# Patient Record
Sex: FEMALE | Race: WHITE | NOT HISPANIC OR LATINO | URBAN - METROPOLITAN AREA
[De-identification: names, ages, dates, MRNs, and addresses within clinical notes are randomized per-mention and may not be internally consistent; named-entity substitution may affect disease eponyms.]

---

## 2017-03-19 ENCOUNTER — EMERGENCY (EMERGENCY)
Facility: HOSPITAL | Age: 62
LOS: 1 days | Discharge: HOME | End: 2017-03-19
Attending: EMERGENCY MEDICINE | Admitting: EMERGENCY MEDICINE
Payer: COMMERCIAL

## 2017-03-19 VITALS
TEMPERATURE: 98 F | OXYGEN SATURATION: 98 % | SYSTOLIC BLOOD PRESSURE: 173 MMHG | DIASTOLIC BLOOD PRESSURE: 100 MMHG | RESPIRATION RATE: 24 BRPM | WEIGHT: 147.93 LBS | HEIGHT: 65 IN | HEART RATE: 89 BPM

## 2017-03-19 VITALS
DIASTOLIC BLOOD PRESSURE: 76 MMHG | TEMPERATURE: 98 F | HEART RATE: 75 BPM | RESPIRATION RATE: 16 BRPM | SYSTOLIC BLOOD PRESSURE: 155 MMHG | OXYGEN SATURATION: 100 %

## 2017-03-19 DIAGNOSIS — E11.9 TYPE 2 DIABETES MELLITUS WITHOUT COMPLICATIONS: ICD-10-CM

## 2017-03-19 DIAGNOSIS — K74.60 UNSPECIFIED CIRRHOSIS OF LIVER: ICD-10-CM

## 2017-03-19 DIAGNOSIS — Z90.89 ACQUIRED ABSENCE OF OTHER ORGANS: ICD-10-CM

## 2017-03-19 DIAGNOSIS — Z98.890 OTHER SPECIFIED POSTPROCEDURAL STATES: Chronic | ICD-10-CM

## 2017-03-19 DIAGNOSIS — R10.11 RIGHT UPPER QUADRANT PAIN: ICD-10-CM

## 2017-03-19 DIAGNOSIS — E89.0 POSTPROCEDURAL HYPOTHYROIDISM: Chronic | ICD-10-CM

## 2017-03-19 DIAGNOSIS — Z90.49 ACQUIRED ABSENCE OF OTHER SPECIFIED PARTS OF DIGESTIVE TRACT: Chronic | ICD-10-CM

## 2017-03-19 DIAGNOSIS — F17.200 NICOTINE DEPENDENCE, UNSPECIFIED, UNCOMPLICATED: ICD-10-CM

## 2017-03-19 LAB
ACETONE SERPL-MCNC: NEGATIVE — SIGNIFICANT CHANGE UP
ALBUMIN SERPL ELPH-MCNC: 4 G/DL — SIGNIFICANT CHANGE UP (ref 3.3–5)
ALP SERPL-CCNC: 103 U/L — SIGNIFICANT CHANGE UP (ref 40–120)
ALT FLD-CCNC: 30 U/L RC — SIGNIFICANT CHANGE UP (ref 10–45)
ANION GAP SERPL CALC-SCNC: 14 MMOL/L — SIGNIFICANT CHANGE UP (ref 5–17)
APPEARANCE UR: CLEAR — SIGNIFICANT CHANGE UP
APTT BLD: 34.8 SEC — SIGNIFICANT CHANGE UP (ref 27.5–37.4)
AST SERPL-CCNC: 19 U/L — SIGNIFICANT CHANGE UP (ref 10–40)
BASOPHILS # BLD AUTO: 0.1 K/UL — SIGNIFICANT CHANGE UP (ref 0–0.2)
BASOPHILS NFR BLD AUTO: 1 % — SIGNIFICANT CHANGE UP (ref 0–2)
BILIRUB SERPL-MCNC: 0.4 MG/DL — SIGNIFICANT CHANGE UP (ref 0.2–1.2)
BILIRUB UR-MCNC: NEGATIVE — SIGNIFICANT CHANGE UP
BUN SERPL-MCNC: 17 MG/DL — SIGNIFICANT CHANGE UP (ref 7–23)
CALCIUM SERPL-MCNC: 9.2 MG/DL — SIGNIFICANT CHANGE UP (ref 8.4–10.5)
CHLORIDE SERPL-SCNC: 100 MMOL/L — SIGNIFICANT CHANGE UP (ref 96–108)
CO2 SERPL-SCNC: 26 MMOL/L — SIGNIFICANT CHANGE UP (ref 22–31)
COLOR SPEC: SIGNIFICANT CHANGE UP
COMMENT - URINE: SIGNIFICANT CHANGE UP
CREAT SERPL-MCNC: 0.93 MG/DL — SIGNIFICANT CHANGE UP (ref 0.5–1.3)
DIFF PNL FLD: NEGATIVE — SIGNIFICANT CHANGE UP
EOSINOPHIL # BLD AUTO: 0.1 K/UL — SIGNIFICANT CHANGE UP (ref 0–0.5)
EOSINOPHIL NFR BLD AUTO: 1.5 % — SIGNIFICANT CHANGE UP (ref 0–6)
GAS PNL BLDV: SIGNIFICANT CHANGE UP
GLUCOSE SERPL-MCNC: 428 MG/DL — HIGH (ref 70–99)
GLUCOSE UR QL: >1000
HCT VFR BLD CALC: 44.5 % — SIGNIFICANT CHANGE UP (ref 34.5–45)
HGB BLD-MCNC: 15.4 G/DL — SIGNIFICANT CHANGE UP (ref 11.5–15.5)
INR BLD: 1.07 RATIO — SIGNIFICANT CHANGE UP (ref 0.88–1.16)
KETONES UR-MCNC: NEGATIVE — SIGNIFICANT CHANGE UP
LEUKOCYTE ESTERASE UR-ACNC: NEGATIVE — SIGNIFICANT CHANGE UP
LIDOCAIN IGE QN: 55 U/L — SIGNIFICANT CHANGE UP (ref 7–60)
LYMPHOCYTES # BLD AUTO: 2.2 K/UL — SIGNIFICANT CHANGE UP (ref 1–3.3)
LYMPHOCYTES # BLD AUTO: 32.1 % — SIGNIFICANT CHANGE UP (ref 13–44)
MAGNESIUM SERPL-MCNC: 1.8 MG/DL — SIGNIFICANT CHANGE UP (ref 1.6–2.6)
MCHC RBC-ENTMCNC: 33.9 PG — SIGNIFICANT CHANGE UP (ref 27–34)
MCHC RBC-ENTMCNC: 34.7 GM/DL — SIGNIFICANT CHANGE UP (ref 32–36)
MCV RBC AUTO: 97.8 FL — SIGNIFICANT CHANGE UP (ref 80–100)
MONOCYTES # BLD AUTO: 0.4 K/UL — SIGNIFICANT CHANGE UP (ref 0–0.9)
MONOCYTES NFR BLD AUTO: 5.8 % — SIGNIFICANT CHANGE UP (ref 2–14)
NEUTROPHILS # BLD AUTO: 4.1 K/UL — SIGNIFICANT CHANGE UP (ref 1.8–7.4)
NEUTROPHILS NFR BLD AUTO: 59.6 % — SIGNIFICANT CHANGE UP (ref 43–77)
NITRITE UR-MCNC: NEGATIVE — SIGNIFICANT CHANGE UP
PH UR: 6 — SIGNIFICANT CHANGE UP (ref 4.8–8)
PHOSPHATE SERPL-MCNC: 3.4 MG/DL — SIGNIFICANT CHANGE UP (ref 2.5–4.5)
PLATELET # BLD AUTO: 91 K/UL — LOW (ref 150–400)
POTASSIUM SERPL-MCNC: 4 MMOL/L — SIGNIFICANT CHANGE UP (ref 3.5–5.3)
POTASSIUM SERPL-SCNC: 4 MMOL/L — SIGNIFICANT CHANGE UP (ref 3.5–5.3)
PROT SERPL-MCNC: 7.1 G/DL — SIGNIFICANT CHANGE UP (ref 6–8.3)
PROT UR-MCNC: 30 MG/DL
PROTHROM AB SERPL-ACNC: 11.7 SEC — SIGNIFICANT CHANGE UP (ref 10–13.1)
RBC # BLD: 4.55 M/UL — SIGNIFICANT CHANGE UP (ref 3.8–5.2)
RBC # FLD: 12.1 % — SIGNIFICANT CHANGE UP (ref 10.3–14.5)
RBC CASTS # UR COMP ASSIST: SIGNIFICANT CHANGE UP /HPF (ref 0–2)
SODIUM SERPL-SCNC: 140 MMOL/L — SIGNIFICANT CHANGE UP (ref 135–145)
SP GR SPEC: 1.02 — SIGNIFICANT CHANGE UP (ref 1.01–1.02)
UROBILINOGEN FLD QL: NEGATIVE — SIGNIFICANT CHANGE UP
WBC # BLD: 6.9 K/UL — SIGNIFICANT CHANGE UP (ref 3.8–10.5)
WBC # FLD AUTO: 6.9 K/UL — SIGNIFICANT CHANGE UP (ref 3.8–10.5)
WBC UR QL: SIGNIFICANT CHANGE UP /HPF (ref 0–5)

## 2017-03-19 PROCEDURE — 82947 ASSAY GLUCOSE BLOOD QUANT: CPT

## 2017-03-19 PROCEDURE — 99284 EMERGENCY DEPT VISIT MOD MDM: CPT | Mod: 25

## 2017-03-19 PROCEDURE — 74176 CT ABD & PELVIS W/O CONTRAST: CPT

## 2017-03-19 PROCEDURE — 80053 COMPREHEN METABOLIC PANEL: CPT

## 2017-03-19 PROCEDURE — 84295 ASSAY OF SERUM SODIUM: CPT

## 2017-03-19 PROCEDURE — 96376 TX/PRO/DX INJ SAME DRUG ADON: CPT

## 2017-03-19 PROCEDURE — 99285 EMERGENCY DEPT VISIT HI MDM: CPT | Mod: 25

## 2017-03-19 PROCEDURE — 81001 URINALYSIS AUTO W/SCOPE: CPT

## 2017-03-19 PROCEDURE — 84100 ASSAY OF PHOSPHORUS: CPT

## 2017-03-19 PROCEDURE — 82803 BLOOD GASES ANY COMBINATION: CPT

## 2017-03-19 PROCEDURE — 82330 ASSAY OF CALCIUM: CPT

## 2017-03-19 PROCEDURE — 85027 COMPLETE CBC AUTOMATED: CPT

## 2017-03-19 PROCEDURE — 74176 CT ABD & PELVIS W/O CONTRAST: CPT | Mod: 26

## 2017-03-19 PROCEDURE — 83735 ASSAY OF MAGNESIUM: CPT

## 2017-03-19 PROCEDURE — 83605 ASSAY OF LACTIC ACID: CPT

## 2017-03-19 PROCEDURE — 84132 ASSAY OF SERUM POTASSIUM: CPT

## 2017-03-19 PROCEDURE — 85610 PROTHROMBIN TIME: CPT

## 2017-03-19 PROCEDURE — 85014 HEMATOCRIT: CPT

## 2017-03-19 PROCEDURE — 82009 KETONE BODYS QUAL: CPT

## 2017-03-19 PROCEDURE — 82435 ASSAY OF BLOOD CHLORIDE: CPT

## 2017-03-19 PROCEDURE — 85730 THROMBOPLASTIN TIME PARTIAL: CPT

## 2017-03-19 PROCEDURE — 83690 ASSAY OF LIPASE: CPT

## 2017-03-19 PROCEDURE — 96375 TX/PRO/DX INJ NEW DRUG ADDON: CPT

## 2017-03-19 PROCEDURE — 96374 THER/PROPH/DIAG INJ IV PUSH: CPT

## 2017-03-19 RX ORDER — SODIUM CHLORIDE 9 MG/ML
1000 INJECTION, SOLUTION INTRAVENOUS ONCE
Qty: 0 | Refills: 0 | Status: COMPLETED | OUTPATIENT
Start: 2017-03-19 | End: 2017-03-19

## 2017-03-19 RX ORDER — MORPHINE SULFATE 50 MG/1
4 CAPSULE, EXTENDED RELEASE ORAL ONCE
Qty: 0 | Refills: 0 | Status: DISCONTINUED | OUTPATIENT
Start: 2017-03-19 | End: 2017-03-19

## 2017-03-19 RX ORDER — DOCUSATE SODIUM 100 MG
1 CAPSULE ORAL
Qty: 14 | Refills: 0
Start: 2017-03-19 | End: 2017-03-26

## 2017-03-19 RX ORDER — OXYCODONE HYDROCHLORIDE 5 MG/1
1 TABLET ORAL
Qty: 12 | Refills: 0
Start: 2017-03-19 | End: 2017-03-22

## 2017-03-19 RX ORDER — ACETAMINOPHEN 500 MG
1000 TABLET ORAL ONCE
Qty: 0 | Refills: 0 | Status: COMPLETED | OUTPATIENT
Start: 2017-03-19 | End: 2017-03-19

## 2017-03-19 RX ORDER — SENNA PLUS 8.6 MG/1
1 TABLET ORAL
Qty: 7 | Refills: 0
Start: 2017-03-19 | End: 2017-03-26

## 2017-03-19 RX ORDER — ONDANSETRON 8 MG/1
4 TABLET, FILM COATED ORAL ONCE
Qty: 0 | Refills: 0 | Status: COMPLETED | OUTPATIENT
Start: 2017-03-19 | End: 2017-03-19

## 2017-03-19 RX ORDER — HYDROMORPHONE HYDROCHLORIDE 2 MG/ML
0.5 INJECTION INTRAMUSCULAR; INTRAVENOUS; SUBCUTANEOUS ONCE
Qty: 0 | Refills: 0 | Status: DISCONTINUED | OUTPATIENT
Start: 2017-03-19 | End: 2017-03-19

## 2017-03-19 RX ORDER — SODIUM CHLORIDE 9 MG/ML
1000 INJECTION INTRAMUSCULAR; INTRAVENOUS; SUBCUTANEOUS ONCE
Qty: 0 | Refills: 0 | Status: COMPLETED | OUTPATIENT
Start: 2017-03-19 | End: 2017-03-19

## 2017-03-19 RX ADMIN — MORPHINE SULFATE 4 MILLIGRAM(S): 50 CAPSULE, EXTENDED RELEASE ORAL at 06:41

## 2017-03-19 RX ADMIN — Medication 400 MILLIGRAM(S): at 06:10

## 2017-03-19 RX ADMIN — MORPHINE SULFATE 4 MILLIGRAM(S): 50 CAPSULE, EXTENDED RELEASE ORAL at 07:35

## 2017-03-19 RX ADMIN — Medication 1000 MILLIGRAM(S): at 06:42

## 2017-03-19 RX ADMIN — SODIUM CHLORIDE 1000 MILLILITER(S): 9 INJECTION INTRAMUSCULAR; INTRAVENOUS; SUBCUTANEOUS at 06:10

## 2017-03-19 RX ADMIN — SODIUM CHLORIDE 4000 MILLILITER(S): 9 INJECTION, SOLUTION INTRAVENOUS at 08:03

## 2017-03-19 RX ADMIN — HYDROMORPHONE HYDROCHLORIDE 0.5 MILLIGRAM(S): 2 INJECTION INTRAMUSCULAR; INTRAVENOUS; SUBCUTANEOUS at 10:54

## 2017-03-19 RX ADMIN — MORPHINE SULFATE 4 MILLIGRAM(S): 50 CAPSULE, EXTENDED RELEASE ORAL at 07:11

## 2017-03-19 RX ADMIN — MORPHINE SULFATE 4 MILLIGRAM(S): 50 CAPSULE, EXTENDED RELEASE ORAL at 08:03

## 2017-03-19 RX ADMIN — ONDANSETRON 4 MILLIGRAM(S): 8 TABLET, FILM COATED ORAL at 06:10

## 2017-03-19 NOTE — ED PROVIDER NOTE - CARE PLAN
Instructions for follow-up, activity and diet:	You were seen in the Emergency Department for abdominal pain. Your examination and lab tests were reassuring. Please follow up with your regular physician this week for reevaluation. Please follow up with your gastroenterologist as soon as possible for further evaluation. Take the pain medications as needed. Please return to the Emergency Department if you have any new concerning symptoms such as severe pain, weakness, or any other concerning symptoms. Principal Discharge DX:	Cirrhosis  Instructions for follow-up, activity and diet:	You were seen in the Emergency Department for abdominal pain. Your examination and lab tests were reassuring. Please follow up with your regular physician this week for reevaluation. Please follow up with your gastroenterologist as soon as possible for further evaluation. Take the pain medications as needed. Please return to the Emergency Department if you have any new concerning symptoms such as severe pain, weakness, or any other concerning symptoms.

## 2017-03-19 NOTE — ED ADULT NURSE NOTE - PMH
Bernard syndrome    Diabetes    Gastroptosis    Liver disease    Pancreatitis Bernard syndrome    Cirrhosis    Diabetes    Gastroptosis    Liver disease    Pancreatitis

## 2017-03-19 NOTE — ED PROVIDER NOTE - NS ED ROS FT
ROS: No fever/chills, no eye pain, no throat pain, no chest pain, no shortness of breath, pos  abdominal pain,  no dysuria, no muscle pain, no rashes, no focal neurologic complaints, no known mental health issues

## 2017-03-19 NOTE — ED PROVIDER NOTE - PHYSICAL EXAMINATION
Aida: A & O x 3, NAD, HEENT WNL and no facial asymmetry; lungs CTAB, heart with reg rhythm without murmur; abdomen soft with right upper quadrant tenderness on lateral upper quadrandt. extremities with no edema; skin with no rashes, neuro exam non focal with no motor or sensory deficits

## 2017-03-19 NOTE — ED PROVIDER NOTE - OBJECTIVE STATEMENT
61 year old, diabetic with Hep C and cirrhosis presenting with sharp right upper quadrant pain . 61 year old, diabetic with Hep C and cirrhosis presenting with sharp right upper quadrant pain occasionally radiating to the back.     GI: antionette bradford  Pmd: [facility] 61 year old, diabetic with Hep C, intermittent pancreatitis, and cirrhosis presenting with sharp right upper quadrant pain occasionally radiating to the back. nausea with no vomit. pain is 3x per year for the past few years.     GI: antionette bradford  Pmd: [facility] Dr. Ranjit Blcok 61 year old, diabetic with Hep C (since transfusion during birth), intermittent pancreatitis, and cirrhosis presenting with sharp right upper quadrant pain occasionally radiating to the back. nausea with no vomit. pain is 3x per year for the past few years. states chronic abdominal pain which requires narcotics. On Istop patient found to have 3 vicodin per day rx for at least the past 6 months.     GI: antionette bradford  Pmd: [facility] Dr. Ranjit Block

## 2017-03-19 NOTE — ED ADULT NURSE REASSESSMENT NOTE - NS ED NURSE REASSESS COMMENT FT1
vss; bp high secondary to pain; adv md marrufo; pt drinking for ct scan; c/o 7/10 pain; adv md stock

## 2017-03-19 NOTE — ED PROVIDER NOTE - ATTENDING CONTRIBUTION TO CARE
61 year old, diabetic with Hep C, intermittent pancreatitis, and cirrhosis c/o ruq tend sharp with soft abd. pt came to see gi.  ct a/p, labs, and analgesia

## 2017-03-19 NOTE — ED ADULT NURSE NOTE - PSH
H/O total thyroidectomy    History of cholecystectomy    History of liver biopsy H/O total thyroidectomy    History of cholecystectomy  1990s  History of liver biopsy

## 2017-03-19 NOTE — ED ADULT NURSE NOTE - OBJECTIVE STATEMENT
61 year old female with Hx of hep C, liver cirrhosis, gall bladder removal presented to ED complaining of RUQ and RLQ pain 7/10  and nausea that has been off and on for a week. Pt was discharged from [facility] 2 days ago with 'abdominal pain' and some minor ascites. Pt states that the pain has been getting worse in the last 2 hours and she came here because her MD is here. Pt is A&O x 4, VSS, afebrile in ED, ambulates independently. Abdomen is distended and tender to palpation. + bowel sounds. IV placed, labs drawn, bed in low position.

## 2017-03-19 NOTE — ED PROVIDER NOTE - PLAN OF CARE
You were seen in the Emergency Department for abdominal pain. Your examination and lab tests were reassuring. Please follow up with your regular physician this week for reevaluation. Please follow up with your gastroenterologist as soon as possible for further evaluation. Take the pain medications as needed. Please return to the Emergency Department if you have any new concerning symptoms such as severe pain, weakness, or any other concerning symptoms.

## 2017-03-19 NOTE — ED PROVIDER NOTE - PROGRESS NOTE DETAILS
Spoke with GI regarding abdominal pain, CT showing cirrhosis, splenomegaly. Report that this pain is chronic and that she does not need further GI work up in ED. Pt. with recent EGD, colonoscopy, MRI that were normal. would like patient to follow up with DR. López this week. Patient agrees with discharge and outpatient follow up with strict return precautions.

## 2017-03-29 ENCOUNTER — APPOINTMENT (OUTPATIENT)
Age: 62
End: 2017-03-29

## 2017-03-29 VITALS
BODY MASS INDEX: 25.49 KG/M2 | HEIGHT: 65 IN | HEART RATE: 85 BPM | WEIGHT: 153 LBS | TEMPERATURE: 98.3 F | SYSTOLIC BLOOD PRESSURE: 116 MMHG | DIASTOLIC BLOOD PRESSURE: 76 MMHG | RESPIRATION RATE: 14 BRPM

## 2017-06-28 ENCOUNTER — APPOINTMENT (OUTPATIENT)
Age: 62
End: 2017-06-28

## 2017-08-07 ENCOUNTER — APPOINTMENT (OUTPATIENT)
Age: 62
End: 2017-08-07
Payer: COMMERCIAL

## 2017-08-07 PROCEDURE — 91200 LIVER ELASTOGRAPHY: CPT

## 2018-02-07 ENCOUNTER — LABORATORY RESULT (OUTPATIENT)
Age: 63
End: 2018-02-07

## 2018-02-07 ENCOUNTER — APPOINTMENT (OUTPATIENT)
Age: 63
End: 2018-02-07
Payer: COMMERCIAL

## 2018-02-07 VITALS
BODY MASS INDEX: 23.99 KG/M2 | DIASTOLIC BLOOD PRESSURE: 78 MMHG | WEIGHT: 144 LBS | TEMPERATURE: 98.1 F | SYSTOLIC BLOOD PRESSURE: 129 MMHG | HEART RATE: 76 BPM | RESPIRATION RATE: 14 BRPM | HEIGHT: 65 IN

## 2018-02-07 DIAGNOSIS — K74.60 CHRONIC VIRAL HEPATITIS C: ICD-10-CM

## 2018-02-07 DIAGNOSIS — B18.2 CHRONIC VIRAL HEPATITIS C: ICD-10-CM

## 2018-02-07 DIAGNOSIS — F17.200 NICOTINE DEPENDENCE, UNSPECIFIED, UNCOMPLICATED: ICD-10-CM

## 2018-02-07 PROCEDURE — 99214 OFFICE O/P EST MOD 30 MIN: CPT

## 2018-02-08 LAB
AFP-TM SERPL-MCNC: 4.3 NG/ML
ALBUMIN SERPL ELPH-MCNC: 3.5 G/DL
ALP BLD-CCNC: 97 U/L
ALT SERPL-CCNC: 53 U/L
ANION GAP SERPL CALC-SCNC: 15 MMOL/L
AST SERPL-CCNC: 38 U/L
BASOPHILS # BLD AUTO: 0.02 K/UL
BASOPHILS NFR BLD AUTO: 0.4 %
BILIRUB SERPL-MCNC: 0.4 MG/DL
BUN SERPL-MCNC: 20 MG/DL
CALCIUM SERPL-MCNC: 9 MG/DL
CHLORIDE SERPL-SCNC: 99 MMOL/L
CO2 SERPL-SCNC: 23 MMOL/L
CREAT SERPL-MCNC: 0.87 MG/DL
EOSINOPHIL # BLD AUTO: 0.09 K/UL
EOSINOPHIL NFR BLD AUTO: 1.7 %
HCT VFR BLD CALC: 41 %
HGB BLD-MCNC: 14.2 G/DL
IMM GRANULOCYTES NFR BLD AUTO: 0.2 %
INR PPP: 1.03 RATIO
LYMPHOCYTES # BLD AUTO: 1.85 K/UL
LYMPHOCYTES NFR BLD AUTO: 34.7 %
MAN DIFF?: NORMAL
MCHC RBC-ENTMCNC: 33.6 PG
MCHC RBC-ENTMCNC: 34.6 GM/DL
MCV RBC AUTO: 96.9 FL
MONOCYTES # BLD AUTO: 0.28 K/UL
MONOCYTES NFR BLD AUTO: 5.3 %
NEUTROPHILS # BLD AUTO: 3.08 K/UL
NEUTROPHILS NFR BLD AUTO: 57.7 %
PLATELET # BLD AUTO: 97 K/UL
POTASSIUM SERPL-SCNC: 4.6 MMOL/L
PROT SERPL-MCNC: 6.7 G/DL
PT BLD: 11.7 SEC
RBC # BLD: 4.23 M/UL
RBC # FLD: 13.3 %
SODIUM SERPL-SCNC: 137 MMOL/L
WBC # FLD AUTO: 5.33 K/UL

## 2018-02-09 LAB
HCV RNA SERPL NAA DL=5-ACNC: NOT DETECTED IU/ML
HCV RNA SERPL NAA+PROBE-LOG IU: NOT DETECTED LOGIU/ML

## 2018-02-11 ENCOUNTER — FORM ENCOUNTER (OUTPATIENT)
Age: 63
End: 2018-02-11

## 2018-02-12 ENCOUNTER — OUTPATIENT (OUTPATIENT)
Dept: OUTPATIENT SERVICES | Facility: HOSPITAL | Age: 63
LOS: 1 days | End: 2018-02-12

## 2018-02-12 ENCOUNTER — APPOINTMENT (OUTPATIENT)
Dept: ULTRASOUND IMAGING | Facility: CLINIC | Age: 63
End: 2018-02-12
Payer: COMMERCIAL

## 2018-02-12 DIAGNOSIS — B18.2 CHRONIC VIRAL HEPATITIS C: ICD-10-CM

## 2018-02-12 DIAGNOSIS — Z90.49 ACQUIRED ABSENCE OF OTHER SPECIFIED PARTS OF DIGESTIVE TRACT: Chronic | ICD-10-CM

## 2018-02-12 DIAGNOSIS — E89.0 POSTPROCEDURAL HYPOTHYROIDISM: Chronic | ICD-10-CM

## 2018-02-12 DIAGNOSIS — Z98.890 OTHER SPECIFIED POSTPROCEDURAL STATES: Chronic | ICD-10-CM

## 2018-02-12 PROCEDURE — 76700 US EXAM ABDOM COMPLETE: CPT | Mod: 26

## 2018-03-12 ENCOUNTER — APPOINTMENT (OUTPATIENT)
Dept: GASTROENTEROLOGY | Facility: CLINIC | Age: 63
End: 2018-03-12
Payer: COMMERCIAL

## 2018-03-12 VITALS
OXYGEN SATURATION: 97 % | DIASTOLIC BLOOD PRESSURE: 64 MMHG | HEART RATE: 58 BPM | BODY MASS INDEX: 23.99 KG/M2 | HEIGHT: 65 IN | TEMPERATURE: 97.5 F | RESPIRATION RATE: 12 BRPM | WEIGHT: 144 LBS | SYSTOLIC BLOOD PRESSURE: 118 MMHG

## 2018-03-12 DIAGNOSIS — R63.4 ABNORMAL WEIGHT LOSS: ICD-10-CM

## 2018-03-12 DIAGNOSIS — R10.11 RIGHT UPPER QUADRANT PAIN: ICD-10-CM

## 2018-03-12 PROCEDURE — 99213 OFFICE O/P EST LOW 20 MIN: CPT

## 2018-03-22 ENCOUNTER — APPOINTMENT (OUTPATIENT)
Dept: CT IMAGING | Facility: CLINIC | Age: 63
End: 2018-03-22

## 2018-03-30 DIAGNOSIS — Z91.041 RADIOGRAPHIC DYE ALLERGY STATUS: ICD-10-CM

## 2018-04-01 ENCOUNTER — FORM ENCOUNTER (OUTPATIENT)
Age: 63
End: 2018-04-01

## 2018-04-02 ENCOUNTER — APPOINTMENT (OUTPATIENT)
Dept: CT IMAGING | Facility: CLINIC | Age: 63
End: 2018-04-02
Payer: COMMERCIAL

## 2018-04-02 ENCOUNTER — OUTPATIENT (OUTPATIENT)
Dept: OUTPATIENT SERVICES | Facility: HOSPITAL | Age: 63
LOS: 1 days | End: 2018-04-02

## 2018-04-02 DIAGNOSIS — Z98.890 OTHER SPECIFIED POSTPROCEDURAL STATES: Chronic | ICD-10-CM

## 2018-04-02 DIAGNOSIS — Z00.8 ENCOUNTER FOR OTHER GENERAL EXAMINATION: ICD-10-CM

## 2018-04-02 DIAGNOSIS — E89.0 POSTPROCEDURAL HYPOTHYROIDISM: Chronic | ICD-10-CM

## 2018-04-02 DIAGNOSIS — Z90.49 ACQUIRED ABSENCE OF OTHER SPECIFIED PARTS OF DIGESTIVE TRACT: Chronic | ICD-10-CM

## 2018-04-02 PROCEDURE — 74177 CT ABD & PELVIS W/CONTRAST: CPT | Mod: 26

## 2018-06-13 ENCOUNTER — APPOINTMENT (OUTPATIENT)
Dept: GASTROENTEROLOGY | Facility: CLINIC | Age: 63
End: 2018-06-13

## 2018-08-15 ENCOUNTER — APPOINTMENT (OUTPATIENT)
Dept: HEPATOLOGY | Facility: CLINIC | Age: 63
End: 2018-08-15

## 2018-08-27 PROBLEM — K85.90 ACUTE PANCREATITIS WITHOUT NECROSIS OR INFECTION, UNSPECIFIED: Chronic | Status: ACTIVE | Noted: 2017-03-19

## 2018-08-27 PROBLEM — E11.9 TYPE 2 DIABETES MELLITUS WITHOUT COMPLICATIONS: Chronic | Status: ACTIVE | Noted: 2017-03-19

## 2018-08-27 PROBLEM — K74.60 UNSPECIFIED CIRRHOSIS OF LIVER: Chronic | Status: ACTIVE | Noted: 2017-03-19

## 2018-08-27 PROBLEM — K22.70 BARRETT'S ESOPHAGUS WITHOUT DYSPLASIA: Chronic | Status: ACTIVE | Noted: 2017-03-19

## 2018-10-11 ENCOUNTER — APPOINTMENT (OUTPATIENT)
Dept: ENDOCRINOLOGY | Facility: CLINIC | Age: 63
End: 2018-10-11
Payer: COMMERCIAL

## 2018-10-11 VITALS
BODY MASS INDEX: 24.66 KG/M2 | HEART RATE: 79 BPM | WEIGHT: 148 LBS | SYSTOLIC BLOOD PRESSURE: 108 MMHG | DIASTOLIC BLOOD PRESSURE: 64 MMHG | HEIGHT: 65 IN

## 2018-10-11 LAB — GLUCOSE BLDC GLUCOMTR-MCNC: 304

## 2018-10-11 PROCEDURE — 82962 GLUCOSE BLOOD TEST: CPT

## 2018-10-11 PROCEDURE — 99215 OFFICE O/P EST HI 40 MIN: CPT | Mod: 25

## 2018-10-11 RX ORDER — BLOOD-GLUCOSE METER
EACH MISCELLANEOUS
Refills: 0 | Status: ACTIVE | COMMUNITY

## 2018-10-11 RX ORDER — RANITIDINE HYDROCHLORIDE 150 MG/1
150 CAPSULE ORAL TWICE DAILY
Qty: 60 | Refills: 3 | Status: DISCONTINUED | COMMUNITY
Start: 2018-03-12 | End: 2018-10-11

## 2018-10-11 RX ORDER — LANCETS 18 GAUGE
EACH MISCELLANEOUS
Qty: 600 | Refills: 1 | Status: ACTIVE | COMMUNITY

## 2018-11-21 ENCOUNTER — APPOINTMENT (OUTPATIENT)
Dept: ENDOCRINOLOGY | Facility: CLINIC | Age: 63
End: 2018-11-21

## 2018-12-19 ENCOUNTER — APPOINTMENT (OUTPATIENT)
Dept: HEPATOLOGY | Facility: CLINIC | Age: 63
End: 2018-12-19

## 2019-01-07 ENCOUNTER — MEDICATION RENEWAL (OUTPATIENT)
Age: 64
End: 2019-01-07

## 2019-01-07 ENCOUNTER — APPOINTMENT (OUTPATIENT)
Dept: HEPATOLOGY | Facility: CLINIC | Age: 64
End: 2019-01-07
Payer: COMMERCIAL

## 2019-01-07 VITALS
SYSTOLIC BLOOD PRESSURE: 165 MMHG | WEIGHT: 148 LBS | DIASTOLIC BLOOD PRESSURE: 84 MMHG | HEIGHT: 65 IN | HEART RATE: 76 BPM | TEMPERATURE: 98.2 F | BODY MASS INDEX: 24.66 KG/M2 | RESPIRATION RATE: 12 BRPM

## 2019-01-07 LAB
AFP-TM SERPL-MCNC: 5.2 NG/ML
ALBUMIN SERPL ELPH-MCNC: 3.4 G/DL
ALP BLD-CCNC: 77 U/L
ALT SERPL-CCNC: 15 U/L
ANION GAP SERPL CALC-SCNC: 9 MMOL/L
AST SERPL-CCNC: 15 U/L
BASOPHILS # BLD AUTO: 0.04 K/UL
BASOPHILS NFR BLD AUTO: 0.6 %
BILIRUB SERPL-MCNC: 0.4 MG/DL
BUN SERPL-MCNC: 22 MG/DL
CALCIUM SERPL-MCNC: 8.7 MG/DL
CHLORIDE SERPL-SCNC: 104 MMOL/L
CO2 SERPL-SCNC: 26 MMOL/L
CREAT SERPL-MCNC: 0.7 MG/DL
EOSINOPHIL # BLD AUTO: 0.3 K/UL
EOSINOPHIL NFR BLD AUTO: 4.7 %
HCT VFR BLD CALC: 39.6 %
HGB BLD-MCNC: 13.7 G/DL
IMM GRANULOCYTES NFR BLD AUTO: 0.2 %
INR PPP: 1.05 RATIO
LYMPHOCYTES # BLD AUTO: 1.91 K/UL
LYMPHOCYTES NFR BLD AUTO: 30.2 %
MAN DIFF?: NORMAL
MCHC RBC-ENTMCNC: 31.6 PG
MCHC RBC-ENTMCNC: 34.6 GM/DL
MCV RBC AUTO: 91.2 FL
MONOCYTES # BLD AUTO: 0.32 K/UL
MONOCYTES NFR BLD AUTO: 5.1 %
NEUTROPHILS # BLD AUTO: 3.75 K/UL
NEUTROPHILS NFR BLD AUTO: 59.2 %
PLATELET # BLD AUTO: 110 K/UL
POTASSIUM SERPL-SCNC: 4 MMOL/L
PROT SERPL-MCNC: 6.4 G/DL
PT BLD: 12 SEC
RBC # BLD: 4.34 M/UL
RBC # FLD: 13.6 %
SODIUM SERPL-SCNC: 139 MMOL/L
WBC # FLD AUTO: 6.33 K/UL

## 2019-01-07 PROCEDURE — 99214 OFFICE O/P EST MOD 30 MIN: CPT

## 2019-01-07 RX ORDER — BLOOD SUGAR DIAGNOSTIC
STRIP MISCELLANEOUS
Qty: 600 | Refills: 1 | Status: DISCONTINUED | COMMUNITY
End: 2019-01-07

## 2019-01-07 NOTE — CONSULT LETTER
[Dear  ___] : Dear  [unfilled], [Courtesy Letter:] : I had the pleasure of seeing your patient, [unfilled], in my office today. [Please see my note below.] : Please see my note below. [Consult Closing:] : Thank you very much for allowing me to participate in the care of this patient.  If you have any questions, please do not hesitate to contact me. [Sincerely,] : Sincerely, [Ernesto López MD, FACP, TYREL, ROSY, RHONDA] : Ernesto López MD, FACP, TYREL, ROSY, RHONDA [Chief, Division of Hepatology] : Chief, Division of Hepatology [[facility]] : [facility] [Professor of Medicine] : Professor of Medicine [[facility] [employer] [facility]] : [facility]

## 2019-01-07 NOTE — ASSESSMENT
[FreeTextEntry1] : 63-year-old woman with cirrhosis secondary to hepatitis C who has had a sustained virologic response to hepatitis C therapy.\par \par I explained the meaning of sustained virological response. I explained the that hepatitis C antibody will be present for life. I explained that the patient will not be able to donate blood because of the positive antibody. I have explained that the antibody is not protective and that hepatitis C infection can be acquired again if  exposed. I reviewed the risk factors for acquiring hepatitis C. \par \par I discussed the meaning of cirrhosis with the patient. I reviewed the natural history of the disease. I explained the risks for the development of esophageal varices with and without bleeding, hepatic encephalopathy, ascites, hepatocellular carcinoma, and liver failure. I have explained that disease can progress to the point of requiring an evaluation for liver transplantation. I have explained the need for imaging every 6 months to screen for liver cancer.\par \par She has no evidence of encephalopathy on physical examination. I have asked her to get a copy of the CT scan report done last month sent to me. If this is without contrast, I am asked her to obtain an abdominal sonogram. I will speak with her on the phone regarding these results. I have recommended blood test to be performed today.\par \par I have recommended followup with GI for persistent abdominal discomfort.\par \par I've asked her to call me with any questions. There are no significant changes related to her liver, I would like to see her back in 6 months with repeat laboratory tests and abdominal sonogram

## 2019-01-07 NOTE — PHYSICAL EXAM
[Liver Size (___ Cm)] : Liver size [unfilled] cm [Cognitive Mini-Mental Status Normal?] : Cognitive Mini Mental Status Exam is normal [General Appearance - Alert] : alert [General Appearance - In No Acute Distress] : in no acute distress [Sclera] : the sclera and conjunctiva were normal [PERRL With Normal Accommodation] : pupils were equal in size, round, and reactive to light [Neck Appearance] : the appearance of the neck was normal [Neck Cervical Mass (___cm)] : no neck mass was observed [Jugular Venous Distention Increased] : there was no jugular-venous distention [Thyroid Diffuse Enlargement] : the thyroid was not enlarged [Thyroid Nodule] : there were no palpable thyroid nodules [Auscultation Breath Sounds / Voice Sounds] : lungs were clear to auscultation bilaterally [Heart Rate And Rhythm] : heart rate was normal and rhythm regular [Heart Sounds] : normal S1 and S2 [Heart Sounds Gallop] : no gallops [Murmurs] : no murmurs [Heart Sounds Pericardial Friction Rub] : no pericardial rub [Edema] : there was no peripheral edema [Bowel Sounds] : normal bowel sounds [Abdomen Soft] : soft [No CVA Tenderness] : no ~M costovertebral angle tenderness [No Spinal Tenderness] : no spinal tenderness [Abnormal Walk] : normal gait [Nail Clubbing] : no clubbing  or cyanosis of the fingernails [Musculoskeletal - Swelling] : no joint swelling seen [Motor Tone] : muscle strength and tone were normal [Skin Color & Pigmentation] : normal skin color and pigmentation [Skin Turgor] : normal skin turgor [] : no rash [Oriented To Time, Place, And Person] : oriented to person, place, and time [Impaired Insight] : insight and judgment were intact [Affect] : the affect was normal [Scleral Icterus] : No Scleral Icterus [Hepatojugular Reflux] : patient did not have a sustained hepatojugular reflux [Spider Angioma] : No spider angioma(s) were observed [Abdominal Bruit] : no abdominal bruit [Splenomegaly] : no splenomegaly [Scrotal Edema] : Scrotum is not edematous [Asterixis] : no asterixis observed [Jaundice] : No jaundice [Palmar Erythema] : no Palmar Erythema [Depression] : no depression [FreeTextEntry1] : Mild tenderness in the right upper quadrant to deep palpation, no rebound, no guarding, liver edge is palpable below the costal margin and is irregular. Her spleen is not palpable

## 2019-01-08 ENCOUNTER — MEDICATION RENEWAL (OUTPATIENT)
Age: 64
End: 2019-01-08

## 2019-01-25 ENCOUNTER — RECORD ABSTRACTING (OUTPATIENT)
Age: 64
End: 2019-01-25

## 2019-01-25 DIAGNOSIS — Z83.3 FAMILY HISTORY OF DIABETES MELLITUS: ICD-10-CM

## 2019-01-25 DIAGNOSIS — Z85.850 PERSONAL HISTORY OF MALIGNANT NEOPLASM OF THYROID: ICD-10-CM

## 2019-01-25 DIAGNOSIS — Z86.59 PERSONAL HISTORY OF OTHER MENTAL AND BEHAVIORAL DISORDERS: ICD-10-CM

## 2019-01-25 DIAGNOSIS — Z80.9 FAMILY HISTORY OF MALIGNANT NEOPLASM, UNSPECIFIED: ICD-10-CM

## 2019-02-04 ENCOUNTER — APPOINTMENT (OUTPATIENT)
Dept: GASTROENTEROLOGY | Facility: CLINIC | Age: 64
End: 2019-02-04

## 2019-02-11 ENCOUNTER — APPOINTMENT (OUTPATIENT)
Dept: ENDOCRINOLOGY | Facility: CLINIC | Age: 64
End: 2019-02-11

## 2019-05-09 ENCOUNTER — APPOINTMENT (OUTPATIENT)
Dept: ENDOCRINOLOGY | Facility: CLINIC | Age: 64
End: 2019-05-09
Payer: COMMERCIAL

## 2019-05-09 ENCOUNTER — EMERGENCY (EMERGENCY)
Facility: HOSPITAL | Age: 64
LOS: 1 days | Discharge: HOME | End: 2019-05-09
Attending: STUDENT IN AN ORGANIZED HEALTH CARE EDUCATION/TRAINING PROGRAM
Payer: COMMERCIAL

## 2019-05-09 VITALS
TEMPERATURE: 99 F | HEIGHT: 65 IN | OXYGEN SATURATION: 99 % | WEIGHT: 151.02 LBS | HEART RATE: 64 BPM | DIASTOLIC BLOOD PRESSURE: 76 MMHG | SYSTOLIC BLOOD PRESSURE: 145 MMHG | RESPIRATION RATE: 16 BRPM

## 2019-05-09 VITALS
HEIGHT: 65 IN | DIASTOLIC BLOOD PRESSURE: 88 MMHG | BODY MASS INDEX: 25.16 KG/M2 | SYSTOLIC BLOOD PRESSURE: 142 MMHG | WEIGHT: 151 LBS | HEART RATE: 65 BPM | OXYGEN SATURATION: 97 %

## 2019-05-09 DIAGNOSIS — Z98.890 OTHER SPECIFIED POSTPROCEDURAL STATES: Chronic | ICD-10-CM

## 2019-05-09 DIAGNOSIS — Z90.49 ACQUIRED ABSENCE OF OTHER SPECIFIED PARTS OF DIGESTIVE TRACT: Chronic | ICD-10-CM

## 2019-05-09 DIAGNOSIS — E89.0 POSTPROCEDURAL HYPOTHYROIDISM: Chronic | ICD-10-CM

## 2019-05-09 LAB
ACETONE SERPL-MCNC: NEGATIVE — SIGNIFICANT CHANGE UP
ALBUMIN SERPL ELPH-MCNC: 2.6 G/DL — LOW (ref 3.3–5.2)
ALP SERPL-CCNC: 142 U/L — HIGH (ref 40–120)
ALT FLD-CCNC: 38 U/L — HIGH
ANION GAP SERPL CALC-SCNC: 9 MMOL/L — SIGNIFICANT CHANGE UP (ref 5–17)
AST SERPL-CCNC: 33 U/L — HIGH
BASOPHILS # BLD AUTO: 0 K/UL — SIGNIFICANT CHANGE UP (ref 0–0.2)
BASOPHILS NFR BLD AUTO: 0.5 % — SIGNIFICANT CHANGE UP (ref 0–2)
BILIRUB SERPL-MCNC: <0.2 MG/DL — LOW (ref 0.4–2)
BUN SERPL-MCNC: 26 MG/DL — HIGH (ref 8–20)
CALCIUM SERPL-MCNC: 9 MG/DL — SIGNIFICANT CHANGE UP (ref 8.6–10.2)
CHLORIDE SERPL-SCNC: 99 MMOL/L — SIGNIFICANT CHANGE UP (ref 98–107)
CO2 SERPL-SCNC: 27 MMOL/L — SIGNIFICANT CHANGE UP (ref 22–29)
CREAT SERPL-MCNC: 0.77 MG/DL — SIGNIFICANT CHANGE UP (ref 0.5–1.3)
EOSINOPHIL # BLD AUTO: 0.2 K/UL — SIGNIFICANT CHANGE UP (ref 0–0.5)
EOSINOPHIL NFR BLD AUTO: 3 % — SIGNIFICANT CHANGE UP (ref 0–6)
GLUCOSE BLDC GLUCOMTR-MCNC: 371
GLUCOSE BLDC GLUCOMTR-MCNC: 438
GLUCOSE BLDC GLUCOMTR-MCNC: 445
GLUCOSE SERPL-MCNC: 335 MG/DL — HIGH (ref 70–115)
HCT VFR BLD CALC: 36.4 % — LOW (ref 37–47)
HGB BLD-MCNC: 12.8 G/DL — SIGNIFICANT CHANGE UP (ref 12–16)
LYMPHOCYTES # BLD AUTO: 2.5 K/UL — SIGNIFICANT CHANGE UP (ref 1–4.8)
LYMPHOCYTES # BLD AUTO: 40 % — SIGNIFICANT CHANGE UP (ref 20–55)
MCHC RBC-ENTMCNC: 32.7 PG — HIGH (ref 27–31)
MCHC RBC-ENTMCNC: 35.2 G/DL — SIGNIFICANT CHANGE UP (ref 32–36)
MCV RBC AUTO: 93.1 FL — SIGNIFICANT CHANGE UP (ref 81–99)
MONOCYTES # BLD AUTO: 0.4 K/UL — SIGNIFICANT CHANGE UP (ref 0–0.8)
MONOCYTES NFR BLD AUTO: 6.4 % — SIGNIFICANT CHANGE UP (ref 3–10)
NEUTROPHILS # BLD AUTO: 3.1 K/UL — SIGNIFICANT CHANGE UP (ref 1.8–8)
NEUTROPHILS NFR BLD AUTO: 49.9 % — SIGNIFICANT CHANGE UP (ref 37–73)
PLATELET # BLD AUTO: 111 K/UL — LOW (ref 150–400)
POTASSIUM SERPL-MCNC: 4.5 MMOL/L — SIGNIFICANT CHANGE UP (ref 3.5–5.3)
POTASSIUM SERPL-SCNC: 4.5 MMOL/L — SIGNIFICANT CHANGE UP (ref 3.5–5.3)
PROT SERPL-MCNC: 5.5 G/DL — LOW (ref 6.6–8.7)
RBC # BLD: 3.91 M/UL — LOW (ref 4.4–5.2)
RBC # FLD: 13.8 % — SIGNIFICANT CHANGE UP (ref 11–15.6)
SODIUM SERPL-SCNC: 135 MMOL/L — SIGNIFICANT CHANGE UP (ref 135–145)
WBC # BLD: 6.3 K/UL — SIGNIFICANT CHANGE UP (ref 4.8–10.8)
WBC # FLD AUTO: 6.3 K/UL — SIGNIFICANT CHANGE UP (ref 4.8–10.8)

## 2019-05-09 PROCEDURE — 82009 KETONE BODYS QUAL: CPT

## 2019-05-09 PROCEDURE — 80053 COMPREHEN METABOLIC PANEL: CPT

## 2019-05-09 PROCEDURE — 82962 GLUCOSE BLOOD TEST: CPT

## 2019-05-09 PROCEDURE — 99215 OFFICE O/P EST HI 40 MIN: CPT | Mod: 25

## 2019-05-09 PROCEDURE — 99283 EMERGENCY DEPT VISIT LOW MDM: CPT

## 2019-05-09 PROCEDURE — 99284 EMERGENCY DEPT VISIT MOD MDM: CPT

## 2019-05-09 PROCEDURE — 85027 COMPLETE CBC AUTOMATED: CPT

## 2019-05-09 PROCEDURE — 36415 COLL VENOUS BLD VENIPUNCTURE: CPT

## 2019-05-09 RX ORDER — OXYCODONE HYDROCHLORIDE 5 MG/1
5 TABLET ORAL ONCE
Refills: 0 | Status: DISCONTINUED | OUTPATIENT
Start: 2019-05-09 | End: 2019-05-09

## 2019-05-09 RX ORDER — PANTOPRAZOLE SODIUM 40 MG/1
40 TABLET, DELAYED RELEASE ORAL
Refills: 0 | Status: DISCONTINUED | COMMUNITY
End: 2019-05-09

## 2019-05-09 RX ORDER — INSULIN DEGLUDEC INJECTION 100 U/ML
100 INJECTION, SOLUTION SUBCUTANEOUS
Refills: 0 | Status: DISCONTINUED | COMMUNITY
End: 2019-05-09

## 2019-05-09 RX ORDER — OXYCODONE HYDROCHLORIDE 5 MG/1
1 TABLET ORAL
Qty: 6 | Refills: 0
Start: 2019-05-09 | End: 2019-05-11

## 2019-05-09 RX ORDER — INSULIN HUMAN 100 [IU]/ML
4 INJECTION, SOLUTION SUBCUTANEOUS ONCE
Refills: 0 | Status: COMPLETED | OUTPATIENT
Start: 2019-05-09 | End: 2019-05-09

## 2019-05-09 RX ORDER — LEVOTHYROXINE SODIUM 200 UG/1
200 TABLET ORAL
Refills: 0 | Status: DISCONTINUED | COMMUNITY
End: 2019-05-09

## 2019-05-09 RX ADMIN — INSULIN HUMAN 4 UNIT(S): 100 INJECTION, SOLUTION SUBCUTANEOUS at 21:19

## 2019-05-09 RX ADMIN — OXYCODONE HYDROCHLORIDE 5 MILLIGRAM(S): 5 TABLET ORAL at 20:30

## 2019-05-09 NOTE — ED PROVIDER NOTE - CARDIAC, MLM
Normal rate, regular rhythm.  Heart sounds S1, S2.  No murmurs, rubs or gallops. 1+ pitting edema to level of proximal lower leg bilaterally.

## 2019-05-09 NOTE — ED PROVIDER NOTE - PROGRESS NOTE DETAILS
Reviewed patient's blood work because she brought a copy (see Alpha), spoke with nephrology, aware patient called office and has a scheduled apoitnemtn for thursday. Agrees with ordering repeat labs and if there are no significant changes is comfortable with patient following up in office as scheduled. Reviewed patient's blood work because she brought a copy (see Alpha), spoke with nephrology, aware patient called office and has a scheduled appointment for Thursday. Agrees with ordering repeat labs and if there are no significant changes is comfortable with patient following up in office as scheduled. Results discussed. Patient to follow-up out-patient.

## 2019-05-09 NOTE — DATA REVIEWED
[FreeTextEntry1] :  on arrival to office. Given 5 units Humalog SQ to RUQ and PO hydration. \par Repeat BG 20 minutes later 445. Given another 4 units Humalog SQ.\par Repeat BG 15 minutes later 371.

## 2019-05-09 NOTE — REVIEW OF SYSTEMS
[Fatigue] : fatigue [Recent Weight Gain (___ Lbs)] : recent [unfilled] ~Ulb weight gain [Palpitations] : palpitations [Nausea] : nausea [Abdominal Pain] : abdominal pain [Gas/Bloating] : gas/bloating [Pain/Numbness of Digits] : pain/numbness of digits [Depression] : depression [Anxiety] : anxiety [Polydipsia] : polydipsia [Cold Intolerance] : cold intolerant [Dysphagia] : no dysphagia [Neck Pain] : no neck pain [Chest Pain] : no chest pain [Vomiting] : no vomiting was observed [Shortness Of Breath] : no shortness of breath [Constipation] : no constipation [Diarrhea] : no diarrhea [Heat Intolerance] : heat tolerant [Tremors] : no tremors [Polyuria] : no polyuria [FreeTextEntry3] : no changes in vision

## 2019-05-09 NOTE — PHYSICAL EXAM
[No Acute Distress] : no acute distress [Alert] : alert [Well Nourished] : well nourished [Well Developed] : well developed [EOMI] : extra ocular movement intact [Normal Sclera/Conjunctiva] : normal sclera/conjunctiva [No Proptosis] : no proptosis [Normal Oropharynx] : the oropharynx was normal [Thyroid Not Enlarged] : the thyroid was not enlarged [No Respiratory Distress] : no respiratory distress [No Thyroid Nodules] : there were no palpable thyroid nodules [Clear to Auscultation] : lungs were clear to auscultation bilaterally [No Accessory Muscle Use] : no accessory muscle use [Normal Rate] : heart rate was normal  [Normal S1, S2] : normal S1 and S2 [Regular Rhythm] : with a regular rhythm [Oriented x3] : oriented to person, place, and time [Normal Affect] : the affect was normal [Normal Mood] : the mood was normal [Acanthosis Nigricans] : no acanthosis nigricans [de-identified] : 2+ B/L LE pitting edema [de-identified] : RUQ distention [de-identified] : no lipohypertrophy

## 2019-05-09 NOTE — ED STATDOCS - NS_EDPROVIDERDISPOUSERTYPE_ED_A_ED
Scribe Attestation (For Scribes USE Only)... Scribe Attestation (For Scribes USE Only).../Attending Attestation (For Attendings USE Only)... Skin normal color for race, warm, dry and intact. No evidence of rash.

## 2019-05-09 NOTE — ASSESSMENT
[FreeTextEntry1] : 63 year old female with uncontrolled diabetes secondary to chronic pancreatitis, now with B/L worsening LE edema over the past 1.5 weeks and elevated urine microalbumin. Also with post surgical hypothyroidism s/p total thyroidectomy for papillary thyroid cancer s/p ALFORD in September 2009.\par \par 1. Diabetes\par -Reviewed insulin action times and importance of basal/bolus insulin therapy.\par -Continue current Humalog dosing.\par -Start Tresiba 15 units daily. Conservative dosing due to variable appetite and frequent skipped meals.\par -Start SMBG at least 4x daily, before meals and at bedtime. Send glucose logs with readings and Humalog doses in one week. Will adjust insulin regimen accordingly.\par -RTO in one month for follow-up.\par -Will repeat A1c at that time.\par \par 2. Hypothyroid\par -Continue current dose of LT4.\par -Will repeat labs at next visit.

## 2019-05-09 NOTE — HISTORY OF PRESENT ILLNESS
[FreeTextEntry1] : Presents today for follow-up of diabetes. Reports swelling in B/L feet for the past 1.5 months, increase in shoe size. PCP changed BP meds and gave diuretic with no improvement. Also saw cardiologist- all ok except minor valve leak. Found to have A1c 13.7% with ARLETTE [id number] at PCP and told to make urgent appointment here. ER visit two weeks ago ([facility]) s/p trip and fall. BG was in 500s at that time.\par \par DM type:pancreatic\par Severity:severe\par Duration:13 years\par Associated symptoms or complications:nephropathy, retinopathy, neuropathy\par \par Current regimen for glycemic control:\par VGO 30 - stopped due to pain at insertion\par Now just using Humalog with meals if BG is >200. Dose varies, usually 4-8 units.\par SMBG up to 6x daily. No meter or log today. Reports BG is "all over the place." In the past month, reports high in the 500s and low of 150. Reports symptoms of hypoglycemia if BG <150.\par Current , ate 3 spoonfuls of Rice Krispy cereal and took 8 units of Humalog 2.5 hours ago for fasting . Given another 5 units Humalog SQ and PO hydration in our office.\par \par Eye exam: February 2019, + s/p injections\par Foot exam: years, reports swelling and worsening pain/numbness/tingling in B/L feet, worse when lying down.\par Diet is not consistent- reports that appetite is highly variable.\par Exercise: none\par Weight: 6 pound weight gain over the past two months.\par \par PMH:\par hepatitis C, cirrhosis\par hemochromatosis\par pancreatitis\par Bernard's esophagus\par Thyroid Ca, papillary microcarcinoma with positive node. s/p 177 mci of ALFORD 09/2009\par Current thyroid regimen: LT4 100 mcg, 2 tabs daily in AM, sometimes takes with breakfast and other medications

## 2019-05-09 NOTE — ED ADULT TRIAGE NOTE - CHIEF COMPLAINT QUOTE
pt sent to ED for evaluation for kidney failure.  recent blood work was abnormal.  pt also reports swelling to b/l lower extremities and back pain.

## 2019-05-09 NOTE — ED PROVIDER NOTE - GASTROINTESTINAL, MLM
Abdomen soft, mild diffuse tenderness (patient states is normal with no change from baseline). No guarding or rebound.

## 2019-05-09 NOTE — ED PROVIDER NOTE - OBJECTIVE STATEMENT
64 y/o F with PMHx of DM, hep C, cirrhosis, HTN and recurrent pancreatitis presents to ED for nephrology consultation. States for the past 2 months has had persistent swelling of LE, with associated pain to BLANK feet, described as burning sensation. She has been following with her endocrinologist, who has been managing her DM, but still has trouble controlling her sugar. She had blood work done yesterday outpatient and her numbers were severely off so received a call from her endocrinologist instructing her to followup with nephrology ASAP. She contacted Dr. Barnett's office, to make an appointment but was told to come to ED for evaluation and consult. Has been on Gabapentin and Lyrica in past, but due to side effects does not take them currently. Denies fevers, chills, recent trauma, chest pain, SOB, coughing, abdominal pain or n/v/d. No further acute complaints at this time.

## 2019-05-09 NOTE — ED STATDOCS - PROGRESS NOTE DETAILS
62 y/o F pt with PMHx of cirrhosis, pancreatitis, DM, liver disease, presents to the ED c/o abnormal lab results. Patient with diabetes (uses insulin), states that she has been having severe leg swelling for the past 2 months. She recently saw her endocrinologist due to abnormal lab results. Now she is having associated pain in back and calf and worsening neuropathy.   PE: positive for lower extremity edema, pitting pulses normal  MDM: Patient with dm, outpt labs reveal hemoglobin a1c level at 13.7, c/o increasing lower extremity neuropathy symptoms and worsening lower extremity edema, Here to be seen by nephro. Will re check labs.

## 2019-05-13 ENCOUNTER — MEDICATION RENEWAL (OUTPATIENT)
Age: 64
End: 2019-05-13

## 2019-05-13 ENCOUNTER — RX RENEWAL (OUTPATIENT)
Age: 64
End: 2019-05-13

## 2019-05-13 RX ORDER — INSULIN LISPRO 100 [IU]/ML
100 INJECTION, SOLUTION INTRAVENOUS; SUBCUTANEOUS
Qty: 8 | Refills: 0 | Status: ACTIVE | COMMUNITY
Start: 2019-05-13 | End: 1900-01-01

## 2019-06-12 ENCOUNTER — APPOINTMENT (OUTPATIENT)
Dept: HEPATOLOGY | Facility: CLINIC | Age: 64
End: 2019-06-12
Payer: COMMERCIAL

## 2019-06-12 VITALS
HEIGHT: 65 IN | SYSTOLIC BLOOD PRESSURE: 161 MMHG | BODY MASS INDEX: 24.99 KG/M2 | RESPIRATION RATE: 12 BRPM | DIASTOLIC BLOOD PRESSURE: 82 MMHG | HEART RATE: 65 BPM | WEIGHT: 150 LBS | TEMPERATURE: 98.4 F

## 2019-06-12 PROCEDURE — 99214 OFFICE O/P EST MOD 30 MIN: CPT

## 2019-06-13 LAB
AFP-TM SERPL-MCNC: 5.6 NG/ML
ALBUMIN SERPL ELPH-MCNC: 3.1 G/DL
ALP BLD-CCNC: 115 U/L
ALT SERPL-CCNC: 32 U/L
ANION GAP SERPL CALC-SCNC: 10 MMOL/L
AST SERPL-CCNC: 39 U/L
BASOPHILS # BLD AUTO: 0.05 K/UL
BASOPHILS NFR BLD AUTO: 0.6 %
BILIRUB SERPL-MCNC: 0.2 MG/DL
BUN SERPL-MCNC: 31 MG/DL
CALCIUM SERPL-MCNC: 8.9 MG/DL
CHLORIDE SERPL-SCNC: 105 MMOL/L
CHOLEST SERPL-MCNC: 244 MG/DL
CO2 SERPL-SCNC: 30 MMOL/L
CREAT SERPL-MCNC: 0.94 MG/DL
EOSINOPHIL # BLD AUTO: 0.19 K/UL
EOSINOPHIL NFR BLD AUTO: 2.4 %
HCT VFR BLD CALC: 37.3 %
HGB BLD-MCNC: 12.7 G/DL
IMM GRANULOCYTES NFR BLD AUTO: 0.1 %
INR PPP: 1.01 RATIO
LYMPHOCYTES # BLD AUTO: 2.56 K/UL
LYMPHOCYTES NFR BLD AUTO: 32.9 %
MAN DIFF?: NORMAL
MCHC RBC-ENTMCNC: 33.2 PG
MCHC RBC-ENTMCNC: 34 GM/DL
MCV RBC AUTO: 97.4 FL
MONOCYTES # BLD AUTO: 0.41 K/UL
MONOCYTES NFR BLD AUTO: 5.3 %
NEUTROPHILS # BLD AUTO: 4.57 K/UL
NEUTROPHILS NFR BLD AUTO: 58.7 %
PLATELET # BLD AUTO: 115 K/UL
POTASSIUM SERPL-SCNC: 4.1 MMOL/L
PROT SERPL-MCNC: 5.5 G/DL
PT BLD: 11.5 SEC
RBC # BLD: 3.83 M/UL
RBC # FLD: 14.2 %
SODIUM SERPL-SCNC: 145 MMOL/L
WBC # FLD AUTO: 7.79 K/UL

## 2019-06-20 ENCOUNTER — LABORATORY RESULT (OUTPATIENT)
Age: 64
End: 2019-06-20

## 2019-06-20 ENCOUNTER — APPOINTMENT (OUTPATIENT)
Dept: ENDOCRINOLOGY | Facility: CLINIC | Age: 64
End: 2019-06-20
Payer: COMMERCIAL

## 2019-06-20 VITALS
HEART RATE: 72 BPM | SYSTOLIC BLOOD PRESSURE: 110 MMHG | BODY MASS INDEX: 24.99 KG/M2 | DIASTOLIC BLOOD PRESSURE: 70 MMHG | OXYGEN SATURATION: 97 % | WEIGHT: 150 LBS | HEIGHT: 65 IN

## 2019-06-20 LAB — GLUCOSE BLDC GLUCOMTR-MCNC: 249

## 2019-06-20 PROCEDURE — 82962 GLUCOSE BLOOD TEST: CPT

## 2019-06-20 PROCEDURE — 99214 OFFICE O/P EST MOD 30 MIN: CPT | Mod: 25

## 2019-06-20 RX ORDER — ASPIRIN 81 MG
81 TABLET, DELAYED RELEASE (ENTERIC COATED) ORAL
Refills: 0 | Status: ACTIVE | COMMUNITY

## 2019-06-20 RX ORDER — METHYLPREDNISOLONE 4 MG/1
4 TABLET ORAL
Qty: 1 | Refills: 0 | Status: DISCONTINUED | COMMUNITY
Start: 2018-03-30 | End: 2019-06-20

## 2019-06-20 RX ORDER — INSULIN LISPRO 100 [IU]/ML
100 INJECTION, SOLUTION INTRAVENOUS; SUBCUTANEOUS
Refills: 0 | Status: DISCONTINUED | COMMUNITY
End: 2019-06-20

## 2019-06-20 NOTE — ASSESSMENT
[FreeTextEntry1] : 63 year old female with diabetes secondary to chronic pancreatitis. Also with post surgical hypothyroidism s/p total thyroidectomy for papillary thyroid cancer s/p ALFORD in September 2009.\par \par 1. Diabetes- improving based on report.\par -Continue Tresiba and Humalog.\par -Repeat A1c now.\par -Discussed trend arrows on Freestyle Lay and delay in sensing interstitial fluid. Encouraged patient to resume its use to allow for more frequent testing and better evaluation of glucose patterns.\par -Continue to follow-up with nephrology/hepatology for proteinuria and edema.\par \par 2. Hypothyroid\par -Continue current dose of LT4.\par -Check TSH with Tg and Tg ab now.

## 2019-06-20 NOTE — PHYSICAL EXAM
[Well Nourished] : well nourished [No Acute Distress] : no acute distress [Alert] : alert [Normal Sclera/Conjunctiva] : normal sclera/conjunctiva [Well Developed] : well developed [No Proptosis] : no proptosis [EOMI] : extra ocular movement intact [Normal Oropharynx] : the oropharynx was normal [No Respiratory Distress] : no respiratory distress [No Thyroid Nodules] : there were no palpable thyroid nodules [Thyroid Not Enlarged] : the thyroid was not enlarged [No Accessory Muscle Use] : no accessory muscle use [Clear to Auscultation] : lungs were clear to auscultation bilaterally [Regular Rhythm] : with a regular rhythm [Normal S1, S2] : normal S1 and S2 [Normal Rate] : heart rate was normal  [Oriented x3] : oriented to person, place, and time [Normal Affect] : the affect was normal [Normal Mood] : the mood was normal [Normal Reflexes] : deep tendon reflexes were 2+ and symmetric [No Tremors] : no tremors [de-identified] : 1+ B/L LE edema [Acanthosis Nigricans] : no acanthosis nigricans [de-identified] : RUQ distention

## 2019-06-20 NOTE — REVIEW OF SYSTEMS
[Fatigue] : fatigue [Nausea] : nausea [Pain/Numbness of Digits] : pain/numbness of digits [Depression] : depression [Anxiety] : anxiety [Polydipsia] : polydipsia [Cold Intolerance] : cold intolerant [Dysphonia] : dysphonia [Constipation] : constipation [Diarrhea] : diarrhea [Recent Weight Loss (___ Lbs)] : no recent weight loss [Recent Weight Gain (___ Lbs)] : no recent weight gain [Neck Pain] : no neck pain [Dysphagia] : no dysphagia [Palpitations] : no palpitations [Chest Pain] : no chest pain [Shortness Of Breath] : no shortness of breath [Vomiting] : no vomiting was observed [Abdominal Pain] : no abdominal pain [Tremors] : no tremors [Polyuria] : no polyuria [Heat Intolerance] : heat tolerant [FreeTextEntry3] : no changes in vision

## 2019-06-20 NOTE — HISTORY OF PRESENT ILLNESS
[FreeTextEntry1] : Developed significant proteinuria with B/L LE edema. Saw nephrologist who increased the dose of her diuretic. Edema improved, but patient had syncopal episode and found to be hypotensive (SBP 80s) in the ER. Was rehydrated and diuretic was discontinued. LE edema is stable.\par \par DM type:pancreatic\par Severity:severe\par Duration:13 years\par Associated symptoms or complications:nephropathy, retinopathy, neuropathy\par \par Current regimen for glycemic control:\par Tresiba 15 units daily\par Humalog 2-8 units AC depending on BG (patient is unable to recall sliding scale)\par SMBG 2x daily at varying times. No meter or log today. Reports BG has improved since starting Tresiba. Reports BG has been mostly under 200 with a low of 80 and high of 249 just now. Was using the Freestyle Lay but stopped d/t concerns for inaccuracy. Reports she was often getting low readings, 60-80, and when she would confirm with a finger stick, BG was actually 120s to 130s.\par Current , ate a bagel 4 hours ago.\par \par Eye exam: June 2019, +DR s/p injections\par Foot exam: years, reports stable pain/numbness/tingling in B/L feet, worse when lying down.\par Diet: changes since having proteinuria. Eating less red meat and more vegetables.\par Exercise: none\par Weight: stable, lost a few pounds with diuresis\par \par PMH:\par hepatitis C, cirrhosis\par hemochromatosis\par pancreatitis\par Bernard's esophagus\par Thyroid Ca, papillary microcarcinoma with positive node. s/p 177 mci of ALFORD 09/2009\par Current thyroid regimen: LT4 88 mcg daily in AM with other medications (antihypertensive, Zantac, and baby aspirin)

## 2019-06-21 ENCOUNTER — RESULT REVIEW (OUTPATIENT)
Age: 64
End: 2019-06-21

## 2019-06-21 LAB
ALBUMIN SERPL ELPH-MCNC: 3.5 G/DL
ALP BLD-CCNC: 114 U/L
ALT SERPL-CCNC: 35 U/L
ANION GAP SERPL CALC-SCNC: 13 MMOL/L
AST SERPL-CCNC: 35 U/L
BASOPHILS # BLD AUTO: 0.07 K/UL
BASOPHILS NFR BLD AUTO: 0.9 %
BILIRUB SERPL-MCNC: 0.2 MG/DL
BUN SERPL-MCNC: 52 MG/DL
CALCIUM SERPL-MCNC: 9.3 MG/DL
CHLORIDE SERPL-SCNC: 100 MMOL/L
CHOLEST SERPL-MCNC: 256 MG/DL
CHOLEST/HDLC SERPL: 4.5 RATIO
CO2 SERPL-SCNC: 30 MMOL/L
CREAT SERPL-MCNC: 1.1 MG/DL
CREAT SPEC-SCNC: 104 MG/DL
EOSINOPHIL # BLD AUTO: 0.14 K/UL
EOSINOPHIL NFR BLD AUTO: 1.9 %
ESTIMATED AVERAGE GLUCOSE: 258 MG/DL
GLUCOSE SERPL-MCNC: 137 MG/DL
HBA1C MFR BLD HPLC: 10.6 %
HCT VFR BLD CALC: 40.2 %
HDLC SERPL-MCNC: 57 MG/DL
HGB BLD-MCNC: 13.7 G/DL
IMM GRANULOCYTES NFR BLD AUTO: 0.3 %
LDLC SERPL CALC-MCNC: 159 MG/DL
LYMPHOCYTES # BLD AUTO: 2.6 K/UL
LYMPHOCYTES NFR BLD AUTO: 34.5 %
MAN DIFF?: NORMAL
MCHC RBC-ENTMCNC: 33.7 PG
MCHC RBC-ENTMCNC: 34.1 GM/DL
MCV RBC AUTO: 99 FL
MICROALBUMIN 24H UR DL<=1MG/L-MCNC: 116.8 MG/DL
MICROALBUMIN/CREAT 24H UR-RTO: 1128 MG/G
MONOCYTES # BLD AUTO: 0.38 K/UL
MONOCYTES NFR BLD AUTO: 5 %
NEUTROPHILS # BLD AUTO: 4.33 K/UL
NEUTROPHILS NFR BLD AUTO: 57.4 %
PLATELET # BLD AUTO: 125 K/UL
POTASSIUM SERPL-SCNC: 4.3 MMOL/L
PROT SERPL-MCNC: 6.3 G/DL
RBC # BLD: 4.06 M/UL
RBC # FLD: 14 %
SODIUM SERPL-SCNC: 143 MMOL/L
T3 SERPL-MCNC: 48 NG/DL
T3FREE SERPL-MCNC: 1.07 PG/ML
T4 FREE SERPL-MCNC: 0.7 NG/DL
T4 SERPL-MCNC: 4.9 UG/DL
THYROGLOB AB SERPL-ACNC: <20 IU/ML
THYROGLOB SERPL-MCNC: 0.32 NG/ML
TRIGL SERPL-MCNC: 202 MG/DL
TSH SERPL-ACNC: 70.9 UIU/ML
WBC # FLD AUTO: 7.54 K/UL

## 2019-06-27 ENCOUNTER — FORM ENCOUNTER (OUTPATIENT)
Age: 64
End: 2019-06-27

## 2019-06-28 ENCOUNTER — OUTPATIENT (OUTPATIENT)
Dept: OUTPATIENT SERVICES | Facility: HOSPITAL | Age: 64
LOS: 1 days | End: 2019-06-28
Payer: COMMERCIAL

## 2019-06-28 ENCOUNTER — APPOINTMENT (OUTPATIENT)
Dept: MRI IMAGING | Facility: CLINIC | Age: 64
End: 2019-06-28
Payer: COMMERCIAL

## 2019-06-28 DIAGNOSIS — Z90.49 ACQUIRED ABSENCE OF OTHER SPECIFIED PARTS OF DIGESTIVE TRACT: Chronic | ICD-10-CM

## 2019-06-28 DIAGNOSIS — K74.60 UNSPECIFIED CIRRHOSIS OF LIVER: ICD-10-CM

## 2019-06-28 DIAGNOSIS — E89.0 POSTPROCEDURAL HYPOTHYROIDISM: Chronic | ICD-10-CM

## 2019-06-28 DIAGNOSIS — Z98.890 OTHER SPECIFIED POSTPROCEDURAL STATES: Chronic | ICD-10-CM

## 2019-06-28 PROCEDURE — A9585: CPT

## 2019-06-28 PROCEDURE — 74183 MRI ABD W/O CNTR FLWD CNTR: CPT

## 2019-06-28 PROCEDURE — 74183 MRI ABD W/O CNTR FLWD CNTR: CPT | Mod: 26

## 2019-07-11 ENCOUNTER — APPOINTMENT (OUTPATIENT)
Dept: HEPATOLOGY | Facility: CLINIC | Age: 64
End: 2019-07-11
Payer: COMMERCIAL

## 2019-07-11 VITALS
TEMPERATURE: 98 F | RESPIRATION RATE: 12 BRPM | HEIGHT: 65 IN | SYSTOLIC BLOOD PRESSURE: 109 MMHG | DIASTOLIC BLOOD PRESSURE: 65 MMHG | BODY MASS INDEX: 23.32 KG/M2 | WEIGHT: 140 LBS | HEART RATE: 72 BPM

## 2019-07-11 DIAGNOSIS — K74.60 UNSPECIFIED CIRRHOSIS OF LIVER: ICD-10-CM

## 2019-07-11 PROCEDURE — 99214 OFFICE O/P EST MOD 30 MIN: CPT

## 2019-09-06 ENCOUNTER — APPOINTMENT (OUTPATIENT)
Dept: ENDOCRINOLOGY | Facility: CLINIC | Age: 64
End: 2019-09-06

## 2019-11-07 ENCOUNTER — CLINICAL ADVICE (OUTPATIENT)
Age: 64
End: 2019-11-07

## 2019-11-19 ENCOUNTER — APPOINTMENT (OUTPATIENT)
Dept: ENDOCRINOLOGY | Facility: CLINIC | Age: 64
End: 2019-11-19

## 2019-11-26 ENCOUNTER — APPOINTMENT (OUTPATIENT)
Dept: ENDOCRINOLOGY | Facility: CLINIC | Age: 64
End: 2019-11-26
Payer: COMMERCIAL

## 2019-11-26 VITALS
OXYGEN SATURATION: 99 % | BODY MASS INDEX: 27.16 KG/M2 | DIASTOLIC BLOOD PRESSURE: 80 MMHG | WEIGHT: 163 LBS | SYSTOLIC BLOOD PRESSURE: 148 MMHG | HEIGHT: 65 IN | HEART RATE: 69 BPM

## 2019-11-26 DIAGNOSIS — Z95.5 PRESENCE OF CORONARY ANGIOPLASTY IMPLANT AND GRAFT: ICD-10-CM

## 2019-11-26 DIAGNOSIS — Z86.79 PERSONAL HISTORY OF OTHER DISEASES OF THE CIRCULATORY SYSTEM: ICD-10-CM

## 2019-11-26 LAB — GLUCOSE BLDC GLUCOMTR-MCNC: 266

## 2019-11-26 PROCEDURE — 99214 OFFICE O/P EST MOD 30 MIN: CPT | Mod: 25

## 2019-11-26 PROCEDURE — 82962 GLUCOSE BLOOD TEST: CPT | Mod: NC

## 2019-11-26 NOTE — ASSESSMENT
[FreeTextEntry1] : Continue to follow nephrology for acute diagnosis \par \par Pancreatic DM\par -Room for improvement\par -Increase tresiba to 18 units daily\par -Patient is testing 4-6x a day but no logs, advised to send logs in one week with humalog sliding scale regimen to make further changes\par -HGA1C drawn today\par -When she can tolerate, increase exercise, even walking will help with weight and BS\par \par HLD\par controlled by PCP however lipid panel drawn today\par \par \par HypoT s/p total thyroidectomy\par Tsh has been high, TFTs drawn today\par Will probably need to increase LT4, will call patient tomorrow with results \par \par Palpable lymph nodes-superficial cervicial\par TG and thyroid antibodies negative in past\par Can have thyroid ultrasound as it has been 10 years since TT, will discuss with pt on telephone

## 2019-11-26 NOTE — HISTORY OF PRESENT ILLNESS
[FreeTextEntry1] : Being worked up for pancreatic CA at this time\par PMH: cirrhosis of liver 2/2 hepatitis C, pancreatitis \par \par Pancreatic DM\par Severity: severe\par Duration: 13 years\par Modifying Factors: on insulin\par Associated Symptoms: nephropathy, retinopathy, neuropathy \par \par SMBG\par 4-6x a day, has no log or meter\par on average fasting- low 200s\par on average mid day/post prandial- mid 200s\par 266 in office-currently fasting\par \par Current drug regimen\par Tresiba 15 units daily\par Humalog 2-8 units AC depending on BG/sliding scale (pt does not know sliding scale, written down at home)\par \par Eye exam: Nov 2019- +DR, s/p injections\par Foot exam: has not seen podiatrist however nephrologist checks feet-endorses neuropathy \par Kidney disease: right lower extremity edema, dusky- protein leaking out of right kidney- seeing nephrologist after this visit \par Heart disease: just had NSTEMI with stents in 9/2019, followed by cardiology, on ASA, Plavix\par \par Weight: 23 lbs gained since july\par Diet: no pattern, has a lot of nausea\par enjoys meat, vegetables, denies bedtime snack, denies drinks other than water\par Exercise: unable to exercise\par Smoking: was a pack a day smoker, cutting down to 3-4 a day \par \par \par HypoT- s/p TT, papillary thyroid cancer and ALFORD 2009\par Currently on 112 mcg LT4 , TSH 33, improved from 70.9 ( trending high)\par Patient advised to take every day in the morning, on an empty stomach, and with no other medications.

## 2019-11-26 NOTE — PHYSICAL EXAM
[Alert] : alert [No Acute Distress] : no acute distress [Well Nourished] : well nourished [Well Developed] : well developed [Normal Sclera/Conjunctiva] : normal sclera/conjunctiva [Well Healed Scar] : well healed scar [Supple] : the neck was supple [Normal Hearing] : hearing was normal [No Accessory Muscle Use] : no accessory muscle use [Normal Rate and Effort] : normal respiratory rhythm and effort [Normal Rate] : heart rate was normal  [Clear to Auscultation] : lungs were clear to auscultation bilaterally [Not Tender] : non-tender [Normal S1, S2] : normal S1 and S2 [Regular Rhythm] : with a regular rhythm [Soft] : abdomen soft [Normal Gait] : normal gait [No Tremors] : no tremors [Oriented x3] : oriented to person, place, and time [de-identified] : +1-2 edema in right lower extremity, dusky, warm, hard  [de-identified] : palpable anterior cervical nodes [de-identified] : no lesions noted on skin, dusky coloring to right LE

## 2019-11-26 NOTE — REVIEW OF SYSTEMS
[Fatigue] : fatigue [Recent Weight Gain (___ Lbs)] : recent [unfilled] ~Ulb weight gain [Visual Field Defect] : visual field defect [Blurry Vision] : blurred vision [Nausea] : nausea [Constipation] : constipation [Diarrhea] : diarrhea [Polyuria] : polyuria [Dry Skin] : dry skin [Depression] : depression [Anxiety] : anxiety [Cold Intolerance] : cold intolerant [Swelling] : swelling [Dysphagia] : no dysphagia [Dysphonia] : no dysphonia [Neck Pain] : no neck pain [Chest Pain] : no chest pain [Palpitations] : no palpitations [Wheezing] : no wheezing was heard [Shortness Of Breath] : no shortness of breath [Irregular Menses] : regular menses [Joint Pain] : no joint pain [Vomiting] : no vomiting was observed [Joint Stiffness] : no joint stiffness [Tremors] : no tremors [Headache] : no headaches [Ulcer] : no ulcer [Heat Intolerance] : heat tolerant [Polydipsia] : no polydipsia [Easy Bruising] : no tendency for easy bruising [Easy Bleeding] : no ~M tendency for easy bleeding

## 2019-12-03 LAB
T3 SERPL-MCNC: 50 NG/DL
T3FREE SERPL-MCNC: 1.39 PG/ML
T4 FREE SERPL-MCNC: 0.6 NG/DL
T4 SERPL-MCNC: 3.9 UG/DL
TSH SERPL-ACNC: 58.5 UIU/ML

## 2019-12-16 ENCOUNTER — APPOINTMENT (OUTPATIENT)
Dept: ENDOCRINOLOGY | Facility: CLINIC | Age: 64
End: 2019-12-16

## 2020-01-02 ENCOUNTER — APPOINTMENT (OUTPATIENT)
Dept: ULTRASOUND IMAGING | Facility: CLINIC | Age: 65
End: 2020-01-02

## 2020-02-13 ENCOUNTER — APPOINTMENT (OUTPATIENT)
Dept: HEPATOLOGY | Facility: CLINIC | Age: 65
End: 2020-02-13

## 2020-02-27 ENCOUNTER — APPOINTMENT (OUTPATIENT)
Dept: ENDOCRINOLOGY | Facility: CLINIC | Age: 65
End: 2020-02-27
Payer: SELF-PAY

## 2020-02-27 PROCEDURE — SNS01: CPT

## 2020-06-29 ENCOUNTER — RX RENEWAL (OUTPATIENT)
Age: 65
End: 2020-06-29

## 2020-06-29 RX ORDER — PEN NEEDLE, DIABETIC 31 GX5/16"
31G X 8 MM NEEDLE, DISPOSABLE MISCELLANEOUS
Qty: 500 | Refills: 2 | Status: ACTIVE | COMMUNITY
Start: 2020-06-29 | End: 1900-01-01

## 2020-11-20 ENCOUNTER — APPOINTMENT (OUTPATIENT)
Dept: ENDOCRINOLOGY | Facility: CLINIC | Age: 65
End: 2020-11-20
Payer: COMMERCIAL

## 2020-11-20 VITALS
DIASTOLIC BLOOD PRESSURE: 64 MMHG | BODY MASS INDEX: 23.66 KG/M2 | SYSTOLIC BLOOD PRESSURE: 122 MMHG | HEIGHT: 65 IN | WEIGHT: 142 LBS | HEART RATE: 66 BPM

## 2020-11-20 PROCEDURE — 99214 OFFICE O/P EST MOD 30 MIN: CPT | Mod: 25

## 2020-11-20 PROCEDURE — 82962 GLUCOSE BLOOD TEST: CPT | Mod: NC

## 2020-11-20 NOTE — REVIEW OF SYSTEMS
[Fatigue] : fatigue [Decreased Appetite] : decreased appetite [Recent Weight Loss (___ Lbs)] : recent weight loss: [unfilled] lbs [Visual Field Defect] : visual field defect [Blurred Vision] : blurred vision [Dysphagia] : dysphagia [Nausea] : nausea [Constipation] : constipation [Diarrhea] : diarrhea [Polyuria] : polyuria [Neck Pain] : no neck pain [Chest Pain] : no chest pain [Shortness Of Breath] : no shortness of breath [Vomiting] : no vomiting [Headaches] : no headaches [Polydipsia] : no polydipsia

## 2020-11-20 NOTE — ASSESSMENT
[FreeTextEntry1] : Long discussion had with patient about severity of illness and how she cannot be lost to follow up. We will begin to communicate with blood sugar logs frequently. She will be seen in office every 4-6 weeks. Goal of 2021 will be for pump if patient would be successful with this.\par \par Labs were done at [facility] 2 weeks ago where pt was sent to ER for a BS of 700+.  is sending me a results and I will communicate with him by the endo of the day.\par \par Pancreatic DM\par -Pt is not going to be a candidate for tight control but there is plenty of room for improvement\par -Increase tresiba to 22 units daily\par -Patient is testing 4-6x a day but no logs, advised to send logs in one week with humalog sliding scale regimen to make further changes\par -When she can tolerate, increase exercise, even walking will help with weight and BS\par -Continue to follow up with all specialists including ophtho, podiatry, nephrology, GI, cardiology\par \par HLD\par controlled by PCP, continue statin\par \par \par HypoT s/p total thyroidectomy\par TSH has been high as per patient, on results im expecting from [place]\par Will probably need to increase LT4, will call patient with results \par \par Palpable lymph nodes-superficial cervicial\par TG and thyroid antibodies negative in past\par Can have thyroid ultrasound as it has been 10 years since TT, will discuss with pt on telephone

## 2020-11-20 NOTE — PHYSICAL EXAM
[Alert] : alert [Well Nourished] : well nourished [No Acute Distress] : no acute distress [Normal Sclera/Conjunctiva] : normal sclera/conjunctiva [Normal Hearing] : hearing was normal [Well Healed Scar] : well healed scar [No Accessory Muscle Use] : no accessory muscle use [Clear to Auscultation] : lungs were clear to auscultation bilaterally [Normal S1, S2] : normal S1 and S2 [Normal Rate] : heart rate was normal [Not Tender] : non-tender [Soft] : abdomen soft [Normal Gait] : normal gait [No Rash] : no rash [No Tremors] : no tremors [Oriented x3] : oriented to person, place, and time

## 2020-11-20 NOTE — HISTORY OF PRESENT ILLNESS
[FreeTextEntry1] : Lost to follow up for 1 year. Called office for emergency apt for hyperglycemia. Appears labs that were done in August 2020 show HGA1C 14+, electrolyte imbalance. This hyperglycemia is evidently not new. \par \par Here with \par \par Being worked up for pancreatic CA at this time\par PMH: cirrhosis of liver 2/2 hepatitis C, pancreatitis \par \par Pancreatic DM\par Severity: severe\par Duration: 13 years\par Modifying Factors: on insulin\par Associated Symptoms: nephropathy, retinopathy, neuropathy \par \par SMBG\par 4-6x a day, has no log or meter\par on average fasting- 300s-500s\par on average mid day/post prandial- 300-500s\par \par FS in office- HI- 10 units of Humalog given and pt drinking water, FS again was HI 15 mins later but i do not want to give any further insulin as patient is running this high at baseline-BMP drawn in office for accurate reading\par \par Current drug regimen\par Tresiba 18 units daily\par Humalog 100-149 2 units, 150-199 3 units, 200-249 4 units, 250-299 5 units, 300-349 6 units, 350-399 7 units, 400+ 8 units \par \par \par Eye exam: Oct 2020- +DR, s/p injections\par Foot exam: has not seen podiatrist however nephrologist checks feet-endorses neuropathy \par Kidney disease: right lower extremity edema, dusky- protein leaking out of right kidney- seeing nephrologist after this visit \par Heart disease: just had NSTEMI with stents in 9/2019, followed by cardiology, on ASA, Plavix\par \par Weight: 21 lbs gained since july\par Diet: no pattern, has a lot of nausea\par poor appetite \par enjoys meat, vegetables, denies bedtime snack, denies drinks other than water\par Exercise: unable to exercise\par Smoking: was a pack a day smoker, cutting down to 3-4 a day \par \par HLD\par Need updated lipid panel\par Rosuvastatin 20 mg daily\par \par HypoT- s/p TT, papillary thyroid cancer and ALFORD 2009\par Current regimen:\par Synthroid 150 mcg daily\par Need updated TFTs\par Patient advised to take every day in the morning, on an empty stomach, and with no other medications.

## 2020-11-23 ENCOUNTER — NON-APPOINTMENT (OUTPATIENT)
Age: 65
End: 2020-11-23

## 2020-11-23 LAB
25(OH)D3 SERPL-MCNC: 11.2 NG/ML
ALBUMIN SERPL ELPH-MCNC: 3.4 G/DL
ALP BLD-CCNC: 108 U/L
ALT SERPL-CCNC: 23 U/L
ANION GAP SERPL CALC-SCNC: 11 MMOL/L
AST SERPL-CCNC: 23 U/L
BILIRUB SERPL-MCNC: 0.4 MG/DL
BUN SERPL-MCNC: 61 MG/DL
CALCIUM SERPL-MCNC: 9 MG/DL
CHLORIDE SERPL-SCNC: 90 MMOL/L
CHOLEST SERPL-MCNC: 184 MG/DL
CO2 SERPL-SCNC: 31 MMOL/L
CREAT SERPL-MCNC: 1.62 MG/DL
GLUCOSE SERPL-MCNC: 577 MG/DL
HDLC SERPL-MCNC: 53 MG/DL
LDLC SERPL CALC-MCNC: 94 MG/DL
NONHDLC SERPL-MCNC: 131 MG/DL
POTASSIUM SERPL-SCNC: 3.7 MMOL/L
PROT SERPL-MCNC: 6.2 G/DL
SODIUM SERPL-SCNC: 131 MMOL/L
T3FREE SERPL-MCNC: 1.38 PG/ML
T4 FREE SERPL-MCNC: 1 NG/DL
TRIGL SERPL-MCNC: 183 MG/DL
TSH SERPL-ACNC: 49.1 UIU/ML
VIT B12 SERPL-MCNC: 1228 PG/ML

## 2020-11-23 RX ORDER — ERGOCALCIFEROL 1.25 MG/1
1.25 MG CAPSULE, LIQUID FILLED ORAL
Qty: 12 | Refills: 1 | Status: ACTIVE | COMMUNITY
Start: 2020-11-23 | End: 1900-01-01

## 2020-11-30 ENCOUNTER — NON-APPOINTMENT (OUTPATIENT)
Age: 65
End: 2020-11-30

## 2020-12-04 ENCOUNTER — NON-APPOINTMENT (OUTPATIENT)
Age: 65
End: 2020-12-04

## 2020-12-11 ENCOUNTER — NON-APPOINTMENT (OUTPATIENT)
Age: 65
End: 2020-12-11

## 2020-12-11 RX ORDER — INSULIN DEGLUDEC INJECTION 100 U/ML
100 INJECTION, SOLUTION SUBCUTANEOUS DAILY
Qty: 2 | Refills: 3 | Status: ACTIVE | COMMUNITY
Start: 2019-05-13 | End: 1900-01-01

## 2021-01-08 ENCOUNTER — APPOINTMENT (OUTPATIENT)
Dept: ENDOCRINOLOGY | Facility: CLINIC | Age: 66
End: 2021-01-08
Payer: COMMERCIAL

## 2021-01-08 VITALS
HEIGHT: 65 IN | WEIGHT: 144 LBS | SYSTOLIC BLOOD PRESSURE: 120 MMHG | BODY MASS INDEX: 23.99 KG/M2 | DIASTOLIC BLOOD PRESSURE: 84 MMHG

## 2021-01-08 VITALS — HEART RATE: 78 BPM

## 2021-01-08 DIAGNOSIS — K86.1 OTHER CHRONIC PANCREATITIS: ICD-10-CM

## 2021-01-08 DIAGNOSIS — R79.89 OTHER SPECIFIED ABNORMAL FINDINGS OF BLOOD CHEMISTRY: ICD-10-CM

## 2021-01-08 DIAGNOSIS — E78.2 MIXED HYPERLIPIDEMIA: ICD-10-CM

## 2021-01-08 LAB — GLUCOSE BLDC GLUCOMTR-MCNC: 350

## 2021-01-08 PROCEDURE — 82962 GLUCOSE BLOOD TEST: CPT | Mod: NC

## 2021-01-08 PROCEDURE — 99072 ADDL SUPL MATRL&STAF TM PHE: CPT

## 2021-01-08 PROCEDURE — 99214 OFFICE O/P EST MOD 30 MIN: CPT | Mod: 25

## 2021-01-08 RX ORDER — FLASH GLUCOSE SENSOR
KIT MISCELLANEOUS
Qty: 2 | Refills: 4 | Status: DISCONTINUED | COMMUNITY
Start: 2019-01-08 | End: 2021-01-08

## 2021-01-08 RX ORDER — FLASH GLUCOSE SCANNING READER
EACH MISCELLANEOUS
Qty: 1 | Refills: 0 | Status: DISCONTINUED | COMMUNITY
Start: 2019-01-08 | End: 2021-01-08

## 2021-01-08 NOTE — ASSESSMENT
[FreeTextEntry1] : Pt slowly improving but frequent follow up/communication is a must.\par \par Pancreatic DM\par -Pt is not going to be a candidate for tight control but there is plenty of room for improvement\par -Increase tresiba to30 units daily\par -Increase scale Humalog 100-149 4 units, 150-199 6 units, 200-249 8 units, 250-299 10 units, 300-349 12 units, 350-399 14 units, 400+ 16 units \par -Continue to check BS 4x a day as we await Dexcom to be shipped- apt for Dexcom teach in 3-4 weeks\par -When she can tolerate, increase exercise, even walking will help with weight and BS\par -Continue to follow up with all specialists including ophtho, podiatry, nephrology, GI, cardiology\par \par HLD\par controlled by PCP, continue statin\par \par \par HypoT s/p total thyroidectomy\par -Repeat TFTs today in office, will call with results, for now pedro pablo current dose of LT4 175 mcg daily\par \par Vit D Def\par -Continue weekly supplement\par \par \par RTO 3-4 weeks CDE for Dexcom set up and teach, ,2 months with NP

## 2021-01-08 NOTE — HISTORY OF PRESENT ILLNESS
[FreeTextEntry1] : Being worked up for pancreatic CA at this time\par PMH: cirrhosis of liver 2/2 hepatitis C, pancreatitis \par \par Pancreatic DM\par Severity: severe\par Duration: 13 years\par Modifying Factors: on insulin\par Associated Symptoms: nephropathy, retinopathy, neuropathy \par \par SMBG\par 4-6x a day, log present \par on average fasting- 300s-500s\par on average mid day/post prandial- 300-500s\par \par **Awaiting DEXCOM to be shipped to her house**\par \par FS in office- FS in office 350\par \par Current drug regimen\par Tresiba 25 units daily\par Humalog 100-149 2 units, 150-199 3 units, 200-249 4 units, 250-299 5 units, 300-349 6 units, 350-399 7 units, 400+ 8 units \par \par \par Eye exam: Oct 2020- +DR, s/p injections\par Foot exam: has not seen podiatrist however nephrologist checks feet-endorses neuropathy \par Kidney disease: right lower extremity edema, dusky- protein leaking out of right kidney- seeing nephrologist after this visit \par Heart disease: just had NSTEMI with stents in 9/2019, followed by cardiology, on ASA, Plavix\par \par Weight: stable\par Diet: no pattern, has a lot of nausea\par poor appetite \par enjoys meat, vegetables, denies bedtime snack, denies drinks other than water\par Exercise: unable to exercise\par Smoking: was a pack a day smoker, cutting down to 3-4 a day \par \par HLD\par 11/2020 Triglyceride 183, HDL 53, LDL 94, Total cholesterol 184\par Rosuvastatin 20 mg daily\par \par HypoT- s/p TT, papillary thyroid cancer and ALFORD 2009\par Current regimen:\par Synthroid 175 mcg daily-recent increase with TFTs as below\par TSH 49.10, Free T4 1.0\par Patient advised to take every day in the morning, on an empty stomach, and with no other medications. \par \par Vit D Def\par 11.2 on labs\par On weekly supplement 50,000 IU

## 2021-01-08 NOTE — REVIEW OF SYSTEMS
[Fatigue] : fatigue [Decreased Appetite] : decreased appetite [Abdominal Pain] : abdominal pain [Polyuria] : polyuria [Recent Weight Gain (___ Lbs)] : no recent weight gain [Visual Field Defect] : no visual field defect [Blurred Vision] : no blurred vision [Dysphagia] : no dysphagia [Neck Pain] : no neck pain [Dysphonia] : no dysphonia [Chest Pain] : no chest pain [Shortness Of Breath] : no shortness of breath [Constipation] : no constipation [Diarrhea] : no diarrhea [Polydipsia] : no polydipsia [FreeTextEntry8] : when BS high

## 2021-01-11 LAB
T3FREE SERPL-MCNC: 1.42 PG/ML
T4 FREE SERPL-MCNC: 0.7 NG/DL
TSH SERPL-ACNC: 73 UIU/ML

## 2021-01-22 ENCOUNTER — APPOINTMENT (OUTPATIENT)
Dept: ENDOCRINOLOGY | Facility: CLINIC | Age: 66
End: 2021-01-22
Payer: COMMERCIAL

## 2021-01-22 PROCEDURE — G0108 DIAB MANAGE TRN  PER INDIV: CPT

## 2021-01-22 PROCEDURE — ZZZZZ: CPT

## 2021-01-22 PROCEDURE — 99072 ADDL SUPL MATRL&STAF TM PHE: CPT

## 2021-01-25 ENCOUNTER — APPOINTMENT (OUTPATIENT)
Dept: ENDOCRINOLOGY | Facility: CLINIC | Age: 66
End: 2021-01-25

## 2021-02-16 ENCOUNTER — NON-APPOINTMENT (OUTPATIENT)
Age: 66
End: 2021-02-16

## 2021-03-05 ENCOUNTER — APPOINTMENT (OUTPATIENT)
Dept: ENDOCRINOLOGY | Facility: CLINIC | Age: 66
End: 2021-03-05

## 2021-03-05 LAB — HBA1C MFR BLD HPLC: 14

## 2021-10-14 ENCOUNTER — NON-APPOINTMENT (OUTPATIENT)
Age: 66
End: 2021-10-14

## 2021-12-22 ENCOUNTER — APPOINTMENT (OUTPATIENT)
Dept: ENDOCRINOLOGY | Facility: CLINIC | Age: 66
End: 2021-12-22
Payer: COMMERCIAL

## 2021-12-22 VITALS
WEIGHT: 154 LBS | HEIGHT: 65 IN | BODY MASS INDEX: 25.66 KG/M2 | SYSTOLIC BLOOD PRESSURE: 150 MMHG | DIASTOLIC BLOOD PRESSURE: 80 MMHG | HEART RATE: 66 BPM | OXYGEN SATURATION: 99 %

## 2021-12-22 LAB — GLUCOSE BLDC GLUCOMTR-MCNC: 136

## 2021-12-22 PROCEDURE — 82962 GLUCOSE BLOOD TEST: CPT | Mod: NC

## 2021-12-22 PROCEDURE — 99214 OFFICE O/P EST MOD 30 MIN: CPT | Mod: 25

## 2021-12-22 RX ORDER — RAMIPRIL 10 MG/1
10 CAPSULE ORAL
Refills: 0 | Status: ACTIVE | COMMUNITY

## 2021-12-22 RX ORDER — ROSUVASTATIN CALCIUM 20 MG/1
20 TABLET, FILM COATED ORAL DAILY
Refills: 0 | Status: ACTIVE | COMMUNITY

## 2021-12-22 RX ORDER — PANCRELIPASE 60000; 12000; 38000 [USP'U]/1; [USP'U]/1; [USP'U]/1
12000-38000 CAPSULE, DELAYED RELEASE PELLETS ORAL
Refills: 0 | Status: ACTIVE | COMMUNITY

## 2021-12-22 RX ORDER — CAMPHOR 0.45 %
25 GEL (GRAM) TOPICAL
Qty: 2 | Refills: 0 | Status: DISCONTINUED | COMMUNITY
Start: 2018-03-30 | End: 2021-12-22

## 2021-12-22 NOTE — HISTORY OF PRESENT ILLNESS
[FreeTextEntry1] : DM type:pancreatic\par Severity:severe\par Duration:13 years\par \par Associated symptoms or complications:nephropathy, retinopathy (legally blind), neuropathy\par \par \par Current regimen:\par vgo 30 - stopped\par tresiba 20\par humalog\par \par \par \par Current control:uncontrolled\par \par \par \par PMH:\par hepatitis C, cirrhosis\par hemochromatosis\par pancreatitis\par Bernard's esophagus\par Thyroid Ca, papillary microcarcinoma with positive node. s/p 177 [facility]ci of ALFORD 9/09/\par renal insufficiency\par refractory hypertension\par \par \par

## 2021-12-22 NOTE — PHYSICAL EXAM
[Thyroid Not Enlarged] : the thyroid was not enlarged [Normal Rate] : heart rate was normal [de-identified] : chronically ill appearing [de-identified] : bilateral edema

## 2021-12-22 NOTE — ASSESSMENT
[FreeTextEntry1] : Pancreatic diabetes, chronically uncontrolled with complications. ALso renal, liver disease problematic, as well as intermittent hypertension.\par Needs to resume dexcom use\par \par Glucose Sensor Necessity:  This patient with diabetes (dx: E10.65) has been performing 4 glucose checks per day for the last 60 days utilizing a home blood glucose monitor   The patient is treated with insulin via four injections daily.  This patient requires frequent adjustments to her insulin treatment on the basis of therapeutic continuous glucose monitoring results by either adjusting fixed doses or sliding scale insulin.  In addition, the patient has been to our office for an evaluation of her diabetes control within the past 6 months.  \par \par \par

## 2022-01-19 ENCOUNTER — APPOINTMENT (OUTPATIENT)
Dept: ENDOCRINOLOGY | Facility: CLINIC | Age: 67
End: 2022-01-19
Payer: COMMERCIAL

## 2022-01-19 DIAGNOSIS — C73 MALIGNANT NEOPLASM OF THYROID GLAND: ICD-10-CM

## 2022-01-19 DIAGNOSIS — E89.0 POSTPROCEDURAL HYPOTHYROIDISM: ICD-10-CM

## 2022-01-19 DIAGNOSIS — E11.69 TYPE 2 DIABETES MELLITUS WITH OTHER SPECIFIED COMPLICATION: ICD-10-CM

## 2022-01-19 DIAGNOSIS — K86.9 TYPE 2 DIABETES MELLITUS WITH OTHER SPECIFIED COMPLICATION: ICD-10-CM

## 2022-01-19 LAB — GLUCOSE BLDC GLUCOMTR-MCNC: 305

## 2022-01-19 PROCEDURE — 82962 GLUCOSE BLOOD TEST: CPT | Mod: NC

## 2022-01-19 PROCEDURE — 99214 OFFICE O/P EST MOD 30 MIN: CPT | Mod: 25

## 2022-01-19 RX ORDER — LEVOTHYROXINE SODIUM 0.2 MG/1
200 TABLET ORAL
Qty: 90 | Refills: 3 | Status: ACTIVE | COMMUNITY
Start: 2019-06-21 | End: 1900-01-01

## 2022-01-19 NOTE — ASSESSMENT
[FreeTextEntry1] : Severe hypothyroidism. Suspect poor adherence with T4. EMphasized need for daily dosing under the proper conditions. Increased dose to .2, and repeat labs in 10-14 days.\par Will also update A1C, suspect chronically uncontrolled diabetes\par \par \par Glucose Sensor Necessity:  This patient with diabetes (dx: E10.65) has been performing 4 glucose checks per day for the last 60 days utilizing a home blood glucose monitor   The patient is treated with insulin via four injections daily.  This patient requires frequent adjustments to her insulin treatment on the basis of therapeutic continuous glucose monitoring results by either adjusting fixed doses or sliding scale insulin.  In addition, the patient has been to our office for an evaluation of her diabetes control within the past 6 months.  \par \par \par

## 2022-01-19 NOTE — PHYSICAL EXAM
[Well Healed Scar] : well healed scar [Clear to Auscultation] : lungs were clear to auscultation bilaterally [Normal Rate] : heart rate was normal [Oriented x3] : oriented to person, place, and time [de-identified] : appears chronically ill and fatigued [de-identified] : bilateral periorbital edema [de-identified] : voice slow and mildly raspy [de-identified] : bilateral edema [de-identified] : warm and dry

## 2022-01-19 NOTE — HISTORY OF PRESENT ILLNESS
[FreeTextEntry1] : DM type:pancreatic\par Severity:severe\par Duration:13 years\par \par Associated symptoms or complications:nephropathy, retinopathy (legally blind), neuropathy\par \par \par Current regimen:\par vgo 30 - stopped\par tresiba 20\par humalog 10 AC\par \par \par \par Current control:uncontrolled\par \par \par \par PMH:\par hepatitis C, cirrhosis\par hemochromatosis\par pancreatitis\par Bernard's esophagus\par Thyroid Ca, papillary microcarcinoma with positive node. s/p 177 mci of ALFORD 9/09/\par renal insufficiency\par refractory hypertension\par \par recent hospitalization at [facility] due to accelerated hypertension. Labs noted severe hypothyroidism. Pt. claims adherence with synthroid. c/o fatigue, swelling, weakness and cold intolerance\par \par

## 2022-04-27 RX ORDER — BLOOD SUGAR DIAGNOSTIC
STRIP MISCELLANEOUS
Qty: 6 | Refills: 0 | Status: ACTIVE | COMMUNITY
Start: 2019-01-07 | End: 1900-01-01

## 2022-04-27 RX ORDER — PEN NEEDLE, DIABETIC 29 G X1/2"
31G X 8 MM NEEDLE, DISPOSABLE MISCELLANEOUS
Qty: 500 | Refills: 1 | Status: ACTIVE | COMMUNITY
Start: 1900-01-01 | End: 1900-01-01

## 2022-06-23 ENCOUNTER — APPOINTMENT (OUTPATIENT)
Dept: ENDOCRINOLOGY | Facility: CLINIC | Age: 67
End: 2022-06-23

## 2024-02-07 ENCOUNTER — INPATIENT (INPATIENT)
Facility: HOSPITAL | Age: 69
LOS: 2 days | Discharge: HOME | DRG: 391 | End: 2024-02-10
Attending: STUDENT IN AN ORGANIZED HEALTH CARE EDUCATION/TRAINING PROGRAM | Admitting: STUDENT IN AN ORGANIZED HEALTH CARE EDUCATION/TRAINING PROGRAM
Payer: COMMERCIAL

## 2024-02-07 VITALS
WEIGHT: 135.8 LBS | TEMPERATURE: 98 F | SYSTOLIC BLOOD PRESSURE: 154 MMHG | DIASTOLIC BLOOD PRESSURE: 69 MMHG | HEIGHT: 62 IN | RESPIRATION RATE: 18 BRPM | OXYGEN SATURATION: 99 % | HEART RATE: 70 BPM

## 2024-02-07 DIAGNOSIS — Z98.890 OTHER SPECIFIED POSTPROCEDURAL STATES: Chronic | ICD-10-CM

## 2024-02-07 DIAGNOSIS — Z90.49 ACQUIRED ABSENCE OF OTHER SPECIFIED PARTS OF DIGESTIVE TRACT: Chronic | ICD-10-CM

## 2024-02-07 DIAGNOSIS — E89.0 POSTPROCEDURAL HYPOTHYROIDISM: Chronic | ICD-10-CM

## 2024-02-07 DIAGNOSIS — N18.6 END STAGE RENAL DISEASE: ICD-10-CM

## 2024-02-07 LAB
ALBUMIN SERPL ELPH-MCNC: 3.5 G/DL — SIGNIFICANT CHANGE UP (ref 3.3–5.2)
ALP SERPL-CCNC: 86 U/L — SIGNIFICANT CHANGE UP (ref 40–120)
ALT FLD-CCNC: 16 U/L — SIGNIFICANT CHANGE UP
ANION GAP SERPL CALC-SCNC: 17 MMOL/L — SIGNIFICANT CHANGE UP (ref 5–17)
ANISOCYTOSIS BLD QL: SLIGHT — SIGNIFICANT CHANGE UP
APTT BLD: 34.3 SEC — SIGNIFICANT CHANGE UP (ref 24.5–35.6)
AST SERPL-CCNC: 18 U/L — SIGNIFICANT CHANGE UP
BASE EXCESS BLDV CALC-SCNC: 3.6 MMOL/L — HIGH (ref -2–3)
BASOPHILS # BLD AUTO: 0 K/UL — SIGNIFICANT CHANGE UP (ref 0–0.2)
BASOPHILS NFR BLD AUTO: 0 % — SIGNIFICANT CHANGE UP (ref 0–2)
BILIRUB SERPL-MCNC: 0.3 MG/DL — LOW (ref 0.4–2)
BLD GP AB SCN SERPL QL: SIGNIFICANT CHANGE UP
BUN SERPL-MCNC: 48.6 MG/DL — HIGH (ref 8–20)
CA-I SERPL-SCNC: 1.02 MMOL/L — LOW (ref 1.15–1.33)
CALCIUM SERPL-MCNC: 8.4 MG/DL — SIGNIFICANT CHANGE UP (ref 8.4–10.5)
CHLORIDE BLDV-SCNC: 101 MMOL/L — SIGNIFICANT CHANGE UP (ref 96–108)
CHLORIDE SERPL-SCNC: 96 MMOL/L — SIGNIFICANT CHANGE UP (ref 96–108)
CO2 SERPL-SCNC: 24 MMOL/L — SIGNIFICANT CHANGE UP (ref 22–29)
CREAT SERPL-MCNC: 5.33 MG/DL — HIGH (ref 0.5–1.3)
EGFR: 8 ML/MIN/1.73M2 — LOW
EOSINOPHIL # BLD AUTO: 0.14 K/UL — SIGNIFICANT CHANGE UP (ref 0–0.5)
EOSINOPHIL NFR BLD AUTO: 5.3 % — SIGNIFICANT CHANGE UP (ref 0–6)
GAS PNL BLDV: 136 MMOL/L — SIGNIFICANT CHANGE UP (ref 136–145)
GAS PNL BLDV: SIGNIFICANT CHANGE UP
GLUCOSE BLDV-MCNC: 205 MG/DL — HIGH (ref 70–99)
GLUCOSE SERPL-MCNC: 195 MG/DL — HIGH (ref 70–99)
HCO3 BLDV-SCNC: 28 MMOL/L — SIGNIFICANT CHANGE UP (ref 22–29)
HCT VFR BLD CALC: 29.4 % — LOW (ref 34.5–45)
HCT VFR BLDA CALC: 30 % — SIGNIFICANT CHANGE UP
HGB BLD CALC-MCNC: 10 G/DL — LOW (ref 11.7–16.1)
HGB BLD-MCNC: 9.9 G/DL — LOW (ref 11.5–15.5)
INR BLD: 1.05 RATIO — SIGNIFICANT CHANGE UP (ref 0.85–1.18)
LACTATE BLDV-MCNC: 1.1 MMOL/L — SIGNIFICANT CHANGE UP (ref 0.5–2)
LIDOCAIN IGE QN: 45 U/L — SIGNIFICANT CHANGE UP (ref 22–51)
LYMPHOCYTES # BLD AUTO: 0.53 K/UL — LOW (ref 1–3.3)
LYMPHOCYTES # BLD AUTO: 19.5 % — SIGNIFICANT CHANGE UP (ref 13–44)
MANUAL SMEAR VERIFICATION: SIGNIFICANT CHANGE UP
MCHC RBC-ENTMCNC: 31.8 PG — SIGNIFICANT CHANGE UP (ref 27–34)
MCHC RBC-ENTMCNC: 33.7 GM/DL — SIGNIFICANT CHANGE UP (ref 32–36)
MCV RBC AUTO: 94.5 FL — SIGNIFICANT CHANGE UP (ref 80–100)
MONOCYTES # BLD AUTO: 0.33 K/UL — SIGNIFICANT CHANGE UP (ref 0–0.9)
MONOCYTES NFR BLD AUTO: 12.4 % — SIGNIFICANT CHANGE UP (ref 2–14)
MYELOCYTES NFR BLD: 0.9 % — HIGH (ref 0–0)
NEUTROPHILS # BLD AUTO: 1.6 K/UL — LOW (ref 1.8–7.4)
NEUTROPHILS NFR BLD AUTO: 59.3 % — SIGNIFICANT CHANGE UP (ref 43–77)
OVALOCYTES BLD QL SMEAR: SLIGHT — SIGNIFICANT CHANGE UP
PCO2 BLDV: 42 MMHG — SIGNIFICANT CHANGE UP (ref 39–42)
PH BLDV: 7.43 — SIGNIFICANT CHANGE UP (ref 7.32–7.43)
PLAT MORPH BLD: NORMAL — SIGNIFICANT CHANGE UP
PLATELET # BLD AUTO: 72 K/UL — LOW (ref 150–400)
PO2 BLDV: 135 MMHG — HIGH (ref 25–45)
POLYCHROMASIA BLD QL SMEAR: SLIGHT — SIGNIFICANT CHANGE UP
POTASSIUM BLDV-SCNC: 4.9 MMOL/L — SIGNIFICANT CHANGE UP (ref 3.5–5.1)
POTASSIUM SERPL-MCNC: 4.5 MMOL/L — SIGNIFICANT CHANGE UP (ref 3.5–5.3)
POTASSIUM SERPL-SCNC: 4.5 MMOL/L — SIGNIFICANT CHANGE UP (ref 3.5–5.3)
PROT SERPL-MCNC: 6.5 G/DL — LOW (ref 6.6–8.7)
PROTHROM AB SERPL-ACNC: 11.6 SEC — SIGNIFICANT CHANGE UP (ref 9.5–13)
RBC # BLD: 3.11 M/UL — LOW (ref 3.8–5.2)
RBC # FLD: 13.4 % — SIGNIFICANT CHANGE UP (ref 10.3–14.5)
RBC BLD AUTO: ABNORMAL
SAO2 % BLDV: 100 % — SIGNIFICANT CHANGE UP
SODIUM SERPL-SCNC: 137 MMOL/L — SIGNIFICANT CHANGE UP (ref 135–145)
VARIANT LYMPHS # BLD: 2.6 % — SIGNIFICANT CHANGE UP (ref 0–6)
WBC # BLD: 2.7 K/UL — LOW (ref 3.8–10.5)
WBC # FLD AUTO: 2.7 K/UL — LOW (ref 3.8–10.5)

## 2024-02-07 PROCEDURE — 71045 X-RAY EXAM CHEST 1 VIEW: CPT | Mod: 26

## 2024-02-07 PROCEDURE — 99285 EMERGENCY DEPT VISIT HI MDM: CPT

## 2024-02-07 PROCEDURE — 93010 ELECTROCARDIOGRAM REPORT: CPT

## 2024-02-07 PROCEDURE — G1004: CPT

## 2024-02-07 PROCEDURE — 74177 CT ABD & PELVIS W/CONTRAST: CPT | Mod: 26,ME

## 2024-02-07 PROCEDURE — 99223 1ST HOSP IP/OBS HIGH 75: CPT

## 2024-02-07 RX ORDER — ONDANSETRON 8 MG/1
4 TABLET, FILM COATED ORAL ONCE
Refills: 0 | Status: COMPLETED | OUTPATIENT
Start: 2024-02-07 | End: 2024-02-07

## 2024-02-07 RX ORDER — LANOLIN ALCOHOL/MO/W.PET/CERES
3 CREAM (GRAM) TOPICAL AT BEDTIME
Refills: 0 | Status: DISCONTINUED | OUTPATIENT
Start: 2024-02-07 | End: 2024-02-10

## 2024-02-07 RX ORDER — ONDANSETRON 8 MG/1
4 TABLET, FILM COATED ORAL EVERY 8 HOURS
Refills: 0 | Status: DISCONTINUED | OUTPATIENT
Start: 2024-02-07 | End: 2024-02-10

## 2024-02-07 RX ORDER — ACETAMINOPHEN 500 MG
650 TABLET ORAL EVERY 6 HOURS
Refills: 0 | Status: DISCONTINUED | OUTPATIENT
Start: 2024-02-07 | End: 2024-02-10

## 2024-02-07 RX ORDER — HYDROMORPHONE HYDROCHLORIDE 2 MG/ML
1 INJECTION INTRAMUSCULAR; INTRAVENOUS; SUBCUTANEOUS ONCE
Refills: 0 | Status: DISCONTINUED | OUTPATIENT
Start: 2024-02-07 | End: 2024-02-07

## 2024-02-07 RX ORDER — SODIUM CHLORIDE 9 MG/ML
3 INJECTION INTRAMUSCULAR; INTRAVENOUS; SUBCUTANEOUS EVERY 8 HOURS
Refills: 0 | Status: DISCONTINUED | OUTPATIENT
Start: 2024-02-07 | End: 2024-02-10

## 2024-02-07 RX ADMIN — HYDROMORPHONE HYDROCHLORIDE 1 MILLIGRAM(S): 2 INJECTION INTRAMUSCULAR; INTRAVENOUS; SUBCUTANEOUS at 16:49

## 2024-02-07 RX ADMIN — HYDROMORPHONE HYDROCHLORIDE 1 MILLIGRAM(S): 2 INJECTION INTRAMUSCULAR; INTRAVENOUS; SUBCUTANEOUS at 15:47

## 2024-02-07 RX ADMIN — ONDANSETRON 4 MILLIGRAM(S): 8 TABLET, FILM COATED ORAL at 15:47

## 2024-02-07 NOTE — ED PROVIDER NOTE - ATTENDING CONTRIBUTION TO CARE
67 year old female with past medical history of CAD (s/p stents), PAD, Hep-C (cured per pt, was on Harvoni in past), Cirrhosis, HTN, ACD, CKD, end stage renal disease (On dialysis M, W, F L. AVF access) and Bernard's esophagus, s/p cholecystectomy presenting for evaluation of right lower quadrant pain, on exam w. mild-mod ttp, no other focal findings on exam, did miss her dialysis earlier today. Will check labs, ctap w. iv cont, after study patient will need admit for dialysis though does not appear to be in acute vol overload--mostly due to need for her routine dialysis. If positive scan will d/w surg

## 2024-02-07 NOTE — ED PROVIDER NOTE - NSICDXPASTMEDICALHX_GEN_ALL_CORE_FT
PAST MEDICAL HISTORY:  Bernard syndrome     Cirrhosis     Diabetes     ESRD on dialysis MWF at [facility]    HTN (hypertension)     Pancreatitis

## 2024-02-07 NOTE — ED PROVIDER NOTE - PROGRESS NOTE DETAILS
CT abdomen with IV contrast is necessary to rule out acute appendicitis for this pt. Benefits outweigh the risk, Nephrology contacted for HD post CT abdomen with IV contrast. CT abdomen pelvis negative for acute findings.  Will admit patient for dialysis given that she missed dialysis today and received IV contrast in the ED.  Nephrology consult placed for the morning

## 2024-02-07 NOTE — ED PROVIDER NOTE - NSICDXPASTSURGICALHX_GEN_ALL_CORE_FT
PAST SURGICAL HISTORY:  H/O total thyroidectomy     History of cholecystectomy 1990s    History of liver biopsy

## 2024-02-07 NOTE — ED PROVIDER NOTE - PHYSICAL EXAMINATION
Const: Awake, alert and oriented.   HEENT: NC/AT. Moist mucous membranes.  Eyes: No scleral icterus.  Neck:. Soft and supple.  Cardiac: Regular rate and regular rhythm. +S1/S2. Peripheral pulses 2+ and symmetric. No LE edema.  Resp: Speaking in full sentences. No evidence of respiratory distress. No wheezes, rales or rhonchi.  Abd: Soft, tender over right lower quadrant, non-distended. Normal bowel sounds in all 4 quadrants. +Guarding or rebound.  Back: Spine midline and non-tender. +CVAT.  Skin: No rashes, abrasions or lacerations.  Neuro: Awake, alert & oriented x 3. Moves all extremities symmetrically.

## 2024-02-07 NOTE — ED PROVIDER NOTE - CLINICAL SUMMARY MEDICAL DECISION MAKING FREE TEXT BOX
67 year old female with past medical history of CAD (s/p stents), PAD, Hep-C (cured per pt, was on Harvoni in past), Cirrhosis, HTN, ACD, CKD, end stage renal disease (On dialysis M, W, F L. AVF access). Complains of R.LQ pain, vitally stable. Tenderness in R.LQ, CT abdomen with IV contrast planned to r/o acute appendicits, ischemic bowel. Nephrology contacted for HD post CT abdomen with IV contrast. Blood work without WBC elevation, normal electrolytes. EKG without ST-T changes. GI PCR ordered. To be admitted following CT abdomen.

## 2024-02-07 NOTE — ED PROVIDER NOTE - OBJECTIVE STATEMENT
67 year old female with past medical history of CAD (s/p stents), PAD, Hep-C (cured per pt, was on Harvoni in past), Cirrhosis, HTN, ACD, CKD, end stage renal disease (On dialysis M, W, F) and Barrets esophagus, s/p cholecystectomy. 67 year old female with past medical history of CAD (s/p stents), PAD, Hep-C (cured per pt, was on Harvoni in past), Cirrhosis, HTN, ACD, CKD, end stage renal disease (On dialysis M, W, F) and Bernard's esophagus, s/p cholecystectomy. RLQ pain radiating to the back, CVA tenderness. Nausea, vomiting. Diarrhea+  No fever, chills, burning with micturition. Missed dialysis session today. Was previously seen at [facility] on Monday 2/5, discharged as constipation was did not have a bowel movement then. 67 year old female with past medical history of CAD (s/p stents), PAD, Hep-C (cured per pt, was on Harvoni in past), Cirrhosis, HTN, ACD, CKD, end stage renal disease (On dialysis M, W, F L. AVF access) and Bernard's esophagus, s/p cholecystectomy. RLQ pain radiating to the back, CVA tenderness. Nausea, vomiting. Diarrhea+ No fever, chills, burning with micturition. Missed dialysis session today. Was previously seen at [facility] on Monday 2/5, discharged as constipation was did not have a bowel movement then.

## 2024-02-07 NOTE — ED ADULT NURSE REASSESSMENT NOTE - NS ED NURSE REASSESS COMMENT FT1
patient informed plan of care and states understanding, warm blankets provided  and safety precautions in place.

## 2024-02-07 NOTE — ED PROVIDER NOTE - DISPOSITION TYPE
06/24/23 1410   Subjective Comments   Subjective Comments Hold PT, pt not medically stable, hypotensive. PT will continue to follow and treat as able        ADMIT

## 2024-02-08 LAB
A1C WITH ESTIMATED AVERAGE GLUCOSE RESULT: 7.3 % — HIGH (ref 4–5.6)
ANION GAP SERPL CALC-SCNC: 14 MMOL/L — SIGNIFICANT CHANGE UP (ref 5–17)
APPEARANCE UR: ABNORMAL
BACTERIA # UR AUTO: ABNORMAL /HPF
BILIRUB UR-MCNC: NEGATIVE — SIGNIFICANT CHANGE UP
BUN SERPL-MCNC: 50.5 MG/DL — HIGH (ref 8–20)
CALCIUM SERPL-MCNC: 8.2 MG/DL — LOW (ref 8.4–10.5)
CHLORIDE SERPL-SCNC: 99 MMOL/L — SIGNIFICANT CHANGE UP (ref 96–108)
CO2 SERPL-SCNC: 26 MMOL/L — SIGNIFICANT CHANGE UP (ref 22–29)
COLOR SPEC: YELLOW — SIGNIFICANT CHANGE UP
CREAT SERPL-MCNC: 5.52 MG/DL — HIGH (ref 0.5–1.3)
DIFF PNL FLD: ABNORMAL
EGFR: 8 ML/MIN/1.73M2 — LOW
ESTIMATED AVERAGE GLUCOSE: 163 MG/DL — HIGH (ref 68–114)
GLUCOSE BLDC GLUCOMTR-MCNC: 143 MG/DL — HIGH (ref 70–99)
GLUCOSE BLDC GLUCOMTR-MCNC: 204 MG/DL — HIGH (ref 70–99)
GLUCOSE BLDC GLUCOMTR-MCNC: 238 MG/DL — HIGH (ref 70–99)
GLUCOSE BLDC GLUCOMTR-MCNC: 303 MG/DL — HIGH (ref 70–99)
GLUCOSE SERPL-MCNC: 222 MG/DL — HIGH (ref 70–99)
GLUCOSE UR QL: 250 MG/DL
HCT VFR BLD CALC: 28.9 % — LOW (ref 34.5–45)
HGB BLD-MCNC: 9.2 G/DL — LOW (ref 11.5–15.5)
KETONES UR-MCNC: NEGATIVE MG/DL — SIGNIFICANT CHANGE UP
LEUKOCYTE ESTERASE UR-ACNC: ABNORMAL
MAGNESIUM SERPL-MCNC: 2.2 MG/DL — SIGNIFICANT CHANGE UP (ref 1.6–2.6)
MCHC RBC-ENTMCNC: 31.1 PG — SIGNIFICANT CHANGE UP (ref 27–34)
MCHC RBC-ENTMCNC: 31.8 GM/DL — LOW (ref 32–36)
MCV RBC AUTO: 97.6 FL — SIGNIFICANT CHANGE UP (ref 80–100)
NITRITE UR-MCNC: NEGATIVE — SIGNIFICANT CHANGE UP
PH UR: 8.5 (ref 5–8)
PHOSPHATE SERPL-MCNC: 6.4 MG/DL — HIGH (ref 2.4–4.7)
PLATELET # BLD AUTO: 73 K/UL — LOW (ref 150–400)
POTASSIUM SERPL-MCNC: 5 MMOL/L — SIGNIFICANT CHANGE UP (ref 3.5–5.3)
POTASSIUM SERPL-SCNC: 5 MMOL/L — SIGNIFICANT CHANGE UP (ref 3.5–5.3)
PROT UR-MCNC: 300 MG/DL
RBC # BLD: 2.96 M/UL — LOW (ref 3.8–5.2)
RBC # FLD: 13.2 % — SIGNIFICANT CHANGE UP (ref 10.3–14.5)
RBC CASTS # UR COMP ASSIST: 5 /HPF — HIGH (ref 0–4)
SODIUM SERPL-SCNC: 138 MMOL/L — SIGNIFICANT CHANGE UP (ref 135–145)
SP GR SPEC: 1.02 — SIGNIFICANT CHANGE UP (ref 1–1.03)
SQUAMOUS # UR AUTO: 31 /HPF — HIGH (ref 0–5)
UROBILINOGEN FLD QL: 0.2 MG/DL — SIGNIFICANT CHANGE UP (ref 0.2–1)
WBC # BLD: 2.64 K/UL — LOW (ref 3.8–10.5)
WBC # FLD AUTO: 2.64 K/UL — LOW (ref 3.8–10.5)
WBC UR QL: 75 /HPF — HIGH (ref 0–5)

## 2024-02-08 RX ORDER — SERTRALINE 25 MG/1
200 TABLET, FILM COATED ORAL DAILY
Refills: 0 | Status: DISCONTINUED | OUTPATIENT
Start: 2024-02-08 | End: 2024-02-10

## 2024-02-08 RX ORDER — LIPASE/PROTEASE/AMYLASE 16-48-48K
2 CAPSULE,DELAYED RELEASE (ENTERIC COATED) ORAL
Refills: 0 | Status: DISCONTINUED | OUTPATIENT
Start: 2024-02-08 | End: 2024-02-10

## 2024-02-08 RX ORDER — GLUCAGON INJECTION, SOLUTION 0.5 MG/.1ML
1 INJECTION, SOLUTION SUBCUTANEOUS ONCE
Refills: 0 | Status: DISCONTINUED | OUTPATIENT
Start: 2024-02-08 | End: 2024-02-10

## 2024-02-08 RX ORDER — DEXTROSE 50 % IN WATER 50 %
12.5 SYRINGE (ML) INTRAVENOUS ONCE
Refills: 0 | Status: DISCONTINUED | OUTPATIENT
Start: 2024-02-08 | End: 2024-02-10

## 2024-02-08 RX ORDER — CALCIUM ACETATE 667 MG
667 TABLET ORAL
Refills: 0 | Status: DISCONTINUED | OUTPATIENT
Start: 2024-02-08 | End: 2024-02-10

## 2024-02-08 RX ORDER — HYDROMORPHONE HYDROCHLORIDE 2 MG/ML
0.5 INJECTION INTRAMUSCULAR; INTRAVENOUS; SUBCUTANEOUS EVERY 4 HOURS
Refills: 0 | Status: DISCONTINUED | OUTPATIENT
Start: 2024-02-08 | End: 2024-02-10

## 2024-02-08 RX ORDER — LISINOPRIL 2.5 MG/1
1 TABLET ORAL
Refills: 0 | DISCHARGE

## 2024-02-08 RX ORDER — SERTRALINE 25 MG/1
1 TABLET, FILM COATED ORAL
Refills: 0 | DISCHARGE

## 2024-02-08 RX ORDER — CARVEDILOL PHOSPHATE 80 MG/1
37.5 CAPSULE, EXTENDED RELEASE ORAL EVERY 12 HOURS
Refills: 0 | Status: DISCONTINUED | OUTPATIENT
Start: 2024-02-08 | End: 2024-02-10

## 2024-02-08 RX ORDER — LEVOTHYROXINE SODIUM 125 MCG
2 TABLET ORAL
Refills: 0 | DISCHARGE

## 2024-02-08 RX ORDER — LEVOTHYROXINE SODIUM 125 MCG
250 TABLET ORAL DAILY
Refills: 0 | Status: DISCONTINUED | OUTPATIENT
Start: 2024-02-08 | End: 2024-02-10

## 2024-02-08 RX ORDER — DEXTROSE 50 % IN WATER 50 %
25 SYRINGE (ML) INTRAVENOUS ONCE
Refills: 0 | Status: DISCONTINUED | OUTPATIENT
Start: 2024-02-08 | End: 2024-02-10

## 2024-02-08 RX ORDER — HYDRALAZINE HCL 50 MG
5 TABLET ORAL ONCE
Refills: 0 | Status: COMPLETED | OUTPATIENT
Start: 2024-02-08 | End: 2024-02-08

## 2024-02-08 RX ORDER — LISINOPRIL 2.5 MG/1
10 TABLET ORAL DAILY
Refills: 0 | Status: DISCONTINUED | OUTPATIENT
Start: 2024-02-08 | End: 2024-02-10

## 2024-02-08 RX ORDER — ASPIRIN/CALCIUM CARB/MAGNESIUM 324 MG
81 TABLET ORAL DAILY
Refills: 0 | Status: DISCONTINUED | OUTPATIENT
Start: 2024-02-08 | End: 2024-02-10

## 2024-02-08 RX ORDER — LIPASE/PROTEASE/AMYLASE 16-48-48K
2 CAPSULE,DELAYED RELEASE (ENTERIC COATED) ORAL
Refills: 0 | DISCHARGE

## 2024-02-08 RX ORDER — INSULIN LISPRO 100/ML
VIAL (ML) SUBCUTANEOUS
Refills: 0 | Status: DISCONTINUED | OUTPATIENT
Start: 2024-02-08 | End: 2024-02-09

## 2024-02-08 RX ORDER — NIFEDIPINE 30 MG
60 TABLET, EXTENDED RELEASE 24 HR ORAL DAILY
Refills: 0 | Status: DISCONTINUED | OUTPATIENT
Start: 2024-02-08 | End: 2024-02-10

## 2024-02-08 RX ORDER — NIFEDIPINE 30 MG
1 TABLET, EXTENDED RELEASE 24 HR ORAL
Refills: 0 | DISCHARGE

## 2024-02-08 RX ORDER — CALCIUM ACETATE 667 MG
3 TABLET ORAL
Refills: 0 | DISCHARGE

## 2024-02-08 RX ORDER — CARVEDILOL PHOSPHATE 80 MG/1
3 CAPSULE, EXTENDED RELEASE ORAL
Refills: 0 | DISCHARGE

## 2024-02-08 RX ORDER — SODIUM CHLORIDE 9 MG/ML
1000 INJECTION, SOLUTION INTRAVENOUS
Refills: 0 | Status: DISCONTINUED | OUTPATIENT
Start: 2024-02-08 | End: 2024-02-10

## 2024-02-08 RX ORDER — INFLUENZA VIRUS VACCINE 15; 15; 15; 15 UG/.5ML; UG/.5ML; UG/.5ML; UG/.5ML
0.7 SUSPENSION INTRAMUSCULAR ONCE
Refills: 0 | Status: DISCONTINUED | OUTPATIENT
Start: 2024-02-08 | End: 2024-02-10

## 2024-02-08 RX ORDER — HYDROMORPHONE HYDROCHLORIDE 2 MG/ML
1 INJECTION INTRAMUSCULAR; INTRAVENOUS; SUBCUTANEOUS EVERY 4 HOURS
Refills: 0 | Status: DISCONTINUED | OUTPATIENT
Start: 2024-02-08 | End: 2024-02-10

## 2024-02-08 RX ORDER — ASPIRIN/CALCIUM CARB/MAGNESIUM 324 MG
0 TABLET ORAL
Refills: 0 | DISCHARGE

## 2024-02-08 RX ORDER — DEXTROSE 50 % IN WATER 50 %
15 SYRINGE (ML) INTRAVENOUS ONCE
Refills: 0 | Status: DISCONTINUED | OUTPATIENT
Start: 2024-02-08 | End: 2024-02-10

## 2024-02-08 RX ORDER — HEPARIN SODIUM 5000 [USP'U]/ML
5000 INJECTION INTRAVENOUS; SUBCUTANEOUS EVERY 12 HOURS
Refills: 0 | Status: DISCONTINUED | OUTPATIENT
Start: 2024-02-08 | End: 2024-02-10

## 2024-02-08 RX ADMIN — Medication 0.2 MILLIGRAM(S): at 15:26

## 2024-02-08 RX ADMIN — Medication 4: at 08:01

## 2024-02-08 RX ADMIN — Medication 8: at 22:32

## 2024-02-08 RX ADMIN — Medication 667 MILLIGRAM(S): at 08:01

## 2024-02-08 RX ADMIN — Medication 667 MILLIGRAM(S): at 16:46

## 2024-02-08 RX ADMIN — LISINOPRIL 10 MILLIGRAM(S): 2.5 TABLET ORAL at 05:21

## 2024-02-08 RX ADMIN — Medication 81 MILLIGRAM(S): at 11:24

## 2024-02-08 RX ADMIN — HYDROMORPHONE HYDROCHLORIDE 1 MILLIGRAM(S): 2 INJECTION INTRAMUSCULAR; INTRAVENOUS; SUBCUTANEOUS at 01:44

## 2024-02-08 RX ADMIN — HYDROMORPHONE HYDROCHLORIDE 1 MILLIGRAM(S): 2 INJECTION INTRAMUSCULAR; INTRAVENOUS; SUBCUTANEOUS at 18:37

## 2024-02-08 RX ADMIN — CARVEDILOL PHOSPHATE 37.5 MILLIGRAM(S): 80 CAPSULE, EXTENDED RELEASE ORAL at 05:28

## 2024-02-08 RX ADMIN — HYDROMORPHONE HYDROCHLORIDE 1 MILLIGRAM(S): 2 INJECTION INTRAMUSCULAR; INTRAVENOUS; SUBCUTANEOUS at 00:52

## 2024-02-08 RX ADMIN — SERTRALINE 200 MILLIGRAM(S): 25 TABLET, FILM COATED ORAL at 11:24

## 2024-02-08 RX ADMIN — Medication 0.2 MILLIGRAM(S): at 22:31

## 2024-02-08 RX ADMIN — Medication 0.2 MILLIGRAM(S): at 05:22

## 2024-02-08 RX ADMIN — Medication 5 MILLIGRAM(S): at 07:19

## 2024-02-08 RX ADMIN — Medication 60 MILLIGRAM(S): at 05:23

## 2024-02-08 RX ADMIN — HEPARIN SODIUM 5000 UNIT(S): 5000 INJECTION INTRAVENOUS; SUBCUTANEOUS at 05:31

## 2024-02-08 RX ADMIN — Medication 2 CAPSULE(S): at 08:01

## 2024-02-08 RX ADMIN — Medication 40 MILLIGRAM(S): at 05:22

## 2024-02-08 RX ADMIN — SODIUM CHLORIDE 3 MILLILITER(S): 9 INJECTION INTRAMUSCULAR; INTRAVENOUS; SUBCUTANEOUS at 22:31

## 2024-02-08 RX ADMIN — Medication 667 MILLIGRAM(S): at 11:25

## 2024-02-08 RX ADMIN — CARVEDILOL PHOSPHATE 37.5 MILLIGRAM(S): 80 CAPSULE, EXTENDED RELEASE ORAL at 16:46

## 2024-02-08 RX ADMIN — SODIUM CHLORIDE 3 MILLILITER(S): 9 INJECTION INTRAMUSCULAR; INTRAVENOUS; SUBCUTANEOUS at 15:28

## 2024-02-08 RX ADMIN — HYDROMORPHONE HYDROCHLORIDE 1 MILLIGRAM(S): 2 INJECTION INTRAMUSCULAR; INTRAVENOUS; SUBCUTANEOUS at 11:59

## 2024-02-08 RX ADMIN — Medication 2 CAPSULE(S): at 16:46

## 2024-02-08 RX ADMIN — Medication 250 MICROGRAM(S): at 05:28

## 2024-02-08 RX ADMIN — HYDROMORPHONE HYDROCHLORIDE 1 MILLIGRAM(S): 2 INJECTION INTRAMUSCULAR; INTRAVENOUS; SUBCUTANEOUS at 22:29

## 2024-02-08 RX ADMIN — HEPARIN SODIUM 5000 UNIT(S): 5000 INJECTION INTRAVENOUS; SUBCUTANEOUS at 16:45

## 2024-02-08 RX ADMIN — SODIUM CHLORIDE 3 MILLILITER(S): 9 INJECTION INTRAMUSCULAR; INTRAVENOUS; SUBCUTANEOUS at 06:03

## 2024-02-08 RX ADMIN — HYDROMORPHONE HYDROCHLORIDE 1 MILLIGRAM(S): 2 INJECTION INTRAMUSCULAR; INTRAVENOUS; SUBCUTANEOUS at 18:07

## 2024-02-08 RX ADMIN — Medication 4: at 16:45

## 2024-02-08 RX ADMIN — HYDROMORPHONE HYDROCHLORIDE 1 MILLIGRAM(S): 2 INJECTION INTRAMUSCULAR; INTRAVENOUS; SUBCUTANEOUS at 12:29

## 2024-02-08 RX ADMIN — Medication 2 CAPSULE(S): at 11:25

## 2024-02-08 RX ADMIN — HYDROMORPHONE HYDROCHLORIDE 1 MILLIGRAM(S): 2 INJECTION INTRAMUSCULAR; INTRAVENOUS; SUBCUTANEOUS at 05:04

## 2024-02-08 NOTE — H&P ADULT - NSICDXPASTMEDICALHX_GEN_ALL_CORE_FT
PAST MEDICAL HISTORY:  Bernard syndrome     Cirrhosis     Diabetes     ESRD on dialysis MWF at [facility]    HTN (hypertension)     Pancreatitis

## 2024-02-08 NOTE — CONSULT NOTE ADULT - SUBJECTIVE AND OBJECTIVE BOX
Consult Note Nephrology - CONSULTATION NOTE  67 year old female with past medical history of CAD (s/p stents), PAD, Hep-C (cured per pt, was on Harvoni in past), Cirrhosis, HTN, ACD, CKD, end stage renal disease (On dialysis M, W, F L. AVF access) and Bernard's esophagus, s/p cholecystectomy. RLQ pain radiating to the back, CVA tenderness. Nausea, vomiting. Diarrhea+ No fever, chills, burning with micturition. Missed dialysis session today. Was previously seen at [facility] on Monday 2/5, discharged as constipation ,  did not have a bowel movement then.    Nephrology consult called in for her ESR/ on going hemodialysis needs.  She gets her HD in [facility], [facility] schedule. Nephrologist  is Preethi Sierra.   Her last HD was on Monday 2/5 and missed yesterday's one.   PT is alert and Ox3 with no distress.  No SOB and no CP. Breathing easily at room air.   She still has mild pain in her right abdomin but no n/v.  Vitals stable during exam.           PAST MEDICAL & SURGICAL HISTORY:  Diabetes      Bernard syndrome      Pancreatitis      Cirrhosis      ESRD on dialysis  MWF at [facility]      HTN (hypertension)      History of liver biopsy      H/O total thyroidectomy      History of cholecystectomy  1990s        Augmentin (Hives)    Home Medications Reviewed  Hospital Medications:   MEDICATIONS  (STANDING):  aspirin enteric coated 81 milliGRAM(s) Oral daily  calcium acetate 667 milliGRAM(s) Oral three times a day with meals  carvedilol 37.5 milliGRAM(s) Oral every 12 hours  cloNIDine 0.2 milliGRAM(s) Oral three times a day  dextrose 5%. 1000 milliLiter(s) (50 mL/Hr) IV Continuous <Continuous>  dextrose 5%. 1000 milliLiter(s) (100 mL/Hr) IV Continuous <Continuous>  dextrose 50% Injectable 25 Gram(s) IV Push once  dextrose 50% Injectable 12.5 Gram(s) IV Push once  dextrose 50% Injectable 25 Gram(s) IV Push once  glucagon  Injectable 1 milliGRAM(s) IntraMuscular once  heparin   Injectable 5000 Unit(s) SubCutaneous every 12 hours  influenza  Vaccine (HIGH DOSE) 0.7 milliLiter(s) IntraMuscular once  insulin lispro (ADMELOG) corrective regimen sliding scale   SubCutaneous Before meals and at bedtime  levothyroxine 250 MICROGram(s) Oral daily  lisinopril 10 milliGRAM(s) Oral daily  NIFEdipine XL 60 milliGRAM(s) Oral daily  pancrelipase  (CREON 36,000 Lipase Units) 2 Capsule(s) Oral three times a day with meals  sertraline 200 milliGRAM(s) Oral daily  sodium chloride 0.9% lock flush 3 milliLiter(s) IV Push every 8 hours  torsemide 40 milliGRAM(s) Oral daily    SOCIAL HISTORY:  Denies ETOh,Smoking,   FAMILY HISTORY:    REVIEW OF SYSTEMS:  CONSTITUTIONAL: No weakness, fevers or chills  EYES/ENT: No visual changes;  No vertigo or throat pain   NECK: No pain or stiffness  RESPIRATORY: No cough, wheezing, hemoptysis; No shortness of breath  CARDIOVASCULAR: No chest pain or palpitations.  GASTROINTESTINAL: Pain in right abdomin but No nausea, vomiting, or hematemesis; No diarrhea or constipation. No melena or hematochezia.  GENITOURINARY: No dysuria, frequency, foamy urine, urinary urgency, incontinence or hematuria  NEUROLOGICAL: No numbness or weakness  SKIN: No itching, burning, rashes, or lesions   VASCULAR: No bilateral lower extremity edema.   All other review of systems is negative unless indicated above.    VITALS:  T(F): 97.9 (02-08-24 @ 10:58), Max: 98.1 (02-08-24 @ 00:36)  HR: 68 (02-08-24 @ 10:58)  BP: 124/65 (02-08-24 @ 10:58)  RR: 18 (02-08-24 @ 10:58)  SpO2: 98% (02-08-24 @ 10:58)  Wt(kg): --    Height (cm): 157.5 (02-07 @ 13:28)  Weight (kg): 61.6 (02-07 @ 13:28)  BMI (kg/m2): 24.8 (02-07 @ 13:28)  BSA (m2): 1.62 (02-07 @ 13:28)  PHYSICAL EXAM:  Constitutional: NAD  HEENT: anicteric sclera, oropharynx clear, MMM  Neck: No JVD  Respiratory: CTAB, no wheezes, rales or rhonchi  Cardiovascular: S1, S2, RRR  Gastrointestinal: BS+, soft, NT/ND  Extremities: No cyanosis or clubbing. No peripheral edema  Neurological: A/O x 3, no focal deficits  Psychiatric: Normal mood, normal affect  : No CVA tenderness. No clemons.   Skin: No rashes  Vascular Access:    LABS:  02-08    138  |  99  |  50.5<H>  ----------------------------<  222<H>  5.0   |  26.0  |  5.52<H>    Ca    8.2<L>      08 Feb 2024 04:21  Phos  6.4     02-08  Mg     2.2     02-08    TPro  6.5<L>  /  Alb  3.5  /  TBili  0.3<L>  /  DBili      /  AST  18  /  ALT  16  /  AlkPhos  86  02-07    Creatinine Trend: 5.52 <--, 5.33 <--                        9.2    2.64  )-----------( 73       ( 08 Feb 2024 04:21 )             28.9     Urine Studies:  Urinalysis Basic - ( 08 Feb 2024 04:21 )    Color:  / Appearance:  / SG:  / pH:   Gluc: 222 mg/dL / Ketone:   / Bili:  / Urobili:    Blood:  / Protein:  / Nitrite:    Leuk Esterase:  / RBC:  / WBC    Sq Epi:  / Non Sq Epi:  / Bacteria:                        Consult Note Nephrology - CONSULTATION NOTE  67 year old female with past medical history of CAD (s/p stents), PAD, Hep-C (cured per pt, was on Harvoni in past), Cirrhosis, HTN, ACD, CKD, end stage renal disease (On dialysis M, W, F L. AVF access) and Bernard's esophagus, s/p cholecystectomy. RLQ pain radiating to the back, CVA tenderness. Nausea, vomiting. Diarrhea+ No fever, chills, burning with micturition. Missed dialysis session today. Was previously seen at [facility] on Monday 2/5, discharged as constipation ,  did not have a bowel movement then.    Nephrology consult called in for her ESR/ on going hemodialysis needs.  She gets her HD in [facility] chronic HD center, [facility] schedule. Nephrologist  is Preethi Sierra.   Her last HD was on Monday 2/5 and missed yesterday's one.   PT is alert and Ox3 with no distress.  No SOB and no CP. Breathing easily at room air.   She still has mild pain in her right abdomin but no n/v.  Vitals stable during exam.     Later on Pt seen during HD.   Tolerated HD well with no complaints.          PAST MEDICAL & SURGICAL HISTORY:  Diabetes      Bernard syndrome      Pancreatitis      Cirrhosis      ESRD on dialysis  MWF at [facility]      HTN (hypertension)      History of liver biopsy      H/O total thyroidectomy      History of cholecystectomy  1990s        Augmentin (Hives)    Home Medications Reviewed  Hospital Medications:   MEDICATIONS  (STANDING):  aspirin enteric coated 81 milliGRAM(s) Oral daily  calcium acetate 667 milliGRAM(s) Oral three times a day with meals  carvedilol 37.5 milliGRAM(s) Oral every 12 hours  cloNIDine 0.2 milliGRAM(s) Oral three times a day  dextrose 5%. 1000 milliLiter(s) (50 mL/Hr) IV Continuous <Continuous>  dextrose 5%. 1000 milliLiter(s) (100 mL/Hr) IV Continuous <Continuous>  dextrose 50% Injectable 25 Gram(s) IV Push once  dextrose 50% Injectable 12.5 Gram(s) IV Push once  dextrose 50% Injectable 25 Gram(s) IV Push once  glucagon  Injectable 1 milliGRAM(s) IntraMuscular once  heparin   Injectable 5000 Unit(s) SubCutaneous every 12 hours  influenza  Vaccine (HIGH DOSE) 0.7 milliLiter(s) IntraMuscular once  insulin lispro (ADMELOG) corrective regimen sliding scale   SubCutaneous Before meals and at bedtime  levothyroxine 250 MICROGram(s) Oral daily  lisinopril 10 milliGRAM(s) Oral daily  NIFEdipine XL 60 milliGRAM(s) Oral daily  pancrelipase  (CREON 36,000 Lipase Units) 2 Capsule(s) Oral three times a day with meals  sertraline 200 milliGRAM(s) Oral daily  sodium chloride 0.9% lock flush 3 milliLiter(s) IV Push every 8 hours  torsemide 40 milliGRAM(s) Oral daily    SOCIAL HISTORY:  Denies ETOh,Smoking,   FAMILY HISTORY:    REVIEW OF SYSTEMS:  CONSTITUTIONAL: No weakness, fevers or chills  EYES/ENT: No visual changes;  No vertigo or throat pain   NECK: No pain or stiffness  RESPIRATORY: No cough, wheezing, hemoptysis; No shortness of breath  CARDIOVASCULAR: No chest pain or palpitations.  GASTROINTESTINAL: Pain in right abdomin but No nausea, vomiting, or hematemesis; No diarrhea or constipation. No melena or hematochezia.  GENITOURINARY: No dysuria, frequency, foamy urine, urinary urgency, incontinence or hematuria  NEUROLOGICAL: No numbness or weakness  SKIN: No itching, burning, rashes, or lesions   VASCULAR: No bilateral lower extremity edema.   All other review of systems is negative unless indicated above.    VITALS:  T(F): 97.9 (02-08-24 @ 10:58), Max: 98.1 (02-08-24 @ 00:36)  HR: 68 (02-08-24 @ 10:58)  BP: 124/65 (02-08-24 @ 10:58)  RR: 18 (02-08-24 @ 10:58)  SpO2: 98% (02-08-24 @ 10:58)  Wt(kg): --    Height (cm): 157.5 (02-07 @ 13:28)  Weight (kg): 61.6 (02-07 @ 13:28)  BMI (kg/m2): 24.8 (02-07 @ 13:28)  BSA (m2): 1.62 (02-07 @ 13:28)  PHYSICAL EXAM:  Constitutional: NAD  HEENT: anicteric sclera, oropharynx clear, MMM  Neck: No JVD  Respiratory: CTAB, no wheezes, rales or rhonchi  Cardiovascular: S1, S2, RRR  Gastrointestinal: BS+, soft, NT/ND  Extremities: No cyanosis or clubbing. No peripheral edema  Neurological: A/O x 3, no focal deficits  Psychiatric: Normal mood, normal affect  : No CVA tenderness. No clemons.   Skin: No rashes  Vascular Access:    LABS:  02-08    138  |  99  |  50.5<H>  ----------------------------<  222<H>  5.0   |  26.0  |  5.52<H>    Ca    8.2<L>      08 Feb 2024 04:21  Phos  6.4     02-08  Mg     2.2     02-08    TPro  6.5<L>  /  Alb  3.5  /  TBili  0.3<L>  /  DBili      /  AST  18  /  ALT  16  /  AlkPhos  86  02-07    Creatinine Trend: 5.52 <--, 5.33 <--                        9.2    2.64  )-----------( 73       ( 08 Feb 2024 04:21 )             28.9     Urine Studies:  Urinalysis Basic - ( 08 Feb 2024 04:21 )    Color:  / Appearance:  / SG:  / pH:   Gluc: 222 mg/dL / Ketone:   / Bili:  / Urobili:    Blood:  / Protein:  / Nitrite:    Leuk Esterase:  / RBC:  / WBC    Sq Epi:  / Non Sq Epi:  / Bacteria:

## 2024-02-08 NOTE — PATIENT PROFILE ADULT - FALL HARM RISK - HARM RISK INTERVENTIONS

## 2024-02-08 NOTE — H&P ADULT - NSICDXPASTSURGICALHX_GEN_ALL_CORE_FT
HPI:  63M with PMHx HTN, CAD s/p CABG, CRISTIAN on CPAP, psoriasis (skyrizi), left facial liposarcoma s/p resection, undergoing adjuvant radiotherapy starting in May presenting with worsening left facial pain and swelling. Patient has been following with rad onc for RT starting in May and has had progressively worsening L sided facial swelling more so over the last ten days. Pain is burning, 9/10 and located to left mandible/submandibular area. Pain is worse with opening mouth and limits his ability to eat. Worse with palpation as well. Has muffled his voice but able to speak in full sentences. He states he has lost 50 lbs in the last few months. Denies trouble tolerating secretions or liquids. Denies fevers, chills, abdominal pain, SOB, CP, hemoptysis, vomiting, diarrhea. Pt had CT done today with rad onc and was told to come into the hospital.    In ED leukocytosis, tachycardic, given IV cefepime and vanc for likely OM.    (2021 03:11)      PAST MEDICAL & SURGICAL HISTORY:  Psoriasis    HTN (hypertension)    Cervical radiculopathy    CAD (coronary artery disease)    Sleep apnea  uses CPAP    Cheek mass    Other specified soft tissue disorders    Cataract  left    Obesity    History of hip replacement, total, right      S/P CABG x 4  2019    Cheek mass  liposuction x 2 to left cheek    H/O sinus surgery        Allergies    No Known Allergies    Intolerances        ANTIMICROBIALS:  cefepime   IVPB 2000 every 12 hours  vancomycin  IVPB 1000 every 12 hours      OTHER MEDS:  acetaminophen   Tablet .. 650 milliGRAM(s) Oral every 6 hours PRN  aspirin enteric coated 81 milliGRAM(s) Oral daily  atorvastatin 80 milliGRAM(s) Oral at bedtime  dexAMETHasone  IVPB 10 milliGRAM(s) IV Intermittent every 8 hours  enoxaparin Injectable 40 milliGRAM(s) SubCutaneous daily  losartan 100 milliGRAM(s) Oral daily  sodium chloride 0.9%. 1000 milliLiter(s) IV Continuous <Continuous>      SOCIAL HISTORY:  from [place], , lives with family  no smoking, alcohol or drug abuse  no recent travel    FAMILY HISTORY:  Family history of coronary artery disease  father when 59    Family history of MI (myocardial infarction) (Father)  father        ROS:    All other systems negative     Constitutional: no fever, no chills, + weight loss  Eye: no eye pain, no redness, no vision changes  ENT:  L face and neck swelling, difficulty opening mouth and swallowing  Cardiovascular:  no chest pain, no palpitation  Respiratory:  no SOB, no cough  GI:  no abd pain, no vomiting, no diarrhea  urinary: no dysuria, no hematuria, no flank pain  : no penile discharge or bleeding  musculoskeletal:  no joint pain, no joint swelling  skin:  no rash  neurology:  no headache, no seizure, no change in mental status  psych: no anxiety, no depression     Physical Exam:    General:    NAD, non toxic  Head: atraumatic, normocephalic  Eyes: normal sclera and conjunctiva  ENT:   cannot open the mouth fully but belives it has improved after receiving medications, L face and neck edema, slight erythremia and tenderness  Cardio:    regular S1,S2, no murmur  Respiratory:   clear b/l, no wheezing  abd:   soft, BS +, not tender  :     no CVAT, no suprapubic tenderness, no miles  Musculoskeletal : no joint swelling, no edema  Skin:    no rash  vascular: no phlebitis  Neurologic:     no focal deficits  psych: normal affect      Drug Dosing Weight  Height (cm): 170.2 (2021 19:20)  Weight (kg): 95.7 (2021 12:01)  BMI (kg/m2): 33 (2021 19:20)  BSA (m2): 2.07 (2021 19:20)    Vital Signs Last 24 Hrs  T(F): 98.2 (21 @ 09:15), Max: 99.5 (21 @ 01:24)    Vital Signs Last 24 Hrs  HR: 69 (21 @ 09:15) (69 - 102)  BP: 113/67 (21 @ 09:15) (103/64 - 125/60)  RR: 17 (21 @ 09:15)  SpO2: 100% (21 @ 09:15) (97% - 100%)  Wt(kg): --                          13.0   16.68 )-----------( 255      ( 2021 06:14 )             39.4           135  |  100  |  20  ----------------------------<  138<H>  4.8   |  19<L>  |  0.81    Ca    9.3      2021 06:14  Phos  3.2       Mg     2.2         TPro  8.5<H>  /  Alb  3.1<L>  /  TBili  0.7  /  DBili  x   /  AST  70<H>  /  ALT  85<H>  /  AlkPhos  140<H>        Urinalysis Basic - ( 2021 05:06 )    Color: Yellow / Appearance: Clear / S.036 / pH: x  Gluc: x / Ketone: Small  / Bili: Negative / Urobili: <2 mg/dL   Blood: x / Protein: 30 mg/dL / Nitrite: Negative   Leuk Esterase: Negative / RBC: 1 /HPF / WBC 1 /HPF   Sq Epi: x / Non Sq Epi: 1 /HPF / Bacteria: Negative        MICROBIOLOGY:  v              RADIOLOGY:    Images independently visualized and reviewed personally,  findings as below  < from: CT Angio Neck w/ IV Cont (21 @ 09:35) >    IMPRESSION:    Head CT:  1. No acute intracranial hemorrhage, mass effect, or shift of the midline structures.    CTA NECK:  1. Mild long segment luminal narrowingof the left internal carotid artery without occlusion secondary to mass effect generated upon the left carotid sheath by the previously noted left-sided /parapharyngeal space phlegmon.  2. Redemonstration of lytic destructive changes involving the left posterior mandibular body for which osteomyelitis, osteonecrosis, or osteoradionecrosis are considerations.    CTA HEAD:  1. No large vessel occlusion or major stenosis.    < end of copied text >  < from: CT Maxillofacial w/ IV Cont (21 @ 15:35) >  IMPRESSION:    Bony irregularity, dehiscence left mandibular posterior body involving left inferior alveolar foramina with permeative change, absent left mandibular molar teeth, osteitis condensans, osteomyelitis, osteoradionecrosis not excluded.    Inflammatory phlegmon right , submandibular, parapharyngeal, carotid, parotid, spaces 3.2 x 4 x 4.2 cm AP, TR, CC low-attenuation fluid collection involving left medial and lateral pterygoid with narrowed  nasopharyngeal and oropharyngeal airways (4:32). Phlegmon with inflammatory change involves left palatine tonsils, glossotonsillar sulcus, left lateral lateral pharyngeal wall, and saphenous tissues left supra and infrahyoid neck developing Will's angina not excluded.    Soft tissue fullness and inflammatory change extends with loss of fat planes extends to the left soft palate (200:88), uvula, and left greater than right prevertebral soft tissues with loss of intervening fat planes and left carotid space, with narrowing of distal left internal carotid artery (4:29), CTA or MRA may better assess.    Soft tissue fullness in prior region of left buccal space liposarcoma, involving left buccinator muscle and left retromolar trigone,  left parotid duct Stensen's insertion, left  space extending to left foramen ovale, (200:120), with loss of fat planes in the left pterygopalatine fossa, although this may reflect infectious/inflammatory change, recurrent tumor with perineural involvement is also not excluded.    < end of copied text >   PAST SURGICAL HISTORY:  H/O total thyroidectomy     History of cholecystectomy 1990s    History of liver biopsy

## 2024-02-08 NOTE — H&P ADULT - GAIT/BALANCE
Spoke with Mr Noyola, phone number for [facility] pharmacy given to patient   Not tested at this time

## 2024-02-08 NOTE — CONSULT NOTE ADULT - ASSESSMENT
Assessment & Plan=    ESRD/On gong HD requirements.    ESRD.   Continue HD.  Will dialyze today. Consent for HD signed.  Access is LUE AVF.  Out patient HD on MWF      Hyperkalemia.  K in goal.  Continue to monitor.       Anemia of CKD.  Hb below goal  Continue out patient HD plan.    HT.  Controlled with current Plan.  continue current regimen.    MBD of CKD.  Pi is high.   Continue Ca acetate along with low Pi diets.     Abdominal Pain.  Manage as per PCP team.

## 2024-02-08 NOTE — H&P ADULT - ASSESSMENT
69 y/o female with abdominal pain, Hx of ESRD on HD, HTN, IBS, Pancreatic insufficiency, DM-2, HCV s/p Harvoni, cirrhosis, CAD s/p PCI    Abdominal Pain:  -Unclear etiology at this time  -No acute findings on CT  -Liver/bowel congestion from missing HD/Cirrhosis   -LFTs normal, Lipase normal, no pancreatitis on CT  -No leucocytosis, left shift/fever  -IBS?   -Treat symptomatically for now  -serial abdominal exams  -GI f/u as o/p  -OOB, ambulate with walker/fall risk  -DVT-P VC boots/ Sub Q heparin     ESRD on HD:  -No urgent need for HD at this time  -No reported issues with left UE AVF  -Cont. o/p regimen  -Nephrology consulted for Routine HD while admitted as patient had IV contrast  -Renal diet     HTN:  -resume o/p regimen    Pancreatic insufficiency:  -Cont. Creon with meals     DM-2:  -Diet controlled at home  -Coverage insulin with AC/HS accu checks     Cirrhosis:  -No evidence of decompensated cirrhosis   -Cont. o/p regimen  -F/U with Hepatology as o/p     CAD s/p PCI:  -No anginal symptoms  -Cont. Aspirin, BB  -Not on statin since 11/23?  -F/U with Cardiology as o/p     Discussed with ED staff, Patient

## 2024-02-08 NOTE — PATIENT PROFILE ADULT - FUNCTIONAL ASSESSMENT - BASIC MOBILITY 6.
[address] [facility]                Phone: [phone]   Fax: [phone]    Physical Therapy Daily Treatment Note  Date:  2019    Patient Name:  Tra Cummins    :  1972  MRN: [id number]    Evaluating therapist:  DOUGLAS Morillo               (4/10/19)  Restrictions/Precautions:  WBAT, in boot when up; remove last heel lift in two weeks  Diagnosis:  s/p L achilles tendon repair              ()    Insurance/Certification information:  [id number] [facility],[address]  Referring Physician:  Mary Kate Graham  care signed (Y/N):    Visit# / total visits:     Pain level: 0-10/10        RTP 19     Time In:       1026  Time Out:     1131      Subjective:          Exercises:  Exercise/Equipment Resistance/Repetitions Other comments   StepOne   10 min L2         Rocker board wt shifts 20x ea 3 way                           squats 2 x20         Step ups fwd  L LE 3 x10x8\"         Calf raises wedge 2x15     Calf stretch wedge 2 x20s         leg press [id number]         flex band:  A/P 2 min                     M/L 2 min                     marching  2 min          BOSU 4x30s                      Objective: Activities as listed per flow sheet above. exercises reduced due to increased soreness/pain reported today. Assessment:  Patient performs all exercises well with good effort and pacing. Home Exercise Program:  provided 4/10/19    Manual Treatments:      Modalities:      Timed Code Treatment Minutes:       Total Treatment Minutes:      Treatment/Activity Tolerance:  [x] Patient tolerated treatment well [] Patient limited by fatique  [] Patient limited by pain  [] Patient limited by other medical complications  [] Other:     Prognosis: [x] Good [] Fair  [] Poor    Patient Requires Follow-up: [x] Yes  [] No    Plan:   [x] Continue per plan of care [] Alter current plan (see comments)  [] Plan of care initiated [] Hold pending MD visit [] Discharge    Plan for Next Session: See Weekly Progress Note: []  Yes  []  No  Next due:        Electronically signed by:   Karishma Rajan ATC 2 = A lot of assistance

## 2024-02-08 NOTE — H&P ADULT - HEART RATE (BEATS/MIN)
Physical Therapy  Visit Type: initial evaluation and treatment  Precautions:  Medical precautions:  fall risk;. 1/9 Pt adm to 857 via ED following fall sustaining left distal femur fx  1/10  ORIF left supracondylar femur fx.  WBAT  KI on when ambulating ok to perform ROM as tolerated    Therapist wearing gloves and surgical mask during session.    Patient was wearing mask throughout duration of therapy session.   Lines:     Basic: capped IV and wound vac      Lines in chart and on patient reviewed, precautions maintained throughout session.  Lower Extremity:    Left:  weight bearing: as tolerated.  knee immobilizer when out of bed    Safety Measures: bed alarm and bed rails    SUBJECTIVE  Patient agreed to participate in therapy this date.  Patient / Family Goal: maximize function     OBJECTIVE     Oriented to person, place, time and situation     Arousal alertness: appropriate responses to stimuli    Affect/Behavior: cooperative and appropriate  Functional Communication/Cognition    Overall status:  Within functional limits    Form of communication:  Verbal   Attention span:  Appears intact    Commands: follows all commands and directions consistently.    Transition between tasks: transition with cues.    Safety judgement: good awareness of safety precautions.    Awareness of deficits: fully aware of deficits.  Range of Motion (measured in degrees unless otherwise noted, active unless indicated)  Comments / Details: WFL except LLE in Knee immobilizer for ambulation. Left up in chair and ROM not addressed this session.  Strength (out of 5 unless otherwise indicated)   WFL: LLE, RLE  Balance (trials measured in sec unless otherwise indicted)    Sitting: Static: stand by assist back unsupported, single lower extremity support and double upper extremity support    Standing - Firm Surface - Eyes Open: Static: contact guard/touching/steadying assist double upper extremity support    Bed Mobility:        Supine 78

## 2024-02-08 NOTE — H&P ADULT - HISTORY OF PRESENT ILLNESS
67 y/o female with hx of ESRD on HD via Left UE AVF M/W/F, HTN, CAD s/p PCI, PAD, HCV cirrhosis s/p Harvoni, IBS, Pancreatic insufficiency due to prior episodes of pancreatitis, Hypothyroidism, Depression, diet controlled DM-2. Patient presents from o/p GI clinic c/o abdominal pain. She states she was at [facility] last week c/o abdominal pain and was told she was constipated and prescribed Colace. She states she has had this pain before and was concerned it may be Pancreatitis. She went to see her GI doctor who advised she go to ED. She missed her usual HD session today as well. In the ED vitals stable, noted to have right sided reproducible abdomina pain w/o guarding or rebound. Lipase 45, CT abd/pelvis w/ IV contrast with colopathy of bowels, no other acute findings. Denies fever, chills, N/V, travel, sick contacts or changes in medications.

## 2024-02-09 LAB
ALBUMIN SERPL ELPH-MCNC: 3.4 G/DL — SIGNIFICANT CHANGE UP (ref 3.3–5.2)
ALP SERPL-CCNC: 87 U/L — SIGNIFICANT CHANGE UP (ref 40–120)
ALT FLD-CCNC: 13 U/L — SIGNIFICANT CHANGE UP
ANION GAP SERPL CALC-SCNC: 16 MMOL/L — SIGNIFICANT CHANGE UP (ref 5–17)
AST SERPL-CCNC: 18 U/L — SIGNIFICANT CHANGE UP
BILIRUB SERPL-MCNC: 0.3 MG/DL — LOW (ref 0.4–2)
BUN SERPL-MCNC: 34.7 MG/DL — HIGH (ref 8–20)
CALCIUM SERPL-MCNC: 8.6 MG/DL — SIGNIFICANT CHANGE UP (ref 8.4–10.5)
CHLORIDE SERPL-SCNC: 96 MMOL/L — SIGNIFICANT CHANGE UP (ref 96–108)
CO2 SERPL-SCNC: 25 MMOL/L — SIGNIFICANT CHANGE UP (ref 22–29)
CREAT SERPL-MCNC: 4.24 MG/DL — HIGH (ref 0.5–1.3)
CULTURE RESULTS: SIGNIFICANT CHANGE UP
EGFR: 11 ML/MIN/1.73M2 — LOW
GLUCOSE BLDC GLUCOMTR-MCNC: 159 MG/DL — HIGH (ref 70–99)
GLUCOSE BLDC GLUCOMTR-MCNC: 163 MG/DL — HIGH (ref 70–99)
GLUCOSE BLDC GLUCOMTR-MCNC: 185 MG/DL — HIGH (ref 70–99)
GLUCOSE BLDC GLUCOMTR-MCNC: 255 MG/DL — HIGH (ref 70–99)
GLUCOSE BLDC GLUCOMTR-MCNC: 264 MG/DL — HIGH (ref 70–99)
GLUCOSE BLDC GLUCOMTR-MCNC: 270 MG/DL — HIGH (ref 70–99)
GLUCOSE BLDC GLUCOMTR-MCNC: 48 MG/DL — CRITICAL LOW (ref 70–99)
GLUCOSE BLDC GLUCOMTR-MCNC: 49 MG/DL — CRITICAL LOW (ref 70–99)
GLUCOSE BLDC GLUCOMTR-MCNC: 49 MG/DL — CRITICAL LOW (ref 70–99)
GLUCOSE SERPL-MCNC: 175 MG/DL — HIGH (ref 70–99)
HCT VFR BLD CALC: 30.6 % — LOW (ref 34.5–45)
HCV AB S/CO SERPL IA: 13.33 S/CO — HIGH (ref 0–0.99)
HCV AB SERPL-IMP: REACTIVE
HGB BLD-MCNC: 9.8 G/DL — LOW (ref 11.5–15.5)
MAGNESIUM SERPL-MCNC: 2.1 MG/DL — SIGNIFICANT CHANGE UP (ref 1.6–2.6)
MCHC RBC-ENTMCNC: 31 PG — SIGNIFICANT CHANGE UP (ref 27–34)
MCHC RBC-ENTMCNC: 32 GM/DL — SIGNIFICANT CHANGE UP (ref 32–36)
MCV RBC AUTO: 96.8 FL — SIGNIFICANT CHANGE UP (ref 80–100)
PHOSPHATE SERPL-MCNC: 4.6 MG/DL — SIGNIFICANT CHANGE UP (ref 2.4–4.7)
PLATELET # BLD AUTO: 86 K/UL — LOW (ref 150–400)
POTASSIUM SERPL-MCNC: 4.6 MMOL/L — SIGNIFICANT CHANGE UP (ref 3.5–5.3)
POTASSIUM SERPL-SCNC: 4.6 MMOL/L — SIGNIFICANT CHANGE UP (ref 3.5–5.3)
PROT SERPL-MCNC: 6.4 G/DL — LOW (ref 6.6–8.7)
RBC # BLD: 3.16 M/UL — LOW (ref 3.8–5.2)
RBC # FLD: 13.4 % — SIGNIFICANT CHANGE UP (ref 10.3–14.5)
SODIUM SERPL-SCNC: 137 MMOL/L — SIGNIFICANT CHANGE UP (ref 135–145)
SPECIMEN SOURCE: SIGNIFICANT CHANGE UP
WBC # BLD: 3.66 K/UL — LOW (ref 3.8–10.5)
WBC # FLD AUTO: 3.66 K/UL — LOW (ref 3.8–10.5)

## 2024-02-09 PROCEDURE — 99232 SBSQ HOSP IP/OBS MODERATE 35: CPT

## 2024-02-09 PROCEDURE — 70450 CT HEAD/BRAIN W/O DYE: CPT | Mod: 26

## 2024-02-09 PROCEDURE — 99223 1ST HOSP IP/OBS HIGH 75: CPT

## 2024-02-09 PROCEDURE — 70551 MRI BRAIN STEM W/O DYE: CPT | Mod: 26

## 2024-02-09 PROCEDURE — 99222 1ST HOSP IP/OBS MODERATE 55: CPT

## 2024-02-09 RX ORDER — DEXTROSE 50 % IN WATER 50 %
25 SYRINGE (ML) INTRAVENOUS ONCE
Refills: 0 | Status: COMPLETED | OUTPATIENT
Start: 2024-02-09 | End: 2024-02-09

## 2024-02-09 RX ORDER — INSULIN LISPRO 100/ML
VIAL (ML) SUBCUTANEOUS
Refills: 0 | Status: DISCONTINUED | OUTPATIENT
Start: 2024-02-09 | End: 2024-02-10

## 2024-02-09 RX ADMIN — LISINOPRIL 10 MILLIGRAM(S): 2.5 TABLET ORAL at 06:22

## 2024-02-09 RX ADMIN — HYDROMORPHONE HYDROCHLORIDE 1 MILLIGRAM(S): 2 INJECTION INTRAMUSCULAR; INTRAVENOUS; SUBCUTANEOUS at 06:20

## 2024-02-09 RX ADMIN — Medication 2 CAPSULE(S): at 09:13

## 2024-02-09 RX ADMIN — Medication 25 GRAM(S): at 01:53

## 2024-02-09 RX ADMIN — SODIUM CHLORIDE 3 MILLILITER(S): 9 INJECTION INTRAMUSCULAR; INTRAVENOUS; SUBCUTANEOUS at 05:08

## 2024-02-09 RX ADMIN — HYDROMORPHONE HYDROCHLORIDE 1 MILLIGRAM(S): 2 INJECTION INTRAMUSCULAR; INTRAVENOUS; SUBCUTANEOUS at 11:58

## 2024-02-09 RX ADMIN — Medication 0.2 MILLIGRAM(S): at 14:17

## 2024-02-09 RX ADMIN — Medication 667 MILLIGRAM(S): at 09:13

## 2024-02-09 RX ADMIN — Medication 25 GRAM(S): at 12:32

## 2024-02-09 RX ADMIN — HYDROMORPHONE HYDROCHLORIDE 1 MILLIGRAM(S): 2 INJECTION INTRAMUSCULAR; INTRAVENOUS; SUBCUTANEOUS at 16:11

## 2024-02-09 RX ADMIN — Medication 667 MILLIGRAM(S): at 11:58

## 2024-02-09 RX ADMIN — Medication 3: at 18:53

## 2024-02-09 RX ADMIN — HEPARIN SODIUM 5000 UNIT(S): 5000 INJECTION INTRAVENOUS; SUBCUTANEOUS at 06:24

## 2024-02-09 RX ADMIN — HYDROMORPHONE HYDROCHLORIDE 1 MILLIGRAM(S): 2 INJECTION INTRAMUSCULAR; INTRAVENOUS; SUBCUTANEOUS at 21:19

## 2024-02-09 RX ADMIN — Medication 250 MICROGRAM(S): at 06:22

## 2024-02-09 RX ADMIN — HEPARIN SODIUM 5000 UNIT(S): 5000 INJECTION INTRAVENOUS; SUBCUTANEOUS at 18:48

## 2024-02-09 RX ADMIN — HYDROMORPHONE HYDROCHLORIDE 1 MILLIGRAM(S): 2 INJECTION INTRAMUSCULAR; INTRAVENOUS; SUBCUTANEOUS at 20:19

## 2024-02-09 RX ADMIN — Medication 667 MILLIGRAM(S): at 18:48

## 2024-02-09 RX ADMIN — Medication 60 MILLIGRAM(S): at 06:21

## 2024-02-09 RX ADMIN — SERTRALINE 200 MILLIGRAM(S): 25 TABLET, FILM COATED ORAL at 11:58

## 2024-02-09 RX ADMIN — HYDROMORPHONE HYDROCHLORIDE 1 MILLIGRAM(S): 2 INJECTION INTRAMUSCULAR; INTRAVENOUS; SUBCUTANEOUS at 16:07

## 2024-02-09 RX ADMIN — Medication 81 MILLIGRAM(S): at 11:57

## 2024-02-09 RX ADMIN — Medication 6: at 09:16

## 2024-02-09 RX ADMIN — SODIUM CHLORIDE 3 MILLILITER(S): 9 INJECTION INTRAMUSCULAR; INTRAVENOUS; SUBCUTANEOUS at 21:25

## 2024-02-09 RX ADMIN — SODIUM CHLORIDE 3 MILLILITER(S): 9 INJECTION INTRAMUSCULAR; INTRAVENOUS; SUBCUTANEOUS at 13:28

## 2024-02-09 RX ADMIN — Medication 0.2 MILLIGRAM(S): at 06:22

## 2024-02-09 RX ADMIN — Medication 1: at 22:17

## 2024-02-09 RX ADMIN — CARVEDILOL PHOSPHATE 37.5 MILLIGRAM(S): 80 CAPSULE, EXTENDED RELEASE ORAL at 06:21

## 2024-02-09 RX ADMIN — Medication 2 CAPSULE(S): at 11:57

## 2024-02-09 RX ADMIN — Medication 2 CAPSULE(S): at 18:48

## 2024-02-09 RX ADMIN — Medication 40 MILLIGRAM(S): at 06:21

## 2024-02-09 NOTE — CONSULT NOTE ADULT - SUBJECTIVE AND OBJECTIVE BOX
[facility]                                        [facility] [place]                                  Jayda Ellison, & Willie                                      [address]                                           [place], [place], [address]                                                [phone]        CC: Balance disorder and dizziness.    HISTORY:  The patient is a 68y Female with multiple medical issues including End stage renal disease on hemodialysis.  She reported dizziness and balance difficulty worse than her usual issues.   She has diabetic peripheral neuropathy and balance problems secondary to Covid-19.   She uses a walker at baseline.     PAST MEDICAL & SURGICAL HISTORY:  Diabetes  Bernard syndrome  Pancreatitis  Cirrhosis  ESRD on dialysis  MWF at [facility]  HTN (hypertension)  History of liver biopsy  H/O total thyroidectomy  History of cholecystectomy  1990s    MEDICATIONS  (STANDING):  aspirin enteric coated 81 milliGRAM(s) Oral daily  calcium acetate 667 milliGRAM(s) Oral three times a day with meals  carvedilol 37.5 milliGRAM(s) Oral every 12 hours  cloNIDine 0.2 milliGRAM(s) Oral three times a day  dextrose 5%. 1000 milliLiter(s) (50 mL/Hr) IV Continuous <Continuous>  dextrose 5%. 1000 milliLiter(s) (100 mL/Hr) IV Continuous <Continuous>  glucagon  Injectable 1 milliGRAM(s) IntraMuscular once  heparin   Injectable 5000 Unit(s) SubCutaneous every 12 hours  influenza  Vaccine (HIGH DOSE) 0.7 milliLiter(s) IntraMuscular once  insulin lispro (ADMELOG) corrective regimen sliding scale   SubCutaneous Before meals and at bedtime  levothyroxine 250 MICROGram(s) Oral daily  lisinopril 10 milliGRAM(s) Oral daily  NIFEdipine XL 60 milliGRAM(s) Oral daily  pancrelipase  (CREON 36,000 Lipase Units) 2 Capsule(s) Oral three times a day with meals  sertraline 200 milliGRAM(s) Oral daily  sodium chloride 0.9% lock flush 3 milliLiter(s) IV Push every 8 hours  torsemide 40 milliGRAM(s) Oral daily    MEDICATIONS  (PRN):  acetaminophen     Tablet .. 650 milliGRAM(s) Oral every 6 hours PRN Temp greater or equal to 38C (100.4F), Mild Pain (1 - 3)  aluminum hydroxide/magnesium hydroxide/simethicone Suspension 30 milliLiter(s) Oral every 4 hours PRN Dyspepsia  dextrose Oral Gel 15 Gram(s) Oral once PRN Blood Glucose LESS THAN 70 milliGRAM(s)/deciliter  HYDROmorphone  Injectable 1 milliGRAM(s) IV Push every 4 hours PRN Severe Pain (7 - 10)  HYDROmorphone  Injectable 0.5 milliGRAM(s) IV Push every 4 hours PRN Moderate Pain (4 - 6)  melatonin 3 milliGRAM(s) Oral at bedtime PRN Insomnia  ondansetron Injectable 4 milliGRAM(s) IV Push every 8 hours PRN Nausea and/or Vomiting    Allergies  Augmentin (Hives)    SOCIAL HISTORY:  Non smoker.   No alcohol.    FAMILY HISTORY:  No known family history of stroke.     ROS:  Constitutional: The patient denies fevers or weight changes.  Neuro: As per HPI.  Eyes: Denies blurry vision.  Ears/nose/throat: Denies Tinnitus.   Cardiac: Denies chest pain. Denies palpitations.  Respiratory: Denies shortness of breath.  GI: Denies abdominal pain, nausea, or vomiting.  : Denies change in urinary pattern.  Integumentary: Denies rash.  Psych: Denies recent mood changes.  Heme: denies easy bleeding/bruising.    Exam:  Vital Signs Last 24 Hrs  T(C): 36.5 (09 Feb 2024 10:45), Max: 36.9 (08 Feb 2024 15:00)  T(F): 97.7 (09 Feb 2024 10:45), Max: 98.5 (08 Feb 2024 15:00)  HR: 62 (09 Feb 2024 12:10) (59 - 69)  BP: 112/63 (09 Feb 2024 12:10) (112/63 - 190/70)  BP(mean): 79 (09 Feb 2024 12:10) (79 - 79)  RR: 18 (09 Feb 2024 10:45) (18 - 18)  SpO2: 95% (09 Feb 2024 10:45) (95% - 99%)    Parameters below as of 09 Feb 2024 10:45  Patient On (Oxygen Delivery Method): room air    General: NAD.   Carotid bruits absent.     Mental status: The patient is awake, alert, and fully oriented. There is no aphasia. Attention span is normal. Patient is aware of current events.     Cranial nerves: There is no papilledema. Pupils react symmetrically to light. There is no visual field deficit to confrontation. Extraocular motion is full with no nystagmus.  Facial sensation is intact. Facial musculature is symmetric. Palate elevates symmetrically. Tongue is midline.    Motor: There is normal bulk and tone.  Strength is 5/5 in the right arm and leg.   Strength is 5/5 in the left arm and leg.    Sensation: Intact to light touch and pin. Decreased vibration in the toes and ankles. There is no extinction to double simultaneous stimulation.    Reflexes: Trace throughout and plantar responses are flexor.    Cerebellar: There is no dysmetria on finger to nose testing. She sways on sitting up.    [facility] SS:   Date: 2/9/24  Time:   1a) Level of consciousness (0-3): 0  1b) Questions (0-2): 0  1c) Commands (0-2): 0  2  ) Gaze (0-2): 0  3  ) Visual field (0-3): 0  4  ) Facial palsy (0-3): 0  Motor  5a) Left arm (0-4): 0  5b) Right arm (0-4): 0  6a) Left leg (0-4): 0  6b) Right leg (0-4): 0  7  ) Ataxia (0-2): 0  Sensory  8  ) Sensory (0-2): 1  Speech  9  ) Language (0-3): 0  10) Dysarthria (0-2): 0  Extinction  11) Extinction/inattention (0-2): 0    Total score: 1    Prestroke Modified [place]: 2    (0: No symptoms and no disability.  1: No significant disability despite symptoms; able to carry out all usual duties and activities.  2: Slight disability; unable to carry out all previous activity but able to look after own affairs without assistance.  3: Moderate disability; requiring some help but able to walk without assistance.   4: Moderately severe disability; unable to walk without assistance and unable to attend to own bodily needs without assistance.  5: Severe disability; bedridden, incontinent and requiring constant nursing care and attention.   6: Dead. )     LABS:                         9.8    3.66  )-----------( 86       ( 09 Feb 2024 02:33 )             30.6       02-09    137  |  96  |  34.7<H>  ----------------------------<  175<H>  4.6   |  25.0  |  4.24<H>    Ca    8.6      09 Feb 2024 02:33  Phos  4.6     02-09  Mg     2.1     02-09    TPro  6.4<L>  /  Alb  3.4  /  TBili  0.3<L>  /  DBili  x   /  AST  18  /  ALT  13  /  AlkPhos  87  02-09    PT/INR - ( 07 Feb 2024 16:10 )   PT: 11.6 sec;   INR: 1.05 ratio    PTT - ( 07 Feb 2024 16:10 )  PTT:34.3 sec    RADIOLOGY   CT head images reviewed (and concur with report): There is no acute pathology.

## 2024-02-09 NOTE — CONSULT NOTE ADULT - NS ATTEND AMEND GEN_ALL_CORE FT
67 y/o female with a PMH of CAD s/p PCI, ESRD on HD, HCP cirrhosis s/p Harvoni, Pacreatic insufficiency 2/2 hx of pancreatitis, Pancreatic DM, Thyroid Cancer s/p ALFORD now Hypothyroidism s/p Total Thyroidectomy admitted with dizziness, balance issues and low abd pains. Labile glucoses with hyperglycemia followed by hypoglycemia after correction insulin. Hypoglycemia today. WIll reduce coverage scale for now. ALso w post surgical hypothyroidism for thyroid cancer in 2009, check TSH, Tg level, cont current synthroid dosing.  Strongly encourage her to call office and schedule a follow up apt.

## 2024-02-09 NOTE — CONSULT NOTE ADULT - ASSESSMENT
The patient is a 68y Female with multiple issues now with worse dizziness and balance difficulty.    Dizziness and balance difficulty.  Will need brain MRI to assess for cerebellar stroke.   If negative then would attribute to missing hemodialysis and can discharge once medically cleared.     End stage renal disease   Continue hemodialysis per medicine/renal service.     Case discussed with Ham.

## 2024-02-09 NOTE — CONSULT NOTE ADULT - ASSESSMENT
69 y/o female with a PMH of CAD s/p PCI, ESRD on HD, HCP cirrhosis s/p Harvoni, Pacreatic insufficiency 2/2 hx of pancreatitis, Pancreatic DM, Thyroid Cancer s/p ALFORD now Hypothyroidism s/p Total Thyroidectomy.    Consulted for diabetes management  Home diabetes meds: not currently on medication  Current a1c: 7.2% however may be unreliable 2/2 anemia of CKD  Outpatient Endocrinologist: Dr. Escobedo- lost to follow ups since 01/2022      1. Pancreatic DM- Labile BS  - Change MSSI to LSSI  - Check FS TID AC and QHS  - Consistent Carb diet    2. Thyroid Cancer s/p ALFORD now Hypothyroidism s/p Total Thyroidectomy  - Will check TFT's, TG and TGab tomorrow  - For now continue with LT4 250 mcg    3. HTN  - On ACE, BB, CCB    4. ESRD  - Hd management per Renal    5. Abd pain  - CT Abd: No appendicitis. Mild colonic wall thickening without inflammatory   changes can be due to portal colopathy, partial distention or early   colitis  - Management per primary team   69 y/o female with a PMH of CAD s/p PCI, ESRD on HD, HCP cirrhosis s/p Harvoni, Pacreatic insufficiency 2/2 hx of pancreatitis, Pancreatic DM, Thyroid Cancer s/p ALFORD now Hypothyroidism s/p Total Thyroidectomy.    Consulted for diabetes management  Home diabetes meds: not currently on medication  Current a1c: 7.2% however may be unreliable 2/2 anemia of CKD  Outpatient Endocrinologist: Dr. Escobedo- lost to follow ups since 01/2022      1. Pancreatic DM- Labile BS  - Change MSSI to LSSI  - Check FS TID AC and QHS  - Consistent Carb diet    2. Hx of Thyroid Cancer s/p ALFORD; now Hypothyroidism s/p Total Thyroidectomy  - Will check TFT's, TG and TGab tomorrow  - For now continue with LT4 250 mcg    3. HTN  - On ACE, BB, CCB    4. ESRD  - Hd management per Renal    5. Abd pain  - CT Abd: No appendicitis. Mild colonic wall thickening without inflammatory   changes can be due to portal colopathy, partial distention or early   colitis  - Management per primary team   69 y/o female with a PMH of CAD s/p PCI, ESRD on HD, HCP cirrhosis s/p Harvoni, Pacreatic insufficiency 2/2 hx of pancreatitis, Pancreatic DM, Thyroid Cancer s/p ALFORD now Hypothyroidism s/p Total Thyroidectomy admitted with dizziness, balance issues and low abd pains.    Consulted for diabetes management  Home diabetes meds: not currently on medication  Current a1c: 7.2% however may be unreliable 2/2 anemia of CKD  Outpatient Endocrinologist: Dr. Escobedo- lost to follow ups since 01/2022    1. Pancreatic DM- Labile BS since coming to hospital - has gotten lots of insulin coverage in setting of ESRD resulting in hypoglycemia  - Change AISS mod dose to low dose  - Check FS TID AC and QHS  - Consistent Carb diet  - hold off on standing basal insulin for now  - can discharge home on low dose admelog scale  - pt encouraged to call office and schedule a follow up apt    2. Hx of Thyroid Cancer s/p ALFORD; now Hypothyroidism s/p Total Thyroidectomy  - Will check TFT's, TG and TGab tomorrow  - For now continue with LT4 250 mcg    3. HTN  - On ACE, BB, CCB    4. ESRD  - Hd management per Renal    5. Abd pain, dizziness  - Lipase negative  - CT Abd: No appendicitis. Mild colonic wall thickening without inflammatory   changes can be due to portal colopathy, partial distention or early   colitis  - MRI head pending  - Management per primary team

## 2024-02-09 NOTE — CHART NOTE - NSCHARTNOTEFT_GEN_A_CORE
Pt with hypoglycemic episode, blood sugar 49  At time of event pt was diaphoretic, dizzy, clammy.  Given D50 injectable by RN with improvement in glucose, 264  Pt still c/o residual symptoms of dizziness despite improvement in glucose  Seen at bedside, resting comfortably in NAD  Denies chest pain, SOB, n/v/d/c, abdominal pain, HA, blurry vision  All vital signs stable  A&Ox4  Neuro exam non focal, PERRL, EOMI, tongue midline, no facial droop, equal strength b/l, follows commands  Head Ct Pt with hypoglycemic episode, blood sugar 49  At time of event pt was diaphoretic, dizzy, clammy.  Given D50 injectable by RN with improvement in glucose, 264  Pt still c/o residual symptoms of dizziness despite improvement in glucose  Seen at bedside, resting comfortably in NAD  Denies chest pain, SOB, n/v/d/c, abdominal pain, HA, blurry vision  All vital signs stable  A&Ox4  Neuro exam non focal, PERRL, EOMI, tongue midline, no facial droop, equal strength b/l, follows commands  Check Head CT, CBC, CMP

## 2024-02-09 NOTE — CONSULT NOTE ADULT - SUBJECTIVE AND OBJECTIVE BOX
HPI:  67 y/o female with hx of ESRD on HD via Left UE AVF M/W/F, HTN, CAD s/p PCI, PAD, HCV cirrhosis s/p Harvoni, IBS, Pancreatic insufficiency due to prior episodes of pancreatitis, Hypothyroidism, Depression, diet controlled DM-2. Patient presents from o/p GI clinic c/o abdominal pain. She states she was at [facility] last week c/o abdominal pain and was told she was constipated and prescribed Colace. She states she has had this pain before and was concerned it may be Pancreatitis. She went to see her GI doctor who advised she go to ED. She missed her usual HD session today as well. In the ED vitals stable, noted to have right sided reproducible abdomina pain w/o guarding or rebound. Lipase 45, CT abd/pelvis w/ IV contrast with colopathy of bowels, no other acute findings. Denies fever, chills, N/V, travel, sick contacts or changes in medications.  (2024 01:34)      Endocrine Hx : Patient is known to our practice, follows with Dr. Escobedo. However, lost to follow ups- last seen 2022. Patient reports she previously was on Basal/Bolus regimen but has stopped 2/2 kidney issues. Last seen in 2022 patient was on Tresiba 20 units daily and Humalog 10 units TID AC. Unclear when patient stopped taking insulin. Patient also with hx of papillary thyroid cancer, s/p ALFORD in  now hypothyroidism s/p total thyroidectomy. Reports she is taking LT4 125 mcg daily appropriately.     Diagnosed: ~ 10 years ago, pancreatic DM  Home regimen: diet controlled    Previous medications: Basal/Bolus- Tesiba/Humalog     SMB-3x per day  Hypoglycemia: 1-2x per week  Eye doctor: legally blind  Neuropathy: +numbness/tingling- overdue for podiatry  Kidney disease: ESRD on HD    Weight: lost 10 lbs over the past 1.5 weeks 2/2 N/V/Abd pain  Smoking: Former smoker- quit 4 months ago. 1 ppd for 50 years  Exercise: house work      PAST MEDICAL & SURGICAL HISTORY:  Diabetes  Bernard syndrome  Pancreatitis  Cirrhosis  ESRD on dialysis  MWF at [facility]  HTN (hypertension)  History of liver biopsy  H/O total thyroidectomy  History of cholecystectomy  1990s          FAMILY HISTORY:      SOCIAL HISTORY:    GENERAL: +generalized weakness, + weight loss  EYES/ENT: reports she is legally blind  NECK: No pain or stiffness   RESPIRATORY: no cough, no wheezing, no hemoptysis, no dyspnea, no shortness of breath  CARDIOVASCULAR: no chest pain or palpitations  GASTROINTESTINAL: + N/V + abd pain  GENITOURINARY: no dysuria, no frequency, no nocturia, no hematuria  MUSCULOSKELETAL: no trauma, no sprain/strain, no myalgias, no arthralgias  NEUROLOGICAL: no HA, +numbness/tingling to BL LE  SKIN: No itching, rashes      MEDICATIONS  (STANDING):  aspirin enteric coated 81 milliGRAM(s) Oral daily  calcium acetate 667 milliGRAM(s) Oral three times a day with meals  carvedilol 37.5 milliGRAM(s) Oral every 12 hours  cloNIDine 0.2 milliGRAM(s) Oral three times a day  dextrose 5%. 1000 milliLiter(s) (100 mL/Hr) IV Continuous <Continuous>  dextrose 5%. 1000 milliLiter(s) (50 mL/Hr) IV Continuous <Continuous>  dextrose 50% Injectable 25 Gram(s) IV Push once  dextrose 50% Injectable 12.5 Gram(s) IV Push once  dextrose 50% Injectable 25 Gram(s) IV Push once  glucagon  Injectable 1 milliGRAM(s) IntraMuscular once  heparin   Injectable 5000 Unit(s) SubCutaneous every 12 hours  influenza  Vaccine (HIGH DOSE) 0.7 milliLiter(s) IntraMuscular once  insulin lispro (ADMELOG) corrective regimen sliding scale   SubCutaneous Before meals and at bedtime  levothyroxine 250 MICROGram(s) Oral daily  lisinopril 10 milliGRAM(s) Oral daily  NIFEdipine XL 60 milliGRAM(s) Oral daily  pancrelipase  (CREON 36,000 Lipase Units) 2 Capsule(s) Oral three times a day with meals  sertraline 200 milliGRAM(s) Oral daily  sodium chloride 0.9% lock flush 3 milliLiter(s) IV Push every 8 hours  torsemide 40 milliGRAM(s) Oral daily    MEDICATIONS  (PRN):  acetaminophen     Tablet .. 650 milliGRAM(s) Oral every 6 hours PRN Temp greater or equal to 38C (100.4F), Mild Pain (1 - 3)  aluminum hydroxide/magnesium hydroxide/simethicone Suspension 30 milliLiter(s) Oral every 4 hours PRN Dyspepsia  dextrose Oral Gel 15 Gram(s) Oral once PRN Blood Glucose LESS THAN 70 milliGRAM(s)/deciliter  HYDROmorphone  Injectable 1 milliGRAM(s) IV Push every 4 hours PRN Severe Pain (7 - 10)  HYDROmorphone  Injectable 0.5 milliGRAM(s) IV Push every 4 hours PRN Moderate Pain (4 - 6)  melatonin 3 milliGRAM(s) Oral at bedtime PRN Insomnia  ondansetron Injectable 4 milliGRAM(s) IV Push every 8 hours PRN Nausea and/or Vomiting      Allergies    Augmentin (Hives)    Intolerances          PHYSICAL EXAM:    Vital Signs Last 24 Hrs  T(C): 36.5 (2024 10:45), Max: 36.9 (2024 15:00)  T(F): 97.7 (2024 10:45), Max: 98.5 (2024 15:00)  HR: 62 (2024 12:10) (59 - 69)  BP: 112/63 (2024 12:10) (112/63 - 190/70)  BP(mean): 79 (2024 12:10) (79 - 79)  RR: 18 (2024 10:45) (18 - 18)  SpO2: 95% (2024 10:45) (95% - 99%)    Parameters below as of 2024 10:45  Patient On (Oxygen Delivery Method): room air      GENERAL: NAD, comfortable  NECK: Supple; trachea midline  CHEST/LUNG: Clear to auscultation bilaterally; No wheezes  HEART: Regular rate and rhythm; No murmurs, rubs, or gallops  ABDOMEN: Soft, Nontender, Nondistended  NEURO: AAOx3  EXTREMITIES:  2+ Peripheral Pulses, no edema            LABS:                        9.8    3.66  )-----------( 86       ( 2024 02:33 )             30.6     02-09    137  |  96  |  34.7<H>  ----------------------------<  175<H>  4.6   |  25.0  |  4.24<H>    Ca    8.6      2024 02:33  Phos  4.6     02-09  Mg     2.1     02-09    TPro  6.4<L>  /  Alb  3.4  /  TBili  0.3<L>  /  DBili  x   /  AST  18  /  ALT  13  /  AlkPhos  87      Urinalysis Basic - ( 2024 02:33 )    Color: x / Appearance: x / SG: x / pH: x  Gluc: 175 mg/dL / Ketone: x  / Bili: x / Urobili: x   Blood: x / Protein: x / Nitrite: x   Leuk Esterase: x / RBC: x / WBC x   Sq Epi: x / Non Sq Epi: x / Bacteria: x      LIVER FUNCTIONS - ( 2024 02:33 )  Alb: 3.4 g/dL / Pro: 6.4 g/dL / ALK PHOS: 87 U/L / ALT: 13 U/L / AST: 18 U/L / GGT: x                 POCT Blood Glucose.: 48 mg/dL (24 @ 12:39)  POCT Blood Glucose.: 49 mg/dL (24 @ 12:36)  POCT Blood Glucose.: 255 mg/dL (24 @ 08:43)  POCT Blood Glucose.: 264 mg/dL (24 @ 02:03)  POCT Blood Glucose.: 49 mg/dL (24 @ 01:34)  POCT Blood Glucose.: 303 mg/dL (24 @ 22:27)  POCT Blood Glucose.: 238 mg/dL (24 @ 16:34)  POCT Blood Glucose.: 143 mg/dL (24 @ 11:09)  POCT Blood Glucose.: 204 mg/dL (24 @ 07:48)      RADIOLOGY & ADDITIONAL STUDIES:       HPI:  69 y/o female with hx of ESRD on HD via Left UE AVF M/W/F, HTN, CAD s/p PCI, PAD, HCV cirrhosis s/p Harvoni, IBS, Pancreatic insufficiency due to prior episodes of pancreatitis, Hypothyroidism, Depression, diet controlled DM-2. Patient presents from o/p GI clinic c/o abdominal pain. She states she was at [facility] last week c/o abdominal pain and was told she was constipated and prescribed Colace. She states she has had this pain before and was concerned it may be Pancreatitis. She went to see her GI doctor who advised she go to ED. She missed her usual HD session today as well. In the ED vitals stable, noted to have right sided reproducible abdomina pain w/o guarding or rebound. Lipase 45, CT abd/pelvis w/ IV contrast with colopathy of bowels, no other acute findings. Denies fever, chills, N/V, travel, sick contacts or changes in medications.  (2024 01:34)      Endocrine Hx : Patient is known to our practice, follows with Dr. Escobedo. However, lost to follow ups- last seen 2022. Patient reports she previously was on Basal/Bolus regimen but has stopped 2/2 kidney issues. Last seen in 2022 patient was on Tresiba 20 units daily and Humalog 10 units TID AC. Unclear when patient stopped taking insulin. Patient also with hx of papillary thyroid cancer, s/p ALFORD in  now hypothyroidism s/p total thyroidectomy.     Diagnosed: ~ 10 years ago, pancreatic DM  Severity: A1C 7.2% however may be unreliable 2/2 anemia of ckd  Home regimen: diet controlled    Previous medications: Basal/Bolus- Tesiba/Humalog     SMB-3x per day  Hypoglycemia: 1-2x per week  Eye doctor: legally blind  Neuropathy: +numbness/tingling- overdue for podiatry  Kidney disease: ESRD on HD    Weight: lost 10 lbs over the past 1.5 weeks 2/2 N/V/Abd pain  Smoking: Former smoker- quit 4 months ago. 1 ppd for 50 years  Exercise: house work      PAST MEDICAL & SURGICAL HISTORY:  Diabetes  Bernard syndrome  Pancreatitis  Cirrhosis  ESRD on dialysis  MWF at [facility]  HTN (hypertension)  History of liver biopsy  H/O total thyroidectomy  History of cholecystectomy        GENERAL: +generalized weakness, + weight loss  EYES/ENT: reports she is legally blind  NECK: No pain or stiffness   RESPIRATORY: no cough, no wheezing, no hemoptysis, no dyspnea, no shortness of breath  CARDIOVASCULAR: no chest pain or palpitations  GASTROINTESTINAL: + N/V + abd pain  GENITOURINARY: no dysuria, no frequency, no nocturia, no hematuria  MUSCULOSKELETAL: no trauma, no sprain/strain, no myalgias, no arthralgias  NEUROLOGICAL: no HA, +numbness/tingling to BL LE  SKIN: No itching, rashes      MEDICATIONS  (STANDING):  aspirin enteric coated 81 milliGRAM(s) Oral daily  calcium acetate 667 milliGRAM(s) Oral three times a day with meals  carvedilol 37.5 milliGRAM(s) Oral every 12 hours  cloNIDine 0.2 milliGRAM(s) Oral three times a day  dextrose 5%. 1000 milliLiter(s) (100 mL/Hr) IV Continuous <Continuous>  dextrose 5%. 1000 milliLiter(s) (50 mL/Hr) IV Continuous <Continuous>  dextrose 50% Injectable 25 Gram(s) IV Push once  dextrose 50% Injectable 12.5 Gram(s) IV Push once  dextrose 50% Injectable 25 Gram(s) IV Push once  glucagon  Injectable 1 milliGRAM(s) IntraMuscular once  heparin   Injectable 5000 Unit(s) SubCutaneous every 12 hours  influenza  Vaccine (HIGH DOSE) 0.7 milliLiter(s) IntraMuscular once  insulin lispro (ADMELOG) corrective regimen sliding scale   SubCutaneous Before meals and at bedtime  levothyroxine 250 MICROGram(s) Oral daily  lisinopril 10 milliGRAM(s) Oral daily  NIFEdipine XL 60 milliGRAM(s) Oral daily  pancrelipase  (CREON 36,000 Lipase Units) 2 Capsule(s) Oral three times a day with meals  sertraline 200 milliGRAM(s) Oral daily  sodium chloride 0.9% lock flush 3 milliLiter(s) IV Push every 8 hours  torsemide 40 milliGRAM(s) Oral daily    MEDICATIONS  (PRN):  acetaminophen     Tablet .. 650 milliGRAM(s) Oral every 6 hours PRN Temp greater or equal to 38C (100.4F), Mild Pain (1 - 3)  aluminum hydroxide/magnesium hydroxide/simethicone Suspension 30 milliLiter(s) Oral every 4 hours PRN Dyspepsia  dextrose Oral Gel 15 Gram(s) Oral once PRN Blood Glucose LESS THAN 70 milliGRAM(s)/deciliter  HYDROmorphone  Injectable 1 milliGRAM(s) IV Push every 4 hours PRN Severe Pain (7 - 10)  HYDROmorphone  Injectable 0.5 milliGRAM(s) IV Push every 4 hours PRN Moderate Pain (4 - 6)  melatonin 3 milliGRAM(s) Oral at bedtime PRN Insomnia  ondansetron Injectable 4 milliGRAM(s) IV Push every 8 hours PRN Nausea and/or Vomiting      Allergies    Augmentin (Hives)    Intolerances          PHYSICAL EXAM:    Vital Signs Last 24 Hrs  T(C): 36.5 (2024 10:45), Max: 36.9 (2024 15:00)  T(F): 97.7 (2024 10:45), Max: 98.5 (2024 15:00)  HR: 62 (2024 12:10) (59 - 69)  BP: 112/63 (2024 12:10) (112/63 - 190/70)  BP(mean): 79 (2024 12:10) (79 - 79)  RR: 18 (2024 10:45) (18 - 18)  SpO2: 95% (2024 10:45) (95% - 99%)    Parameters below as of 2024 10:45  Patient On (Oxygen Delivery Method): room air      GENERAL: NAD, comfortable  NECK: Supple; trachea midline  CHEST/LUNG: Clear to auscultation bilaterally; No wheezes  HEART: Regular rate and rhythm; No murmurs, rubs, or gallops  ABDOMEN: Soft, Nontender, Nondistended  NEURO: AAOx3  EXTREMITIES:  2+ Peripheral Pulses, no edema        LABS:                        9.8    3.66  )-----------( 86       ( 2024 02:33 )             30.6     02-09    137  |  96  |  34.7<H>  ----------------------------<  175<H>  4.6   |  25.0  |  4.24<H>    Ca    8.6      2024 02:33  Phos  4.6     02-09  Mg     2.1     02-09    TPro  6.4<L>  /  Alb  3.4  /  TBili  0.3<L>  /  DBili  x   /  AST  18  /  ALT  13  /  AlkPhos  87      Urinalysis Basic - ( 2024 02:33 )    Color: x / Appearance: x / SG: x / pH: x  Gluc: 175 mg/dL / Ketone: x  / Bili: x / Urobili: x   Blood: x / Protein: x / Nitrite: x   Leuk Esterase: x / RBC: x / WBC x   Sq Epi: x / Non Sq Epi: x / Bacteria: x      LIVER FUNCTIONS - ( 2024 02:33 )  Alb: 3.4 g/dL / Pro: 6.4 g/dL / ALK PHOS: 87 U/L / ALT: 13 U/L / AST: 18 U/L / GGT: x                 POCT Blood Glucose.: 48 mg/dL (24 @ 12:39)  POCT Blood Glucose.: 49 mg/dL (24 @ 12:36)  POCT Blood Glucose.: 255 mg/dL (24 @ 08:43)  POCT Blood Glucose.: 264 mg/dL (24 @ 02:03)  POCT Blood Glucose.: 49 mg/dL (24 @ 01:34)  POCT Blood Glucose.: 303 mg/dL (24 @ 22:27)  POCT Blood Glucose.: 238 mg/dL (24 @ 16:34)  POCT Blood Glucose.: 143 mg/dL (24 @ 11:09)  POCT Blood Glucose.: 204 mg/dL (24 @ 07:48)      RADIOLOGY & ADDITIONAL STUDIES:       HPI:  67 y/o female with hx of ESRD on HD via Left UE AVF M/W/F, HTN, CAD s/p PCI, PAD, HCV cirrhosis s/p Harvoni, IBS, Pancreatic insufficiency due to prior episodes of pancreatitis, Hypothyroidism, Depression, diet controlled DM-2. Patient presents from o/p GI clinic c/o abdominal pain. She states she was at [facility] last week c/o abdominal pain and was told she was constipated and prescribed Colace. She states she has had this pain before and was concerned it may be Pancreatitis. She went to see her GI doctor who advised she go to ED. She missed her usual HD session today as well. In the ED vitals stable, noted to have right sided reproducible abdomina pain w/o guarding or rebound. Lipase 45, CT abd/pelvis w/ IV contrast with colopathy of bowels, no other acute findings. Denies fever, chills, N/V, travel, sick contacts or changes in medications.  (2024 01:34)      Endocrine Hx : Patient is known to our practice, follows with Dr. Escobedo. However, lost to follow ups- last seen 2022. Patient reports she previously was on Basal/Bolus regimen but has stopped 2/2 kidney issues. Last seen in 2022 patient was on Tresiba 20 units daily and Humalog 10 units TID AC. Unclear when patient stopped taking insulin. Patient also with hx of papillary thyroid cancer, s/p ALFORD in  now hypothyroidism s/p total thyroidectomy. Reports taking Levothyroxine 250 mcg (ordered by PCP) daily first thing in the am on an empty stomach.    Diagnosed: ~ 10 years ago, pancreatic DM  Severity: A1C 7.2% however may be unreliable 2/2 anemia of ckd  Home regimen: diet controlled    Previous medications: Basal/Bolus- Tesiba/Humalog     SMB-3x per day  Hypoglycemia: 1-2x per week  Eye doctor: legally blind  Neuropathy: +numbness/tingling- overdue for podiatry  Kidney disease: ESRD on HD    Weight: lost 10 lbs over the past 1.5 weeks 2/2 N/V/Abd pain  Smoking: Former smoker- quit 4 months ago. 1 ppd for 50 years  Exercise: house work      PAST MEDICAL & SURGICAL HISTORY:  Diabetes  Bernard syndrome  Pancreatitis  Cirrhosis  ESRD on dialysis  MWF at [facility]  HTN (hypertension)  History of liver biopsy  H/O total thyroidectomy  History of cholecystectomy  1990s      GENERAL: +generalized weakness, + weight loss  EYES/ENT: reports she is legally blind  NECK: No pain or stiffness   RESPIRATORY: no cough, no wheezing, no hemoptysis, no dyspnea, no shortness of breath  CARDIOVASCULAR: no chest pain or palpitations  GASTROINTESTINAL: + N/V + abd pain  GENITOURINARY: no dysuria, no frequency, no nocturia, no hematuria  MUSCULOSKELETAL: no trauma, no sprain/strain, no myalgias, no arthralgias  NEUROLOGICAL: no HA, +numbness/tingling to BL LE  SKIN: No itching, rashes      MEDICATIONS  (STANDING):  aspirin enteric coated 81 milliGRAM(s) Oral daily  calcium acetate 667 milliGRAM(s) Oral three times a day with meals  carvedilol 37.5 milliGRAM(s) Oral every 12 hours  cloNIDine 0.2 milliGRAM(s) Oral three times a day  dextrose 5%. 1000 milliLiter(s) (100 mL/Hr) IV Continuous <Continuous>  dextrose 5%. 1000 milliLiter(s) (50 mL/Hr) IV Continuous <Continuous>  dextrose 50% Injectable 25 Gram(s) IV Push once  dextrose 50% Injectable 12.5 Gram(s) IV Push once  dextrose 50% Injectable 25 Gram(s) IV Push once  glucagon  Injectable 1 milliGRAM(s) IntraMuscular once  heparin   Injectable 5000 Unit(s) SubCutaneous every 12 hours  influenza  Vaccine (HIGH DOSE) 0.7 milliLiter(s) IntraMuscular once  insulin lispro (ADMELOG) corrective regimen sliding scale   SubCutaneous Before meals and at bedtime  levothyroxine 250 MICROGram(s) Oral daily  lisinopril 10 milliGRAM(s) Oral daily  NIFEdipine XL 60 milliGRAM(s) Oral daily  pancrelipase  (CREON 36,000 Lipase Units) 2 Capsule(s) Oral three times a day with meals  sertraline 200 milliGRAM(s) Oral daily  sodium chloride 0.9% lock flush 3 milliLiter(s) IV Push every 8 hours  torsemide 40 milliGRAM(s) Oral daily    MEDICATIONS  (PRN):  acetaminophen     Tablet .. 650 milliGRAM(s) Oral every 6 hours PRN Temp greater or equal to 38C (100.4F), Mild Pain (1 - 3)  aluminum hydroxide/magnesium hydroxide/simethicone Suspension 30 milliLiter(s) Oral every 4 hours PRN Dyspepsia  dextrose Oral Gel 15 Gram(s) Oral once PRN Blood Glucose LESS THAN 70 milliGRAM(s)/deciliter  HYDROmorphone  Injectable 1 milliGRAM(s) IV Push every 4 hours PRN Severe Pain (7 - 10)  HYDROmorphone  Injectable 0.5 milliGRAM(s) IV Push every 4 hours PRN Moderate Pain (4 - 6)  melatonin 3 milliGRAM(s) Oral at bedtime PRN Insomnia  ondansetron Injectable 4 milliGRAM(s) IV Push every 8 hours PRN Nausea and/or Vomiting      Allergies  Augmentin (Hives)      PHYSICAL EXAM:    Vital Signs Last 24 Hrs  T(C): 36.5 (2024 10:45), Max: 36.9 (2024 15:00)  T(F): 97.7 (2024 10:45), Max: 98.5 (2024 15:00)  HR: 62 (2024 12:10) (59 - 69)  BP: 112/63 (2024 12:10) (112/63 - 190/70)  BP(mean): 79 (2024 12:10) (79 - 79)  RR: 18 (2024 10:45) (18 - 18)  SpO2: 95% (2024 10:45) (95% - 99%)    Parameters below as of 2024 10:45  Patient On (Oxygen Delivery Method): room air      GENERAL: NAD, comfortable  NECK: Supple; trachea midline  CHEST/LUNG: Clear to auscultation bilaterally; No wheezes  HEART: Regular rate and rhythm; No murmurs, rubs, or gallops  ABDOMEN: Soft, Nontender, Nondistended  NEURO: AAOx3  EXTREMITIES:  2+ Peripheral Pulses, no edema        LABS:                        9.8    3.66  )-----------( 86       ( 2024 02:33 )             30.6     02-09    137  |  96  |  34.7<H>  ----------------------------<  175<H>  4.6   |  25.0  |  4.24<H>    Ca    8.6      2024 02:33  Phos  4.6       Mg     2.1         TPro  6.4<L>  /  Alb  3.4  /  TBili  0.3<L>  /  DBili  x   /  AST  18  /  ALT  13  /  AlkPhos  87      Urinalysis Basic - ( 2024 02:33 )    Color: x / Appearance: x / SG: x / pH: x  Gluc: 175 mg/dL / Ketone: x  / Bili: x / Urobili: x   Blood: x / Protein: x / Nitrite: x   Leuk Esterase: x / RBC: x / WBC x   Sq Epi: x / Non Sq Epi: x / Bacteria: x      LIVER FUNCTIONS - ( 2024 02:33 )  Alb: 3.4 g/dL / Pro: 6.4 g/dL / ALK PHOS: 87 U/L / ALT: 13 U/L / AST: 18 U/L / GGT: x             POCT Blood Glucose.: 48 mg/dL (24 @ 12:39)  POCT Blood Glucose.: 49 mg/dL (24 @ 12:36)  POCT Blood Glucose.: 255 mg/dL (24 @ 08:43)  POCT Blood Glucose.: 264 mg/dL (24 @ 02:03)  POCT Blood Glucose.: 49 mg/dL (24 @ 01:34)  POCT Blood Glucose.: 303 mg/dL (24 @ 22:27)  POCT Blood Glucose.: 238 mg/dL (24 @ 16:34)  POCT Blood Glucose.: 143 mg/dL (24 @ 11:09)  POCT Blood Glucose.: 204 mg/dL (24 @ 07:48)      RADIOLOGY & ADDITIONAL STUDIES:       HPI:  67 y/o female with hx of ESRD on HD via Left UE AVF M/W/F, HTN, CAD s/p PCI, PAD, HCV cirrhosis s/p Harvoni, IBS, Pancreatic insufficiency due to prior episodes of pancreatitis, Hypothyroidism, Depression, diet controlled DM-2. Patient presents from o/p GI clinic c/o abdominal pain. She states she was at [facility] last week c/o abdominal pain and was told she was constipated and prescribed Colace. She states she has had this pain before and was concerned it may be Pancreatitis. She went to see her GI doctor who advised she go to ED. She missed her usual HD session today as well. In the ED vitals stable, noted to have right sided reproducible abdomina pain w/o guarding or rebound. Lipase 45, CT abd/pelvis w/ IV contrast with colopathy of bowels, no other acute findings. Denies fever, chills, N/V, travel, sick contacts or changes in medications.  (2024 01:34)      Endocrine Hx : Patient is known to our practice, follows with Dr. Escobedo. However, lost to follow ups- last seen 2022. Patient reports she previously was on Basal/Bolus regimen but has stopped 2/2 kidney issues. Last seen in 2022 patient was on Tresiba 20 units daily and Humalog 10 units TID AC. Unclear when patient stopped taking insulin. Patient also with hx of papillary thyroid cancer, s/p ALFORD in  now hypothyroidism s/p total thyroidectomy. Reports taking Levothyroxine 250 mcg (ordered by PCP) daily first thing in the am on an empty stomach.    Diagnosed: ~ 10 years ago, pancreatic DM  Severity: A1C 7.2% however may be unreliable 2/2 anemia of ckd  Home regimen: diet controlled    Previous medications: Basal/Bolus- Tesiba/Humalog     SMB-3x per day  Hypoglycemia: 1-2x per week  Eye doctor: legally blind  Neuropathy: +numbness/tingling- overdue for podiatry  Kidney disease: ESRD on HD    Weight: lost 10 lbs over the past 1.5 weeks 2/2 N/V/Abd pain  Smoking: Former smoker- quit 4 months ago. 1 ppd for 50 years  Exercise: house work      PAST MEDICAL & SURGICAL HISTORY:  Diabetes  Bernard syndrome  Pancreatitis  Cirrhosis  ESRD on dialysis  MWF at [facility]  HTN (hypertension)  History of liver biopsy  H/O total thyroidectomy  History of cholecystectomy  1990s      GENERAL: +generalized weakness, + weight loss  EYES/ENT: reports she is legally blind  NECK: No pain or stiffness   RESPIRATORY: no cough, no wheezing, no hemoptysis, no dyspnea, no shortness of breath  CARDIOVASCULAR: no chest pain or palpitations  GASTROINTESTINAL: + N/V + abd pain  GENITOURINARY: no dysuria, no frequency, no nocturia, no hematuria  MUSCULOSKELETAL: no trauma, no sprain/strain, no myalgias, no arthralgias  NEUROLOGICAL: no HA, +numbness/tingling to BL LE  SKIN: No itching, rashes      MEDICATIONS  (STANDING):  aspirin enteric coated 81 milliGRAM(s) Oral daily  calcium acetate 667 milliGRAM(s) Oral three times a day with meals  carvedilol 37.5 milliGRAM(s) Oral every 12 hours  cloNIDine 0.2 milliGRAM(s) Oral three times a day  dextrose 5%. 1000 milliLiter(s) (100 mL/Hr) IV Continuous <Continuous>  dextrose 5%. 1000 milliLiter(s) (50 mL/Hr) IV Continuous <Continuous>  dextrose 50% Injectable 25 Gram(s) IV Push once  dextrose 50% Injectable 12.5 Gram(s) IV Push once  dextrose 50% Injectable 25 Gram(s) IV Push once  glucagon  Injectable 1 milliGRAM(s) IntraMuscular once  heparin   Injectable 5000 Unit(s) SubCutaneous every 12 hours  influenza  Vaccine (HIGH DOSE) 0.7 milliLiter(s) IntraMuscular once  insulin lispro (ADMELOG) corrective regimen sliding scale   SubCutaneous Before meals and at bedtime  levothyroxine 250 MICROGram(s) Oral daily  lisinopril 10 milliGRAM(s) Oral daily  NIFEdipine XL 60 milliGRAM(s) Oral daily  pancrelipase  (CREON 36,000 Lipase Units) 2 Capsule(s) Oral three times a day with meals  sertraline 200 milliGRAM(s) Oral daily  sodium chloride 0.9% lock flush 3 milliLiter(s) IV Push every 8 hours  torsemide 40 milliGRAM(s) Oral daily    MEDICATIONS  (PRN):  acetaminophen     Tablet .. 650 milliGRAM(s) Oral every 6 hours PRN Temp greater or equal to 38C (100.4F), Mild Pain (1 - 3)  aluminum hydroxide/magnesium hydroxide/simethicone Suspension 30 milliLiter(s) Oral every 4 hours PRN Dyspepsia  dextrose Oral Gel 15 Gram(s) Oral once PRN Blood Glucose LESS THAN 70 milliGRAM(s)/deciliter  HYDROmorphone  Injectable 1 milliGRAM(s) IV Push every 4 hours PRN Severe Pain (7 - 10)  HYDROmorphone  Injectable 0.5 milliGRAM(s) IV Push every 4 hours PRN Moderate Pain (4 - 6)  melatonin 3 milliGRAM(s) Oral at bedtime PRN Insomnia  ondansetron Injectable 4 milliGRAM(s) IV Push every 8 hours PRN Nausea and/or Vomiting      Allergies  Augmentin (Hives)      PHYSICAL EXAM:    Vital Signs Last 24 Hrs  T(C): 36.5 (2024 10:45), Max: 36.9 (2024 15:00)  T(F): 97.7 (2024 10:45), Max: 98.5 (2024 15:00)  HR: 62 (2024 12:10) (59 - 69)  BP: 112/63 (2024 12:10) (112/63 - 190/70)  BP(mean): 79 (2024 12:10) (79 - 79)  RR: 18 (2024 10:45) (18 - 18)  SpO2: 95% (2024 10:45) (95% - 99%)    Parameters below as of 2024 10:45  Patient On (Oxygen Delivery Method): room air      GENERAL: NAD, comfortable  NECK: Supple; trachea midline  CHEST/LUNG: Clear to auscultation bilaterally; No wheezes  HEART: Regular rate and rhythm; No murmurs, rubs, or gallops  ABDOMEN: Soft, Nontender, Nondistended  NEURO: AAOx3-forgetful  EXTREMITIES:  2+ Peripheral Pulses, no edema        LABS:                        9.8    3.66  )-----------( 86       ( 2024 02:33 )             30.6     02-    137  |  96  |  34.7<H>  ----------------------------<  175<H>  4.6   |  25.0  |  4.24<H>    Ca    8.6      2024 02:33  Phos  4.6       Mg     2.1         TPro  6.4<L>  /  Alb  3.4  /  TBili  0.3<L>  /  DBili  x   /  AST  18  /  ALT  13  /  AlkPhos  87      Urinalysis Basic - ( 2024 02:33 )    Color: x / Appearance: x / SG: x / pH: x  Gluc: 175 mg/dL / Ketone: x  / Bili: x / Urobili: x   Blood: x / Protein: x / Nitrite: x   Leuk Esterase: x / RBC: x / WBC x   Sq Epi: x / Non Sq Epi: x / Bacteria: x      LIVER FUNCTIONS - ( 2024 02:33 )  Alb: 3.4 g/dL / Pro: 6.4 g/dL / ALK PHOS: 87 U/L / ALT: 13 U/L / AST: 18 U/L / GGT: x             POCT Blood Glucose.: 48 mg/dL (24 @ 12:39)  POCT Blood Glucose.: 49 mg/dL (24 @ 12:36)  POCT Blood Glucose.: 255 mg/dL (24 @ 08:43)  POCT Blood Glucose.: 264 mg/dL (24 @ 02:03)  POCT Blood Glucose.: 49 mg/dL (24 @ 01:34)  POCT Blood Glucose.: 303 mg/dL (24 @ 22:27)  POCT Blood Glucose.: 238 mg/dL (24 @ 16:34)  POCT Blood Glucose.: 143 mg/dL (24 @ 11:09)  POCT Blood Glucose.: 204 mg/dL (24 @ 07:48)      RADIOLOGY & ADDITIONAL STUDIES:       HPI:  67 y/o female with hx of ESRD on HD via Left UE AVF M/W/F, HTN, CAD s/p PCI, PAD, HCV cirrhosis s/p Harvoni, IBS, Pancreatic insufficiency due to prior episodes of pancreatitis, Hypothyroidism, Depression, diet controlled DM-2. Patient presents from o/p GI clinic c/o abdominal pain. She states she was at [facility] last week c/o abdominal pain and was told she was constipated and prescribed Colace. She states she has had this pain before and was concerned it may be Pancreatitis. She went to see her GI doctor who advised she go to ED. She missed her usual HD session today as well. In the ED vitals stable, noted to have right sided reproducible abdomina pain w/o guarding or rebound. Lipase 45, CT abd/pelvis w/ IV contrast with colopathy of bowels, no other acute findings. Denies fever, chills, N/V, travel, sick contacts or changes in medications.  (2024 01:34)      Endocrine Hx : Patient is known to our practice, follows with Dr. Escobedo. However, lost to follow ups- last seen 2022. Patient reports she previously was on Basal/Bolus regimen but has stopped 2/2 kidney issues and has been off insulin for the past 1 year since starting HD. Last seen in 2022 patient was on Tresiba 20 units daily and Humalog 10 units TID AC. Unclear when patient stopped taking insulin. Patient also with hx of papillary thyroid cancer, s/p ALFORD in  now hypothyroidism s/p total thyroidectomy. Reports taking Levothyroxine 250 mcg (ordered by PCP, 2 tabs of 125 mcg) daily first thing in the am on an empty stomach.    Diagnosed: ~ 10 years ago, pancreatic DM due to chronic pancreatitis  Severity: A1C 7.2% however may be unreliable 2/2 anemia of ckd  Home regimen: diet controlled    Previous medications: Basal/Bolus- Tesiba/Humalog     SMB-3x per day, glucoses okay by history  Hypoglycemia: 1-2x per week  Eye doctor: legally blind  Neuropathy: +numbness/tingling- overdue for podiatry  Kidney disease: ESRD on HD    Weight: lost 10 lbs over the past 1.5 weeks 2/2 N/V/Abd pain  Smoking: Former smoker- quit 4 months ago. 1 ppd for 50 years  Exercise: house work      PAST MEDICAL & SURGICAL HISTORY:  Diabetes  Bernard syndrome  Pancreatitis  Cirrhosis  ESRD on dialysis  MWF at [facility]  HTN (hypertension)  History of liver biopsy  H/O total thyroidectomy  History of cholecystectomy  1990s      GENERAL: +generalized weakness, + weight loss  EYES/ENT: reports she is legally blind  NECK: No pain or stiffness   RESPIRATORY: no cough, no wheezing, no hemoptysis, no dyspnea, no shortness of breath  CARDIOVASCULAR: no chest pain or palpitations  GASTROINTESTINAL: + N/V + abd pain  GENITOURINARY: no dysuria, no frequency, no nocturia, no hematuria  MUSCULOSKELETAL: no trauma, no sprain/strain, no myalgias, no arthralgias  NEUROLOGICAL: no HA, +numbness/tingling to BL LE  SKIN: No itching, rashes      MEDICATIONS  (STANDING):  aspirin enteric coated 81 milliGRAM(s) Oral daily  calcium acetate 667 milliGRAM(s) Oral three times a day with meals  carvedilol 37.5 milliGRAM(s) Oral every 12 hours  cloNIDine 0.2 milliGRAM(s) Oral three times a day  dextrose 5%. 1000 milliLiter(s) (100 mL/Hr) IV Continuous <Continuous>  dextrose 5%. 1000 milliLiter(s) (50 mL/Hr) IV Continuous <Continuous>  dextrose 50% Injectable 25 Gram(s) IV Push once  dextrose 50% Injectable 12.5 Gram(s) IV Push once  dextrose 50% Injectable 25 Gram(s) IV Push once  glucagon  Injectable 1 milliGRAM(s) IntraMuscular once  heparin   Injectable 5000 Unit(s) SubCutaneous every 12 hours  influenza  Vaccine (HIGH DOSE) 0.7 milliLiter(s) IntraMuscular once  insulin lispro (ADMELOG) corrective regimen sliding scale   SubCutaneous Before meals and at bedtime  levothyroxine 250 MICROGram(s) Oral daily  lisinopril 10 milliGRAM(s) Oral daily  NIFEdipine XL 60 milliGRAM(s) Oral daily  pancrelipase  (CREON 36,000 Lipase Units) 2 Capsule(s) Oral three times a day with meals  sertraline 200 milliGRAM(s) Oral daily  sodium chloride 0.9% lock flush 3 milliLiter(s) IV Push every 8 hours  torsemide 40 milliGRAM(s) Oral daily    MEDICATIONS  (PRN):  acetaminophen     Tablet .. 650 milliGRAM(s) Oral every 6 hours PRN Temp greater or equal to 38C (100.4F), Mild Pain (1 - 3)  aluminum hydroxide/magnesium hydroxide/simethicone Suspension 30 milliLiter(s) Oral every 4 hours PRN Dyspepsia  dextrose Oral Gel 15 Gram(s) Oral once PRN Blood Glucose LESS THAN 70 milliGRAM(s)/deciliter  HYDROmorphone  Injectable 1 milliGRAM(s) IV Push every 4 hours PRN Severe Pain (7 - 10)  HYDROmorphone  Injectable 0.5 milliGRAM(s) IV Push every 4 hours PRN Moderate Pain (4 - 6)  melatonin 3 milliGRAM(s) Oral at bedtime PRN Insomnia  ondansetron Injectable 4 milliGRAM(s) IV Push every 8 hours PRN Nausea and/or Vomiting      Allergies  Augmentin (Hives)      PHYSICAL EXAM:    Vital Signs Last 24 Hrs  T(C): 36.5 (2024 10:45), Max: 36.9 (2024 15:00)  T(F): 97.7 (2024 10:45), Max: 98.5 (2024 15:00)  HR: 62 (2024 12:10) (59 - 69)  BP: 112/63 (2024 12:10) (112/63 - 190/70)  BP(mean): 79 (2024 12:10) (79 - 79)  RR: 18 (2024 10:45) (18 - 18)  SpO2: 95% (2024 10:45) (95% - 99%)    Parameters below as of 2024 10:45  Patient On (Oxygen Delivery Method): room air      GENERAL: NAD, comfortable  NECK: Supple; trachea midline  CHEST/LUNG: Clear to auscultation bilaterally; No wheezes  HEART: Regular rate and rhythm; No murmurs, rubs, or gallops  ABDOMEN: Soft, Nontender, Nondistended  NEURO: AAOx3-forgetful  EXTREMITIES:  2+ Peripheral Pulses, no edema        LABS:                        9.8    3.66  )-----------( 86       ( 2024 02:33 )             30.6     02-09    137  |  96  |  34.7<H>  ----------------------------<  175<H>  4.6   |  25.0  |  4.24<H>    Ca    8.6      2024 02:33  Phos  4.6       Mg     2.1         TPro  6.4<L>  /  Alb  3.4  /  TBili  0.3<L>  /  DBili  x   /  AST  18  /  ALT  13  /  AlkPhos  87      LIVER FUNCTIONS - ( 2024 02:33 )  Alb: 3.4 g/dL / Pro: 6.4 g/dL / ALK PHOS: 87 U/L / ALT: 13 U/L / AST: 18 U/L / GGT: x           POCT Blood Glucose.: 48 mg/dL (24 @ 12:39)  POCT Blood Glucose.: 49 mg/dL (24 @ 12:36)  POCT Blood Glucose.: 255 mg/dL (24 @ 08:43)  POCT Blood Glucose.: 264 mg/dL (24 @ 02:03)  POCT Blood Glucose.: 49 mg/dL (24 @ 01:34)  POCT Blood Glucose.: 303 mg/dL (24 @ 22:27)  POCT Blood Glucose.: 238 mg/dL (24 @ 16:34)  POCT Blood Glucose.: 143 mg/dL (24 @ 11:09)  POCT Blood Glucose.: 204 mg/dL (24 @ 07:48)      RADIOLOGY & ADDITIONAL STUDIES:

## 2024-02-10 ENCOUNTER — TRANSCRIPTION ENCOUNTER (OUTPATIENT)
Age: 69
End: 2024-02-10

## 2024-02-10 VITALS
RESPIRATION RATE: 18 BRPM | TEMPERATURE: 98 F | SYSTOLIC BLOOD PRESSURE: 134 MMHG | DIASTOLIC BLOOD PRESSURE: 62 MMHG | OXYGEN SATURATION: 98 % | HEART RATE: 69 BPM

## 2024-02-10 LAB
GLUCOSE BLDC GLUCOMTR-MCNC: 124 MG/DL — HIGH (ref 70–99)
GLUCOSE BLDC GLUCOMTR-MCNC: 152 MG/DL — HIGH (ref 70–99)
GLUCOSE BLDC GLUCOMTR-MCNC: 174 MG/DL — HIGH (ref 70–99)
GLUCOSE BLDC GLUCOMTR-MCNC: 214 MG/DL — HIGH (ref 70–99)
HCV RNA FLD QL NAA+PROBE: SIGNIFICANT CHANGE UP
HCV RNA SPEC QL PROBE+SIG AMP: SIGNIFICANT CHANGE UP
T4 FREE SERPL-MCNC: 1.5 NG/DL — SIGNIFICANT CHANGE UP (ref 0.9–1.8)
THYROGLOB AB FLD IA-ACNC: <20 IU/ML — SIGNIFICANT CHANGE UP
THYROGLOB AB SERPL-ACNC: <20 IU/ML — SIGNIFICANT CHANGE UP
THYROGLOB SERPL-MCNC: <0.2 NG/ML — LOW (ref 1.6–59.9)
TSH SERPL-MCNC: 0.63 UIU/ML — SIGNIFICANT CHANGE UP (ref 0.27–4.2)

## 2024-02-10 PROCEDURE — 90935 HEMODIALYSIS ONE EVALUATION: CPT

## 2024-02-10 PROCEDURE — 84443 ASSAY THYROID STIM HORMONE: CPT

## 2024-02-10 PROCEDURE — 83036 HEMOGLOBIN GLYCOSYLATED A1C: CPT

## 2024-02-10 PROCEDURE — 82962 GLUCOSE BLOOD TEST: CPT

## 2024-02-10 PROCEDURE — 85610 PROTHROMBIN TIME: CPT

## 2024-02-10 PROCEDURE — 85014 HEMATOCRIT: CPT

## 2024-02-10 PROCEDURE — 82330 ASSAY OF CALCIUM: CPT

## 2024-02-10 PROCEDURE — 85025 COMPLETE CBC W/AUTO DIFF WBC: CPT

## 2024-02-10 PROCEDURE — 99285 EMERGENCY DEPT VISIT HI MDM: CPT | Mod: 25

## 2024-02-10 PROCEDURE — 80053 COMPREHEN METABOLIC PANEL: CPT

## 2024-02-10 PROCEDURE — 74177 CT ABD & PELVIS W/CONTRAST: CPT | Mod: ME

## 2024-02-10 PROCEDURE — 70551 MRI BRAIN STEM W/O DYE: CPT

## 2024-02-10 PROCEDURE — 86900 BLOOD TYPING SEROLOGIC ABO: CPT

## 2024-02-10 PROCEDURE — 80048 BASIC METABOLIC PNL TOTAL CA: CPT

## 2024-02-10 PROCEDURE — 99233 SBSQ HOSP IP/OBS HIGH 50: CPT

## 2024-02-10 PROCEDURE — 82435 ASSAY OF BLOOD CHLORIDE: CPT

## 2024-02-10 PROCEDURE — 87521 HEPATITIS C PROBE&RVRS TRNSC: CPT

## 2024-02-10 PROCEDURE — G1004: CPT

## 2024-02-10 PROCEDURE — 81001 URINALYSIS AUTO W/SCOPE: CPT

## 2024-02-10 PROCEDURE — 83735 ASSAY OF MAGNESIUM: CPT

## 2024-02-10 PROCEDURE — 84432 ASSAY OF THYROGLOBULIN: CPT

## 2024-02-10 PROCEDURE — 93005 ELECTROCARDIOGRAM TRACING: CPT

## 2024-02-10 PROCEDURE — 36415 COLL VENOUS BLD VENIPUNCTURE: CPT

## 2024-02-10 PROCEDURE — 84132 ASSAY OF SERUM POTASSIUM: CPT

## 2024-02-10 PROCEDURE — 86800 THYROGLOBULIN ANTIBODY: CPT

## 2024-02-10 PROCEDURE — 84439 ASSAY OF FREE THYROXINE: CPT

## 2024-02-10 PROCEDURE — 85018 HEMOGLOBIN: CPT

## 2024-02-10 PROCEDURE — 99239 HOSP IP/OBS DSCHRG MGMT >30: CPT

## 2024-02-10 PROCEDURE — 83690 ASSAY OF LIPASE: CPT

## 2024-02-10 PROCEDURE — 84100 ASSAY OF PHOSPHORUS: CPT

## 2024-02-10 PROCEDURE — 86803 HEPATITIS C AB TEST: CPT

## 2024-02-10 PROCEDURE — 96375 TX/PRO/DX INJ NEW DRUG ADDON: CPT

## 2024-02-10 PROCEDURE — 86901 BLOOD TYPING SEROLOGIC RH(D): CPT

## 2024-02-10 PROCEDURE — 96374 THER/PROPH/DIAG INJ IV PUSH: CPT

## 2024-02-10 PROCEDURE — 85027 COMPLETE CBC AUTOMATED: CPT

## 2024-02-10 PROCEDURE — 99261: CPT

## 2024-02-10 PROCEDURE — 82947 ASSAY GLUCOSE BLOOD QUANT: CPT

## 2024-02-10 PROCEDURE — 70450 CT HEAD/BRAIN W/O DYE: CPT

## 2024-02-10 PROCEDURE — 87086 URINE CULTURE/COLONY COUNT: CPT

## 2024-02-10 PROCEDURE — 86850 RBC ANTIBODY SCREEN: CPT

## 2024-02-10 PROCEDURE — 83605 ASSAY OF LACTIC ACID: CPT

## 2024-02-10 PROCEDURE — 84295 ASSAY OF SERUM SODIUM: CPT

## 2024-02-10 PROCEDURE — 82803 BLOOD GASES ANY COMBINATION: CPT

## 2024-02-10 PROCEDURE — 71045 X-RAY EXAM CHEST 1 VIEW: CPT

## 2024-02-10 PROCEDURE — 85730 THROMBOPLASTIN TIME PARTIAL: CPT

## 2024-02-10 RX ORDER — ACETAMINOPHEN 500 MG
1000 TABLET ORAL ONCE
Refills: 0 | Status: COMPLETED | OUTPATIENT
Start: 2024-02-10 | End: 2024-02-10

## 2024-02-10 RX ORDER — INSULIN LISPRO 100/ML
1 VIAL (ML) SUBCUTANEOUS
Qty: 0 | Refills: 0 | DISCHARGE
Start: 2024-02-10

## 2024-02-10 RX ORDER — HYDRALAZINE HCL 50 MG
10 TABLET ORAL ONCE
Refills: 0 | Status: COMPLETED | OUTPATIENT
Start: 2024-02-10 | End: 2024-02-10

## 2024-02-10 RX ADMIN — CARVEDILOL PHOSPHATE 37.5 MILLIGRAM(S): 80 CAPSULE, EXTENDED RELEASE ORAL at 18:04

## 2024-02-10 RX ADMIN — HYDROMORPHONE HYDROCHLORIDE 1 MILLIGRAM(S): 2 INJECTION INTRAMUSCULAR; INTRAVENOUS; SUBCUTANEOUS at 09:37

## 2024-02-10 RX ADMIN — Medication 60 MILLIGRAM(S): at 05:39

## 2024-02-10 RX ADMIN — HEPARIN SODIUM 5000 UNIT(S): 5000 INJECTION INTRAVENOUS; SUBCUTANEOUS at 05:37

## 2024-02-10 RX ADMIN — Medication 0.2 MILLIGRAM(S): at 13:10

## 2024-02-10 RX ADMIN — SERTRALINE 200 MILLIGRAM(S): 25 TABLET, FILM COATED ORAL at 15:46

## 2024-02-10 RX ADMIN — HYDROMORPHONE HYDROCHLORIDE 0.5 MILLIGRAM(S): 2 INJECTION INTRAMUSCULAR; INTRAVENOUS; SUBCUTANEOUS at 19:06

## 2024-02-10 RX ADMIN — HYDROMORPHONE HYDROCHLORIDE 1 MILLIGRAM(S): 2 INJECTION INTRAMUSCULAR; INTRAVENOUS; SUBCUTANEOUS at 15:46

## 2024-02-10 RX ADMIN — Medication 400 MILLIGRAM(S): at 01:20

## 2024-02-10 RX ADMIN — Medication 2 CAPSULE(S): at 13:11

## 2024-02-10 RX ADMIN — Medication 1: at 13:05

## 2024-02-10 RX ADMIN — Medication 667 MILLIGRAM(S): at 09:14

## 2024-02-10 RX ADMIN — Medication 1000 MILLIGRAM(S): at 02:20

## 2024-02-10 RX ADMIN — HYDROMORPHONE HYDROCHLORIDE 1 MILLIGRAM(S): 2 INJECTION INTRAMUSCULAR; INTRAVENOUS; SUBCUTANEOUS at 16:00

## 2024-02-10 RX ADMIN — Medication 250 MICROGRAM(S): at 05:37

## 2024-02-10 RX ADMIN — Medication 2 CAPSULE(S): at 09:21

## 2024-02-10 RX ADMIN — Medication 81 MILLIGRAM(S): at 13:10

## 2024-02-10 RX ADMIN — Medication 10 MILLIGRAM(S): at 18:57

## 2024-02-10 RX ADMIN — Medication 667 MILLIGRAM(S): at 13:10

## 2024-02-10 RX ADMIN — LISINOPRIL 10 MILLIGRAM(S): 2.5 TABLET ORAL at 05:38

## 2024-02-10 RX ADMIN — Medication 2: at 09:21

## 2024-02-10 RX ADMIN — HYDROMORPHONE HYDROCHLORIDE 0.5 MILLIGRAM(S): 2 INJECTION INTRAMUSCULAR; INTRAVENOUS; SUBCUTANEOUS at 19:26

## 2024-02-10 RX ADMIN — SODIUM CHLORIDE 3 MILLILITER(S): 9 INJECTION INTRAMUSCULAR; INTRAVENOUS; SUBCUTANEOUS at 21:16

## 2024-02-10 NOTE — DISCHARGE NOTE PROVIDER - PROVIDER TOKENS
PROVIDER:[TOKEN:[[id number]],FOLLOWUP:[1 week]],FREE:[LAST:[PCP],PHONE:[(   )    -],FAX:[(   )    -],FOLLOWUP:[1 week]]

## 2024-02-10 NOTE — DISCHARGE NOTE PROVIDER - CARE PROVIDER_API CALL
Fred Escobedo  Endocrinology/Metab/Diabetes  [address] [place] [address]  Phone: [phone]  Fax: [phone]  Follow Up Time: 1 week    PCP,   Phone: (   )    -  Fax: (   )    -  Follow Up Time: 1 week

## 2024-02-10 NOTE — DISCHARGE NOTE PROVIDER - ATTENDING DISCHARGE PHYSICAL EXAMINATION:
Vital Signs Last 24 Hrs  T(F): 98.3 (10 Feb 2024 08:52), Max: 98.3 (10 Feb 2024 08:52)  HR: 68 (10 Feb 2024 08:52) (64 - 68)  BP: 179/68 (10 Feb 2024 08:52) (102/56 - 179/68)  RR: 18 (10 Feb 2024 08:52) (18 - 18)  SpO2: 97% (10 Feb 2024 08:52) (97% - 98%)    Physical Exam:  Constitutional: Appears stated aged, not- chronically ill-appearing, laying comfortably in bed in NAD  HEENT: NC/AT, PERRL, EOMI, trachea midline, no JVD  Respiratory: CTA bilaterally, symmetrical chest rise  Cardiovascular: RRR, no m/g/r  Gastrointestinal: Soft, NT/ND, BS+  Vascular: 2+ peripheral pulses  Neurological: A/O x 3, no focal neurological deficits  Psych: Fair mood/affect  Musculoskeletal: No edema, cyanosis, deformities. ROM normal  Skin: No obvious rash, lesions. No jaundice.

## 2024-02-10 NOTE — DISCHARGE NOTE PROVIDER - NSDCCPCAREPLAN_GEN_ALL_CORE_FT
PRINCIPAL DISCHARGE DIAGNOSIS  Diagnosis: ESRD on hemodialysis  Assessment and Plan of Treatment: Resume outpatient scheduled.      SECONDARY DISCHARGE DIAGNOSES  Diagnosis: Abdominal pain  Assessment and Plan of Treatment: CT A/P without acute changes. Follow up with GI outpatient.    Diagnosis: Hypoglycemia  Assessment and Plan of Treatment: Seen by Endocrine. Recommending low dose Ademlog sliding scale . Follow up with Dr. Escobedo

## 2024-02-10 NOTE — DISCHARGE NOTE NURSING/CASE MANAGEMENT/SOCIAL WORK - NSDCPEFALRISK_GEN_ALL_CORE
For information on Fall & Injury Prevention, visit: [email or web address] OR  [email or web address] OR  https://www.cdc.gov/steadi/patient.html

## 2024-02-10 NOTE — PROGRESS NOTE ADULT - SUBJECTIVE AND OBJECTIVE BOX
[facility]                                        Neurology at [place]                                  aJyda [place], & Willie                                      [address]                                           [place], [place], [address]                                                [phone]        CC: Balance disorder and dizziness.    HISTORY:  The patient is a 68y Female with multiple medical issues including End stage renal disease on hemodialysis.  She reported dizziness and balance difficulty worse than her usual issues.   She has diabetic peripheral neuropathy and balance problems secondary to Covid-19.   She uses a walker at baseline. (JW)    Interval history:    Review of systems (neurology): Denies headache or dizziness. Denies weakness/numbness.  Denies speech/language deficits. Denies diplopia/blurred vision.  Denies confusion    MEDICATIONS  (STANDING):  aspirin enteric coated 81 milliGRAM(s) Oral daily  calcium acetate 667 milliGRAM(s) Oral three times a day with meals  carvedilol 37.5 milliGRAM(s) Oral every 12 hours  cloNIDine 0.2 milliGRAM(s) Oral three times a day  dextrose 5%. 1000 milliLiter(s) (100 mL/Hr) IV Continuous <Continuous>  dextrose 5%. 1000 milliLiter(s) (50 mL/Hr) IV Continuous <Continuous>  dextrose 50% Injectable 12.5 Gram(s) IV Push once  dextrose 50% Injectable 25 Gram(s) IV Push once  dextrose 50% Injectable 25 Gram(s) IV Push once  glucagon  Injectable 1 milliGRAM(s) IntraMuscular once  heparin   Injectable 5000 Unit(s) SubCutaneous every 12 hours  influenza  Vaccine (HIGH DOSE) 0.7 milliLiter(s) IntraMuscular once  insulin lispro (ADMELOG) corrective regimen sliding scale   SubCutaneous Before meals and at bedtime  levothyroxine 250 MICROGram(s) Oral daily  lisinopril 10 milliGRAM(s) Oral daily  NIFEdipine XL 60 milliGRAM(s) Oral daily  pancrelipase  (CREON 36,000 Lipase Units) 2 Capsule(s) Oral three times a day with meals  sertraline 200 milliGRAM(s) Oral daily  sodium chloride 0.9% lock flush 3 milliLiter(s) IV Push every 8 hours  torsemide 40 milliGRAM(s) Oral daily    MEDICATIONS  (PRN):  acetaminophen     Tablet .. 650 milliGRAM(s) Oral every 6 hours PRN Temp greater or equal to 38C (100.4F), Mild Pain (1 - 3)  aluminum hydroxide/magnesium hydroxide/simethicone Suspension 30 milliLiter(s) Oral every 4 hours PRN Dyspepsia  dextrose Oral Gel 15 Gram(s) Oral once PRN Blood Glucose LESS THAN 70 milliGRAM(s)/deciliter  HYDROmorphone  Injectable 0.5 milliGRAM(s) IV Push every 4 hours PRN Moderate Pain (4 - 6)  HYDROmorphone  Injectable 1 milliGRAM(s) IV Push every 4 hours PRN Severe Pain (7 - 10)  melatonin 3 milliGRAM(s) Oral at bedtime PRN Insomnia  ondansetron Injectable 4 milliGRAM(s) IV Push every 8 hours PRN Nausea and/or Vomiting      Vital Signs Last 24 Hrs  T(C): 36.8 (10 Feb 2024 08:52), Max: 36.8 (10 Feb 2024 08:52)  T(F): 98.3 (10 Feb 2024 08:52), Max: 98.3 (10 Feb 2024 08:52)  HR: 68 (10 Feb 2024 08:52) (64 - 68)  BP: 179/68 (10 Feb 2024 08:52) (102/56 - 179/68)  BP(mean): --  RR: 18 (10 Feb 2024 08:52) (18 - 18)  SpO2: 97% (10 Feb 2024 08:52) (97% - 98%)    Parameters below as of 10 Feb 2024 08:52  Patient On (Oxygen Delivery Method): room air    Neuro exam:     Mental status: The patient is awake, alert, and fully oriented. There is no aphasia. Attention span is normal.     Cranial nerves:  Pupils react symmetrically to light. There is no visual field deficit to confrontation. Extraocular motion is full with no nystagmus.  Facial sensation is intact. Facial musculature is symmetric. Palate elevates symmetrically. Tongue is midline.    Motor: There is normal bulk and tone.  Strength is 5/5 in the right arm and leg.   Strength is 5/5 in the left arm and leg.    Sensation: Intact to light touch and pin.      Reflexes: Trace throughout and plantar responses are flexor.    Cerebellar: There is no dysmetria on finger to nose testing. She sways on sitting up.      LABS:                         9.8    3.66  )-----------( 86       ( 09 Feb 2024 02:33 )             30.6       02-09    137  |  96  |  34.7<H>  ----------------------------<  175<H>  4.6   |  25.0  |  4.24<H>    Ca    8.6      09 Feb 2024 02:33  Phos  4.6     02-09  Mg     2.1     02-09    TPro  6.4<L>  /  Alb  3.4  /  TBili  0.3<L>  /  DBili  x   /  AST  18  /  ALT  13  /  AlkPhos  87  02-09    PT/INR - ( 07 Feb 2024 16:10 )   PT: 11.6 sec;   INR: 1.05 ratio    PTT - ( 07 Feb 2024 16:10 )  PTT:34.3 sec    RADIOLOGY   MR Head No Cont (02.09.24 @ 17:23)  IMPRESSION: No acute intracranial hemorrhage or evidence of acute   ischemia.  Multiple small rounded nonspecific abnormal white matter foci of T2/FLAIR   prolongation statistically favoring microvascular type changes.              
Reason for visit: ESRD    Subjective: Patient was seen on hemodialysis earlier today    ROS: All systems were reviewed in detail pertinent positive and negative mentioned above, rest are negative.    Physical Exam:  Gen: no acute distress  MS: alert, conversing normally  Eyes: EOMI, no icterus  HENT: NCAT, MMM  CV: rhythm reg reg, rate normal, no m/g/r  Chest: CTAB, no w/r/r,  Abd: soft, NT, ND  Extremities: No edema    =======================================================  Vital Signs Last 24 Hrs  T(C): 36.8 (10 Feb 2024 20:58), Max: 37.1 (10 Feb 2024 19:16)  T(F): 98.2 (10 Feb 2024 20:58), Max: 98.8 (10 Feb 2024 19:16)  HR: 69 (10 Feb 2024 20:58) (66 - 73)  BP: 134/62 (10 Feb 2024 20:58) (102/56 - 185/77)  BP(mean): --  RR: 18 (10 Feb 2024 20:58) (18 - 18)  SpO2: 98% (10 Feb 2024 20:58) (97% - 100%)    Parameters below as of 10 Feb 2024 20:58  Patient On (Oxygen Delivery Method): room air      I&O's Summary    =======================================================  Current Antibiotics:    Other medications:  aspirin enteric coated 81 milliGRAM(s) Oral daily  calcium acetate 667 milliGRAM(s) Oral three times a day with meals  carvedilol 37.5 milliGRAM(s) Oral every 12 hours  cloNIDine 0.2 milliGRAM(s) Oral three times a day  dextrose 5%. 1000 milliLiter(s) IV Continuous <Continuous>  dextrose 5%. 1000 milliLiter(s) IV Continuous <Continuous>  dextrose 50% Injectable 12.5 Gram(s) IV Push once  dextrose 50% Injectable 25 Gram(s) IV Push once  dextrose 50% Injectable 25 Gram(s) IV Push once  glucagon  Injectable 1 milliGRAM(s) IntraMuscular once  heparin   Injectable 5000 Unit(s) SubCutaneous every 12 hours  influenza  Vaccine (HIGH DOSE) 0.7 milliLiter(s) IntraMuscular once  insulin lispro (ADMELOG) corrective regimen sliding scale   SubCutaneous Before meals and at bedtime  levothyroxine 250 MICROGram(s) Oral daily  lisinopril 10 milliGRAM(s) Oral daily  NIFEdipine XL 60 milliGRAM(s) Oral daily  pancrelipase  (CREON 36,000 Lipase Units) 2 Capsule(s) Oral three times a day with meals  sertraline 200 milliGRAM(s) Oral daily  sodium chloride 0.9% lock flush 3 milliLiter(s) IV Push every 8 hours  torsemide 40 milliGRAM(s) Oral daily    =======================================================  02-09    137  |  96  |  34.7<H>  ----------------------------<  175<H>  4.6   |  25.0  |  4.24<H>    Ca    8.6      09 Feb 2024 02:33  Phos  4.6     02-09  Mg     2.1     02-09    TPro  6.4<L>  /  Alb  3.4  /  TBili  0.3<L>  /  DBili  x   /  AST  18  /  ALT  13  /  AlkPhos  87  02-09    Creatinine: 4.24 mg/dL (02-09-24 @ 02:33)  Creatinine: 5.52 mg/dL (02-08-24 @ 04:21)  Creatinine: 5.33 mg/dL (02-07-24 @ 16:10)    =======================================================      
HAYLEY LARA Patient is a 68y old  Female who presents with a chief complaint of Missed HD  Abdominal pain (09 Feb 2024 13:53)     HPI:  69 y/o female with hx of ESRD on HD via Left UE AVF M/W/F, HTN, CAD s/p PCI, PAD, HCV cirrhosis s/p Harvoni, IBS, Pancreatic insufficiency due to prior episodes of pancreatitis, Hypothyroidism, Depression, diet controlled DM-2. Patient presents from o/p GI clinic c/o abdominal pain. She states she was at [facility] last week c/o abdominal pain and was told she was constipated and prescribed Colace. She states she has had this pain before and was concerned it may be Pancreatitis. She went to see her GI doctor who advised she go to ED. She missed her usual HD session today as well. In the ED vitals stable, noted to have right sided reproducible abdomina pain w/o guarding or rebound. Lipase 45, CT abd/pelvis w/ IV contrast with colopathy of bowels, no other acute findings. Denies fever, chills, N/V, travel, sick contacts or changes in medications.  (08 Feb 2024 01:34)    The patient was seen and evaluated lying in bed states has been light headed and dizzy when up - no vertigo - no spinning of the room- no focal weakness  The patient is in no acute distress.  Denied any fever chest pain, palpitations, shortness of breath, abdominal pain, fever, dysuria, cough, edema       Height (cm): 165.1 (02-10 @ 00:54)  Weight (kg): 61.6 (02-10 @ 00:54)  BMI (kg/m2): 22.6 (02-10 @ 00:54)  BSA (m2): 1.68 (02-10 @ 00:54)I&O's Summary    Allergies    Augmentin (Hives)    Intolerances      HEALTH ISSUES - PROBLEM Dx:        PAST MEDICAL & SURGICAL HISTORY:  Diabetes      Bernard syndrome      Pancreatitis      Cirrhosis      ESRD on dialysis  MWF at [facility]      HTN (hypertension)      History of liver biopsy      H/O total thyroidectomy      History of cholecystectomy  1990s              Vital Signs stable Last 24 Hrs    Wt(kg): --    PHYSICAL EXAM:    GENERAL: NAD, sitting up conversing- swaya a little to the right when she sits otherwise Ok left eyes open slightly less than right  HEAD:  Atraumatic, Normocephalic  EYES: EOMI,conjunctiva and sclera clear  ENMT:  Moist mucous membranes,  No lesions  NECK: Supple, No JVD, Normal thyroid  NERVOUS SYSTEM:  Alert & Oriented X3,  Moves upper and lower extremities; CNS-II-XII  CHEST/LUNG: Clear to auscultation bilaterally; No rales, rhonchi, wheezing,   HEART: Regular rate and rhythm; No murmurs,   ABDOMEN: Soft, Nontender, Nondistended; Bowel sounds present  EXTREMITIES:  Peripheral Pulses, No  cyanosis, or edema  psychiatry- mood and affect appropriate, Insight and judgement intact     acetaminophen     Tablet .. 650 milliGRAM(s) Oral every 6 hours PRN  aluminum hydroxide/magnesium hydroxide/simethicone Suspension 30 milliLiter(s) Oral every 4 hours PRN  aspirin enteric coated 81 milliGRAM(s) Oral daily  calcium acetate 667 milliGRAM(s) Oral three times a day with meals  carvedilol 37.5 milliGRAM(s) Oral every 12 hours  cloNIDine 0.2 milliGRAM(s) Oral three times a day  dextrose 5%. 1000 milliLiter(s) IV Continuous <Continuous>  dextrose 5%. 1000 milliLiter(s) IV Continuous <Continuous>  dextrose 50% Injectable 12.5 Gram(s) IV Push once  dextrose 50% Injectable 25 Gram(s) IV Push once  dextrose 50% Injectable 25 Gram(s) IV Push once  dextrose Oral Gel 15 Gram(s) Oral once PRN  glucagon  Injectable 1 milliGRAM(s) IntraMuscular once  heparin   Injectable 5000 Unit(s) SubCutaneous every 12 hours  HYDROmorphone  Injectable 0.5 milliGRAM(s) IV Push every 4 hours PRN  HYDROmorphone  Injectable 1 milliGRAM(s) IV Push every 4 hours PRN  influenza  Vaccine (HIGH DOSE) 0.7 milliLiter(s) IntraMuscular once  insulin lispro (ADMELOG) corrective regimen sliding scale   SubCutaneous Before meals and at bedtime  levothyroxine 250 MICROGram(s) Oral daily  lisinopril 10 milliGRAM(s) Oral daily  melatonin 3 milliGRAM(s) Oral at bedtime PRN  NIFEdipine XL 60 milliGRAM(s) Oral daily  ondansetron Injectable 4 milliGRAM(s) IV Push every 8 hours PRN  pancrelipase  (CREON 36,000 Lipase Units) 2 Capsule(s) Oral three times a day with meals  sertraline 200 milliGRAM(s) Oral daily  sodium chloride 0.9% lock flush 3 milliLiter(s) IV Push every 8 hours  torsemide 40 milliGRAM(s) Oral daily      LABS:                          9.8    3.66  )-----------( 86       ( 09 Feb 2024 02:33 )             30.6     02-09    137  |  96  |  34.7<H>  ----------------------------<  175<H>  4.6   |  25.0  |  4.24<H>    Ca    8.6      09 Feb 2024 02:33  Phos  4.6     02-09  Mg     2.1     02-09    TPro  6.4<L>  /  Alb  3.4  /  TBili  0.3<L>  /  DBili  x   /  AST  18  /  ALT  13  /  AlkPhos  87  02-09    LIVER FUNCTIONS - ( 09 Feb 2024 02:33 )  Alb: 3.4 g/dL / Pro: 6.4 g/dL / ALK PHOS: 87 U/L / ALT: 13 U/L / AST: 18 U/L / GGT: x                 Urinalysis Basic - ( 09 Feb 2024 02:33 )    Color: x / Appearance: x / SG: x / pH: x  Gluc: 175 mg/dL / Ketone: x  / Bili: x / Urobili: x   Blood: x / Protein: x / Nitrite: x   Leuk Esterase: x / RBC: x / WBC x   Sq Epi: x / Non Sq Epi: x / Bacteria: x      CAPILLARY BLOOD GLUCOSE      POCT Blood Glucose.: 214 mg/dL (10 Feb 2024 08:58)  POCT Blood Glucose.: 124 mg/dL (10 Feb 2024 00:16)  POCT Blood Glucose.: 159 mg/dL (09 Feb 2024 21:34)  POCT Blood Glucose.: 270 mg/dL (09 Feb 2024 18:52)  POCT Blood Glucose.: 185 mg/dL (09 Feb 2024 16:36)  POCT Blood Glucose.: 163 mg/dL (09 Feb 2024 14:14)  POCT Blood Glucose.: 48 mg/dL (09 Feb 2024 12:39)  POCT Blood Glucose.: 49 mg/dL (09 Feb 2024 12:36)      RADIOLOGY & ADDITIONAL TESTS:      Consultant notes reviewed    Case discussed with consultant/provider/ family /patient

## 2024-02-10 NOTE — PROGRESS NOTE ADULT - ASSESSMENT
67 y/o female with abdominal pain, Hx of ESRD on HD, HTN, IBS, Pancreatic insufficiency, DM-2, HCV s/p Harvoni, cirrhosis, CAD s/p PCI    hypoglycemia protocol- changed to a lower scale for DM    Dizziness swaying to left Balance issues - discussed with neurology MRI to rule out Cerebellar CVA     Abdominal Pain: now resolved   -No acute findings on CT  -Liver/bowel congestion from missing HD/Cirrhosis   -LFTs normal, Lipase normal, no pancreatitis on CT  -No leucocytosis, left shift/fever  -serial abdominal exams- have been stable   -GI f/u as o/p  -OOB, ambulate with walker/fall risk  -DVT-P VC boots/ Sub Q heparin     ESRD on HD:  -No urgent need for HD at this time  -No reported issues with left UE AVF  -Cont. o/p regimen  -Nephrology consulted for Routine HD while admitted as patient had IV contrast  -Renal diet     HTN:  -resume o/p regimen    Pancreatic insufficiency:  -Cont. Creon with meals     DM-2:  -Diet controlled at home  -Coverage insulin with AC/HS accu checks     Cirrhosis:  -No evidence of decompensated cirrhosis   -Cont. o/p regimen  -F/U with Hepatology as o/p     CAD s/p PCI:  -No anginal symptoms  -Cont. Aspirin, BB  -Not on statin since 11/23?  -F/U with Cardiology as o/p     Discussed with ED staff, Patient   
Assessment & Plan=    ESRD/On gong HD requirements.    ESRD.   Access is LUE AVF.  Out patient HD on MWF,Following TTS schedule here  Seen on HD.  Qd, Qb, UF rate reviewed.  Vitals stable.  Access working well.  Patient tolerating HD well.      Hyperkalemia.  K in goal.  Continue to monitor.       Anemia of CKD.  Hb below goal  Continue out patient HD plan.    HT.  Controlled with current Plan.  continue current regimen.    MBD of CKD.  Pi is high.   Continue Ca acetate along with low Pi diets.     Abdominal Pain.  Manage as per PCP team.
The patient is a 68y Female with multiple issues now with worse dizziness and balance difficulty.    Dizziness and balance difficulty.  MRI brain showed SVID, no acute stroke  Suspect sensory ataxia due to diabetic neuropathy and COVID19  Suggest PT for ambulation and as outpatient    End stage renal disease   Continue hemodialysis per medicine/renal service.     There is no further inpatient neurologic workup suggested at this time.  We will be available for reconsultation as needed.    Thank you.   Usama Montelongo MD, PhD  [phone]

## 2024-02-10 NOTE — DISCHARGE NOTE PROVIDER - NSDCMRMEDTOKEN_GEN_ALL_CORE_FT
aspirin 81 mg oral delayed release capsule: orally once a day  carvedilol 12.5 mg oral tablet: 3 tab(s) orally 3 times a day  cloNIDine 0.2 mg oral tablet: 1 tab(s) orally 3 times a day  insulin lispro 100 units/mL injectable solution: 1 unit(s) subcutaneous 3 times a day 1 Unit(s) if Glucose 151 - 200  2 Unit(s) if Glucose 201 - 250  3 Unit(s) if Glucose 251 - 300  4 Unit(s) if Glucose 301 - 350  5 Unit(s) if Glucose 351 - 400  6 Unit(s) if Glucose Greater Than 400  levothyroxine 125 mcg (0.125 mg) oral capsule: 2 cap(s) orally once a day  lisinopril 10 mg oral tablet: 1 tab(s) orally once a day  Nifedical XL 60 mg oral tablet, extended release: 1 tab(s) orally once a day  pancrelipase 36,000 units-114,000 units-180,000 units oral delayed release capsule: 2 cap(s) orally 3 times a day  PhosLo Gelcap 667 mg oral capsule: 3 cap(s) orally 3 times a day  sertraline 200 mg oral capsule: 1 cap(s) orally once a day  torsemide 40 mg oral tablet: 1 tab(s) orally once a day

## 2024-02-10 NOTE — DISCHARGE NOTE PROVIDER - HOSPITAL COURSE
67 y/o female with abdominal pain, Hx of ESRD on HD, HTN, IBS, Pancreatic insufficiency, DM-2, HCV s/p Harvoni, cirrhosis, CAD s/p PCI who presented to the ED for abdominal pain. CT AP without acute changes. Possibly due to IBS. Nephro was consulted for resumption of HD while inpatient. Course was complicated by hypoglycemia. Endo was consulted and recommending low dose sliding scale on discharge. Pt states she has all supplies at home and able to administer insulin. Neurology was consulted for difficulty ambulating. MRI was done which was negative for acute changes. Symptoms likely exacerbated by hypoglycemia. Patient refusing PT evaluation and wants to go home. Pt no longer requires inpt hospitalization and is stable for discharge home w/ outpt follow up.

## 2024-02-10 NOTE — DISCHARGE NOTE NURSING/CASE MANAGEMENT/SOCIAL WORK - PATIENT PORTAL LINK FT
You can access the FollowMyHealth Patient Portal offered by [facility] by registering at the following website: [email or web address]. By joining [facility] FollowMyHealth portal, you will also be able to view your health information using other applications (apps) compatible with our system.

## 2024-02-12 NOTE — CDI QUERY NOTE - NSCDIOTHERTXTBX_GEN_ALL_CORE_HH
IBS is not an acceptable NW abbreviation for coding, please specify what IBS refers to  -	Irritable bowel syndrome (IBS)  -	Other, please specify      Discharge Note Provider [Charted Location: [facility] [id number]] [Authored: 10-Feb-2024 12:32]  Hospital Course	  69 y/o female with abdominal pain, Hx of ESRD on HD, HTN, IBS, Pancreatic insufficiency, DM-2, HCV s/p Harvoni, cirrhosis, CAD s/p PCI who presented to the ED for abdominal pain. CT AP without acute changes. Possibly due to IBS.

## 2024-02-19 LAB
THYROGLOB SERPL-MCNC: 0.2 NG/ML — SIGNIFICANT CHANGE UP
THYROGLOB SERPL-MCNC: <1 IU/ML — SIGNIFICANT CHANGE UP
THYROGLOB SERPL-MCNC: SIGNIFICANT CHANGE UP NG/ML

## 2024-07-31 PROBLEM — N18.6 END STAGE RENAL DISEASE: Chronic | Status: ACTIVE | Noted: 2024-02-07

## 2024-07-31 PROBLEM — I10 ESSENTIAL (PRIMARY) HYPERTENSION: Chronic | Status: ACTIVE | Noted: 2024-02-07

## 2024-08-02 ENCOUNTER — TRANSCRIPTION ENCOUNTER (OUTPATIENT)
Age: 69
End: 2024-08-02

## 2024-08-03 ENCOUNTER — TRANSCRIPTION ENCOUNTER (OUTPATIENT)
Age: 69
End: 2024-08-03

## 2024-10-08 ENCOUNTER — APPOINTMENT (OUTPATIENT)
Dept: ENDOCRINOLOGY | Facility: CLINIC | Age: 69
End: 2024-10-08

## 2024-10-08 ENCOUNTER — APPOINTMENT (OUTPATIENT)
Dept: ENDOCRINOLOGY | Facility: CLINIC | Age: 69
End: 2024-10-08
Payer: COMMERCIAL

## 2024-10-08 VITALS
SYSTOLIC BLOOD PRESSURE: 136 MMHG | WEIGHT: 138 LBS | BODY MASS INDEX: 22.99 KG/M2 | HEIGHT: 65 IN | OXYGEN SATURATION: 99 % | HEART RATE: 78 BPM | DIASTOLIC BLOOD PRESSURE: 62 MMHG

## 2024-10-08 DIAGNOSIS — E89.0 POSTPROCEDURAL HYPOTHYROIDISM: ICD-10-CM

## 2024-10-08 DIAGNOSIS — E11.69 TYPE 2 DIABETES MELLITUS WITH OTHER SPECIFIED COMPLICATION: ICD-10-CM

## 2024-10-08 DIAGNOSIS — R79.89 OTHER SPECIFIED ABNORMAL FINDINGS OF BLOOD CHEMISTRY: ICD-10-CM

## 2024-10-08 DIAGNOSIS — C73 MALIGNANT NEOPLASM OF THYROID GLAND: ICD-10-CM

## 2024-10-08 DIAGNOSIS — K86.9 TYPE 2 DIABETES MELLITUS WITH OTHER SPECIFIED COMPLICATION: ICD-10-CM

## 2024-10-08 DIAGNOSIS — E78.2 MIXED HYPERLIPIDEMIA: ICD-10-CM

## 2024-10-08 LAB — GLUCOSE BLDC GLUCOMTR-MCNC: 167

## 2024-10-08 PROCEDURE — 99214 OFFICE O/P EST MOD 30 MIN: CPT

## 2024-10-08 PROCEDURE — 82962 GLUCOSE BLOOD TEST: CPT

## 2024-10-08 RX ORDER — NIFEDIPINE 60 MG
60 TABLET, EXTENDED RELEASE ORAL
Refills: 0 | Status: ACTIVE | COMMUNITY

## 2024-10-08 RX ORDER — SERTRALINE HYDROCHLORIDE 200 MG/1
200 CAPSULE ORAL
Refills: 0 | Status: ACTIVE | COMMUNITY

## 2024-10-08 RX ORDER — LEVOTHYROXINE SODIUM 0.05 MG/1
50 TABLET ORAL
Refills: 0 | Status: ACTIVE | COMMUNITY

## 2024-10-08 RX ORDER — LISINOPRIL 10 MG/1
10 TABLET ORAL
Refills: 0 | Status: ACTIVE | COMMUNITY

## 2024-10-08 RX ORDER — CARVEDILOL 3.12 MG/1
3.12 TABLET, FILM COATED ORAL
Refills: 0 | Status: ACTIVE | COMMUNITY

## 2024-10-08 RX ORDER — LEVOTHYROXINE SODIUM 200 UG/1
200 CAPSULE ORAL
Refills: 0 | Status: ACTIVE | COMMUNITY

## 2024-11-19 ENCOUNTER — NON-APPOINTMENT (OUTPATIENT)
Age: 69
End: 2024-11-19

## 2024-11-19 DIAGNOSIS — I10 ESSENTIAL (PRIMARY) HYPERTENSION: ICD-10-CM

## 2024-11-19 DIAGNOSIS — R60.9 EDEMA, UNSPECIFIED: ICD-10-CM

## 2024-11-19 RX ORDER — LIDOCAINE AND PRILOCAINE 25; 25 MG/G; MG/G
2.5-2.5 CREAM TOPICAL
Refills: 0 | Status: ACTIVE | COMMUNITY

## 2024-11-19 RX ORDER — METOLAZONE 5 MG/1
5 TABLET ORAL TWICE DAILY
Refills: 0 | Status: ACTIVE | COMMUNITY

## 2024-11-19 RX ORDER — ATORVASTATIN CALCIUM 10 MG/1
10 TABLET, FILM COATED ORAL DAILY
Refills: 0 | Status: ACTIVE | COMMUNITY

## 2024-11-19 RX ORDER — TORSEMIDE 20 MG/1
20 TABLET ORAL
Refills: 0 | Status: ACTIVE | COMMUNITY

## 2024-11-19 RX ORDER — RANITIDINE 150 MG/1
150 TABLET, FILM COATED ORAL
Refills: 0 | Status: ACTIVE | COMMUNITY

## 2024-11-19 RX ORDER — POTASSIUM CHLORIDE 1.5 G/1.58G
20 POWDER, FOR SOLUTION ORAL 3 TIMES DAILY
Refills: 0 | Status: ACTIVE | COMMUNITY

## 2025-01-13 LAB
HBA1C MFR BLD HPLC: 7.9
LDLC SERPL DIRECT ASSAY-MCNC: 128
TSH SERPL-ACNC: 0.19

## 2025-01-14 ENCOUNTER — APPOINTMENT (OUTPATIENT)
Dept: ENDOCRINOLOGY | Facility: CLINIC | Age: 70
End: 2025-01-14

## 2025-05-05 NOTE — ED ADULT TRIAGE NOTE - BANDS:
Treatment Goal Explanation (Does Not Render In The Note): Stable for the purposes of categorizing medical decision making is defined by the specific treatment goals for an individual patient. A patient that is not at their treatment goal is not stable, even if the condition has not changed and there is no short- term threat to life or function.
Allergy;

## 2025-05-20 ENCOUNTER — APPOINTMENT (OUTPATIENT)
Dept: ENDOCRINOLOGY | Facility: CLINIC | Age: 70
End: 2025-05-20

## 2025-05-21 ENCOUNTER — INPATIENT (INPATIENT)
Facility: HOSPITAL | Age: 70
LOS: 1 days | Discharge: HOME | DRG: 392 | End: 2025-05-23
Attending: INTERNAL MEDICINE | Admitting: HOSPITALIST
Payer: COMMERCIAL

## 2025-05-21 VITALS
OXYGEN SATURATION: 99 % | HEART RATE: 79 BPM | DIASTOLIC BLOOD PRESSURE: 71 MMHG | TEMPERATURE: 98 F | RESPIRATION RATE: 16 BRPM | WEIGHT: 143.08 LBS | SYSTOLIC BLOOD PRESSURE: 190 MMHG | HEIGHT: 65 IN

## 2025-05-21 DIAGNOSIS — R10.9 UNSPECIFIED ABDOMINAL PAIN: ICD-10-CM

## 2025-05-21 DIAGNOSIS — Z98.890 OTHER SPECIFIED POSTPROCEDURAL STATES: Chronic | ICD-10-CM

## 2025-05-21 DIAGNOSIS — Z90.49 ACQUIRED ABSENCE OF OTHER SPECIFIED PARTS OF DIGESTIVE TRACT: Chronic | ICD-10-CM

## 2025-05-21 DIAGNOSIS — E89.0 POSTPROCEDURAL HYPOTHYROIDISM: Chronic | ICD-10-CM

## 2025-05-21 LAB
ALBUMIN SERPL ELPH-MCNC: 4 G/DL — SIGNIFICANT CHANGE UP (ref 3.3–5.2)
ALP SERPL-CCNC: 96 U/L — SIGNIFICANT CHANGE UP (ref 40–120)
ALT FLD-CCNC: 11 U/L — SIGNIFICANT CHANGE UP
ANION GAP SERPL CALC-SCNC: 18 MMOL/L — HIGH (ref 5–17)
ANISOCYTOSIS BLD QL: SLIGHT — SIGNIFICANT CHANGE UP
APPEARANCE UR: ABNORMAL
AST SERPL-CCNC: 18 U/L — SIGNIFICANT CHANGE UP
BACTERIA # UR AUTO: ABNORMAL /HPF
BASOPHILS # BLD AUTO: 0.03 K/UL — SIGNIFICANT CHANGE UP (ref 0–0.2)
BASOPHILS # BLD MANUAL: 0.07 K/UL — SIGNIFICANT CHANGE UP (ref 0–0.2)
BASOPHILS NFR BLD AUTO: 0.8 % — SIGNIFICANT CHANGE UP (ref 0–2)
BASOPHILS NFR BLD MANUAL: 1.7 % — SIGNIFICANT CHANGE UP (ref 0–2)
BILIRUB DIRECT SERPL-MCNC: 0.1 MG/DL — SIGNIFICANT CHANGE UP (ref 0–0.3)
BILIRUB INDIRECT FLD-MCNC: 0.3 MG/DL — SIGNIFICANT CHANGE UP (ref 0.2–1)
BILIRUB SERPL-MCNC: 0.4 MG/DL — SIGNIFICANT CHANGE UP (ref 0.4–2)
BILIRUB UR-MCNC: NEGATIVE — SIGNIFICANT CHANGE UP
BLD GP AB SCN SERPL QL: SIGNIFICANT CHANGE UP
BUN SERPL-MCNC: 49.3 MG/DL — HIGH (ref 8–20)
CALCIUM SERPL-MCNC: 8.5 MG/DL — SIGNIFICANT CHANGE UP (ref 8.4–10.5)
CAST: 0 /LPF — SIGNIFICANT CHANGE UP (ref 0–4)
CHLORIDE SERPL-SCNC: 105 MMOL/L — SIGNIFICANT CHANGE UP (ref 96–108)
CO2 SERPL-SCNC: 18 MMOL/L — LOW (ref 22–29)
COLOR SPEC: YELLOW — SIGNIFICANT CHANGE UP
CREAT SERPL-MCNC: 6.16 MG/DL — HIGH (ref 0.5–1.3)
DIFF PNL FLD: ABNORMAL
EGFR: 7 ML/MIN/1.73M2 — LOW
EGFR: 7 ML/MIN/1.73M2 — LOW
EOSINOPHIL # BLD AUTO: 0.19 K/UL — SIGNIFICANT CHANGE UP (ref 0–0.5)
EOSINOPHIL # BLD MANUAL: 0.33 K/UL — SIGNIFICANT CHANGE UP (ref 0–0.5)
EOSINOPHIL NFR BLD AUTO: 4.9 % — SIGNIFICANT CHANGE UP (ref 0–6)
EOSINOPHIL NFR BLD MANUAL: 8.6 % — HIGH (ref 0–6)
GLUCOSE SERPL-MCNC: 128 MG/DL — HIGH (ref 70–99)
GLUCOSE UR QL: 250 MG/DL
HCT VFR BLD CALC: 26.3 % — LOW (ref 34.5–45)
HGB BLD-MCNC: 8.3 G/DL — LOW (ref 11.5–15.5)
HYPOCHROMIA BLD QL: SLIGHT — SIGNIFICANT CHANGE UP
IMM GRANULOCYTES # BLD AUTO: 0.01 K/UL — SIGNIFICANT CHANGE UP (ref 0–0.07)
IMM GRANULOCYTES NFR BLD AUTO: 0.3 % — SIGNIFICANT CHANGE UP (ref 0–0.9)
IMMATURE PLATELET FRACTION #: 1.8 K/UL — LOW (ref 4.7–11.1)
IMMATURE PLATELET FRACTION %: 2.5 % — SIGNIFICANT CHANGE UP (ref 1.6–4.9)
INR BLD: 1.1 RATIO — SIGNIFICANT CHANGE UP (ref 0.85–1.16)
KETONES UR QL: NEGATIVE MG/DL — SIGNIFICANT CHANGE UP
LEUKOCYTE ESTERASE UR-ACNC: NEGATIVE — SIGNIFICANT CHANGE UP
LIDOCAIN IGE QN: 48 U/L — SIGNIFICANT CHANGE UP (ref 22–51)
LYMPHOCYTES # BLD AUTO: 0.79 K/UL — LOW (ref 1–3.3)
LYMPHOCYTES # BLD MANUAL: 0.7 K/UL — LOW (ref 1–3.3)
LYMPHOCYTES NFR BLD AUTO: 20.5 % — SIGNIFICANT CHANGE UP (ref 13–44)
LYMPHOCYTES NFR BLD MANUAL: 18.1 % — SIGNIFICANT CHANGE UP (ref 13–44)
MAGNESIUM SERPL-MCNC: 2.3 MG/DL — SIGNIFICANT CHANGE UP (ref 1.6–2.6)
MCHC RBC-ENTMCNC: 30.1 PG — SIGNIFICANT CHANGE UP (ref 27–34)
MCHC RBC-ENTMCNC: 31.6 G/DL — LOW (ref 32–36)
MCV RBC AUTO: 95.3 FL — SIGNIFICANT CHANGE UP (ref 80–100)
MONOCYTES # BLD AUTO: 0.34 K/UL — SIGNIFICANT CHANGE UP (ref 0–0.9)
MONOCYTES # BLD MANUAL: 0.3 K/UL — SIGNIFICANT CHANGE UP (ref 0–0.9)
MONOCYTES NFR BLD AUTO: 8.8 % — SIGNIFICANT CHANGE UP (ref 2–14)
MONOCYTES NFR BLD MANUAL: 7.8 % — SIGNIFICANT CHANGE UP (ref 2–14)
NEUTROPHILS # BLD AUTO: 2.5 K/UL — SIGNIFICANT CHANGE UP (ref 1.8–7.4)
NEUTROPHILS # BLD MANUAL: 2.46 K/UL — SIGNIFICANT CHANGE UP (ref 1.8–7.4)
NEUTROPHILS NFR BLD AUTO: 64.7 % — SIGNIFICANT CHANGE UP (ref 43–77)
NEUTROPHILS NFR BLD MANUAL: 63.8 % — SIGNIFICANT CHANGE UP (ref 43–77)
NITRITE UR-MCNC: NEGATIVE — SIGNIFICANT CHANGE UP
NRBC # BLD AUTO: 0 K/UL — SIGNIFICANT CHANGE UP (ref 0–0)
NRBC # FLD: 0 K/UL — SIGNIFICANT CHANGE UP (ref 0–0)
NRBC BLD AUTO-RTO: 0 /100 WBCS — SIGNIFICANT CHANGE UP (ref 0–0)
PH UR: 8.5 (ref 5–8)
PHOSPHATE SERPL-MCNC: 5.8 MG/DL — HIGH (ref 2.4–4.7)
PLAT MORPH BLD: NORMAL — SIGNIFICANT CHANGE UP
PLATELET # BLD AUTO: 73 K/UL — LOW (ref 150–400)
PMV BLD: 12.1 FL — SIGNIFICANT CHANGE UP (ref 7–13)
POIKILOCYTOSIS BLD QL AUTO: SLIGHT — SIGNIFICANT CHANGE UP
POLYCHROMASIA BLD QL SMEAR: SLIGHT — SIGNIFICANT CHANGE UP
POTASSIUM SERPL-MCNC: 5.2 MMOL/L — SIGNIFICANT CHANGE UP (ref 3.5–5.3)
POTASSIUM SERPL-SCNC: 5.2 MMOL/L — SIGNIFICANT CHANGE UP (ref 3.5–5.3)
PROT SERPL-MCNC: 6.9 G/DL — SIGNIFICANT CHANGE UP (ref 6.6–8.7)
PROT UR-MCNC: 300 MG/DL
PROTHROM AB SERPL-ACNC: 12.8 SEC — SIGNIFICANT CHANGE UP (ref 9.9–13.4)
RBC # BLD: 2.76 M/UL — LOW (ref 3.8–5.2)
RBC # FLD: 15.4 % — HIGH (ref 10.3–14.5)
RBC BLD AUTO: ABNORMAL
RBC CASTS # UR COMP ASSIST: 4 /HPF — SIGNIFICANT CHANGE UP (ref 0–4)
SCHISTOCYTES BLD QL AUTO: SLIGHT — SIGNIFICANT CHANGE UP
SODIUM SERPL-SCNC: 141 MMOL/L — SIGNIFICANT CHANGE UP (ref 135–145)
SP GR SPEC: 1.01 — SIGNIFICANT CHANGE UP (ref 1–1.03)
SQUAMOUS # UR AUTO: 6 /HPF — HIGH (ref 0–5)
UROBILINOGEN FLD QL: 0.2 MG/DL — SIGNIFICANT CHANGE UP (ref 0.2–1)
WBC # BLD: 3.86 K/UL — SIGNIFICANT CHANGE UP (ref 3.8–10.5)
WBC # FLD AUTO: 3.86 K/UL — SIGNIFICANT CHANGE UP (ref 3.8–10.5)
WBC UR QL: 1 /HPF — SIGNIFICANT CHANGE UP (ref 0–5)

## 2025-05-21 PROCEDURE — 99223 1ST HOSP IP/OBS HIGH 75: CPT

## 2025-05-21 PROCEDURE — 74176 CT ABD & PELVIS W/O CONTRAST: CPT | Mod: 26

## 2025-05-21 PROCEDURE — 99285 EMERGENCY DEPT VISIT HI MDM: CPT

## 2025-05-21 RX ORDER — HYDROMORPHONE/SOD CHLOR,ISO/PF 2 MG/10 ML
1 SYRINGE (ML) INJECTION ONCE
Refills: 0 | Status: DISCONTINUED | OUTPATIENT
Start: 2025-05-21 | End: 2025-05-21

## 2025-05-21 RX ORDER — HEPARIN SODIUM 1000 [USP'U]/ML
5000 INJECTION INTRAVENOUS; SUBCUTANEOUS EVERY 12 HOURS
Refills: 0 | Status: DISCONTINUED | OUTPATIENT
Start: 2025-05-21 | End: 2025-05-23

## 2025-05-21 RX ORDER — DEXTROSE 50 % IN WATER 50 %
12.5 SYRINGE (ML) INTRAVENOUS ONCE
Refills: 0 | Status: DISCONTINUED | OUTPATIENT
Start: 2025-05-21 | End: 2025-05-23

## 2025-05-21 RX ORDER — ACETAMINOPHEN 500 MG/5ML
650 LIQUID (ML) ORAL EVERY 6 HOURS
Refills: 0 | Status: DISCONTINUED | OUTPATIENT
Start: 2025-05-21 | End: 2025-05-23

## 2025-05-21 RX ORDER — MAGNESIUM, ALUMINUM HYDROXIDE 200-200 MG
30 TABLET,CHEWABLE ORAL EVERY 4 HOURS
Refills: 0 | Status: DISCONTINUED | OUTPATIENT
Start: 2025-05-21 | End: 2025-05-23

## 2025-05-21 RX ORDER — ONDANSETRON HCL/PF 4 MG/2 ML
4 VIAL (ML) INJECTION EVERY 8 HOURS
Refills: 0 | Status: DISCONTINUED | OUTPATIENT
Start: 2025-05-21 | End: 2025-05-23

## 2025-05-21 RX ORDER — MELATONIN 5 MG
3 TABLET ORAL AT BEDTIME
Refills: 0 | Status: DISCONTINUED | OUTPATIENT
Start: 2025-05-21 | End: 2025-05-23

## 2025-05-21 RX ORDER — DEXTROSE 50 % IN WATER 50 %
25 SYRINGE (ML) INTRAVENOUS ONCE
Refills: 0 | Status: DISCONTINUED | OUTPATIENT
Start: 2025-05-21 | End: 2025-05-23

## 2025-05-21 RX ORDER — INSULIN LISPRO 100 U/ML
INJECTION, SOLUTION INTRAVENOUS; SUBCUTANEOUS
Refills: 0 | Status: DISCONTINUED | OUTPATIENT
Start: 2025-05-21 | End: 2025-05-23

## 2025-05-21 RX ORDER — HYDROMORPHONE/SOD CHLOR,ISO/PF 2 MG/10 ML
1 SYRINGE (ML) INJECTION EVERY 4 HOURS
Refills: 0 | Status: DISCONTINUED | OUTPATIENT
Start: 2025-05-21 | End: 2025-05-23

## 2025-05-21 RX ORDER — GLUCAGON 3 MG/1
1 POWDER NASAL ONCE
Refills: 0 | Status: DISCONTINUED | OUTPATIENT
Start: 2025-05-21 | End: 2025-05-23

## 2025-05-21 RX ORDER — SODIUM CHLORIDE 9 G/1000ML
1000 INJECTION, SOLUTION INTRAVENOUS
Refills: 0 | Status: DISCONTINUED | OUTPATIENT
Start: 2025-05-21 | End: 2025-05-23

## 2025-05-21 RX ORDER — DEXTROSE 50 % IN WATER 50 %
15 SYRINGE (ML) INTRAVENOUS ONCE
Refills: 0 | Status: DISCONTINUED | OUTPATIENT
Start: 2025-05-21 | End: 2025-05-23

## 2025-05-21 RX ORDER — HYDROMORPHONE/SOD CHLOR,ISO/PF 2 MG/10 ML
0.5 SYRINGE (ML) INJECTION EVERY 4 HOURS
Refills: 0 | Status: DISCONTINUED | OUTPATIENT
Start: 2025-05-21 | End: 2025-05-23

## 2025-05-21 RX ORDER — ONDANSETRON HCL/PF 4 MG/2 ML
4 VIAL (ML) INJECTION ONCE
Refills: 0 | Status: COMPLETED | OUTPATIENT
Start: 2025-05-21 | End: 2025-05-21

## 2025-05-21 RX ADMIN — Medication 4 MILLIGRAM(S): at 17:49

## 2025-05-21 RX ADMIN — Medication 1 MILLIGRAM(S): at 21:40

## 2025-05-21 RX ADMIN — Medication 1 MILLIGRAM(S): at 17:49

## 2025-05-21 NOTE — H&P ADULT - NSICDXPASTMEDICALHX_GEN_ALL_CORE_FT
PAST MEDICAL HISTORY:  Bernard syndrome     Cirrhosis     Diabetes     ESRD on dialysis MWF at [facility]    HTN (hypertension)     Pancreatitis

## 2025-05-21 NOTE — H&P ADULT - HISTORY OF PRESENT ILLNESS
70yo F with PMHx of ESRD on HD MWF,  HTN, chronic pancreatitis, diabetes, cirrhosis presented to ED c/o right sided abdominal pain.  Patient reports that she has intermittent right-sided abdominal pain for which she is on opioid and currently follows with GI for possible nerve block as per patient. Pt states that due to her abdominal pain she was unable to attend her scheduled dialysis session today. Denies fever, chills, nausea, vomiting, diarrhea, dysuria, hematuria.   The evaluation and management of this patient commenced prior to midnight.    68yo F with PMHx of ESRD on HD MWF,  HTN, chronic pancreatitis, diabetes, cirrhosis presented to ED c/o right sided abdominal pain.  Patient reports that she has intermittent right-sided abdominal pain for which she is on opioid and currently follows with GI for possible nerve block as per patient. Pt states that due to her abdominal pain she was unable to attend her scheduled dialysis session today. Denies fever, chills, nausea, vomiting, diarrhea, dysuria, hematuria.

## 2025-05-21 NOTE — H&P ADULT - NSHPPHYSICALEXAM_GEN_ALL_CORE
T(C): 36.4 (05-22-25 @ 03:33), Max: 36.8 (05-22-25 @ 01:51)  HR: 80 (05-22-25 @ 03:33) (74 - 80)  BP: 207/79 (05-22-25 @ 03:33) (190/71 - 210/81)  RR: 18 (05-22-25 @ 03:33) (16 - 18)  SpO2: 97% (05-22-25 @ 03:33) (89% - 99%)    GENERAL: patient appears well, no acute distress, appropriate, pleasant  EYES: sclera clear, no exudates  ENMT: oropharynx clear without erythema, no exudates, moist mucous membranes  NECK: supple, soft, no thyromegaly noted  LUNGS: good air entry bilaterally, clear to auscultation, symmetric breath sounds, no wheezing or rhonchi appreciated  HEART: soft S1/S2, regular rate and rhythm, no murmurs noted, no lower extremity edema  GASTROINTESTINAL: abdomen is soft, nontender, nondistended, normoactive bowel sounds, no palpable masses  INTEGUMENT: good skin turgor, warm skin, appears well perfused  MUSCULOSKELETAL: no clubbing or cyanosis, no obvious deformity  NEUROLOGIC: awake, alert, oriented x3, good muscle tone in 4 extremities, no obvious sensory deficits  PSYCHIATRIC: mood is good, affect is congruent, linear and logical thought process  HEME/LYMPH: no palpable supraclavicular nodules, no obvious ecchymosis or petechiae T(C): 36.4 (05-22-25 @ 03:33), Max: 36.8 (05-22-25 @ 01:51)  HR: 80 (05-22-25 @ 03:33) (74 - 80)  BP: 207/79 (05-22-25 @ 03:33) (190/71 - 210/81)  RR: 18 (05-22-25 @ 03:33) (16 - 18)  SpO2: 97% (05-22-25 @ 03:33) (89% - 99%)    GENERAL: patient appears well, no acute distress, appropriate, pleasant  EYES: sclera clear, no exudates  ENMT: oropharynx clear without erythema, no exudates, moist mucous membranes  NECK: supple, soft, no thyromegaly noted  LUNGS: good air entry bilaterally, clear to auscultation, symmetric breath sounds, no wheezing or rhonchi appreciated  HEART: soft S1/S2, regular rate and rhythm, no murmurs noted, no lower extremity edema  GASTROINTESTINAL: abdomen is soft, RUQ tenderness, nondistended, normoactive bowel sounds, no palpable masses  INTEGUMENT: good skin turgor, warm skin, appears well perfused  MUSCULOSKELETAL: no clubbing or cyanosis, no obvious deformity  NEUROLOGIC: awake, alert, oriented x3, good muscle tone in 4 extremities, no obvious sensory deficits  PSYCHIATRIC: mood is good, affect is congruent, linear and logical thought process  HEME/LYMPH: no palpable supraclavicular nodules, no obvious ecchymosis or petechiae

## 2025-05-21 NOTE — H&P ADULT - ASSESSMENT
70yo F with PMHx of ESRD on HD MWF,  HTN, chronic pancreatitis, diabetes, cirrhosis presented to ED c/o right sided abdominal pain.    ESRD missed HD  Hypertensive urgency    -Pt missed dialysis today due to severe abdominal pain   -No acute volume overload on exam, electrolytes wnl   -elevated BP given IV hydralazine 10mg stat  -resume home Lisinopril, coreg   -HD in the am   -cont [facility] Nephrology consulted     Chronic abdominal pain  Pancreatic insufficieny     -2/2 gastroparesis   -CT a/p without acute findings   -Cont with Dilaudid prn  -Pt reports that she has pain management but refused to fill her pain meds and recommended hospice for which pt declined and want to try nerve block first.   -cont Creon, Carafate     DM   -ISS, hypoglycemic protocol   -check A1c     Hypothyroidism   -cont synthroid     Depression/anxiety   -cont zoloft     DVT PPx  -Heparin subQ   Dispo: active, anticipated dc 1-2 days if medically stable        Time-based billing (NON-critical care).   78 minutes spent on total encounter. The necessity of the time spent during the encounter on this date of service was due to:   chart review, patient evaluation, independent history taking, documentation, coordination of care and discussion with patient, and nurse.

## 2025-05-21 NOTE — ED PROVIDER NOTE - NSICDXPASTMEDICALHX_GEN_ALL_CORE_FT
PAST MEDICAL HISTORY:  Bernard syndrome     Cirrhosis     Diabetes     ESRD on dialysis MWF at [facility]    HTN (hypertension)     Pancreatitis

## 2025-05-21 NOTE — ED ADULT NURSE NOTE - CHIEF COMPLAINT
----- Message from Rosy Kaplan PA-C sent at 5/24/2021  3:46 PM CDT -----  Folate is low. Would recommend supplement OTC - 1 mg daily. His US showed many gallstones. Needs to schedule appt with surgery. I also need him to do that additional lab test we discussed last week to further evaluate his elevated bilirubin. Order have been placed.   The patient is a 69y Female complaining of abdominal pain.

## 2025-05-21 NOTE — ED PROVIDER NOTE - CLINICAL SUMMARY MEDICAL DECISION MAKING FREE TEXT BOX
69-year-old female with history of end-stage renal disease on HD Monday Wednesday Friday, HTN, chronic pancreatitis, diabetes, cirrhosis, prior cholecystectomy presenting with right-sided abdominal pain.    Patient nontoxic-appearing, HDS, exam with right-sided tenderness on palpation.    CT abdomen pelvis without acute findings.    Potassium within normal limits however patient noted to have acidosis likely from missing hemodialysis today.  Patient reports that she follows with Dr. Marr from [place]. Will admit

## 2025-05-21 NOTE — ED ADULT NURSE NOTE - ED STAT RN HANDOFF DETAILS
Patient AAOx4, report given to receiving RN, IV # 20G to RAC patent, clean and dry, patient ambulates with cane/walker. Telebox in place,  airway patent respirations unlabored on 2L of o2 via n/c, plan of care ongoing.

## 2025-05-21 NOTE — ED ADULT NURSE NOTE - OBJECTIVE STATEMENT
A&Ox4, RR even and unlabored on RA. Skin is warm and dry but jaundiced. PT C/O right sided abd pain. Mass palpated to RUQ that patient states has been there for months but has not received a diagnosis for. PT reports for the last week +N/V/D and an 8lbs weight loss. PT has hx of cirrhosis secondary due Hep C and kidney failure. ON dialysis, last treatment was Saturday. Due today for treatment.

## 2025-05-21 NOTE — ED PROVIDER NOTE - OBJECTIVE STATEMENT
69-year-old female with history of end-stage renal disease on HD Monday Wednesday Friday, HTN, chronic pancreatitis, diabetes, cirrhosis, prior cholecystectomy presenting with right-sided abdominal pain.  Patient reports that she has intermittent right-sided abdominal pain and she currently follows with GI for it reports that GI was going to do some sort of nerve block for her pain or release was considering it in the future.  Patient endorses pain not associated with nausea, vomiting, diarrhea, fevers, chills, dysuria, hematuria.  Denies pain is worse with eating.

## 2025-05-21 NOTE — ED PROVIDER NOTE - PHYSICAL EXAMINATION
Gen: no acute distress  Head: normocephalic  EENT: EOMI  Lung: no increased work of breathing  CV: <2s cap refill  Abd: soft, moderately distended; +TTP RLQ; no CVA ttp  MSK: no visible deformities, full range of motion in all 4 extremities  Neuro: A&Ox4; No focal neurologic deficits

## 2025-05-21 NOTE — ED ADULT NURSE NOTE - NSFALLRISKINTERV_ED_ALL_ED
Assistance OOB with selected safe patient handling equipment if applicable/Assistance with ambulation/Communicate fall risk and risk factors to all staff, patient, and family/Monitor gait and stability/Provide visual cue: yellow wristband, yellow gown, etc/Reinforce activity limits and safety measures with patient and family/Call bell, personal items and telephone in reach/Instruct patient to call for assistance before getting out of bed/chair/stretcher/Non-slip footwear applied when patient is off stretcher/[place] to call system/Physically safe environment - no spills, clutter or unnecessary equipment/Purposeful Proactive Rounding/Room/bathroom lighting operational, light cord in reach

## 2025-05-22 LAB
A1C WITH ESTIMATED AVERAGE GLUCOSE RESULT: 7.3 % — HIGH (ref 4–5.6)
ANION GAP SERPL CALC-SCNC: 17 MMOL/L — SIGNIFICANT CHANGE UP (ref 5–17)
BUN SERPL-MCNC: 52.2 MG/DL — HIGH (ref 8–20)
CALCIUM SERPL-MCNC: 8.4 MG/DL — SIGNIFICANT CHANGE UP (ref 8.4–10.5)
CHLORIDE SERPL-SCNC: 106 MMOL/L — SIGNIFICANT CHANGE UP (ref 96–108)
CO2 SERPL-SCNC: 18 MMOL/L — LOW (ref 22–29)
CREAT SERPL-MCNC: 6.72 MG/DL — HIGH (ref 0.5–1.3)
EGFR: 6 ML/MIN/1.73M2 — LOW
EGFR: 6 ML/MIN/1.73M2 — LOW
ESTIMATED AVERAGE GLUCOSE: 163 MG/DL — HIGH (ref 68–114)
GLUCOSE BLDC GLUCOMTR-MCNC: 130 MG/DL — HIGH (ref 70–99)
GLUCOSE BLDC GLUCOMTR-MCNC: 135 MG/DL — HIGH (ref 70–99)
GLUCOSE BLDC GLUCOMTR-MCNC: 143 MG/DL — HIGH (ref 70–99)
GLUCOSE SERPL-MCNC: 126 MG/DL — HIGH (ref 70–99)
HCT VFR BLD CALC: 25.7 % — LOW (ref 34.5–45)
HGB BLD-MCNC: 8 G/DL — LOW (ref 11.5–15.5)
IMMATURE PLATELET FRACTION #: 2.3 K/UL — LOW (ref 4.7–11.1)
IMMATURE PLATELET FRACTION %: 2.8 % — SIGNIFICANT CHANGE UP (ref 1.6–4.9)
MCHC RBC-ENTMCNC: 29.9 PG — SIGNIFICANT CHANGE UP (ref 27–34)
MCHC RBC-ENTMCNC: 31.1 G/DL — LOW (ref 32–36)
MCV RBC AUTO: 95.9 FL — SIGNIFICANT CHANGE UP (ref 80–100)
NRBC # BLD AUTO: 0 K/UL — SIGNIFICANT CHANGE UP (ref 0–0)
NRBC # FLD: 0 K/UL — SIGNIFICANT CHANGE UP (ref 0–0)
NRBC BLD AUTO-RTO: 0 /100 WBCS — SIGNIFICANT CHANGE UP (ref 0–0)
PLATELET # BLD AUTO: 83 K/UL — LOW (ref 150–400)
PMV BLD: 11.4 FL — SIGNIFICANT CHANGE UP (ref 7–13)
POTASSIUM SERPL-MCNC: 5 MMOL/L — SIGNIFICANT CHANGE UP (ref 3.5–5.3)
POTASSIUM SERPL-SCNC: 5 MMOL/L — SIGNIFICANT CHANGE UP (ref 3.5–5.3)
RBC # BLD: 2.68 M/UL — LOW (ref 3.8–5.2)
RBC # FLD: 15.8 % — HIGH (ref 10.3–14.5)
SODIUM SERPL-SCNC: 140 MMOL/L — SIGNIFICANT CHANGE UP (ref 135–145)
WBC # BLD: 5.72 K/UL — SIGNIFICANT CHANGE UP (ref 3.8–10.5)
WBC # FLD AUTO: 5.72 K/UL — SIGNIFICANT CHANGE UP (ref 3.8–10.5)

## 2025-05-22 PROCEDURE — 99222 1ST HOSP IP/OBS MODERATE 55: CPT

## 2025-05-22 PROCEDURE — 99233 SBSQ HOSP IP/OBS HIGH 50: CPT | Mod: GC

## 2025-05-22 RX ORDER — LEVOTHYROXINE SODIUM 300 MCG
200 TABLET ORAL DAILY
Refills: 0 | Status: DISCONTINUED | OUTPATIENT
Start: 2025-05-22 | End: 2025-05-23

## 2025-05-22 RX ORDER — LIPASE/PROTEASE/AMYLASE 10K-37.5K
1 CAPSULE,DELAYED RELEASE (ENTERIC COATED) ORAL DAILY
Refills: 0 | Status: DISCONTINUED | OUTPATIENT
Start: 2025-05-22 | End: 2025-05-23

## 2025-05-22 RX ORDER — CARVEDILOL 3.12 MG/1
12.5 TABLET, FILM COATED ORAL EVERY 12 HOURS
Refills: 0 | Status: DISCONTINUED | OUTPATIENT
Start: 2025-05-22 | End: 2025-05-22

## 2025-05-22 RX ORDER — LISINOPRIL 5 MG/1
10 TABLET ORAL DAILY
Refills: 0 | Status: DISCONTINUED | OUTPATIENT
Start: 2025-05-22 | End: 2025-05-23

## 2025-05-22 RX ORDER — HYDROMORPHONE/SOD CHLOR,ISO/PF 2 MG/10 ML
1 SYRINGE (ML) INJECTION ONCE
Refills: 0 | Status: DISCONTINUED | OUTPATIENT
Start: 2025-05-22 | End: 2025-05-22

## 2025-05-22 RX ORDER — ASPIRIN 325 MG
81 TABLET ORAL DAILY
Refills: 0 | Status: DISCONTINUED | OUTPATIENT
Start: 2025-05-22 | End: 2025-05-23

## 2025-05-22 RX ORDER — SERTRALINE 100 MG/1
200 TABLET, FILM COATED ORAL DAILY
Refills: 0 | Status: DISCONTINUED | OUTPATIENT
Start: 2025-05-22 | End: 2025-05-23

## 2025-05-22 RX ORDER — SUCRALFATE 1 G
1 TABLET ORAL
Refills: 0 | Status: DISCONTINUED | OUTPATIENT
Start: 2025-05-22 | End: 2025-05-23

## 2025-05-22 RX ORDER — CARVEDILOL 3.12 MG/1
25 TABLET, FILM COATED ORAL EVERY 12 HOURS
Refills: 0 | Status: DISCONTINUED | OUTPATIENT
Start: 2025-05-22 | End: 2025-05-23

## 2025-05-22 RX ADMIN — SERTRALINE 200 MILLIGRAM(S): 100 TABLET, FILM COATED ORAL at 12:42

## 2025-05-22 RX ADMIN — Medication 1 MILLIGRAM(S): at 12:43

## 2025-05-22 RX ADMIN — Medication 667 MILLIGRAM(S): at 12:42

## 2025-05-22 RX ADMIN — Medication 1 MILLIGRAM(S): at 04:14

## 2025-05-22 RX ADMIN — Medication 1 MILLIGRAM(S): at 17:04

## 2025-05-22 RX ADMIN — Medication 10 MILLIGRAM(S): at 04:14

## 2025-05-22 RX ADMIN — Medication 0.5 MILLIGRAM(S): at 09:43

## 2025-05-22 RX ADMIN — CARVEDILOL 12.5 MILLIGRAM(S): 3.12 TABLET, FILM COATED ORAL at 08:41

## 2025-05-22 RX ADMIN — Medication 1 MILLIGRAM(S): at 22:08

## 2025-05-22 RX ADMIN — CARVEDILOL 25 MILLIGRAM(S): 3.12 TABLET, FILM COATED ORAL at 21:08

## 2025-05-22 RX ADMIN — Medication 1 MILLIGRAM(S): at 04:30

## 2025-05-22 RX ADMIN — Medication 81 MILLIGRAM(S): at 08:42

## 2025-05-22 RX ADMIN — Medication 1 MILLIGRAM(S): at 06:43

## 2025-05-22 RX ADMIN — Medication 0.5 MILLIGRAM(S): at 08:43

## 2025-05-22 RX ADMIN — Medication 0.5 MILLIGRAM(S): at 01:50

## 2025-05-22 RX ADMIN — Medication 667 MILLIGRAM(S): at 08:42

## 2025-05-22 RX ADMIN — Medication 1 MILLIGRAM(S): at 07:10

## 2025-05-22 RX ADMIN — Medication 4 MILLIGRAM(S): at 12:43

## 2025-05-22 RX ADMIN — LISINOPRIL 10 MILLIGRAM(S): 5 TABLET ORAL at 08:41

## 2025-05-22 RX ADMIN — Medication 1 MILLIGRAM(S): at 13:43

## 2025-05-22 RX ADMIN — Medication 1 MILLIGRAM(S): at 21:08

## 2025-05-22 RX ADMIN — HEPARIN SODIUM 5000 UNIT(S): 1000 INJECTION INTRAVENOUS; SUBCUTANEOUS at 06:36

## 2025-05-22 RX ADMIN — Medication 10 MILLIGRAM(S): at 00:13

## 2025-05-22 RX ADMIN — Medication 4 MILLIGRAM(S): at 21:52

## 2025-05-22 RX ADMIN — Medication 1 CAPSULE(S): at 08:42

## 2025-05-22 RX ADMIN — Medication 1 MILLIGRAM(S): at 17:31

## 2025-05-22 RX ADMIN — Medication 1 GRAM(S): at 06:36

## 2025-05-22 RX ADMIN — Medication 200 MICROGRAM(S): at 06:36

## 2025-05-22 NOTE — PROGRESS NOTE ADULT - TIME BILLING
evaluating patient , reviewing labs , imaging , chart review , discussing plan of care with the consultants and medical team and documentation.  excludes teaching and separately reported services.

## 2025-05-22 NOTE — CONSULT NOTE ADULT - ASSESSMENT
1.  End-stage renal disease on hemodialysis Monday Wednesday Friday: Missed dialysis yesterday.  Access is left upper extremity AV fistula.  Will do a 3-hour session today  2.  Hypertension: Likely contributed by pain.  Will try 1 L ultrafiltration today.  Not much edema  3.  Anemia hemoglobin is 8.  Check iron stores.  Can start Procrit 10,000 units with hemodialysis.  4.  Abdominal pain chronic per primary team      Thank you for the consultation    Marbin Castro MD, PORTIA

## 2025-05-22 NOTE — PATIENT PROFILE ADULT - FALL HARM RISK - HARM RISK INTERVENTIONS

## 2025-05-22 NOTE — CONSULT NOTE ADULT - SUBJECTIVE AND OBJECTIVE BOX
[facility] DIVISION OF KIDNEY DISEASES AND HYPERTENSION -- INITIAL CONSULT NOTE  --------------------------------------------------------------------------------  HPI:  69-year-old female with a past medical history of end-stage renal disease on hemodialysis, hypertension, chronic pancreatitis, cirrhosis, diabetes mellitus who presents to the hospital for abdominal pain.  She usually gets hemodialysis Monday Wednesday Friday at [place].  Her nephrologist is Dr. Marr.  She missed dialysis yesterday.  She still has persistent abdominal pain.  CT abdomen did not show any acute pathology.    PAST HISTORY  --------------------------------------------------------------------------------  PAST MEDICAL & SURGICAL HISTORY:  Diabetes      Bernard syndrome      Pancreatitis      Cirrhosis      ESRD on dialysis  MWF at [facility]      HTN (hypertension)      History of liver biopsy      H/O total thyroidectomy      History of cholecystectomy  1990s        FAMILY HISTORY:    PAST SOCIAL HISTORY:    ALLERGIES & MEDICATIONS  --------------------------------------------------------------------------------  Allergies    Augmentin (Hives)  ceftriaxone (Pruritus)    Intolerances      Standing Inpatient Medications  aspirin enteric coated 81 milliGRAM(s) Oral daily  calcium acetate 667 milliGRAM(s) Oral three times a day with meals  carvedilol 12.5 milliGRAM(s) Oral every 12 hours  dextrose 5%. 1000 milliLiter(s) IV Continuous <Continuous>  dextrose 5%. 1000 milliLiter(s) IV Continuous <Continuous>  dextrose 50% Injectable 25 Gram(s) IV Push once  dextrose 50% Injectable 12.5 Gram(s) IV Push once  dextrose 50% Injectable 25 Gram(s) IV Push once  glucagon  Injectable 1 milliGRAM(s) IntraMuscular once  heparin   Injectable 5000 Unit(s) SubCutaneous every 12 hours  insulin lispro (ADMELOG) corrective regimen sliding scale   SubCutaneous three times a day before meals  levothyroxine 200 MICROGram(s) Oral daily  lisinopril 10 milliGRAM(s) Oral daily  pancrelipase  (CREON 36,000 Lipase Units) 1 Capsule(s) Oral daily  sertraline 200 milliGRAM(s) Oral daily  sucralfate suspension 1 Gram(s) Oral two times a day    PRN Inpatient Medications  acetaminophen     Tablet .. 650 milliGRAM(s) Oral every 6 hours PRN  aluminum hydroxide/magnesium hydroxide/simethicone Suspension 30 milliLiter(s) Oral every 4 hours PRN  dextrose Oral Gel 15 Gram(s) Oral once PRN  hydrALAZINE Injectable 10 milliGRAM(s) IV Push every 6 hours PRN  HYDROmorphone  Injectable 1 milliGRAM(s) IV Push every 4 hours PRN  HYDROmorphone  Injectable 0.5 milliGRAM(s) IV Push every 4 hours PRN  melatonin 3 milliGRAM(s) Oral at bedtime PRN  ondansetron Injectable 4 milliGRAM(s) IV Push every 8 hours PRN      REVIEW OF SYSTEMS  --------------------------------------------------------------------------------  Gen: No weight changes, fatigue, fevers/chills, weakness  Skin: No rashes  Head/Eyes/Ears/Mouth: No headache; Normal hearing; Normal vision w/o blurriness; No sinus pain/discomfort, sore throat  Respiratory: No dyspnea, cough, wheezing, hemoptysis  CV: No chest pain, PND, orthopnea  GI: No abdominal pain, diarrhea, constipation, nausea, vomiting, melena, hematochezia  : No increased frequency, dysuria, hematuria, nocturia  MSK: No joint pain/swelling; no back pain; no edema  Neuro: No dizziness/lightheadedness, weakness, seizures, numbness, tingling  Heme: No easy bruising or bleeding      All other systems were reviewed and are negative, except as noted.    VITALS/PHYSICAL EXAM  --------------------------------------------------------------------------------  T(C): 36.7 (05-22-25 @ 07:15), Max: 36.8 (05-22-25 @ 01:51)  HR: 79 (05-22-25 @ 07:15) (74 - 86)  BP: 155/67 (05-22-25 @ 07:15) (155/67 - 210/81)  RR: 18 (05-22-25 @ 07:15) (16 - 18)  SpO2: 98% (05-22-25 @ 07:15) (89% - 99%)  Wt(kg): --  Height (cm): 165.1 (05-22-25 @ 06:43)  Weight (kg): 54.8 (05-22-25 @ 06:43)  BMI (kg/m2): 20.1 (05-22-25 @ 06:43)  BSA (m2): 1.6 (05-22-25 @ 06:43)      Physical Exam:  Gen: Alert and Oriented *3  HEENT: PERRL, supple neck, clear oropharynx  Pulm: CTA B/L  CV: RRR, S1S2  Abd: +BS, soft, + mild ternderness  Extremities: no edema  LUE AVF    LABS/STUDIES  --------------------------------------------------------------------------------              8.0    5.72  >-----------<  83       [05-22-25 @ 06:07]              25.7     140  |  106  |  52.2  ----------------------------<  126      [05-22-25 @ 06:07]  5.0   |  18.0  |  6.72        Ca     8.4     [05-22-25 @ 06:07]      Mg     2.3     [05-21-25 @ 16:20]      Phos  5.8     [05-21-25 @ 16:20]    TPro  6.9  /  Alb  4.0  /  TBili  0.4  /  DBili  0.1  /  AST  18  /  ALT  11  /  AlkPhos  96  [05-21-25 @ 16:20]    PT/INR: PT 12.8 , INR 1.10       [05-21-25 @ 17:40]      Creatinine Trend:  SCr 6.72 [05-22 @ 06:07]  SCr 6.16 [05-21 @ 16:20]    Urinalysis - [05-22-25 @ 06:07]      Color  / Appearance  / SG  / pH       Gluc 126 / Ketone   / Bili  / Urobili        Blood  / Protein  / Leuk Est  / Nitrite       RBC  / WBC  / Hyaline  / Gran  / Sq Epi  / Non Sq Epi  / Bacteria         HBsAg Nonreact      [08-01-24 @ 13:30]  HCV 13.33, Reactive      [02-09-24 @ 02:33]

## 2025-05-22 NOTE — PATIENT PROFILE ADULT - FUNCTIONAL ASSESSMENT - BASIC MOBILITY SECTION LABEL
Show Aperture Variable?: Yes Medical Necessity Information: It is in your best interest to select a reason for this procedure from the list below. All of these items fulfill various CMS LCD requirements except the new and changing color options. Render Post-Care Instructions In Note?: no Medical Necessity Clause: This procedure was medically necessary because the lesions that were treated were: Detail Level: Detailed Post-Care Instructions: I reviewed with the patient in detail post-care instructions. Patient is to wear sunprotection, and avoid picking at any of the treated lesions. Pt may apply Vaseline to crusted or scabbing areas. Spray Paint Text: The liquid nitrogen was applied to the skin utilizing a spray paint frosting technique. . Consent: The patient's consent was obtained including but not limited to risks of crusting, scabbing, blistering, scarring, darker or lighter pigmentary change, recurrence, incomplete removal and infection.

## 2025-05-23 ENCOUNTER — TRANSCRIPTION ENCOUNTER (OUTPATIENT)
Age: 70
End: 2025-05-23

## 2025-05-23 VITALS
OXYGEN SATURATION: 94 % | DIASTOLIC BLOOD PRESSURE: 57 MMHG | HEART RATE: 81 BPM | TEMPERATURE: 99 F | SYSTOLIC BLOOD PRESSURE: 114 MMHG | RESPIRATION RATE: 18 BRPM

## 2025-05-23 LAB
ANION GAP SERPL CALC-SCNC: 15 MMOL/L — SIGNIFICANT CHANGE UP (ref 5–17)
BUN SERPL-MCNC: 26.2 MG/DL — HIGH (ref 8–20)
CALCIUM SERPL-MCNC: 8.7 MG/DL — SIGNIFICANT CHANGE UP (ref 8.4–10.5)
CHLORIDE SERPL-SCNC: 101 MMOL/L — SIGNIFICANT CHANGE UP (ref 96–108)
CO2 SERPL-SCNC: 24 MMOL/L — SIGNIFICANT CHANGE UP (ref 22–29)
CREAT SERPL-MCNC: 4.37 MG/DL — HIGH (ref 0.5–1.3)
EGFR: 11 ML/MIN/1.73M2 — LOW
EGFR: 11 ML/MIN/1.73M2 — LOW
GLUCOSE BLDC GLUCOMTR-MCNC: 118 MG/DL — HIGH (ref 70–99)
GLUCOSE BLDC GLUCOMTR-MCNC: 145 MG/DL — HIGH (ref 70–99)
GLUCOSE BLDC GLUCOMTR-MCNC: 217 MG/DL — HIGH (ref 70–99)
GLUCOSE SERPL-MCNC: 112 MG/DL — HIGH (ref 70–99)
HCT VFR BLD CALC: 24.6 % — LOW (ref 34.5–45)
HGB BLD-MCNC: 7.6 G/DL — LOW (ref 11.5–15.5)
IMMATURE PLATELET FRACTION #: 2 K/UL — LOW (ref 4.7–11.1)
IMMATURE PLATELET FRACTION %: 3.2 % — SIGNIFICANT CHANGE UP (ref 1.6–4.9)
MCHC RBC-ENTMCNC: 29.6 PG — SIGNIFICANT CHANGE UP (ref 27–34)
MCHC RBC-ENTMCNC: 30.9 G/DL — LOW (ref 32–36)
MCV RBC AUTO: 95.7 FL — SIGNIFICANT CHANGE UP (ref 80–100)
NRBC # BLD AUTO: 0 K/UL — SIGNIFICANT CHANGE UP (ref 0–0)
NRBC # FLD: 0 K/UL — SIGNIFICANT CHANGE UP (ref 0–0)
NRBC BLD AUTO-RTO: 0 /100 WBCS — SIGNIFICANT CHANGE UP (ref 0–0)
PLATELET # BLD AUTO: 74 K/UL — LOW (ref 150–400)
PMV BLD: 11.4 FL — SIGNIFICANT CHANGE UP (ref 7–13)
POTASSIUM SERPL-MCNC: 4.4 MMOL/L — SIGNIFICANT CHANGE UP (ref 3.5–5.3)
POTASSIUM SERPL-SCNC: 4.4 MMOL/L — SIGNIFICANT CHANGE UP (ref 3.5–5.3)
RBC # BLD: 2.57 M/UL — LOW (ref 3.8–5.2)
RBC # FLD: 15.3 % — HIGH (ref 10.3–14.5)
SODIUM SERPL-SCNC: 140 MMOL/L — SIGNIFICANT CHANGE UP (ref 135–145)
WBC # BLD: 3.87 K/UL — SIGNIFICANT CHANGE UP (ref 3.8–10.5)
WBC # FLD AUTO: 3.87 K/UL — SIGNIFICANT CHANGE UP (ref 3.8–10.5)

## 2025-05-23 PROCEDURE — 90935 HEMODIALYSIS ONE EVALUATION: CPT

## 2025-05-23 PROCEDURE — 99239 HOSP IP/OBS DSCHRG MGMT >30: CPT | Mod: GC

## 2025-05-23 RX ORDER — SUCRALFATE 1 G
10 TABLET ORAL
Qty: 900 | Refills: 0
Start: 2025-05-23 | End: 2025-06-21

## 2025-05-23 RX ORDER — SIMETHICONE 80 MG
80 TABLET,CHEWABLE ORAL ONCE
Refills: 0 | Status: COMPLETED | OUTPATIENT
Start: 2025-05-23 | End: 2025-05-23

## 2025-05-23 RX ORDER — LIPASE/PROTEASE/AMYLASE 10K-37.5K
1 CAPSULE,DELAYED RELEASE (ENTERIC COATED) ORAL
Qty: 0 | Refills: 0
Start: 2025-05-23

## 2025-05-23 RX ORDER — LISINOPRIL 5 MG/1
1 TABLET ORAL
Qty: 30 | Refills: 0
Start: 2025-05-23 | End: 2025-06-21

## 2025-05-23 RX ORDER — POLYETHYLENE GLYCOL 3350 17 G/17G
17 POWDER, FOR SOLUTION ORAL ONCE
Refills: 0 | Status: COMPLETED | OUTPATIENT
Start: 2025-05-23 | End: 2025-05-23

## 2025-05-23 RX ORDER — CARVEDILOL 3.12 MG/1
1 TABLET, FILM COATED ORAL
Qty: 60 | Refills: 0
Start: 2025-05-23 | End: 2025-06-21

## 2025-05-23 RX ORDER — SUCRALFATE 1 G
10 TABLET ORAL
Refills: 0 | DISCHARGE

## 2025-05-23 RX ORDER — LIPASE/PROTEASE/AMYLASE 10K-37.5K
1 CAPSULE,DELAYED RELEASE (ENTERIC COATED) ORAL
Refills: 0 | DISCHARGE

## 2025-05-23 RX ORDER — MUPIROCIN CALCIUM 20 MG/G
1 CREAM TOPICAL
Refills: 0 | Status: DISCONTINUED | OUTPATIENT
Start: 2025-05-23 | End: 2025-05-23

## 2025-05-23 RX ORDER — LIPASE/PROTEASE/AMYLASE 10K-37.5K
1 CAPSULE,DELAYED RELEASE (ENTERIC COATED) ORAL
Qty: 90 | Refills: 0
Start: 2025-05-23 | End: 2025-06-21

## 2025-05-23 RX ORDER — SENNA 187 MG
1 TABLET ORAL ONCE
Refills: 0 | Status: COMPLETED | OUTPATIENT
Start: 2025-05-23 | End: 2025-05-23

## 2025-05-23 RX ADMIN — Medication 81 MILLIGRAM(S): at 12:05

## 2025-05-23 RX ADMIN — Medication 1 MILLIGRAM(S): at 07:00

## 2025-05-23 RX ADMIN — Medication 1 MILLIGRAM(S): at 16:08

## 2025-05-23 RX ADMIN — Medication 667 MILLIGRAM(S): at 12:05

## 2025-05-23 RX ADMIN — Medication 200 MICROGRAM(S): at 06:13

## 2025-05-23 RX ADMIN — CARVEDILOL 25 MILLIGRAM(S): 3.12 TABLET, FILM COATED ORAL at 06:13

## 2025-05-23 RX ADMIN — MUPIROCIN CALCIUM 1 APPLICATION(S): 20 CREAM TOPICAL at 17:13

## 2025-05-23 RX ADMIN — LISINOPRIL 10 MILLIGRAM(S): 5 TABLET ORAL at 06:13

## 2025-05-23 RX ADMIN — Medication 4 MILLIGRAM(S): at 10:55

## 2025-05-23 RX ADMIN — Medication 1 CAPSULE(S): at 12:07

## 2025-05-23 RX ADMIN — Medication 80 MILLIGRAM(S): at 16:26

## 2025-05-23 RX ADMIN — Medication 0.5 MILLIGRAM(S): at 19:00

## 2025-05-23 RX ADMIN — HEPARIN SODIUM 5000 UNIT(S): 1000 INJECTION INTRAVENOUS; SUBCUTANEOUS at 06:12

## 2025-05-23 RX ADMIN — Medication 667 MILLIGRAM(S): at 17:05

## 2025-05-23 RX ADMIN — POLYETHYLENE GLYCOL 3350 17 GRAM(S): 17 POWDER, FOR SOLUTION ORAL at 17:05

## 2025-05-23 RX ADMIN — Medication 1 APPLICATION(S): at 12:07

## 2025-05-23 RX ADMIN — HEPARIN SODIUM 5000 UNIT(S): 1000 INJECTION INTRAVENOUS; SUBCUTANEOUS at 17:05

## 2025-05-23 RX ADMIN — Medication 30 MILLILITER(S): at 00:40

## 2025-05-23 RX ADMIN — Medication 1 MILLIGRAM(S): at 13:06

## 2025-05-23 RX ADMIN — CARVEDILOL 25 MILLIGRAM(S): 3.12 TABLET, FILM COATED ORAL at 17:05

## 2025-05-23 RX ADMIN — Medication 1 MILLIGRAM(S): at 17:08

## 2025-05-23 RX ADMIN — SERTRALINE 200 MILLIGRAM(S): 100 TABLET, FILM COATED ORAL at 12:07

## 2025-05-23 RX ADMIN — INSULIN LISPRO 2: 100 INJECTION, SOLUTION INTRAVENOUS; SUBCUTANEOUS at 12:13

## 2025-05-23 RX ADMIN — Medication 1 MILLIGRAM(S): at 12:06

## 2025-05-23 RX ADMIN — Medication 1 GRAM(S): at 17:06

## 2025-05-23 RX ADMIN — Medication 1 TABLET(S): at 17:05

## 2025-05-23 RX ADMIN — Medication 667 MILLIGRAM(S): at 08:42

## 2025-05-23 RX ADMIN — Medication 1 GRAM(S): at 06:13

## 2025-05-23 NOTE — DISCHARGE NOTE PROVIDER - HOSPITAL COURSE
68yo F with PMHx of ESRD on HD MWF,  HTN, chronic pancreatitis, diabetes, cirrhosis presented to ED c/o chronic mid abd / right sided abdominal pain.  Admitted for ESRD missed HD/ Hypertensive urgency. Pt missed dialysis due to severe abdominal pain, No acute volume overload on exam, continued regular meds. Patient also has   Chronic abdominal pain/ Pancreatic insufficieny   / gastroparesis, -CT a/p without acute findings, Cont with Dilaudid prn, follows with gi , plan for possible  nerve block as op cont Creon, Carafate. Diabetic A1c 7.3    ESRD missed HD/ Hypertensive urgency    Chronic abdominal pain/ Pancreatic insufficieny   / gastroparesis   DM   Hypothyroidism   Depression/anxiety

## 2025-05-23 NOTE — PROGRESS NOTE ADULT - ASSESSMENT
68yo F with PMHx of ESRD on HD MWF,  HTN, chronic pancreatitis, diabetes, cirrhosis presented to ED c/o chronic mid abd / right sided abdominal pain.    ESRD missed HD/ Hypertensive urgency    -Pt missed dialysis today due to severe abdominal pain   -No acute volume overload on exam, electrolytes wnl   -c/w  Lisinopril  - increased dose of coreg   - iv hydralazine prn   -cont phoslo  -nephro following     Chronic abdominal pain/ Pancreatic insufficieny   / gastroparesis   -CT a/p without acute findings   -Cont with Dilaudid prn  - follows with gi , plan for possible  nerve block as op   -cont Creon, Carafate     DM   -ISS, hypoglycemic protocol   -A1C with Estimated Average Glucose Result: 7.3 % (05.22.25 @ 06:07)    Hypothyroidism   -cont synthroid     Depression/anxiety   -cont zoloft     DVT PPx  -Heparin subQ   Dispo: active, anticipated dc 1-2 days if medically stable        
1.  End-stage renal disease on hemodialysis Monday Wednesday Friday: Access is left upper extremity AV fistula.  Had HD on thursday. patient does not want HD today. will plan for HD tomorrow if still in house   2.  Hypertension: Likely contributed by pain.  plan for more UF tomorrow   3.  Anemia hemoglobin is 8.  Check iron stores.  Can start Procrit 10,000 units with hemodialysis.  4.  Abdominal pain chronic per primary team          Marbin Castro MD, PORTIA

## 2025-05-23 NOTE — DISCHARGE NOTE NURSING/CASE MANAGEMENT/SOCIAL WORK - PATIENT PORTAL LINK FT
You can access the FollowMyHealth Patient Portal offered by [facility] by registering at the following website: [email or web address]. By joining [facility]’s FollowMyHealth portal, you will also be able to view your health information using other applications (apps) compatible with our system.

## 2025-05-23 NOTE — PROGRESS NOTE ADULT - SUBJECTIVE AND OBJECTIVE BOX
[facility] DIVISION OF KIDNEY DISEASES AND HYPERTENSION -- PROGRESS NOTE    SUBJECTIVE:   had HD yesterday.     MEDICATIONS    Standing Inpatient Medications  aspirin enteric coated 81 milliGRAM(s) Oral daily  calcium acetate 667 milliGRAM(s) Oral three times a day with meals  carvedilol 25 milliGRAM(s) Oral every 12 hours  chlorhexidine 2% Cloths 1 Application(s) Topical <User Schedule>  dextrose 5%. 1000 milliLiter(s) IV Continuous <Continuous>  dextrose 5%. 1000 milliLiter(s) IV Continuous <Continuous>  dextrose 50% Injectable 25 Gram(s) IV Push once  dextrose 50% Injectable 12.5 Gram(s) IV Push once  dextrose 50% Injectable 25 Gram(s) IV Push once  glucagon  Injectable 1 milliGRAM(s) IntraMuscular once  heparin   Injectable 5000 Unit(s) SubCutaneous every 12 hours  insulin lispro (ADMELOG) corrective regimen sliding scale   SubCutaneous three times a day before meals  levothyroxine 200 MICROGram(s) Oral daily  lisinopril 10 milliGRAM(s) Oral daily  mupirocin 2% Ointment 1 Application(s) Both Nostrils two times a day  pancrelipase  (CREON 36,000 Lipase Units) 1 Capsule(s) Oral daily  sertraline 200 milliGRAM(s) Oral daily  sucralfate suspension 1 Gram(s) Oral two times a day    PRN Inpatient Medications  acetaminophen     Tablet .. 650 milliGRAM(s) Oral every 6 hours PRN  aluminum hydroxide/magnesium hydroxide/simethicone Suspension 30 milliLiter(s) Oral every 4 hours PRN  dextrose Oral Gel 15 Gram(s) Oral once PRN  hydrALAZINE Injectable 10 milliGRAM(s) IV Push every 6 hours PRN  HYDROmorphone  Injectable 1 milliGRAM(s) IV Push every 4 hours PRN  HYDROmorphone  Injectable 0.5 milliGRAM(s) IV Push every 4 hours PRN  melatonin 3 milliGRAM(s) Oral at bedtime PRN  ondansetron Injectable 4 milliGRAM(s) IV Push every 8 hours PRN      VITALS/PHYSICAL EXAM  --------------------------------------------------------------------------------  T(C): 36.9 (05-23-25 @ 07:15), Max: 37.6 (05-22-25 @ 15:23)  HR: 73 (05-23-25 @ 08:00) (73 - 88)  BP: 159/62 (05-23-25 @ 07:15) (143/54 - 209/78)  RR: 18 (05-23-25 @ 07:15) (18 - 18)  SpO2: 92% (05-23-25 @ 08:00) (92% - 100%)  Wt(kg): --  Height (cm): 165.1 (05-22-25 @ 06:43)  Weight (kg): 54.8 (05-22-25 @ 06:43)  BMI (kg/m2): 20.1 (05-22-25 @ 06:43)  BSA (m2): 1.6 (05-22-25 @ 06:43)      05-22-25 @ 07:01  -  05-23-25 @ 07:00  --------------------------------------------------------  IN: 800 mL / OUT: 1800 mL / NET: -1000 mL      Physical Exam:  Alert and oriented x3   HEENT: normal  Pulm: CTA B/L  CV: normal S1S2; no murmur  Abd: soft, non-tender  Extremities- no edema  AVF+    LABS/STUDIES  --------------------------------------------------------------------------------  140  |  101  |  26.2  ----------------------------<  112      [05-23-25 @ 06:37]  4.4   |  24.0  |  4.37        Ca     8.7     [05-23-25 @ 06:37]      Mg     2.3     [05-21-25 @ 16:20]      Phos  5.8     [05-21-25 @ 16:20]    TPro  6.9  /  Alb  4.0  /  TBili  0.4  /  DBili  0.1  /  AST  18  /  ALT  11  /  AlkPhos  96  [05-21-25 @ 16:20]    PT/INR: PT 12.8 , INR 1.10       [05-21-25 @ 17:40]      Creatinine Trend:  SCr 4.37 [05-23 @ 06:37]  SCr 6.72 [05-22 @ 06:07]  SCr 6.16 [05-21 @ 16:20]  
[facility] DIVISION OF KIDNEY DISEASES AND HYPERTENSION -- DIALYSIS NOTE    Patient seen and examined during dialysis  Patient tolerating the procedure well.     VITALS  --------------------------------------------------------------------------------  T(C): 36.6 (05-23-25 @ 12:57), Max: 37.6 (05-22-25 @ 15:23)  HR: 75 (05-23-25 @ 12:57) (73 - 88)  BP: 154/67 (05-23-25 @ 12:57) (143/54 - 209/78)  RR: 18 (05-23-25 @ 12:57) (18 - 18)  SpO2: 97% (05-23-25 @ 12:57) (92% - 100%)  Wt(kg): --     Will continue the current medical management. Next hemodialysis as scheduled.     
Patient is a 69y old  Female who presents with a chief complaint of missed dialysis (22 May 2025 12:45)      Patient seen and examined at bedside. No overnight events reported.   c/o abd pian , chronic , no change   planned for possible nerve block as op. in process of scheduling it     ALLERGIES:  Augmentin (Hives)  ceftriaxone (Pruritus)    MEDICATIONS  (STANDING):  aspirin enteric coated 81 milliGRAM(s) Oral daily  calcium acetate 667 milliGRAM(s) Oral three times a day with meals  carvedilol 25 milliGRAM(s) Oral every 12 hours  dextrose 5%. 1000 milliLiter(s) (100 mL/Hr) IV Continuous <Continuous>  dextrose 5%. 1000 milliLiter(s) (50 mL/Hr) IV Continuous <Continuous>  dextrose 50% Injectable 25 Gram(s) IV Push once  dextrose 50% Injectable 12.5 Gram(s) IV Push once  dextrose 50% Injectable 25 Gram(s) IV Push once  glucagon  Injectable 1 milliGRAM(s) IntraMuscular once  heparin   Injectable 5000 Unit(s) SubCutaneous every 12 hours  insulin lispro (ADMELOG) corrective regimen sliding scale   SubCutaneous three times a day before meals  levothyroxine 200 MICROGram(s) Oral daily  lisinopril 10 milliGRAM(s) Oral daily  pancrelipase  (CREON 36,000 Lipase Units) 1 Capsule(s) Oral daily  sertraline 200 milliGRAM(s) Oral daily  sucralfate suspension 1 Gram(s) Oral two times a day    MEDICATIONS  (PRN):  acetaminophen     Tablet .. 650 milliGRAM(s) Oral every 6 hours PRN Temp greater or equal to 38C (100.4F), Mild Pain (1 - 3)  aluminum hydroxide/magnesium hydroxide/simethicone Suspension 30 milliLiter(s) Oral every 4 hours PRN Dyspepsia  dextrose Oral Gel 15 Gram(s) Oral once PRN Blood Glucose LESS THAN 70 milliGRAM(s)/deciliter  hydrALAZINE Injectable 10 milliGRAM(s) IV Push every 6 hours PRN > 150  HYDROmorphone  Injectable 1 milliGRAM(s) IV Push every 4 hours PRN Severe Pain (7 - 10)  HYDROmorphone  Injectable 0.5 milliGRAM(s) IV Push every 4 hours PRN Moderate Pain (4 - 6)  melatonin 3 milliGRAM(s) Oral at bedtime PRN Insomnia  ondansetron Injectable 4 milliGRAM(s) IV Push every 8 hours PRN Nausea and/or Vomiting    Vital Signs Last 24 Hrs  T(F): 99.7 (22 May 2025 15:23), Max: 99.7 (22 May 2025 15:23)  HR: 85 (22 May 2025 15:23) (74 - 86)  BP: 209/78 (22 May 2025 15:23) (155/67 - 210/81)  RR: 18 (22 May 2025 15:23) (18 - 18)  SpO2: 100% (22 May 2025 15:23) (89% - 100%)  I&O's Summary    PHYSICAL EXAM:  General: NAD, A/O x 3  ENT: MMM, no thrush  Neck: Supple, No JVD  Lungs: Clear to auscultation bilaterally, good air entry, non-labored breathing  Cardio: RRR, S1/S2, No murmur  Abdomen: Soft, Nontender, Nondistended; Bowel sounds present  Extremities: No calf tenderness, No pitting edema    LABS:                        8.0    5.72  )-----------( 83       ( 22 May 2025 06:07 )             25.7     05-22    140  |  106  |  52.2  ----------------------------<  126  5.0   |  18.0  |  6.72    Ca    8.4      22 May 2025 06:07  Phos  5.8     05-21  Mg     2.3     05-21    TPro  6.9  /  Alb  4.0  /  TBili  0.4  /  DBili  0.1  /  AST  18  /  ALT  11  /  AlkPhos  96  05-21      Lipase: 48 U/L (05-21-25 @ 16:20)      PT/INR - ( 21 May 2025 17:40 )   PT: 12.8 sec;   INR: 1.10 ratio          POCT Blood Glucose.: 143 mg/dL (22 May 2025 12:35)  POCT Blood Glucose.: 135 mg/dL (22 May 2025 08:36)      Urinalysis Basic - ( 22 May 2025 06:07 )    Color: x / Appearance: x / SG: x / pH: x  Gluc: 126 mg/dL / Ketone: x  / Bili: x / Urobili: x   Blood: x / Protein: x / Nitrite: x   Leuk Esterase: x / RBC: x / WBC x   Sq Epi: x / Non Sq Epi: x / Bacteria: x          RADIOLOGY & ADDITIONAL TESTS:    Care Discussed with Consultants/Other Providers:

## 2025-05-23 NOTE — DISCHARGE NOTE PROVIDER - PROVIDER TOKENS
PROVIDER:[TOKEN:[[id number]],FOLLOWUP:[1 week],ESTABLISHEDPATIENT:[T]] PROVIDER:[TOKEN:[[id number]],FOLLOWUP:[1 week],ESTABLISHEDPATIENT:[T]],FREE:[LAST:[pain management],PHONE:[(   )    -],FAX:[(   )    -],FOLLOWUP:[1 week]],FREE:[LAST:[gastroenterology],PHONE:[(   )    -],FAX:[(   )    -],FOLLOWUP:[1 week]]

## 2025-05-23 NOTE — DISCHARGE NOTE PROVIDER - CARE PROVIDERS DIRECT ADDRESSES
[email or web address],DirectAddress_Unknown,DirectAddress_Unknown

## 2025-05-23 NOTE — DISCHARGE NOTE PROVIDER - CARE PROVIDER_API CALL
ISABELLA JEREZ  [address] [place] [address]  Phone: ()-  Fax: ()-  Established Patient  Follow Up Time: 1 week   [place] [address]  Phone: ()-  Fax: ()-  Established Patient  Follow Up Time: 1 week    pain management,   Phone: (   )    -  Fax: (   )    -  Follow Up Time: 1 week    gastroenterology,   Phone: (   )    -  Fax: (   )    -  Follow Up Time: 1 week

## 2025-05-23 NOTE — DISCHARGE NOTE NURSING/CASE MANAGEMENT/SOCIAL WORK - FINANCIAL ASSISTANCE
[facility] provides services at a reduced cost to those who are determined to be eligible through [facility]’s financial assistance program. Information regarding [facility]’s financial assistance program can be found by going to [email or web address] or by calling [phone].

## 2025-05-23 NOTE — DISCHARGE NOTE PROVIDER - NSDCMRMEDTOKEN_GEN_ALL_CORE_FT
aspirin 81 mg oral delayed release capsule: orally once a day  carvedilol 25 mg oral tablet: 1 tab(s) orally every 12 hours  Creon 36,000 units oral delayed release capsule: 1 cap(s) orally once a day  HumaLOG KwikPen 100 units/mL injectable solution: injectable 3 times a day  levothyroxine 200 mcg (0.2 mg) oral tablet: 1 tab(s) orally once a day  lisinopril 10 mg oral tablet: 1 tab(s) orally once a day  sertraline 100 mg oral tablet: 2 tab(s) orally once a day  sucralfate 1 g/10 mL oral suspension: 10 milliliter(s) orally 2 times a day   aspirin 81 mg oral delayed release capsule: orally once a day  calcium acetate 667 mg oral capsule: 3 cap(s) orally 2 times a day  carvedilol 25 mg oral tablet: 1 tab(s) orally every 12 hours  Creon 3000 units oral delayed release capsule: 1 cap(s) orally every 8 hours  HumaLOG KwikPen 100 units/mL injectable solution: injectable 3 times a day  hydrALAZINE 25 mg oral tablet: 1 tab(s) orally every 8 hours  levothyroxine 200 mcg (0.2 mg) oral tablet: 1 tab(s) orally once a day  lisinopril 10 mg oral tablet: 1 tab(s) orally once a day  sertraline 100 mg oral tablet: 2 tab(s) orally once a day  sucralfate 1 g/10 mL oral suspension: 10 milliliter(s) orally 2 times a day   aspirin 81 mg oral delayed release capsule: orally once a day  calcium acetate 667 mg oral capsule: 3 cap(s) orally 2 times a day  carvedilol 25 mg oral tablet: 1 tab(s) orally every 12 hours  Creon 3000 units oral delayed release capsule: 1 cap(s) orally every 8 hours  HumaLOG KwikPen 100 units/mL injectable solution: injectable 3 times a day  hydrALAZINE 25 mg oral tablet: 1 tab(s) orally every 8 hours  levothyroxine 200 mcg (0.2 mg) oral tablet: 1 tab(s) orally once a day  lisinopril 10 mg oral tablet: 1 tab(s) orally once a day  sertraline 100 mg oral tablet: 2 tab(s) orally once a day  sucralfate 1 g/10 mL oral suspension: 10 milliliter(s) orally 3 times a day

## 2025-05-23 NOTE — DISCHARGE NOTE PROVIDER - NSDCFUADDAPPT_GEN_ALL_CORE_FT
APPTS ARE READY TO BE MADE: [X] YES    Best Family or Patient Contact (if needed):    Additional Information about above appointments (if needed):    1: pain management 		  2: gastroenterology   3: nephrology     Other comments or requests:

## 2025-05-23 NOTE — DISCHARGE NOTE PROVIDER - NSDCCPCAREPLAN_GEN_ALL_CORE_FT
PRINCIPAL DISCHARGE DIAGNOSIS  Diagnosis: Abdominal pain  Assessment and Plan of Treatment: You were admitted to the Emergency Department due to significant right-sided abdominal pain that prevented you from attending your regular dialysis session. After evaluation, it was determined that there were no acute findings explaining the increase in pain.   Phosphate Management: Continue taking Phoslo as directed to manage your phosphate levels.  Chronic Abdominal Pain:  Pain Management: Continue using Dilaudid as needed for pain relief.  Gastrointestinal Care: Continue taking Creon and Carafate as prescribed to manage pancreatic insufficiency and protect your stomach lining.  Follow Up with GI: You have a plan with gastroenterology for a possible nerve block as an outpatient procedure to manage your pain better. Ensure to follow up as scheduled.  General Care Instructions:  Activity: Encourage gradual and regular physical activity as tolerated to improve overall health and mobility. Discuss any new exercise regimes with your healthcare provider.  Hydration: Ensure adequate fluid intake as directed by your nephrologist to balance your fluid levels, especially on non-dialysis days.  Follow-Up Appointments:  Gastroenterology: Ensure to follow up regarding the potential nerve block procedure for pain management.  When to Seek Immediate Care:  If you experience increased abdominal pain, new or worsening symptoms.  Signs of infection such as fever, chills, or unexplained changes in your health.  Missed dialysis sessions or issues with your vascular access.      SECONDARY DISCHARGE DIAGNOSES  Diagnosis: ESRD on dialysis  Assessment and Plan of Treatment: End Stage Renal Disease (ESRD) and Dialysis:  Dialysis Sessions: Ensure you attend your scheduled dialysis sessions (Monday, Wednesday, Friday) unless otherwise directed by your healthcare provider.  Phosphate Management: Continue taking Phoslo as directed to manage your phosphate levels.  Continue regular follow-ups to manage ESRD and related complications.   Follow a diet appropriate for your medical conditions (diabetic-friendly, low-sodium for hypertension, kidney-friendly for ESRD).    Diagnosis: Hypertension  Assessment and Plan of Treatment: Blood Pressure Management: Continue taking Lisinopril and Coreg as prescribed.  Schedule a follow-up appointment with Primary Care Physician within a week after discharge to review your overall health and medication regimen.      Diagnosis: Diabetes  Assessment and Plan of Treatment: Monitor your blood sugar levels regularly as instructed.  Your most recent A1C was 7.3%. Continue managing your diabetes as per the current plan and follow any adjustments your endocrinologist recommends.    Diagnosis: Hypothyroid  Assessment and Plan of Treatment: Hypothyroidism:  Medication: Continue taking Synthroid as prescribed. Do not skip doses, and take your medication at the same time each day.      Diagnosis: Anxiety and depression  Assessment and Plan of Treatment:   Medication: Continue taking Zoloft as prescribed. Report any changes in mood or anxiety to your healthcare provider.     PRINCIPAL DISCHARGE DIAGNOSIS  Diagnosis: Abdominal pain  Assessment and Plan of Treatment: You were admitted to the Emergency Department due to significant right-sided abdominal pain that prevented you from attending your regular dialysis session. After evaluation, it was determined that there were no acute findings explaining the increase in pain.   Phosphate Management: Continue taking Phoslo as directed to manage your phosphate levels.  Chronic Abdominal Pain:  Pain Management: Continue using Dilaudid as needed for pain relief.  Gastrointestinal Care: Continue taking Creon and Carafate as prescribed to manage pancreatic insufficiency and protect your stomach lining.  Follow Up with GI: You have a plan with gastroenterology for a possible nerve block as an outpatient procedure to manage your pain better. Ensure to follow up as scheduled.  follow up with pain management.   General Care Instructions:  Activity: Encourage gradual and regular physical activity as tolerated to improve overall health and mobility. Discuss any new exercise regimes with your healthcare provider.  Hydration: Ensure adequate fluid intake as directed by your nephrologist to balance your fluid levels, especially on non-dialysis days.  Follow-Up Appointments:  Gastroenterology: Ensure to follow up regarding the potential nerve block procedure for pain management.  When to Seek Immediate Care:  If you experience increased abdominal pain, new or worsening symptoms.  Signs of infection such as fever, chills, or unexplained changes in your health.  Missed dialysis sessions or issues with your vascular access.      SECONDARY DISCHARGE DIAGNOSES  Diagnosis: ESRD on dialysis  Assessment and Plan of Treatment: End Stage Renal Disease (ESRD) and Dialysis:  Dialysis Sessions: Ensure you attend your scheduled dialysis sessions (Monday, Wednesday, Friday) unless otherwise directed by your healthcare provider.  Phosphate Management: Continue taking Phoslo as directed to manage your phosphate levels.  Continue regular follow-ups to manage ESRD and related complications.   Follow a diet appropriate for your medical conditions (diabetic-friendly, low-sodium for hypertension, kidney-friendly for ESRD).    Diagnosis: Hypertension  Assessment and Plan of Treatment: Blood Pressure Management: Continue taking Lisinopril and Coreg as prescribed.  Schedule a follow-up appointment with Primary Care Physician within a week after discharge to review your overall health and medication regimen.      Diagnosis: Diabetes  Assessment and Plan of Treatment: Monitor your blood sugar levels regularly as instructed.  Your most recent A1C was 7.3%. Continue managing your diabetes as per the current plan and follow any adjustments your endocrinologist recommends.    Diagnosis: Hypothyroid  Assessment and Plan of Treatment: Hypothyroidism:  Medication: Continue taking Synthroid as prescribed. Do not skip doses, and take your medication at the same time each day.      Diagnosis: Anxiety and depression  Assessment and Plan of Treatment:   Medication: Continue taking Zoloft as prescribed. Report any changes in mood or anxiety to your healthcare provider.     PRINCIPAL DISCHARGE DIAGNOSIS  Diagnosis: Abdominal pain  Assessment and Plan of Treatment: You were admitted to the Emergency Department due to significant right-sided abdominal pain that prevented you from attending your regular dialysis session. After evaluation, it was determined that there were no acute findings explaining the increase in pain.   Phosphate Management: Continue taking Phoslo as directed to manage your phosphate levels.  Chronic Abdominal Pain:  See Pain Management on discharge to further address continuing need for pain relief.  Gastrointestinal Care: Continue taking Creon and Carafate as prescribed to manage pancreatic insufficiency and protect your stomach lining.  Follow Up with GI: You have a plan with gastroenterology for a possible nerve block as an outpatient procedure to manage your pain better. Ensure to follow up as scheduled.  follow up with pain management.   General Care Instructions:  Activity: Encourage gradual and regular physical activity as tolerated to improve overall health and mobility. Discuss any new exercise regimes with your healthcare provider.  Hydration: Ensure adequate fluid intake as directed by your nephrologist to balance your fluid levels, especially on non-dialysis days.  Follow-Up Appointments:  Gastroenterology: Ensure to follow up regarding the potential nerve block procedure for pain management.  When to Seek Immediate Care:  If you experience increased abdominal pain, new or worsening symptoms.  Signs of infection such as fever, chills, or unexplained changes in your health.  Missed dialysis sessions or issues with your vascular access.      SECONDARY DISCHARGE DIAGNOSES  Diagnosis: ESRD on dialysis  Assessment and Plan of Treatment: End Stage Renal Disease (ESRD) and Dialysis:  Dialysis Sessions: Ensure you attend your scheduled dialysis sessions (Monday, Wednesday, Friday) unless otherwise directed by your healthcare provider.  Phosphate Management: Continue taking Phoslo as directed to manage your phosphate levels.  Continue regular follow-ups to manage ESRD and related complications.   Follow a diet appropriate for your medical conditions (diabetic-friendly, low-sodium for hypertension, kidney-friendly for ESRD).    Diagnosis: Hypertension  Assessment and Plan of Treatment: Blood Pressure Management: Continue taking Lisinopril and Coreg as prescribed.  Schedule a follow-up appointment with Primary Care Physician within a week after discharge to review your overall health and medication regimen.      Diagnosis: Diabetes  Assessment and Plan of Treatment: Monitor your blood sugar levels regularly as instructed.  Your most recent A1C was 7.3%. Continue managing your diabetes as per the current plan and follow any adjustments your endocrinologist recommends.    Diagnosis: Hypothyroid  Assessment and Plan of Treatment: Hypothyroidism:  Medication: Continue taking Synthroid as prescribed. Do not skip doses, and take your medication at the same time each day.      Diagnosis: Anxiety and depression  Assessment and Plan of Treatment:   Medication: Continue taking Zoloft as prescribed. Report any changes in mood or anxiety to your healthcare provider.

## 2025-05-23 NOTE — DISCHARGE NOTE PROVIDER - ATTENDING DISCHARGE PHYSICAL EXAMINATION:
GENERAL: NAD, lying in bed comfortably  HEAD:  Atraumatic, normocephalic  EYES: EOMI, PERRLA, conjunctiva and sclera clear  ENT: Moist mucous membranes  NECK: Supple, no JVD  HEART: Regular rate and rhythm, no murmurs, rubs, or gallops  LUNGS: Unlabored respirations.  Clear to auscultation bilaterally, no crackles, wheezing, or rhonchi  ABDOMEN: Soft, nontender, nondistended, +BS  EXTREMITIES: 2+ peripheral pulses bilaterally. No clubbing, cyanosis, or edema  NERVOUS SYSTEM:  A&Ox3, no focal deficits   SKIN: No rashes or lesions Vital Signs Last 24 Hrs  T(C): 36.9 (05-23-25 @ 15:53), Max: 37.3 (05-23-25 @ 05:56)  T(F): 98.4 (05-23-25 @ 15:53), Max: 99.1 (05-23-25 @ 05:56)  HR: 70 (05-23-25 @ 15:53) (69 - 88)  BP: 146/68 (05-23-25 @ 15:53) (132/64 - 165/65)  BP(mean): --  RR: 18 (05-23-25 @ 15:53) (18 - 18)  SpO2: 97% (05-23-25 @ 15:53) (92% - 100%)        GENERAL: NAD, lying in bed comfortably  HEAD:  Atraumatic, normocephalic  EYES: EOMI, PERRLA, conjunctiva and sclera clear  ENT: Moist mucous membranes  NECK: Supple, no JVD  HEART: Regular rate and rhythm, no murmurs, rubs, or gallops  LUNGS: Unlabored respirations.  Clear to auscultation bilaterally, no crackles, wheezing, or rhonchi  ABDOMEN: Soft, nontender, nondistended, +BS  EXTREMITIES: 2+ peripheral pulses bilaterally. No clubbing, cyanosis, or edema  NERVOUS SYSTEM:  A&Ox3, no focal deficits   SKIN: No rashes or lesions

## 2025-05-27 ENCOUNTER — INPATIENT (INPATIENT)
Facility: HOSPITAL | Age: 70
LOS: 14 days | Discharge: HOME | DRG: 951 | End: 2025-06-11
Attending: STUDENT IN AN ORGANIZED HEALTH CARE EDUCATION/TRAINING PROGRAM | Admitting: HOSPITALIST
Payer: COMMERCIAL

## 2025-05-27 VITALS
TEMPERATURE: 98 F | HEIGHT: 65 IN | WEIGHT: 151.9 LBS | DIASTOLIC BLOOD PRESSURE: 61 MMHG | OXYGEN SATURATION: 99 % | SYSTOLIC BLOOD PRESSURE: 160 MMHG | RESPIRATION RATE: 18 BRPM | HEART RATE: 72 BPM

## 2025-05-27 DIAGNOSIS — Z90.49 ACQUIRED ABSENCE OF OTHER SPECIFIED PARTS OF DIGESTIVE TRACT: Chronic | ICD-10-CM

## 2025-05-27 DIAGNOSIS — Z98.890 OTHER SPECIFIED POSTPROCEDURAL STATES: Chronic | ICD-10-CM

## 2025-05-27 DIAGNOSIS — E89.0 POSTPROCEDURAL HYPOTHYROIDISM: Chronic | ICD-10-CM

## 2025-05-27 PROCEDURE — 99285 EMERGENCY DEPT VISIT HI MDM: CPT

## 2025-05-27 NOTE — ED ADULT TRIAGE NOTE - CHIEF COMPLAINT QUOTE
pt presents to ED c/o abdominal pain and swelling to right side of abdomen x3 hours. pt hx of chronic pancreatitis. last mediated with tylenol at 1700.

## 2025-05-28 DIAGNOSIS — Z99.2 DEPENDENCE ON RENAL DIALYSIS: ICD-10-CM

## 2025-05-28 DIAGNOSIS — Z98.891 HISTORY OF UTERINE SCAR FROM PREVIOUS SURGERY: Chronic | ICD-10-CM

## 2025-05-28 DIAGNOSIS — E87.5 HYPERKALEMIA: ICD-10-CM

## 2025-05-28 DIAGNOSIS — I10 ESSENTIAL (PRIMARY) HYPERTENSION: ICD-10-CM

## 2025-05-28 DIAGNOSIS — K31.84 GASTROPARESIS: ICD-10-CM

## 2025-05-28 DIAGNOSIS — E83.39 OTHER DISORDERS OF PHOSPHORUS METABOLISM: ICD-10-CM

## 2025-05-28 DIAGNOSIS — K74.60 UNSPECIFIED CIRRHOSIS OF LIVER: ICD-10-CM

## 2025-05-28 DIAGNOSIS — I25.10 ATHEROSCLEROTIC HEART DISEASE OF NATIVE CORONARY ARTERY WITHOUT ANGINA PECTORIS: ICD-10-CM

## 2025-05-28 DIAGNOSIS — R19.7 DIARRHEA, UNSPECIFIED: ICD-10-CM

## 2025-05-28 DIAGNOSIS — N18.6 END STAGE RENAL DISEASE: ICD-10-CM

## 2025-05-28 LAB
ALBUMIN SERPL ELPH-MCNC: 4 G/DL — SIGNIFICANT CHANGE UP (ref 3.3–5.2)
ALP SERPL-CCNC: 95 U/L — SIGNIFICANT CHANGE UP (ref 40–120)
ALT FLD-CCNC: 10 U/L — SIGNIFICANT CHANGE UP
ANION GAP SERPL CALC-SCNC: 16 MMOL/L — SIGNIFICANT CHANGE UP (ref 5–17)
ANISOCYTOSIS BLD QL: SLIGHT — SIGNIFICANT CHANGE UP
APTT BLD: 34.9 SEC — SIGNIFICANT CHANGE UP (ref 26.1–36.8)
AST SERPL-CCNC: 13 U/L — SIGNIFICANT CHANGE UP
BASOPHILS # BLD AUTO: 0.03 K/UL — SIGNIFICANT CHANGE UP (ref 0–0.2)
BASOPHILS # BLD MANUAL: 0.03 K/UL — SIGNIFICANT CHANGE UP (ref 0–0.2)
BASOPHILS NFR BLD AUTO: 0.8 % — SIGNIFICANT CHANGE UP (ref 0–2)
BASOPHILS NFR BLD MANUAL: 0.9 % — SIGNIFICANT CHANGE UP (ref 0–2)
BILIRUB SERPL-MCNC: 0.2 MG/DL — LOW (ref 0.4–2)
BUN SERPL-MCNC: 52.2 MG/DL — HIGH (ref 8–20)
CALCIUM SERPL-MCNC: 8.7 MG/DL — SIGNIFICANT CHANGE UP (ref 8.4–10.5)
CHLORIDE SERPL-SCNC: 102 MMOL/L — SIGNIFICANT CHANGE UP (ref 96–108)
CO2 SERPL-SCNC: 20 MMOL/L — LOW (ref 22–29)
CREAT SERPL-MCNC: 6.16 MG/DL — HIGH (ref 0.5–1.3)
EGFR: 7 ML/MIN/1.73M2 — LOW
EGFR: 7 ML/MIN/1.73M2 — LOW
EOSINOPHIL # BLD AUTO: 0.16 K/UL — SIGNIFICANT CHANGE UP (ref 0–0.5)
EOSINOPHIL # BLD MANUAL: 0.22 K/UL — SIGNIFICANT CHANGE UP (ref 0–0.5)
EOSINOPHIL NFR BLD AUTO: 4.3 % — SIGNIFICANT CHANGE UP (ref 0–6)
EOSINOPHIL NFR BLD MANUAL: 6 % — SIGNIFICANT CHANGE UP (ref 0–6)
FERRITIN SERPL-MCNC: 116 NG/ML — SIGNIFICANT CHANGE UP (ref 13–330)
GIANT PLATELETS BLD QL SMEAR: PRESENT
GLUCOSE BLDC GLUCOMTR-MCNC: 156 MG/DL — HIGH (ref 70–99)
GLUCOSE BLDC GLUCOMTR-MCNC: 188 MG/DL — HIGH (ref 70–99)
GLUCOSE BLDC GLUCOMTR-MCNC: 93 MG/DL — SIGNIFICANT CHANGE UP (ref 70–99)
GLUCOSE SERPL-MCNC: 191 MG/DL — HIGH (ref 70–99)
HCT VFR BLD CALC: 26.4 % — LOW (ref 34.5–45)
HGB BLD-MCNC: 8.3 G/DL — LOW (ref 11.5–15.5)
IMM GRANULOCYTES # BLD AUTO: 0 K/UL — SIGNIFICANT CHANGE UP (ref 0–0.07)
IMM GRANULOCYTES NFR BLD AUTO: 0 % — SIGNIFICANT CHANGE UP (ref 0–0.9)
IMMATURE PLATELET FRACTION #: 2.1 K/UL — LOW (ref 4.7–11.1)
IMMATURE PLATELET FRACTION %: 3 % — SIGNIFICANT CHANGE UP (ref 1.6–4.9)
INR BLD: 1.04 RATIO — SIGNIFICANT CHANGE UP (ref 0.85–1.16)
IRON SATN MFR SERPL: 17 % — SIGNIFICANT CHANGE UP (ref 14–50)
IRON SATN MFR SERPL: 61 UG/DL — SIGNIFICANT CHANGE UP (ref 37–145)
LIDOCAIN IGE QN: 45 U/L — SIGNIFICANT CHANGE UP (ref 22–51)
LYMPHOCYTES # BLD AUTO: 0.88 K/UL — LOW (ref 1–3.3)
LYMPHOCYTES # BLD MANUAL: 0.86 K/UL — LOW (ref 1–3.3)
LYMPHOCYTES NFR BLD AUTO: 23.8 % — SIGNIFICANT CHANGE UP (ref 13–44)
LYMPHOCYTES NFR BLD MANUAL: 23.3 % — SIGNIFICANT CHANGE UP (ref 13–44)
MACROCYTES BLD QL: SLIGHT — SIGNIFICANT CHANGE UP
MCHC RBC-ENTMCNC: 30.1 PG — SIGNIFICANT CHANGE UP (ref 27–34)
MCHC RBC-ENTMCNC: 31.4 G/DL — LOW (ref 32–36)
MCV RBC AUTO: 95.7 FL — SIGNIFICANT CHANGE UP (ref 80–100)
MONOCYTES # BLD AUTO: 0.37 K/UL — SIGNIFICANT CHANGE UP (ref 0–0.9)
MONOCYTES # BLD MANUAL: 0.16 K/UL — SIGNIFICANT CHANGE UP (ref 0–0.9)
MONOCYTES NFR BLD AUTO: 10 % — SIGNIFICANT CHANGE UP (ref 2–14)
MONOCYTES NFR BLD MANUAL: 4.3 % — SIGNIFICANT CHANGE UP (ref 2–14)
NEUTROPHILS # BLD AUTO: 2.26 K/UL — SIGNIFICANT CHANGE UP (ref 1.8–7.4)
NEUTROPHILS # BLD MANUAL: 2.42 K/UL — SIGNIFICANT CHANGE UP (ref 1.8–7.4)
NEUTROPHILS NFR BLD AUTO: 61.1 % — SIGNIFICANT CHANGE UP (ref 43–77)
NEUTROPHILS NFR BLD MANUAL: 65.5 % — SIGNIFICANT CHANGE UP (ref 43–77)
NRBC # BLD AUTO: 0 K/UL — SIGNIFICANT CHANGE UP (ref 0–0)
NRBC # FLD: 0 K/UL — SIGNIFICANT CHANGE UP (ref 0–0)
NRBC BLD AUTO-RTO: 0 /100 WBCS — SIGNIFICANT CHANGE UP (ref 0–0)
OVALOCYTES BLD QL SMEAR: SLIGHT — SIGNIFICANT CHANGE UP
PLAT MORPH BLD: NORMAL — SIGNIFICANT CHANGE UP
PLATELET # BLD AUTO: 70 K/UL — LOW (ref 150–400)
PMV BLD: 11.1 FL — SIGNIFICANT CHANGE UP (ref 7–13)
POIKILOCYTOSIS BLD QL AUTO: SLIGHT — SIGNIFICANT CHANGE UP
POLYCHROMASIA BLD QL SMEAR: SLIGHT — SIGNIFICANT CHANGE UP
POTASSIUM SERPL-MCNC: 5.4 MMOL/L — HIGH (ref 3.5–5.3)
POTASSIUM SERPL-SCNC: 5.4 MMOL/L — HIGH (ref 3.5–5.3)
PROT SERPL-MCNC: 7 G/DL — SIGNIFICANT CHANGE UP (ref 6.6–8.7)
PROTHROM AB SERPL-ACNC: 11.8 SEC — SIGNIFICANT CHANGE UP (ref 9.9–13.4)
RBC # BLD: 2.76 M/UL — LOW (ref 3.8–5.2)
RBC # FLD: 15.8 % — HIGH (ref 10.3–14.5)
RBC BLD AUTO: ABNORMAL
SODIUM SERPL-SCNC: 138 MMOL/L — SIGNIFICANT CHANGE UP (ref 135–145)
TIBC SERPL-MCNC: 365 UG/DL — SIGNIFICANT CHANGE UP (ref 220–430)
TRANSFERRIN SERPL-MCNC: 255 MG/DL — SIGNIFICANT CHANGE UP (ref 192–382)
WBC # BLD: 3.7 K/UL — LOW (ref 3.8–10.5)
WBC # FLD AUTO: 3.7 K/UL — LOW (ref 3.8–10.5)

## 2025-05-28 PROCEDURE — 74177 CT ABD & PELVIS W/CONTRAST: CPT | Mod: 26

## 2025-05-28 PROCEDURE — 71275 CT ANGIOGRAPHY CHEST: CPT | Mod: 26

## 2025-05-28 PROCEDURE — 99223 1ST HOSP IP/OBS HIGH 75: CPT

## 2025-05-28 PROCEDURE — 99223 1ST HOSP IP/OBS HIGH 75: CPT | Mod: 25

## 2025-05-28 PROCEDURE — 99222 1ST HOSP IP/OBS MODERATE 55: CPT

## 2025-05-28 PROCEDURE — 90937 HEMODIALYSIS REPEATED EVAL: CPT

## 2025-05-28 RX ORDER — LEVOTHYROXINE SODIUM 300 MCG
200 TABLET ORAL DAILY
Refills: 0 | Status: DISCONTINUED | OUTPATIENT
Start: 2025-05-28 | End: 2025-06-11

## 2025-05-28 RX ORDER — LIPASE/PROTEASE/AMYLASE 10K-37.5K
1 CAPSULE,DELAYED RELEASE (ENTERIC COATED) ORAL
Refills: 0 | Status: DISCONTINUED | OUTPATIENT
Start: 2025-05-28 | End: 2025-05-28

## 2025-05-28 RX ORDER — SODIUM CHLORIDE 9 G/1000ML
1000 INJECTION, SOLUTION INTRAVENOUS
Refills: 0 | Status: DISCONTINUED | OUTPATIENT
Start: 2025-05-28 | End: 2025-06-11

## 2025-05-28 RX ORDER — HYDROMORPHONE/SOD CHLOR,ISO/PF 2 MG/10 ML
0.5 SYRINGE (ML) INJECTION ONCE
Refills: 0 | Status: DISCONTINUED | OUTPATIENT
Start: 2025-05-28 | End: 2025-05-28

## 2025-05-28 RX ORDER — IPRATROPIUM BROMIDE AND ALBUTEROL SULFATE .5; 2.5 MG/3ML; MG/3ML
3 SOLUTION RESPIRATORY (INHALATION) EVERY 6 HOURS
Refills: 0 | Status: DISCONTINUED | OUTPATIENT
Start: 2025-05-28 | End: 2025-05-29

## 2025-05-28 RX ORDER — LISINOPRIL 5 MG/1
10 TABLET ORAL DAILY
Refills: 0 | Status: DISCONTINUED | OUTPATIENT
Start: 2025-05-28 | End: 2025-06-11

## 2025-05-28 RX ORDER — DEXTROSE 50 % IN WATER 50 %
12.5 SYRINGE (ML) INTRAVENOUS ONCE
Refills: 0 | Status: DISCONTINUED | OUTPATIENT
Start: 2025-05-28 | End: 2025-06-11

## 2025-05-28 RX ORDER — ASPIRIN 325 MG
81 TABLET ORAL DAILY
Refills: 0 | Status: DISCONTINUED | OUTPATIENT
Start: 2025-05-28 | End: 2025-06-03

## 2025-05-28 RX ORDER — CARVEDILOL 3.12 MG/1
25 TABLET, FILM COATED ORAL EVERY 12 HOURS
Refills: 0 | Status: DISCONTINUED | OUTPATIENT
Start: 2025-05-28 | End: 2025-06-11

## 2025-05-28 RX ORDER — SUCRALFATE 1 G
1 TABLET ORAL
Refills: 0 | Status: DISCONTINUED | OUTPATIENT
Start: 2025-05-28 | End: 2025-06-11

## 2025-05-28 RX ORDER — SERTRALINE 100 MG/1
200 TABLET, FILM COATED ORAL DAILY
Refills: 0 | Status: DISCONTINUED | OUTPATIENT
Start: 2025-05-28 | End: 2025-06-11

## 2025-05-28 RX ORDER — DEXTROSE 50 % IN WATER 50 %
15 SYRINGE (ML) INTRAVENOUS ONCE
Refills: 0 | Status: DISCONTINUED | OUTPATIENT
Start: 2025-05-28 | End: 2025-06-11

## 2025-05-28 RX ORDER — EPOETIN ALFA 10000 [IU]/ML
10000 SOLUTION INTRAVENOUS; SUBCUTANEOUS
Refills: 0 | Status: DISCONTINUED | OUTPATIENT
Start: 2025-05-28 | End: 2025-06-11

## 2025-05-28 RX ORDER — ONDANSETRON HCL/PF 4 MG/2 ML
4 VIAL (ML) INJECTION ONCE
Refills: 0 | Status: COMPLETED | OUTPATIENT
Start: 2025-05-28 | End: 2025-05-28

## 2025-05-28 RX ORDER — HYDROMORPHONE/SOD CHLOR,ISO/PF 2 MG/10 ML
0.5 SYRINGE (ML) INJECTION EVERY 4 HOURS
Refills: 0 | Status: DISCONTINUED | OUTPATIENT
Start: 2025-05-28 | End: 2025-06-04

## 2025-05-28 RX ORDER — INSULIN LISPRO 100 U/ML
INJECTION, SOLUTION INTRAVENOUS; SUBCUTANEOUS
Refills: 0 | Status: DISCONTINUED | OUTPATIENT
Start: 2025-05-28 | End: 2025-06-11

## 2025-05-28 RX ORDER — DEXTROSE 50 % IN WATER 50 %
25 SYRINGE (ML) INTRAVENOUS ONCE
Refills: 0 | Status: DISCONTINUED | OUTPATIENT
Start: 2025-05-28 | End: 2025-06-11

## 2025-05-28 RX ORDER — ONDANSETRON HCL/PF 4 MG/2 ML
4 VIAL (ML) INJECTION EVERY 6 HOURS
Refills: 0 | Status: DISCONTINUED | OUTPATIENT
Start: 2025-05-28 | End: 2025-05-30

## 2025-05-28 RX ORDER — GLUCAGON 3 MG/1
1 POWDER NASAL ONCE
Refills: 0 | Status: DISCONTINUED | OUTPATIENT
Start: 2025-05-28 | End: 2025-06-11

## 2025-05-28 RX ORDER — METOCLOPRAMIDE HCL 10 MG
10 TABLET ORAL
Refills: 0 | Status: DISCONTINUED | OUTPATIENT
Start: 2025-05-28 | End: 2025-06-11

## 2025-05-28 RX ORDER — INSULIN LISPRO 100 U/ML
INJECTION, SOLUTION INTRAVENOUS; SUBCUTANEOUS AT BEDTIME
Refills: 0 | Status: DISCONTINUED | OUTPATIENT
Start: 2025-05-28 | End: 2025-06-11

## 2025-05-28 RX ADMIN — EPOETIN ALFA 10000 UNIT(S): 10000 SOLUTION INTRAVENOUS; SUBCUTANEOUS at 15:39

## 2025-05-28 RX ADMIN — Medication 10 MILLIGRAM(S): at 17:13

## 2025-05-28 RX ADMIN — Medication 25 MILLIGRAM(S): at 14:11

## 2025-05-28 RX ADMIN — Medication 1 GRAM(S): at 11:34

## 2025-05-28 RX ADMIN — Medication 1000 MILLILITER(S): at 01:06

## 2025-05-28 RX ADMIN — Medication 10 MILLIGRAM(S): at 11:35

## 2025-05-28 RX ADMIN — SERTRALINE 200 MILLIGRAM(S): 100 TABLET, FILM COATED ORAL at 11:34

## 2025-05-28 RX ADMIN — Medication 0.5 MILLIGRAM(S): at 22:42

## 2025-05-28 RX ADMIN — Medication 2001 MILLIGRAM(S): at 17:13

## 2025-05-28 RX ADMIN — Medication 25 MILLIGRAM(S): at 19:56

## 2025-05-28 RX ADMIN — Medication 4 MILLIGRAM(S): at 01:07

## 2025-05-28 RX ADMIN — Medication 0.5 MILLIGRAM(S): at 09:22

## 2025-05-28 RX ADMIN — Medication 0.5 MILLIGRAM(S): at 16:00

## 2025-05-28 RX ADMIN — Medication 0.5 MILLIGRAM(S): at 15:30

## 2025-05-28 RX ADMIN — CARVEDILOL 25 MILLIGRAM(S): 3.12 TABLET, FILM COATED ORAL at 17:13

## 2025-05-28 RX ADMIN — Medication 0.5 MILLIGRAM(S): at 01:08

## 2025-05-28 RX ADMIN — Medication 0.5 MILLIGRAM(S): at 03:20

## 2025-05-28 RX ADMIN — Medication 0.5 MILLIGRAM(S): at 21:42

## 2025-05-28 RX ADMIN — Medication 1 GRAM(S): at 17:12

## 2025-05-28 RX ADMIN — INSULIN LISPRO 1: 100 INJECTION, SOLUTION INTRAVENOUS; SUBCUTANEOUS at 11:17

## 2025-05-28 RX ADMIN — Medication 81 MILLIGRAM(S): at 11:34

## 2025-05-28 NOTE — ED ADULT NURSE NOTE - NSFALLUNIVINTERV_ED_ALL_ED
Bed/Stretcher in lowest position, wheels locked, appropriate side rails in place/Call bell, personal items and telephone in reach/Instruct patient to call for assistance before getting out of bed/chair/stretcher/Non-slip footwear applied when patient is off stretcher/[place] to call system/Physically safe environment - no spills, clutter or unnecessary equipment/Purposeful proactive rounding/Room/bathroom lighting operational, light cord in reach

## 2025-05-28 NOTE — PATIENT PROFILE ADULT - HEALTH LITERACY
CC: cough, shortness of breath    History:  George Wilder is a 69 y.o. male who presents today for evaluation of the above problems.      Patient was here one week ago with upper respiratory symptoms, started getting better, but then symptoms returned. Complains of cough and shortness of breath. Has been on astelin, prednisone, and mucinex.     HPI  ROS:  Review of Systems   Respiratory: Positive for cough and shortness of breath.        Allergies   Allergen Reactions   • Brilinta [Ticagrelor] Shortness Of Breath     Gasping for air       Past Medical History:   Diagnosis Date   • Allergic rhinitis due to allergen 8/8/2017   • Arthritis    • Back pain    • CHF (congestive heart failure) ([facility])    • Chronic ethmoidal sinusitis 5/18/2017   • Chronic maxillary sinusitis 5/18/2017   • Coronary artery disease 1984    stent LAD, Cx, RCA    • GERD (gastroesophageal reflux disease)    • Heart attack ([facility]) 1994    Dr Stiles    • Heartburn    • Hyperlipidemia    • Hypertension    • Myocardial infarction ([facility])    • Pain     back, & Neck      Past Surgical History:   Procedure Laterality Date   • ANKLE SURGERY     • BACK SURGERY     • CARDIAC CATHETERIZATION  1984, 1994   • CARDIAC CATHETERIZATION N/A 10/14/2021    Procedure: Left Heart Cath;  Surgeon: Kishore Stiles MD;  Location: [facility] CATH INVASIVE LOCATION;  Service: Cardiology;  Laterality: N/A;   • CHOLECYSTECTOMY  2014    Lararocopic    • CORONARY STENT PLACEMENT     • ENDOSCOPIC FUNCTIONAL SINUS SURGERY (FESS) N/A 6/12/2017    Procedure: ENDOSCOPIC FUNCTIONAL SINUS SURGERY WITH LEFT MIDDLE MEATAL ANTROSTOMY AND LEFT ETHMOIDECTOMY;  Surgeon: Jona Garibay MD;  Location: [facility] OR;  Service:    • ENDOSCOPIC FUNCTIONAL SINUS SURGERY (FESS) Bilateral 8/10/2020    Procedure: Septoplasty Resection of left sammy bullosa deformity Bilateral maxillary antrostomy and uncinectomy;  Surgeon: Jona Garibay MD;  Location: [facility] OR;  Service: ENT;  Laterality: Bilateral;    • ETHMOIDECTOMY Left 6/12/2017    Procedure: ETHMOIDECTOMY ENDOSCOPIC;  Surgeon: Jona Garibay MD;  Location: [facility];  Service:    • EYE SURGERY      LASIK   • HAND SURGERY     • SINUS SURGERY     • TOTAL HIP ARTHROPLASTY Bilateral      Family History   Problem Relation Age of Onset   • No Known Problems Mother    • Cancer Father    • Lung cancer Father         metastatic adenocarcinoma of lung   • Stroke Brother    • Cancer Brother       reports that he quit smoking about 29 years ago. His smoking use included cigarettes. He has a 48.00 pack-year smoking history. He has never used smokeless tobacco. He reports that he does not currently use alcohol. He reports that he does not use drugs.      Current Outpatient Medications:   •  aspirin 81 MG EC tablet, Take 81 mg by mouth Daily., Disp: , Rfl:   •  celecoxib (CeleBREX) 200 MG capsule, Take 200 mg by mouth Daily., Disp: , Rfl:   •  cetirizine (zyrTEC) 10 MG tablet, Take 10 mg by mouth Daily., Disp: , Rfl:   •  Cholecalciferol (VITAMIN D3) 5000 UNITS capsule capsule, Take 5,000 Units by mouth Daily., Disp: , Rfl:   •  cyclobenzaprine (FLEXERIL) 10 MG tablet, Take 10 mg by mouth 3 (Three) Times a Day As Needed., Disp: , Rfl:   •  gabapentin (NEURONTIN) 300 MG capsule, Take 300 mg by mouth 3 (Three) Times a Day., Disp: , Rfl:   •  guaiFENesin (Mucinex) 600 MG 12 hr tablet, Take 2 tablets by mouth 2 (Two) Times a Day., Disp: 28 tablet, Rfl: 0  •  HYDROcodone-acetaminophen (NORCO)  MG per tablet, Take 1 tablet by mouth 2 (Two) Times a Day. BACK PAIN, Disp: , Rfl:   •  isosorbide mononitrate (IMDUR) 30 MG 24 hr tablet, Take 1 tablet by mouth Daily., Disp: 30 tablet, Rfl: 0  •  metoprolol tartrate (LOPRESSOR) 50 MG tablet, TAKE 1 TABLET BY MOUTH EVERYDAY AT BEDTIME, Disp: 30 tablet, Rfl: 11  •  Multiple Vitamins-Minerals (MULTIVITAMIN WITH MINERALS) tablet tablet, Take 1 tablet by mouth Daily., Disp: , Rfl:   •  nitroglycerin (NITROSTAT) 0.4 MG SL tablet,  "1 under the tongue as needed for angina, may repeat q5mins for up three doses, Disp: 25 tablet, Rfl: 2  •  omeprazole (priLOSEC) 20 MG capsule, Take 20 mg by mouth Daily., Disp: , Rfl:   •  prasugrel (EFFIENT) 5 MG tablet, Take 2 tablets by mouth Daily. (Patient taking differently: Take 5 mg by mouth Daily.), Disp: 30 tablet, Rfl: 11  •  prasugrel (EFFIENT) 5 MG tablet, Take 5 mg by mouth Daily., Disp: , Rfl:   •  Psyllium (DAILY FIBER PO), Take  by mouth., Disp: , Rfl:   •  rosuvastatin (CRESTOR) 20 MG tablet, Take 1 tablet by mouth Daily., Disp: 30 tablet, Rfl: 11  •  albuterol (ACCUNEB) 1.25 MG/3ML nebulizer solution, Take 3 mL by nebulization 3 (Three) Times a Day As Needed for Wheezing or Shortness of Air., Disp: 30 each, Rfl: 0  •  azithromycin (Zithromax) 250 MG tablet, Take 2 tablets the first day, then 1 tablet daily for 4 days., Disp: 6 tablet, Rfl: 0  No current facility-administered medications for this visit.    OBJECTIVE:  /83 (BP Location: Left arm, Patient Position: Sitting, Cuff Size: Large Adult)   Pulse 62   Temp 98 °F (36.7 °C) (Temporal)   Resp 18   Ht 188 cm (74\")   Wt 132 kg (290 lb)   SpO2 97%   BMI 37.23 kg/m²    Physical Exam  Vitals reviewed.   Constitutional:       Appearance: He is well-developed.   Cardiovascular:      Rate and Rhythm: Normal rate.   Pulmonary:      Effort: Pulmonary effort is normal.      Breath sounds: Wheezing present.   Neurological:      Mental Status: He is alert and oriented to person, place, and time.   Psychiatric:         Behavior: Behavior normal.         Assessment/Plan    Diagnoses and all orders for this visit:    1. Upper respiratory tract infection, unspecified type (Primary)  -     POCT SARS-CoV-2 Antigen YONAS  -     POCT RSV    2. Bronchitis  -     triamcinolone acetonide (KENALOG-40) injection 40 mg  -     Miscellaneous DME  -     azithromycin (Zithromax) 250 MG tablet; Take 2 tablets the first day, then 1 tablet daily for 4 days.  " Dispense: 6 tablet; Refill: 0  -     albuterol (ACCUNEB) 1.25 MG/3ML nebulizer solution; Take 3 mL by nebulization 3 (Three) Times a Day As Needed for Wheezing or Shortness of Air.  Dispense: 30 each; Refill: 0    Continue mucinex. Start z pack and breathing treatments.    An After Visit Summary was printed and given to the patient at discharge.  Return if symptoms worsen or fail to improve, for Next scheduled follow up.       ISAAC Mendoza 2/24/23    Electronically signed.   None no

## 2025-05-28 NOTE — ED PROVIDER NOTE - CLINICAL SUMMARY MEDICAL DECISION MAKING FREE TEXT BOX
On arrival HD stable, well appearing in no acute distress. Suspected pain exacerbation 2/2 known gastroparesis. Patient having bowel movements and tolerating PO, low suspicion for bowel obstruction. Given history of pancreatitis and worsening pain will obtain ct imaging for further eval. Blood work ordered. Given antiemetics, analgesics for sx management.

## 2025-05-28 NOTE — ED ADULT NURSE NOTE - NS ED NURSE TRANSPORT WITH
Physical Therapy    Visit Type: treatment    Relevant History/Co-morbidities: 6/13/23:L HAYDERA(Dr. Srinivasan);  h/o CAD, CABG, a-fib, HTN, HLD, CKD, IDDM, advanced PVD  6/20/23- sent a perfect serve to Dr. Bryan CARRIZALES to remove knee immobilizer for therapy    SUBJECTIVE  Patient agreed to participate in therapy this date.  agreeable  Patient / Family Goal: return home and maximize function    Pain   RN informed on pain level.    At onset of session (out of 10): 0     OBJECTIVE     Cognitive Status   Orientation    - Oriented to: person, place, time and situation                Bed Mobility  - Rolling left: stand by assist  - Rolling right: stand by assist  - Supine to sit: stand by assist  - Sit to supine: stand by assist  Transfers  Assistive devices: gait belt, 2-wheeled walker  - Sit to stand: minimal assist, contact guard/touching/steadying assist  - Stand to sit: minimal assist, contact guard/touching/steadying assist  - Stand pivot: minimal assist, contact guard/touching/steadying assist  Unsteady SPT with rolling walker, hopping  Sit<>stand with verbal cues on technique & safety, hand placement    Ambulation / Gait  - Assistive device: gait belt and two-wheeled walker  - Distance (feet unless otherwise indicated): 25  - Assist Level: minimal assist and contact guard/touching/steadying assist  - Surface: even  - Description: hopping and unsteady  Unsteady on turns    Stair Ambulation  - Number of steps: 4;   - Assist Level: moderate assist  - Rails: right rail only  - Pattern: step to  - Devices Used: left crutch      Curb Step Ambulation  - Assist Level: total assist  - Assistive Device: walker  Minimal assist x 2  Ascends forward up 4\" stoop & descends backwards    Wheelchair Mobility  - Type: manual  - Propel Method: left upper extremity, right upper extremity and right lower extremity  Level Surfaces  - assist: stand by assist  - distance: 100    Interventions  Balance Training  Standing ring toss with rolling  walker CGA  Therapeutic Exercise  Supine exercises including, left LE hip and knee flexion, hip abd/add,  SAQ, quad &  glut sets, bridging over bolster and straight leg raise. Right LE heel slides, hip abd/add, straight leg raise, and ankle pumps, x 10-15 repetitions.  Gentle Left KTC stretch.   Right sidelying Left hip abduction x 10  Prone lying Left knee flexion x 10        Skilled input: verbal instruction/cues  Verbal Consent: Writer verbally educated and received verbal consent for hand placement, positioning of patient, and techniques to be performed today from patient for clothing adjustments for techniques, therapist position for techniques and hand placement and palpation for techniques as described above and how they are pertinent to the patient's plan of care.         ASSESSMENT   Impairments: activity tolerance, balance, cardiovascular endurance, endurance, pain, safety awareness and strength  Functional Limitations: bed mobility, sit to/from stand transfers, stand pivot transfers and ambulation  Progressing mobility. Short term goals met.  Long term goals in progress.        Discharge Recommendations  Recommendation for Discharge: PT IL: Patient requires 24 HOUR assistance to perform mobility and/or ADLs safely, Patient is appropriate for Physical Therapy 1-3 times per week  PT/OT Mobility Equipment for Discharge: RW, w/c, hunter  PT/OT ADL Equipment for Discharge: tub transfer bench; toilet safety frame  PT Identified Barriers to Discharge: decr balance, LLE pain, decr activity tolerance. Lives alone in ranch house but states has good support.      Progress: improving as expected    Therapy Participation: This patient was unable to participate in 30 minutes of scheduled therapy with this therapist this session due to Not ready/ late breakfast.    Education:   - Present and ready to learn: patient    Patient at End of Session:   Location: in wheelchair  Safety measures: alarm system in  place/re-engaged and call light within reach  Handoff to: rehab aide/tech    PLAN   Suggestions for next session as indicated: PT Frequency: 5 days/week, 6 days/week, 7 days/week  Frequency Comments: 1-1.5 hours a day    Interventions: bed mobility, gait training, functional transfer training, HEP train/position, patient/family training, strengthening, safety education, balance and stairs retraining  Agreement to plan and goals: patient agrees with goals and treatment plan      GOALS  Review date: 6/27/2023  Short Term Goals (STGs): to be met 7 days from date established, unless otherwise stated.  1. Minimal assist for bed mobility- MET  2. Min assist for transfers with a rolling walker- MET  3. Pt will amb 5ft hopping with the rolling walker and maximal assist.-MET  4. Pt will tolerate curb assessment- MET (4\" curb )  Long Term Goals (LTGs): to be met by discharge from rehab program.  1. Modified I for bed mobility  2. Supervision for transfers with rolling walker.  3. contact guard assist for ambulation 10ft with rolling walker  4. Min assist up and down 1 step with rolling walker    Documented in the chart in the following areas: Assessment/Plan.      Therapy procedure time and total treatment time can be found documented on the Time Entry flowsheet    and telebox

## 2025-05-28 NOTE — ED PROVIDER NOTE - ATTENDING CONTRIBUTION TO CARE
69 year old female w/PMHx HD MWF,  HTN, gastroparesis, chronic pancreatitis, diabetes, liver cirrhosis presents to the ED with abd pain. States it has been an ongoing issue "for a while" however over the past week worsening. Associated with nausea, vomiting x1 yesterday. Denies any chest pain, SOB, fevers, chills, constipation, diarrhea, melena, hematuria or dysuria. Of note saw her pain management doctor a week ago and was discontinued off Dilaudid for an upcoming nerve block? Follows with Dr. Antonio. Last dialysis Friday, missed Monday and was scheduled for Tuesday however missed again due to abd pain. States she is currently getting started for home hospice care.    CONSTITUTIONAL: In no apparent distress.  HEENMT: Airway patent,   EYES: Pupils equal, round, Extra-ocular movement intact, eyes are clear b/l  CARDIAC: Regular rate and rhythm, Heart sounds S1 S2 present  RESPIRATORY: No respiratory distress. No stridor, Lungs sounds clear with good aeration bilaterally.   GASTROINTESTINAL: Abdomen soft, tender and non-distended   MUSCULOSKELETAL: Spine appears normal, movement of extremities grossly intact.  NEUROLOGICAL: Alert and interactive, no focal deficits, tone is normal, moving all extremities well,  SKIN: No cyanosis, no pallor, no jaundice, no rash      ISandy, personally saw the patient with the resident, and completed the key components of the history and physical exam. I then discussed the management plan with the resident.

## 2025-05-28 NOTE — CONSULT NOTE ADULT - PROBLEM SELECTOR RECOMMENDATION 9
Patient with chronic intermittent  GUILHERME pain.    She is concerned as she feels intermittent RUQ distension. CT shows mild ascites which maybe due to the fact she missed dialysis.    Abdomen does not feel distended on exam  Had recent EGD  with her primary GI  2 months ago   can give BID PPI

## 2025-05-28 NOTE — CONSULT NOTE ADULT - NS ATTEND AMEND GEN_ALL_CORE FT
hx of chronic pancreatitis, chronic diarrhea, Right side abdominal  distension as per pt     can give creon with meals and snacks  diarrhea is no worse than her chronic symptoms  CT - my interpretation- unde rdistension of colon  NO leukocytosis.  WBC 3.7 Doubt she has infectious colitis. cbc ordered for tomorrow   Can check Stool GI PCR- no recent antibiotics  can give low fiber renal diet   plan discussed with Dr Humza GRACE- BS, soft ,non  tender

## 2025-05-28 NOTE — PATIENT PROFILE ADULT - FUNCTIONAL ASSESSMENT - BASIC MOBILITY 6.
Pre op Cardiac surgery work up in progress   c/w ASA 81, started Lopressor, Atorvastatin qHS,   Heparin gtt for AC  p2y12 260  OR monday 7/17 with Dr. Barron 3 = A little assistance

## 2025-05-28 NOTE — CHART NOTE - NSCHARTNOTEFT_GEN_A_CORE
Edel TapiaCheryl	1955	Female	[address]	[id number]    Prescription Information      PDI Filter:    PDI	My Rx	Current Rx	Drug Type	Rx Written	Rx Dispensed	Drug	Quantity	Days Supply	Prescriber Name	Prescriber JUDITH #	Payment Method	Dispenser  A	N	N	O	05/10/2025	05/14/2025	hydromorphone 4 mg tablet	10	3	Gaurav Polanco	[id number]	Insurance	[facility]  A	N	N	O	03/28/2025	04/17/2025	hydromorphone 4 mg tablet	24	4	Aleja Dewey	[id number]	Insurance	[facility]  A	N	N	O	03/23/2025	03/24/2025	hydromorphone 4 mg tablet	4	2	[facility]	[id number]	Insurance	[facility]  A	N	N	B	03/10/2025	03/10/2025	alprazolam 0.25 mg tablet	30	15	Agustina Fabian	[id number]	Insurance	[facility]  A	N	N	O	02/10/2025	02/25/2025	hydromorphone 4 mg tablet	6	3	MaeBill pratt	[id number]	Insurance	[facility]  A	N	N	O	01/17/2025	01/27/2025	hydromorphone 4 mg tablet	8	4	Melvin Holliday	[id number]	Insurance	[facility]  A	N	N	O	01/27/2025	01/27/2025	oxycodone hcl (ir) 5 mg tablet	6	3	Henry Ashley	[id number]	Insurance	[facility]  A	N	N	O	12/21/2024	01/13/2025	oxycodone-acetaminophen 5-325 mg tab	10	3	Pete Ghosh [facility]	[id number]	Insurance	[facility]  A	N	N	O	01/06/2025	01/13/2025	hydromorphone 4 mg tablet	6	2	Vern Wang	[id number]	Insurance	[facility]  A	N	N	O	12/16/2024	12/20/2024	hydromorphone 4 mg tablet	10	4	Aleja Dewey	[id number]	Insurance	[facility]  A	N	N	O	09/26/2024	09/26/2024	hydromorphone 2 mg tablet	30	15	Nalini Coleman	[id number]	Insurance	[facility]  A	N	N	O	09/20/2024	09/20/2024	oxycodone-acetaminophen 5-325 mg tab	10	3	Sylvie James	[id number]	Insurance	[facility]  A	N	N	O	08/19/2024	08/19/2024	oxycodone-acetaminophen 5-325 mg tab	12	3	Elvia Trevinoroxanne	[id number]	Insurance	[facility]  A	N	N	O	07/22/2024	07/23/2024	hydromorphone 2 mg tablet	14	7	Nalini Coleman	[id number]	Insurance	[facility]  A	N	N	B	07/23/2024	07/23/2024	alprazolam 0.25 mg tablet	30	30	Zion Barrientos MD	[id number]	Insurance	[facility]

## 2025-05-28 NOTE — H&P ADULT - NSICDXFAMILYHX_GEN_ALL_CORE_FT
FAMILY HISTORY:  Father  Still living? No  FH: brain cancer, Age at diagnosis: Age Unknown    Mother  Still living? No  FH: breast cancer, Age at diagnosis: Age Unknown

## 2025-05-28 NOTE — ED PROVIDER NOTE - PHYSICAL EXAMINATION
Gen: NAD, AOx3  Head: NCAT  HEENT: EOMI, oral mucosa moist, normal conjunctiva, neck supple  Lung: CTAB, no respiratory distress  CV: rrr, no murmur, Normal perfusion  Abd: right upper quadrant tenderness to palpation with abd distension. no guarding or rigidity noted  MSK: No edema, no visible deformities  Neuro: No focal neurologic deficits  Skin: No rash   Psych: normal affect

## 2025-05-28 NOTE — H&P ADULT - HISTORY OF PRESENT ILLNESS
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 Intermittent and relieved with hydromorphone. Associated nausea and vomited bilious material 2 days ago and 1 episode of loose brown stool yesterday without any blood. Chronic chills. Scant urination, denies any dysuria. She feels dizzy all the time and has lately noticed dyspnea and hypoxia without any chest pain or palpitations or syncope.  She sees a GI Dr Kenyon, who she stated was planning to do a nerve block and get her off Dilaudid  Of noted patient last had HD on Friday (5 days ago) and is due for HD today

## 2025-05-28 NOTE — H&P ADULT - ASSESSMENT
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 CT abdomen with diffuse colon thickening.     # Abdominal pain, unclear etiology. Possible colitis (non-infectious as afebrile with mild leukopenia) vs pain from adhesions from multiple surgeries  # Gastroparesis  # Chronic Pancreatitis, currently with normal Lipase. CT findings as noted  # Erosive Esophagitis history  # Cirrhosis, prior HCV  - Admit to medical service  - Hydromorphone PRN  - Ondansetron PRN  - Metoclopramide for gastroparesis  - Pancreatic enzymes supplements  - Sucralfate  - GI evaluation    # ESRD  # Hyperkalemia  # Hyperphosphatemia  # AoCKD  - Telemetry  - HD per Nephrology today  - Phosphate binder  - LASHELL with HD  - Check Iron stores    # Hypertension  # History of CAD  # History of CHF, likely diastolic  - Home antihypertensives to be continued  - ASA to be continued  - Fluid balance monitoring and daily weight    # Diabetes  # Hypothyroidism, history of thyroidectomy for cancer  - Blood glucose monitoring with sliding scale Lispro  - A1c in am  - Levothyroxine    # Anxiety  # Depression  - Sertraline to be continued    VTE prophylaxis - Intermittent pneumatic compression devices  Ambulate ad gareth with walker    Medication management - patient unsure but takes 20 pills daily in addition to PRN Insulins. [place]  check and DrFirst and medications reconciled accordingly  Palliative consultation placed to help with goals of care and reported possible ongoing hospice evaluation    Disposition - Pending HD, abdominal pain control. Anticipate home upon discharge

## 2025-05-28 NOTE — ED ADULT NURSE NOTE - NSICDXPASTMEDICALHX_GEN_ALL_CORE_FT
PAST MEDICAL HISTORY:  Bernard syndrome     Cirrhosis     Diabetes     ESRD on dialysis MWF at [facility]    HTN (hypertension)     Pancreatitis

## 2025-05-28 NOTE — H&P ADULT - NSICDXPASTMEDICALHX_GEN_ALL_CORE_FT
PAST MEDICAL HISTORY:  Bernard syndrome     CAD (coronary artery disease)     Chronic diastolic congestive heart failure     Cirrhosis     Diabetes     Esophagitis, erosive     ESRD on dialysis MWF at [facility]    Gastroparesis     HCV (hepatitis C virus)     HTN (hypertension)     IBS (irritable bowel syndrome)     Pancreatitis     Thyroid cancer

## 2025-05-28 NOTE — ED PROVIDER NOTE - OBJECTIVE STATEMENT
69 year old female w/PMHx HD MWF,  HTN, gastroparesis, chronic pancreatitis, diabetes, liver cirrhosis presents to the ED with abd pain. States it has been an ongoing issue "for a while" however over the past week worsening. Associated with nausea, vomiting x1 yesterday. Denies any chest pain, SOB, fevers, chills, consitpation, diarrhea, melena, hematuria or dysuria. Of note saw her pain management doctor a week ago and was discontinued off dilaudid for an upcoming nerve block? Follows with Dr. Antonio. Last dialysis monday, due today. States she is currently getting started for home hospice care. 69 year old female w/PMHx HD MWF,  HTN, gastroparesis, chronic pancreatitis, diabetes, liver cirrhosis presents to the ED with abd pain. States it has been an ongoing issue "for a while" however over the past week worsening. Associated with nausea, vomiting x1 yesterday. Denies any chest pain, SOB, fevers, chills, consitpation, diarrhea, melena, hematuria or dysuria. Of note saw her pain management doctor a week ago and was discontinued off dilaudid for an upcoming nerve block? Follows with Dr. Antonio. Last dialysis Friday, missed monday and was scheduled for Tuesday however missed again due to abd pain. States she is currently getting started for home hospice care.

## 2025-05-28 NOTE — ED ADULT NURSE REASSESSMENT NOTE - NS ED NURSE REASSESS COMMENT FT1
Report received at 7:15am by off going nurse. Care assumed at this time. Pt A+Ox4, Respirations are equal and unlabored on 2L NC. Pt c/o pain to ABD. Pt denies SOB, CP, HA, N/V/D, or dizziness. Pt placed on  and telebox at this time. Pt requesting food, MD Ching made aware of pts pain at this time and inquired about diet order. Pt left in position of comfort, wheels of stretcher locked and in the lowest position. Call bell within reach.
pt. states she had difficulty breathing. pt. noted to be satting 80% on RA. placed pt. on 4L nc and satting 96%. md PEREZ made aware.
report given to MOHIT Fox of CDU. Pt A+Ox4, respirations equal and unlabored on 2L NC. Pt on  and telebox.
trailed pt. off oxygen and pt. satting 90%. pt. placed on 2l NC and satting 94%
Pt medicated per MD order for ABD pain at this time. Pt A+Ox4, respirations are equal and unlabored on room air. Pt on  and telebox. Pt left in position of comfort, wheels of stretcher locked and in the lowest position. Call bell within reach.

## 2025-05-28 NOTE — PATIENT PROFILE ADULT - FALL HARM RISK - HARM RISK INTERVENTIONS

## 2025-05-28 NOTE — CONSULT NOTE ADULT - SUBJECTIVE AND OBJECTIVE BOX
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 CT Chest with mild effusions. CT abdomen with diffuse colon thickening. Pt admitted for colitis.  Nephrology consulted for HD_ pt gets dialysis MWF , last dialysis was on Friday and she missed session on Monday.    PAST HISTORY  --------------------------------------------------------------------------------  PAST MEDICAL & SURGICAL HISTORY:  Diabetes      Bernard syndrome      Pancreatitis      Cirrhosis      ESRD on dialysis  MWF at [facility]      HTN (hypertension)      CAD (coronary artery disease)      Chronic diastolic congestive heart failure      IBS (irritable bowel syndrome)      Gastroparesis      Thyroid cancer      Esophagitis, erosive      HCV (hepatitis C virus)      History of liver biopsy      H/O total thyroidectomy      History of cholecystectomy  1990s      S/P         FAMILY HISTORY:  FH: breast cancer (Mother)    FH: brain cancer (Father)      PAST SOCIAL HISTORY: lives at home    ALLERGIES & MEDICATIONS  --------------------------------------------------------------------------------  Allergies    Augmentin (Hives)  ceftriaxone (Pruritus)      Standing Inpatient Medications  albuterol/ipratropium for Nebulization 3 milliLiter(s) Nebulizer every 6 hours  aspirin enteric coated 81 milliGRAM(s) Oral daily  calcium acetate 667 milliGRAM(s) Oral three times a day with meals  carvedilol 25 milliGRAM(s) Oral every 12 hours  dextrose 5%. 1000 milliLiter(s) IV Continuous <Continuous>  dextrose 5%. 1000 milliLiter(s) IV Continuous <Continuous>  dextrose 50% Injectable 25 Gram(s) IV Push once  dextrose 50% Injectable 12.5 Gram(s) IV Push once  dextrose 50% Injectable 25 Gram(s) IV Push once  glucagon  Injectable 1 milliGRAM(s) IntraMuscular once  hydrALAZINE 25 milliGRAM(s) Oral every 8 hours  insulin lispro (ADMELOG) corrective regimen sliding scale   SubCutaneous three times a day before meals  insulin lispro (ADMELOG) corrective regimen sliding scale   SubCutaneous at bedtime  levothyroxine 200 MICROGram(s) Oral daily  lisinopril 10 milliGRAM(s) Oral daily  metoclopramide 10 milliGRAM(s) Oral three times a day before meals  pancrelipase (ZENPEP  3,000 Lipase Units) 1 Capsule(s) Oral three times a day with meals  sertraline 200 milliGRAM(s) Oral daily  sucralfate suspension 1 Gram(s) Oral four times a day    PRN Inpatient Medications  dextrose Oral Gel 15 Gram(s) Oral once PRN  HYDROmorphone  Injectable 0.5 milliGRAM(s) IV Push every 4 hours PRN  ondansetron Injectable 4 milliGRAM(s) IV Push every 6 hours PRN      REVIEW OF SYSTEMS  --------------------------------------------------------------------------------  Gen: No weight changes, fatigue, fevers/chills, weakness  Skin: No rashes  Head/Eyes/Ears/Mouth: No headache; Normal hearing; Normal vision w/o blurriness; No sinus pain/discomfort, sore throat  Respiratory: No dyspnea, cough, wheezing, hemoptysis  CV: No chest pain, PND, orthopnea  GI: No abdominal pain, diarrhea, constipation, nausea, vomiting, melena, hematochezia  : No increased frequency, dysuria, hematuria, nocturia  MSK: No joint pain/swelling; no back pain; no edema  Neuro: No dizziness/lightheadedness, weakness, seizures, numbness, tingling  Heme: No easy bruising or bleeding  Endo: No heat/cold intolerance  Psych: No significant nervousness, anxiety, stress, depression    All other systems were reviewed and are negative, except as noted.    VITALS/PHYSICAL EXAM  --------------------------------------------------------------------------------  T(C): 36.7 (25 @ 07:26), Max: 36.7 (25 @ 20:19)  HR: 67 (25 @ 09:22) (66 - 72)  BP: 168/76 (25 @ 09:22) (128/68 - 178/72)  RR: 18 (25 @ 09:22) (18 - 22)  SpO2: 92% (25 @ 09:22) (85% - 99%)  Wt(kg): --  Height (cm): 165.1 (25 @ 20:19)  Weight (kg): 68.9 (25 @ 20:19)  BMI (kg/m2): 25.3 (25 @ 20:19)  BSA (m2): 1.76 (25 @ 20:19)      Physical Exam:  	Gen: NAD, well-appearing  	HEENT: PERRL, supple neck, clear oropharynx  	Pulm: CTA B/L  	CV: RRR, S1S2; no rub  	Back: No spinal or CVA tenderness; no sacral edema  	Abd: +BS, soft, nontender/nondistended  	: No suprapubic tenderness  	UE: Warm, FROM, no clubbing, intact strength; no edema; no asterixis  	LE: Warm, FROM, no clubbing, intact strength; no edema  	Neuro: No focal deficits, intact gait  	Psych: Normal affect and mood  	Skin: Warm, without rashes  	Vascular access:    LABS/STUDIES  --------------------------------------------------------------------------------              8.3    3.70  >-----------<  70       [25 @ 23:28]              26.4     138  |  102  |  52.2  ----------------------------<  191      [25 @ 23:28]  5.4   |  20.0  |  6.16        Ca     8.7     [25 @ 23:28]    TPro  7.0  /  Alb  4.0  /  TBili  0.2  /  DBili  x   /  AST  13  /  ALT  10  /  AlkPhos  95  [25 @ 23:28]    PT/INR: PT 11.8 , INR 1.04       [25 @ 23:28]  PTT: 34.9       [25 @ 23:28]      Creatinine Trend:  SCr 6.16 [ 23:28]  SCr 4.37 [ 06:37]  SCr 6.72 [ 06:07]  SCr 6.16 [ 16:20]    Urinalysis - [25 @ 23:28]      Color  / Appearance  / SG  / pH       Gluc 191 / Ketone   / Bili  / Urobili        Blood  / Protein  / Leuk Est  / Nitrite       RBC  / WBC  / Hyaline  / Gran  / Sq Epi  / Non Sq Epi  / Bacteria         HBsAg Nonreact      [24 @ 13:30]  HCV 13.33, Reactive      [24 @ 02:33]     [facility] DIVISION OF KIDNEY DISEASES AND HYPERTENSION -- INITIAL CONSULT NOTE  --------------------------------------------------------------------------------  HPI:  69 year old female pt with Hypertension, Diabetes, CAD s/p PCI, ESRD on HD for 2.5 years (M,W,F), Gastroparesis, IBS, Chronic Pancreatitis, Erosive Esophagitis, 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 CT Chest with mild effusions. CT abdomen with diffuse colon thickening. Pt admitted for colitis.  Nephrology consulted for HD.  Pt seen and examined in ED; reports she has been on HD for about 2.5 years.  pt gets dialysis MWF , last dialysis was on Friday and she missed session on Monday as she wasnt feeling well.  Followed by Dr Marr at [facility].      PAST HISTORY  --------------------------------------------------------------------------------  PAST MEDICAL & SURGICAL HISTORY:  Diabetes      Bernard syndrome      Pancreatitis      Cirrhosis      ESRD on dialysis  MWF at [facility]      HTN (hypertension)      CAD (coronary artery disease)      Chronic diastolic congestive heart failure      IBS (irritable bowel syndrome)      Gastroparesis      Thyroid cancer      Esophagitis, erosive      HCV (hepatitis C virus)      History of liver biopsy      H/O total thyroidectomy      History of cholecystectomy  1990s      S/P         FAMILY HISTORY:  FH: breast cancer (Mother)    FH: brain cancer (Father)      PAST SOCIAL HISTORY: lives at home    ALLERGIES & MEDICATIONS  --------------------------------------------------------------------------------  Allergies    Augmentin (Hives)  ceftriaxone (Pruritus)      Standing Inpatient Medications  albuterol/ipratropium for Nebulization 3 milliLiter(s) Nebulizer every 6 hours  aspirin enteric coated 81 milliGRAM(s) Oral daily  calcium acetate 667 milliGRAM(s) Oral three times a day with meals  carvedilol 25 milliGRAM(s) Oral every 12 hours  dextrose 5%. 1000 milliLiter(s) IV Continuous <Continuous>  dextrose 5%. 1000 milliLiter(s) IV Continuous <Continuous>  dextrose 50% Injectable 25 Gram(s) IV Push once  dextrose 50% Injectable 12.5 Gram(s) IV Push once  dextrose 50% Injectable 25 Gram(s) IV Push once  glucagon  Injectable 1 milliGRAM(s) IntraMuscular once  hydrALAZINE 25 milliGRAM(s) Oral every 8 hours  insulin lispro (ADMELOG) corrective regimen sliding scale   SubCutaneous three times a day before meals  insulin lispro (ADMELOG) corrective regimen sliding scale   SubCutaneous at bedtime  levothyroxine 200 MICROGram(s) Oral daily  lisinopril 10 milliGRAM(s) Oral daily  metoclopramide 10 milliGRAM(s) Oral three times a day before meals  pancrelipase (ZENPEP  3,000 Lipase Units) 1 Capsule(s) Oral three times a day with meals  sertraline 200 milliGRAM(s) Oral daily  sucralfate suspension 1 Gram(s) Oral four times a day    PRN Inpatient Medications  dextrose Oral Gel 15 Gram(s) Oral once PRN  HYDROmorphone  Injectable 0.5 milliGRAM(s) IV Push every 4 hours PRN  ondansetron Injectable 4 milliGRAM(s) IV Push every 6 hours PRN      REVIEW OF SYSTEMS  --------------------------------------------------------------------------------  Gen: No weight changes, fatigue, fevers/chills, weakness  Skin: No rashes  Head/Eyes/Ears/Mouth: No headache; Normal hearing; Normal vision w/o blurriness; No sinus pain/discomfort, sore throat  Respiratory: No dyspnea, cough, wheezing, hemoptysis  CV: No chest pain, PND, orthopnea  GI: No abdominal pain, diarrhea, constipation, nausea, vomiting, melena, hematochezia  : No increased frequency, dysuria, hematuria, nocturia  MSK: No joint pain/swelling; no back pain; no edema  Neuro: No dizziness/lightheadedness, weakness, seizures, numbness, tingling  Heme: No easy bruising or bleeding  Endo: No heat/cold intolerance  Psych: No significant nervousness, anxiety, stress, depression    All other systems were reviewed and are negative, except as noted.    VITALS/PHYSICAL EXAM  --------------------------------------------------------------------------------  T(C): 36.7 (25 @ 07:26), Max: 36.7 (25 @ 20:19)  HR: 67 (25 @ 09:22) (66 - 72)  BP: 168/76 (25 @ 09:22) (128/68 - 178/72)  RR: 18 (25 @ 09:22) (18 - 22)  SpO2: 92% (25 @ 09:22) (85% - 99%)  Wt(kg): --  Height (cm): 165.1 (25 @ 20:19)  Weight (kg): 68.9 (25 @ 20:19)  BMI (kg/m2): 25.3 (25 @ 20:19)  BSA (m2): 1.76 (25 @ 20:19)      Physical Exam:  	Gen: NAD, well-appearing  	HEENT: on 2 L NC  	Pulm: b/l eulalio  	CV: RRR, S1S2; no rub  	Abd: +BS, soft, nontender/nondistended  	: No suprapubic tenderness  	UE: Warm,  no edema  	LE: Warm, no edema  	Neuro: No focal deficit  	Psych: Normal affect and mood  	Skin: Warm  	Vascular access: LUE AVF    LABS/STUDIES  --------------------------------------------------------------------------------              8.3    3.70  >-----------<  70       [25 @ 23:28]              26.4     138  |  102  |  52.2  ----------------------------<  191      [25 @ 23:28]  5.4   |  20.0  |  6.16        Ca     8.7     [25 @ 23:28]    TPro  7.0  /  Alb  4.0  /  TBili  0.2  /  DBili  x   /  AST  13  /  ALT  10  /  AlkPhos  95  [25 @ 23:28]    PT/INR: PT 11.8 , INR 1.04       [25 @ 23:28]  PTT: 34.9       [25 23:28]      Creatinine Trend:  SCr 6.16 [ 23:28]  SCr 4.37 [ @ 06:37]  SCr 6.72 [ 06:07]  SCr 6.16 [ @ 16:20]    Urinalysis - [25 @ 23:28]      Color  / Appearance  / SG  / pH       Gluc 191 / Ketone   / Bili  / Urobili        Blood  / Protein  / Leuk Est  / Nitrite       RBC  / WBC  / Hyaline  / Gran  / Sq Epi  / Non Sq Epi  / Bacteria         HBsAg Nonreact      [24 @ 13:30]  HCV 13.33, Reactive      [24 @ 02:33]

## 2025-05-28 NOTE — H&P ADULT - NSHPPHYSICALEXAM_GEN_ALL_CORE
OBJECTIVE:  Vital Signs Last 24 Hrs  T(C): 36.7 (28 May 2025 07:26), Max: 36.7 (27 May 2025 20:19)  T(F): 98 (28 May 2025 07:26), Max: 98.1 (28 May 2025 02:20)  HR: 66 (28 May 2025 07:26) (66 - 72)  BP: 178/72 (28 May 2025 07:26) (128/68 - 178/72)  BP(mean): --  RR: 20 (28 May 2025 07:26) (18 - 22)  SpO2: 95% (28 May 2025 07:26) (85% - 99%)    Parameters below as of 28 May 2025 03:28  Patient On (Oxygen Delivery Method): nasal cannula  O2 Flow (L/min): 2      PHYSICAL EXAMINATION  General: Elderly female, chronically ill appearing, lying on stretcher, fatigued  HEENT:  Anicteric sclera, extraocular movements intact   NECK:  Supple, prominent neck veins  CVS: regular rate and rhythm S1 S2  RESP:  Fair air entry with bibasilar fine crackles  GI:  Soft, somewhat distended with right sided tenderness more so in RUQ  : No suprapubic tenderness  MSK:  No edema. Moves all extremities  CNS:  Awake, alert, oriented. Fluent speech and intact cognition, Follows commands  INTEG:  Warm skin  PSYCH:  Fair mood, though fatigued

## 2025-05-28 NOTE — H&P ADULT - NSHPSOCIALHISTORY_GEN_ALL_CORE
, lives with . Has 2 adult children  Quit smoking 3 weeks ago, denies any alcohol or substance use  Uses walked for ambulation

## 2025-05-28 NOTE — ED ADULT NURSE NOTE - NSSEPSISSUSPECTED_ED_A_ED
Mailed intro letter to the patient.       Harjit Reynolds [facility]  [address] [facility] [address] [facility]   Medication Assistance  [address]    H)[phone]
No

## 2025-05-28 NOTE — CONSULT NOTE ADULT - ASSESSMENT
69 year old female pt admitted for possible colitis- Nephrology consulted for managing HD needs.    ESRD on HD   MWF schedule at [facility]   HD today    anemia of CKD  Hb below goal  resume LASHELL with HD    HTN/ volume  Bp stable- pt euvolemic  UF as tolerated with HD    CKD MBD  ca++ at goal  Continue phos binders with meals    Monitor Phos levels 69 year old female pt admitted for possible colitis- Nephrology consulted for managing HD needs.    ESRD on HD   MWF schedule [place] [facility] [place] [facility]  HD today    anemia of CKD  Hb below goal  resume LASHELL with HD    HTN/ volume  Bp stable- pt euvolemic  UF as tolerated with HD    CKD MBD  ca++ at goal  Continue phos binders with meals    Monitor Phos levels

## 2025-05-28 NOTE — CONSULT NOTE ADULT - NS ATTEST RISK PROBLEM GEN_ALL_CORE FT
hx of chronic pancreatitis, chronic diarrhea, Right side abdominal  distension as per pt     can give creon with meals and snacks  diarrhea is no worse than her chronic symptoms  CT - my interpretation- unde rdistension of colon  NO leukocytosis.  WBC 3.7 Doubt she has infectious colitis. cbc ordered for tomorrow   Can check Stool GI PCR- no recent antibiotics  can give low fiber renal diet   plan discussed with Dr Ching

## 2025-05-28 NOTE — H&P ADULT - NSICDXPASTSURGICALHX_GEN_ALL_CORE_FT
PAST SURGICAL HISTORY:  H/O total thyroidectomy     History of cholecystectomy 1990s    History of liver biopsy     S/P

## 2025-05-28 NOTE — CONSULT NOTE ADULT - SUBJECTIVE AND OBJECTIVE BOX
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 GI asked to consult for abdominal pain. Pt states that she has had intermittent right sided abdominal pain and swelling for months. States these symptoms typically come and go but she notices the swelling worsening Tuesday morning. The pain is usually in her back and radiates around to the front. Her pain is usually relieved with Dilaudid She follows with GI Dr. Kenyon for her symptoms. She has EGD about 2 months ago which she states showed esophageal erosions and possibly ulcers. She takes over the counter Pepcid and Mylanta for reflux symptoms. She also reports chronic diarrhea 2/2 her chronic pancreatitis. She currently takes creon. States there is no change in her diarrhea, she typically goes up to 6 x daily and this is normal for her. Of note,  pt has missed her dialysis, her last dialysis was , she is on a M// schedule. She denies fevers or chills.         PAST MEDICAL & SURGICAL HISTORY:  Diabetes      Bernard syndrome      Pancreatitis      Cirrhosis      ESRD on dialysis  MWF at [facility]      HTN (hypertension)      CAD (coronary artery disease)      Chronic diastolic congestive heart failure      IBS (irritable bowel syndrome)      Gastroparesis      Thyroid cancer      Esophagitis, erosive      HCV (hepatitis C virus)      History of liver biopsy      H/O total thyroidectomy      History of cholecystectomy  1990s      S/P           REVIEW OF SYSTEMS:   General: Negative  HEENT: Negative  CV: Negative  Respiratory: Negative  GI: See HPI  : Negative  MSK: Negative  Hematologic: Negative  Skin: Negative    MEDICATIONS:   MEDICATIONS  (STANDING):  albuterol/ipratropium for Nebulization 3 milliLiter(s) Nebulizer every 6 hours  aspirin enteric coated 81 milliGRAM(s) Oral daily  calcium acetate 2001 milliGRAM(s) Oral two times a day with meals  carvedilol 25 milliGRAM(s) Oral every 12 hours  CREON (Pancrelipase 3000 units) 1 Capsule(s) 1 Capsule(s) Oral three times a day with meals  dextrose 5%. 1000 milliLiter(s) (50 mL/Hr) IV Continuous <Continuous>  dextrose 5%. 1000 milliLiter(s) (100 mL/Hr) IV Continuous <Continuous>  dextrose 50% Injectable 25 Gram(s) IV Push once  dextrose 50% Injectable 12.5 Gram(s) IV Push once  dextrose 50% Injectable 25 Gram(s) IV Push once  epoetin day (PROCRIT) Injectable 49719 Unit(s) IV Push <User Schedule>  glucagon  Injectable 1 milliGRAM(s) IntraMuscular once  hydrALAZINE 25 milliGRAM(s) Oral every 8 hours  insulin lispro (ADMELOG) corrective regimen sliding scale   SubCutaneous three times a day before meals  insulin lispro (ADMELOG) corrective regimen sliding scale   SubCutaneous at bedtime  levothyroxine 200 MICROGram(s) Oral daily  lisinopril 10 milliGRAM(s) Oral daily  metoclopramide 10 milliGRAM(s) Oral three times a day before meals  sertraline 200 milliGRAM(s) Oral daily  sucralfate suspension 1 Gram(s) Oral four times a day    MEDICATIONS  (PRN):  dextrose Oral Gel 15 Gram(s) Oral once PRN Blood Glucose LESS THAN 70 milliGRAM(s)/deciliter  HYDROmorphone  Injectable 0.5 milliGRAM(s) IV Push every 4 hours PRN abdominal pain  ondansetron Injectable 4 milliGRAM(s) IV Push every 6 hours PRN Nausea and/or Vomiting          DIET:  Diet, DASH/TLC:   Sodium & Cholesterol Restricted  Consistent Carbohydrate Evening Snack (CSTCHOSN)  Low Fat (LOWFAT)  For patients receiving Renal Replacement - No Protein Restr, No Conc K, No Conc Phos, Low  Sodium (RENAL) (25 @ 08:42) [Active]          ALLERGIES:   Allergies    Augmentin (Hives)  ceftriaxone (Pruritus)    Intolerances        Substance Use:   (  ) never used  (  ) other:  Tobacco Usage:  (   ) never smoked   (   ) former smoker   (   ) current smoker  (     ) pack year  (        ) last cigarette date  Alcohol Usage:    Family History   IBD (  ) Yes   (  ) No  GI Malignancy (  )  Yes    (  ) No    Health Management  Last Colonoscopy:  Last Endoscopy:     VITAL SIGNS:   Vital Signs Last 24 Hrs  T(C): 36.6 (28 May 2025 12:50), Max: 36.7 (27 May 2025 20:19)  T(F): 97.8 (28 May 2025 12:50), Max: 98.1 (28 May 2025 02:20)  HR: 72 (28 May 2025 12:50) (66 - 72)  BP: 188/73 (28 May 2025 12:50) (128/68 - 188/73)  BP(mean): --  RR: 18 (28 May 2025 12:50) (18 - 22)  SpO2: 100% (28 May 2025 12:50) (85% - 100%)    Parameters below as of 28 May 2025 12:50  Patient On (Oxygen Delivery Method): nasal cannula      I&O's Summary      PHYSICAL EXAM:   GENERAL:  No acute distress  HEENT:  NC/AT, conjunctiva clear, sclera anicteric  CHEST:  No increased effort  HEART:  Regular rate  ABDOMEN:  Soft, non-tender, non-distended, normoactive bowel sounds, no rebound or guarding  EXTREMITIES: No edema  SKIN:  Warm, dry  NEURO:  Calm, cooperative    LABS:                        8.3    3.70  )-----------( 70       ( 27 May 2025 23:28 )             26.4     Hemoglobin: 8.3 g/dL (25 @ 23:28)        138  |  102  |  52.2[H]  ----------------------------<  191[H]  5.4[H]   |  20.0[L]  |  6.16[H]    Ca    8.7      27 May 2025 23:28    TPro  7.0  /  Alb  4.0  /  TBili  0.2[L]  /  DBili  x   /  AST  13  /  ALT  10  /  AlkPhos  95      LIVER FUNCTIONS - ( 27 May 2025 23:28 )  Alb: 4.0 g/dL / Pro: 7.0 g/dL / ALK PHOS: 95 U/L / ALT: 10 U/L / AST: 13 U/L / GGT: x             PT/INR - ( 27 May 2025 23:28 )   PT: 11.8 sec;   INR: 1.04 ratio         PTT - ( 27 May 2025 23:28 )  PTT:34.9 sec    Lipase: 45 U/L (25 @ 23:28)              RADIOLOGY & ADDITIONAL STUDIES:      ACC: [id number] EXAM:  CT ABDOMEN AND PELVIS IC   ORDERED BY: EVARISTO DOBBINS     ACC: [id number] EXAM:  CT ANGIO CHEST PULM ART WAWIC   ORDERED BY: EVARISTO DOBBINS     PROCEDURE DATE:  2025          INTERPRETATION:  CLINICAL INFORMATION: Hypoxia, chest pain. Generalized   abdominal pain.    COMPARISON: CT abdomen and pelvis 2025..    CONTRAST/COMPLICATIONS:  IV Contrast: Omnipaque 350  90 cc administered   10 cc discarded  Oral Contrast: NONE  .    PROCEDURE:  CT Angiography of the Chest was performed followed by portal venous phase   imaging of the Abdomen and Pelvis.  Sagittal and coronal reformats were performed as well as 3D (MIP)   reconstructions.    FINDINGS:  CHEST:  LUNGS PLEURA AND LARGE AIRWAYS: Patent central airways.  Trace bilateral pleural effusions. Mild bibasilar atelectasis. VESSELS:   No pulmonary embolism. Aortic calcifications. Coronary artery   calcifications.  HEART: Cardiomegaly. No pericardial effusion.  MEDIASTINUM AND MANDIE: No lymphadenopathy.  CHEST WALL AND LOWER NECK: Within normal limits.    ABDOMEN AND PELVIS:  LIVER: Nodular contour to the liver suspicious for cirrhosis. Correlate   clinically.  BILE DUCTS: Prominent common bile duct consistent with   postcholecystectomy state, unchanged  GALLBLADDER: Cholecystectomy.  SPLEEN: Within normal limits.  PANCREAS: Prominent pancreatic duct measuring 5 to 6 mm. Subcentimeter   cyst in uncinate process. Small calcification within the uncinate   process. Recommend nonemergent MRI abdomen withCP for further   evaluation.  ADRENALS: Within normal limits.  KIDNEYS/URETERS: Small right renal cyst. Cortical hypodensities too small   to characterize.    BLADDER: Within normal limits.  REPRODUCTIVE ORGANS: Uterus and adnexa within normal limits.    BOWEL: Mild diffuse thickening of the colon. Question colitis. Sigmoid   diverticulosis without diverticulitis. No bowel obstruction. Appendix is   normal. The cecum is in a midline anterior position consistent with a   bascule  PERITONEUM/RETROPERITONEUM: Trace abdominal and pelvic ascites.  VESSELS: Atherosclerotic changes.  LYMPH NODES: No lymphadenopathy.  ABDOMINAL WALL: Within normal limits.  BONES: Degenerative changes.    IMPRESSION:    No pulmonary embolism.    Trace bilateral pleural effusions. Mild bibasilar atelectasis.    Nodular contour to the liver suspicious for cirrhosis. Correlate   clinically.    Trace abdominal and pelvic ascites.    Prominent pancreatic duct measuring 5 to 6 mm. Subcentimeter cyst in   uncinate process. Small calcification within the uncinate process.   Recommend nonemergent MRI abdomen with MRCP for further evaluation.    Mild diffuse thickening of the colon. Question colitis.      --- End of Report ---            MAI SERVIN MD; Attending Radiologist  This document has been electronically signed. 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   GI asked to consult for abdominal pain. Pt states that she has had intermittent right sided abdominal pain and swelling for months. States these symptoms typically come and go but she notices the swelling worsening Tuesday morning.  Of note, pt had dialysis on , , then no dialysis until today ( seen  in dialysis today ) . The pain is usually in her back and radiates around to the front. Her pain is usually relieved with Dilaudid She follows with GI Dr. Kenyon in [place]  for her symptoms. Known hx of chronic pancreatitis.  He told her she may require a nerve block . She has EGD about 2 months ago which she states showed esophageal erosions as per patient.  She takes over the counter Pepcid and Mylanta for reflux symptoms.  Does  not know if hs is on a PPI as she is legally blind. She does not know the name of all her meds.       She also reports chronic diarrhea 2/2 her chronic pancreatitis. Gets  chronic diarrhea.  Sometimes has fecal incontinence. She currently takes creon. States there is no change in her diarrhea, she typically goes up to 6 x daily and this is normal for her. No fevers       PAST MEDICAL & SURGICAL HISTORY:  Diabetes      Bernard syndrome      Pancreatitis      Cirrhosis      ESRD on dialysis  MWF at [facility]      HTN (hypertension)      CAD (coronary artery disease)      Chronic diastolic congestive heart failure      IBS (irritable bowel syndrome)      Gastroparesis      Thyroid cancer      Esophagitis, erosive      HCV (hepatitis C virus)      History of liver biopsy      H/O total thyroidectomy      History of cholecystectomy  1990s      S/P           REVIEW OF SYSTEMS:   General: Negative  HEENT: Negative  CV: Negative  Respiratory: Negative  GI: See HPI  : Negative  MSK: Negative  Hematologic: Negative  Skin: Negative    MEDICATIONS:   MEDICATIONS  (STANDING):  albuterol/ipratropium for Nebulization 3 milliLiter(s) Nebulizer every 6 hours  aspirin enteric coated 81 milliGRAM(s) Oral daily  calcium acetate 2001 milliGRAM(s) Oral two times a day with meals  carvedilol 25 milliGRAM(s) Oral every 12 hours  CREON (Pancrelipase 3000 units) 1 Capsule(s) 1 Capsule(s) Oral three times a day with meals  dextrose 5%. 1000 milliLiter(s) (50 mL/Hr) IV Continuous <Continuous>  dextrose 5%. 1000 milliLiter(s) (100 mL/Hr) IV Continuous <Continuous>  dextrose 50% Injectable 25 Gram(s) IV Push once  dextrose 50% Injectable 12.5 Gram(s) IV Push once  dextrose 50% Injectable 25 Gram(s) IV Push once  epoetin day (PROCRIT) Injectable 16466 Unit(s) IV Push <User Schedule>  glucagon  Injectable 1 milliGRAM(s) IntraMuscular once  hydrALAZINE 25 milliGRAM(s) Oral every 8 hours  insulin lispro (ADMELOG) corrective regimen sliding scale   SubCutaneous three times a day before meals  insulin lispro (ADMELOG) corrective regimen sliding scale   SubCutaneous at bedtime  levothyroxine 200 MICROGram(s) Oral daily  lisinopril 10 milliGRAM(s) Oral daily  metoclopramide 10 milliGRAM(s) Oral three times a day before meals  sertraline 200 milliGRAM(s) Oral daily  sucralfate suspension 1 Gram(s) Oral four times a day    MEDICATIONS  (PRN):  dextrose Oral Gel 15 Gram(s) Oral once PRN Blood Glucose LESS THAN 70 milliGRAM(s)/deciliter  HYDROmorphone  Injectable 0.5 milliGRAM(s) IV Push every 4 hours PRN abdominal pain  ondansetron Injectable 4 milliGRAM(s) IV Push every 6 hours PRN Nausea and/or Vomiting          DIET:  Diet, DASH/TLC:   Sodium & Cholesterol Restricted  Consistent Carbohydrate Evening Snack (CSTCHOSN)  Low Fat (LOWFAT)  For patients receiving Renal Replacement - No Protein Restr, No Conc K, No Conc Phos, Low  Sodium (RENAL) (25 @ 08:42) [Active]          ALLERGIES:   Allergies    Augmentin (Hives)  ceftriaxone (Pruritus)    Intolerances        Substance Use:   (  ) never used  (  ) other:  Tobacco Usage:  (   ) never smoked   (   ) former smoker   (   ) current smoker  (     ) pack year  (        ) last cigarette date  Alcohol Usage:    Family History   IBD (  ) Yes   (  ) No  GI Malignancy (  )  Yes    (  ) No    Health Management  Last Colonoscopy:  Last Endoscopy:     VITAL SIGNS:   Vital Signs Last 24 Hrs  T(C): 36.6 (28 May 2025 12:50), Max: 36.7 (27 May 2025 20:19)  T(F): 97.8 (28 May 2025 12:50), Max: 98.1 (28 May 2025 02:20)  HR: 72 (28 May 2025 12:50) (66 - 72)  BP: 188/73 (28 May 2025 12:50) (128/68 - 188/73)  BP(mean): --  RR: 18 (28 May 2025 12:50) (18 - 22)  SpO2: 100% (28 May 2025 12:50) (85% - 100%)    Parameters below as of 28 May 2025 12:50  Patient On (Oxygen Delivery Method): nasal cannula      I&O's Summary      PHYSICAL EXAM:   GENERAL:  No acute distress  HEENT:  NC/AT, conjunctiva clear, sclera anicteric  CHEST:  No increased effort  HEART:  Regular rate  ABDOMEN:  Soft, non-tender, non-distended, normoactive bowel sounds, no rebound or guarding  EXTREMITIES: No edema  SKIN:  Warm, dry  NEURO:  Calm, cooperative    LABS:                        8.3    3.70  )-----------( 70       ( 27 May 2025 23:28 )             26.4     Hemoglobin: 8.3 g/dL (-25 @ 23:28)        138  |  102  |  52.2[H]  ----------------------------<  191[H]  5.4[H]   |  20.0[L]  |  6.16[H]    Ca    8.7      27 May 2025 23:28    TPro  7.0  /  Alb  4.0  /  TBili  0.2[L]  /  DBili  x   /  AST  13  /  ALT  10  /  AlkPhos  95      LIVER FUNCTIONS - ( 27 May 2025 23:28 )  Alb: 4.0 g/dL / Pro: 7.0 g/dL / ALK PHOS: 95 U/L / ALT: 10 U/L / AST: 13 U/L / GGT: x             PT/INR - ( 27 May 2025 23:28 )   PT: 11.8 sec;   INR: 1.04 ratio         PTT - ( 27 May 2025 23:28 )  PTT:34.9 sec    Lipase: 45 U/L (25 @ 23:28)              RADIOLOGY & ADDITIONAL STUDIES:      ACC: [id number] EXAM:  CT ABDOMEN AND PELVIS IC   ORDERED BY: EVARISTO DOBBINS     ACC: [id number] EXAM:  CT ANGIO CHEST PULM ART WAWIC   ORDERED BY: EVARISTO DOBBINS     PROCEDURE DATE:  2025          INTERPRETATION:  CLINICAL INFORMATION: Hypoxia, chest pain. Generalized   abdominal pain.    COMPARISON: CT abdomen and pelvis 2025..    CONTRAST/COMPLICATIONS:  IV Contrast: Omnipaque 350  90 cc administered   10 cc discarded  Oral Contrast: NONE  .    PROCEDURE:  CT Angiography of the Chest was performed followed by portal venous phase   imaging of the Abdomen and Pelvis.  Sagittal and coronal reformats were performed as well as 3D (MIP)   reconstructions.    FINDINGS:  CHEST:  LUNGS PLEURA AND LARGE AIRWAYS: Patent central airways.  Trace bilateral pleural effusions. Mild bibasilar atelectasis. VESSELS:   No pulmonary embolism. Aortic calcifications. Coronary artery   calcifications.  HEART: Cardiomegaly. No pericardial effusion.  MEDIASTINUM AND MANDIE: No lymphadenopathy.  CHEST WALL AND LOWER NECK: Within normal limits.    ABDOMEN AND PELVIS:  LIVER: Nodular contour to the liver suspicious for cirrhosis. Correlate   clinically.  BILE DUCTS: Prominent common bile duct consistent with   postcholecystectomy state, unchanged  GALLBLADDER: Cholecystectomy.  SPLEEN: Within normal limits.  PANCREAS: Prominent pancreatic duct measuring 5 to 6 mm. Subcentimeter   cyst in uncinate process. Small calcification within the uncinate   process. Recommend nonemergent MRI abdomen withCP for further   evaluation.  ADRENALS: Within normal limits.  KIDNEYS/URETERS: Small right renal cyst. Cortical hypodensities too small   to characterize.    BLADDER: Within normal limits.  REPRODUCTIVE ORGANS: Uterus and adnexa within normal limits.    BOWEL: Mild diffuse thickening of the colon. Question colitis. Sigmoid   diverticulosis without diverticulitis. No bowel obstruction. Appendix is   normal. The cecum is in a midline anterior position consistent with a   bascule  PERITONEUM/RETROPERITONEUM: Trace abdominal and pelvic ascites.  VESSELS: Atherosclerotic changes.  LYMPH NODES: No lymphadenopathy.  ABDOMINAL WALL: Within normal limits.  BONES: Degenerative changes.    IMPRESSION:    No pulmonary embolism.    Trace bilateral pleural effusions. Mild bibasilar atelectasis.    Nodular contour to the liver suspicious for cirrhosis. Correlate   clinically.    Trace abdominal and pelvic ascites.    Prominent pancreatic duct measuring 5 to 6 mm. Subcentimeter cyst in   uncinate process. Small calcification within the uncinate process.   Recommend nonemergent MRI abdomen with MRCP for further evaluation.    Mild diffuse thickening of the colon. Question colitis.      --- End of Report ---            MAI SERVIN MD; Attending Radiologist  This document has been electronically signed. May 28 2025  5:38AM  25 @ 04:52

## 2025-05-28 NOTE — CHART NOTE - NSCHARTNOTEFT_GEN_A_CORE
Chart/Event Note  [facility] [id number]  HAYLEY LARA, 69y, Female  [id number]    Reason for Notification:   Notified by RN that the above patient had asymptomatic hypertension, with blood pressure at BP: 184/67.     Events/History of Present Illness  Patient has no active complaints at this time. No syncope, dizziness, weakness, headaches, vision changes, new focal/lateralizing neurologic deficits, chest pain, palpitations, shortness of breath, abdominal pain, nausea, or vomiting.   Patient has a history of hypertension, with elevated blood pressures noted across inpatient stay. Patient is currently on active medication therapy to treat hypertension with nursing documentations showing adherence with these medications while inpatient.       T(C): 36.7  HR: 74  BP: 184/67  RR: 20  SpO2: 98%                 Assessment/Plan:    Patient's other vitals are stable. Patient's hypertension is noted to be chronic. Will give patient's PM dose hydralazine now.      1) RN to give patient's PM dose of hydralazine 25mg PO now  2) Repeat blood pressure in one hour post intervention.   3) Will continue to monitor for the development of symptoms and/or signs of end organ dysfunction.   4) Will endorse the above diagnostics and findings to the day medicine team for review and follow up. Will additionally sign out to day medicine teams to follow with relevant specialties.     **21:35 Addendum: Repeat /72. Patient complaining of significant pain and is due for dilaudid. Will reassess when pain improves.    **23:15 Addendum: Repeat /62, pain improved. Will give hydralazine 10mg IVP x1 and re-assess.    Fidelina Olmos NP  Department of Medicine

## 2025-05-28 NOTE — ED PROVIDER NOTE - NSICDXPASTMEDICALHX_GEN_ALL_CORE_FT
PAST MEDICAL HISTORY:  Bernard syndrome     Cirrhosis     Diabetes     ESRD on dialysis MWF at [facility]    HTN (hypertension)     Pancreatitis

## 2025-05-28 NOTE — CONSULT NOTE ADULT - ASSESSMENT
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 GI asked to consult for abdominal pain.    #abdominal pain   #Chronic Pancreatitis  #CKD  CT Abdomen and Pelvis w/ IV Cont (05.28.25) [id number] Prominent pancreatic duct measuring 5 to 6 mm. Subcentimeter cyst in uncinate process. Small calcification within the uncinate process. Recommend nonemergent MRI abdomen with MRCP for further evaluation. Mild diffuse thickening of the colon. Question colitis.  pt has chronic diarrhea, doubt a true colitis  -Trend CBC and BMP daily, no leukocytosis and remains afebrile  -check GI PCR and CDiff  -Continue Creon  -Low fiber diet as tolerated  -HD per nephro  -Can follow up pancreatic cyst as outpatient   -Outpatient follow up with her primary GI Dr. Kenyon  -Further care er primary team   _________________________________________________________________  Assessment and recommendations are final when note is signed by the attending physician.

## 2025-05-28 NOTE — ED ADULT NURSE NOTE - OBJECTIVE STATEMENT
pt. states she has abd pain. abd is tender to touch. states she has nausea. no acute resp distress noted. iv access obtained, meds administered. safety maintained.

## 2025-05-28 NOTE — CONSULT NOTE ADULT - PROBLEM SELECTOR RECOMMENDATION 2
hx of chronic pancreatitis  can give creon with meals and snacks  diarrhea is no worse than her chronic symptoms  CT - my interpretation- unde rdistension of colon  NO leukocytosis.  WBC 3.7 Doubt she has infectious colitis. cbc ordered for tomorrow   Can check Stool GI PCR- no recent antibiotics  can give low fiber renal diet   plan discussed with Dr Ching

## 2025-05-28 NOTE — H&P ADULT - TIME BILLING
Current and prior chart review, diagnostic data review, [facility] ,  [facility] and medication reconciliation, patient interview and examination, order placement, discussion with GI and Nephrology and documentation

## 2025-05-29 ENCOUNTER — TRANSCRIPTION ENCOUNTER (OUTPATIENT)
Age: 70
End: 2025-05-29

## 2025-05-29 LAB
ALBUMIN SERPL ELPH-MCNC: 3.6 G/DL — SIGNIFICANT CHANGE UP (ref 3.3–5.2)
ALP SERPL-CCNC: 97 U/L — SIGNIFICANT CHANGE UP (ref 40–120)
ALT FLD-CCNC: 8 U/L — SIGNIFICANT CHANGE UP
ANION GAP SERPL CALC-SCNC: 15 MMOL/L — SIGNIFICANT CHANGE UP (ref 5–17)
AST SERPL-CCNC: 11 U/L — SIGNIFICANT CHANGE UP
BILIRUB SERPL-MCNC: 0.3 MG/DL — LOW (ref 0.4–2)
BUN SERPL-MCNC: 28.5 MG/DL — HIGH (ref 8–20)
CALCIUM SERPL-MCNC: 8.6 MG/DL — SIGNIFICANT CHANGE UP (ref 8.4–10.5)
CHLORIDE SERPL-SCNC: 96 MMOL/L — SIGNIFICANT CHANGE UP (ref 96–108)
CO2 SERPL-SCNC: 24 MMOL/L — SIGNIFICANT CHANGE UP (ref 22–29)
CREAT SERPL-MCNC: 4.04 MG/DL — HIGH (ref 0.5–1.3)
EGFR: 12 ML/MIN/1.73M2 — LOW
EGFR: 12 ML/MIN/1.73M2 — LOW
GLUCOSE BLDC GLUCOMTR-MCNC: 147 MG/DL — HIGH (ref 70–99)
GLUCOSE BLDC GLUCOMTR-MCNC: 170 MG/DL — HIGH (ref 70–99)
GLUCOSE BLDC GLUCOMTR-MCNC: 185 MG/DL — HIGH (ref 70–99)
GLUCOSE BLDC GLUCOMTR-MCNC: 187 MG/DL — HIGH (ref 70–99)
GLUCOSE SERPL-MCNC: 192 MG/DL — HIGH (ref 70–99)
HCT VFR BLD CALC: 25.3 % — LOW (ref 34.5–45)
HGB BLD-MCNC: 8.1 G/DL — LOW (ref 11.5–15.5)
IMMATURE PLATELET FRACTION #: 2.9 K/UL — LOW (ref 4.7–11.1)
IMMATURE PLATELET FRACTION %: 4.1 % — SIGNIFICANT CHANGE UP (ref 1.6–4.9)
MCHC RBC-ENTMCNC: 30.1 PG — SIGNIFICANT CHANGE UP (ref 27–34)
MCHC RBC-ENTMCNC: 32 G/DL — SIGNIFICANT CHANGE UP (ref 32–36)
MCV RBC AUTO: 94.1 FL — SIGNIFICANT CHANGE UP (ref 80–100)
NRBC # BLD AUTO: 0 K/UL — SIGNIFICANT CHANGE UP (ref 0–0)
NRBC # FLD: 0 K/UL — SIGNIFICANT CHANGE UP (ref 0–0)
NRBC BLD AUTO-RTO: 0 /100 WBCS — SIGNIFICANT CHANGE UP (ref 0–0)
PLATELET # BLD AUTO: 70 K/UL — LOW (ref 150–400)
PMV BLD: 11.9 FL — SIGNIFICANT CHANGE UP (ref 7–13)
POTASSIUM SERPL-MCNC: 4.6 MMOL/L — SIGNIFICANT CHANGE UP (ref 3.5–5.3)
POTASSIUM SERPL-SCNC: 4.6 MMOL/L — SIGNIFICANT CHANGE UP (ref 3.5–5.3)
PROT SERPL-MCNC: 6.2 G/DL — LOW (ref 6.6–8.7)
RAPID RVP RESULT: SIGNIFICANT CHANGE UP
RBC # BLD: 2.69 M/UL — LOW (ref 3.8–5.2)
RBC # FLD: 15.9 % — HIGH (ref 10.3–14.5)
SARS-COV-2 RNA SPEC QL NAA+PROBE: SIGNIFICANT CHANGE UP
SODIUM SERPL-SCNC: 135 MMOL/L — SIGNIFICANT CHANGE UP (ref 135–145)
WBC # BLD: 4.68 K/UL — SIGNIFICANT CHANGE UP (ref 3.8–10.5)
WBC # FLD AUTO: 4.68 K/UL — SIGNIFICANT CHANGE UP (ref 3.8–10.5)

## 2025-05-29 PROCEDURE — 99231 SBSQ HOSP IP/OBS SF/LOW 25: CPT

## 2025-05-29 PROCEDURE — 99233 SBSQ HOSP IP/OBS HIGH 50: CPT | Mod: GC

## 2025-05-29 PROCEDURE — 99232 SBSQ HOSP IP/OBS MODERATE 35: CPT

## 2025-05-29 RX ORDER — POLYETHYLENE GLYCOL 3350 17 G/17G
17 POWDER, FOR SOLUTION ORAL DAILY
Refills: 0 | Status: DISCONTINUED | OUTPATIENT
Start: 2025-05-29 | End: 2025-06-11

## 2025-05-29 RX ORDER — ACETAMINOPHEN 500 MG/5ML
1000 LIQUID (ML) ORAL ONCE
Refills: 0 | Status: COMPLETED | OUTPATIENT
Start: 2025-05-29 | End: 2025-05-29

## 2025-05-29 RX ORDER — METRONIDAZOLE 250 MG
500 TABLET ORAL EVERY 12 HOURS
Refills: 0 | Status: DISCONTINUED | OUTPATIENT
Start: 2025-05-29 | End: 2025-05-30

## 2025-05-29 RX ADMIN — INSULIN LISPRO 0: 100 INJECTION, SOLUTION INTRAVENOUS; SUBCUTANEOUS at 21:43

## 2025-05-29 RX ADMIN — Medication 0.5 MILLIGRAM(S): at 22:40

## 2025-05-29 RX ADMIN — Medication 0.5 MILLIGRAM(S): at 10:16

## 2025-05-29 RX ADMIN — INSULIN LISPRO 1: 100 INJECTION, SOLUTION INTRAVENOUS; SUBCUTANEOUS at 13:28

## 2025-05-29 RX ADMIN — Medication 200 MICROGRAM(S): at 05:11

## 2025-05-29 RX ADMIN — Medication 2001 MILLIGRAM(S): at 17:29

## 2025-05-29 RX ADMIN — Medication 1 GRAM(S): at 01:18

## 2025-05-29 RX ADMIN — LISINOPRIL 10 MILLIGRAM(S): 5 TABLET ORAL at 05:11

## 2025-05-29 RX ADMIN — Medication 0.5 MILLIGRAM(S): at 21:43

## 2025-05-29 RX ADMIN — SERTRALINE 200 MILLIGRAM(S): 100 TABLET, FILM COATED ORAL at 13:26

## 2025-05-29 RX ADMIN — Medication 0.5 MILLIGRAM(S): at 09:46

## 2025-05-29 RX ADMIN — Medication 81 MILLIGRAM(S): at 13:25

## 2025-05-29 RX ADMIN — Medication 10 MILLIGRAM(S): at 17:26

## 2025-05-29 RX ADMIN — Medication 25 MILLIGRAM(S): at 13:29

## 2025-05-29 RX ADMIN — Medication 400 MILLIGRAM(S): at 13:30

## 2025-05-29 RX ADMIN — Medication 1 GRAM(S): at 17:28

## 2025-05-29 RX ADMIN — Medication 2001 MILLIGRAM(S): at 09:02

## 2025-05-29 RX ADMIN — Medication 10 MILLIGRAM(S): at 13:35

## 2025-05-29 RX ADMIN — Medication 50 MILLIGRAM(S): at 21:48

## 2025-05-29 RX ADMIN — INSULIN LISPRO 1: 100 INJECTION, SOLUTION INTRAVENOUS; SUBCUTANEOUS at 09:01

## 2025-05-29 RX ADMIN — Medication 25 MILLIGRAM(S): at 05:12

## 2025-05-29 RX ADMIN — Medication 1000 MILLIGRAM(S): at 14:00

## 2025-05-29 RX ADMIN — Medication 1 GRAM(S): at 05:12

## 2025-05-29 RX ADMIN — POLYETHYLENE GLYCOL 3350 17 GRAM(S): 17 POWDER, FOR SOLUTION ORAL at 13:24

## 2025-05-29 RX ADMIN — Medication 0.5 MILLIGRAM(S): at 03:55

## 2025-05-29 RX ADMIN — CARVEDILOL 25 MILLIGRAM(S): 3.12 TABLET, FILM COATED ORAL at 05:12

## 2025-05-29 RX ADMIN — Medication 1 GRAM(S): at 22:28

## 2025-05-29 RX ADMIN — Medication 0.5 MILLIGRAM(S): at 16:36

## 2025-05-29 RX ADMIN — Medication 0.5 MILLIGRAM(S): at 16:06

## 2025-05-29 RX ADMIN — Medication 0.5 MILLIGRAM(S): at 02:42

## 2025-05-29 RX ADMIN — Medication 10 MILLIGRAM(S): at 01:18

## 2025-05-29 RX ADMIN — Medication 400 MILLIGRAM(S): at 22:28

## 2025-05-29 RX ADMIN — Medication 10 MILLIGRAM(S): at 09:01

## 2025-05-29 RX ADMIN — Medication 500 MILLIGRAM(S): at 17:26

## 2025-05-29 RX ADMIN — CARVEDILOL 25 MILLIGRAM(S): 3.12 TABLET, FILM COATED ORAL at 17:27

## 2025-05-29 RX ADMIN — Medication 1 GRAM(S): at 13:25

## 2025-05-29 NOTE — DISCHARGE NOTE PROVIDER - NSDCCPCAREPLAN_GEN_ALL_CORE_FT
PRINCIPAL DISCHARGE DIAGNOSIS  Diagnosis: Need for acute hemodialysis  Assessment and Plan of Treatment: You came in with abd pain and distension which improved with dialysis. The symtoms were likely caused by fluid overload that improved with dialysis. Please resume your regluar dialysis schedule and follow up outpatient.      SECONDARY DISCHARGE DIAGNOSES  Diagnosis: Pancreatic cyst  Assessment and Plan of Treatment: when we scanned your belly we saw a 5 to 6mm cyst on your pancreas. Please follow up outpatient with GI and perform outpatient MRCP for further evaluation.     PRINCIPAL DISCHARGE DIAGNOSIS  Diagnosis: Need for acute hemodialysis  Assessment and Plan of Treatment: You came in with abd pain and distension which improved with dialysis. The symtoms were likely caused by fluid overload that improved with dialysis. Please resume your regluar dialysis schedule and follow up outpatient.      SECONDARY DISCHARGE DIAGNOSES  Diagnosis: Pancreatic cyst  Assessment and Plan of Treatment: when we scanned your belly we saw a 5 to 6mm cyst on your pancreas. Please follow up outpatient with GI and perform outpatient MRCP for further evaluation.    Diagnosis: Hepatic abscess  Assessment and Plan of Treatment: We scanned your belly later when you had abdominal pain, you were found to have an infected abscess on the liver. The interventional radiologist deemed the abscesses to small to drain, so you will be treated with antibiotics. Please follow-up with your PCP and infectious disease doctor after leaving the hospital     PRINCIPAL DISCHARGE DIAGNOSIS  Diagnosis: Need for acute hemodialysis  Assessment and Plan of Treatment: You came in with abd pain and distension which improved with dialysis. The symtoms were likely caused by fluid overload that improved with dialysis. Please resume your regluar dialysis schedule and follow up outpatient.      SECONDARY DISCHARGE DIAGNOSES  Diagnosis: Pancreatic cyst  Assessment and Plan of Treatment: when we scanned your belly we saw a 5 to 6mm cyst on your pancreas. Please follow up outpatient with GI and perform outpatient MRCP for further evaluation.    Diagnosis: Hepatic abscess  Assessment and Plan of Treatment: We scanned your belly later when you had abdominal pain, you were found to have an infected abscess on the liver. The interventional radiologist deemed the abscesses to small to drain, so you will be treated with antibiotics. Will need 6 weeks of Meropenem 500mg IV Q24h (on HD days give post-HD) through July 15th, 2025. Weekly CBC and CMP to be faxed to .   Discussed PICC line procedure with the patient as well as IV antibiotic administration. Will plan for PICC   Please follow-up with your PCP and infectious disease doctor after leaving the hospital     PRINCIPAL DISCHARGE DIAGNOSIS  Diagnosis: Need for acute hemodialysis  Assessment and Plan of Treatment: You came in with abd pain and distension which improved with dialysis. The symtoms were likely caused by fluid overload that improved with dialysis. Please resume your regluar dialysis schedule and follow up outpatient.      SECONDARY DISCHARGE DIAGNOSES  Diagnosis: Pancreatic cyst  Assessment and Plan of Treatment: when we scanned your belly we saw a 5 to 6mm cyst on your pancreas. Please follow up outpatient with GI and perform outpatient MRCP for further evaluation.    Diagnosis: Hepatic abscess  Assessment and Plan of Treatment: We scanned your belly later when you had abdominal pain, you were found to have an infected abscess on the liver. The interventional radiologist deemed the abscesses to small to drain, so you will be treated with antibiotics. 6 weeks of Meropenem 500mg IV Q24h (on HD days give post-HD) through July 15th, 2025. Weekly CBC and CMP to be faxed to . Please follow-up with your PCP and infectious disease doctor after leaving the hospital     PRINCIPAL DISCHARGE DIAGNOSIS  Diagnosis: Need for acute hemodialysis  Assessment and Plan of Treatment: You came in with abd pain and distension which improved with dialysis. The symtoms were likely caused by fluid overload that improved with dialysis. Please resume your regluar dialysis schedule and follow up outpatient.      SECONDARY DISCHARGE DIAGNOSES  Diagnosis: Pancreatic cyst  Assessment and Plan of Treatment: when we scanned your belly we saw a 5 to 6mm cyst on your pancreas. Please follow up outpatient with GI and perform outpatient MRCP for further evaluation.    Diagnosis: Hepatic abscess  Assessment and Plan of Treatment: We scanned your belly later when you had abdominal pain, you were found to have an infected abscess on the liver. The interventional radiologist deemed the abscesses to small to drain, so you will be treated with antibiotics. 6 weeks of Meropenem 500mg IV Q24h (on HD days give post-HD) through the PICC in your right arm, through July 15th, 2025. Weekly CBC and CMP to be faxed to . Please follow-up with your PCP and infectious disease doctor after leaving the hospital

## 2025-05-29 NOTE — PROGRESS NOTE ADULT - SUBJECTIVE AND OBJECTIVE BOX
[facility] DIVISION OF KIDNEY DISEASES AND HYPERTENSION -- HEMODIALYSIS NOTE  --------------------------------------------------------------------------------  Chief Complaint: ESRD/Ongoing hemodialysis requirement    24 hour events/subjective:          PAST HISTORY  --------------------------------------------------------------------------------  No significant changes to PMH, PSH, FHx, SHx, unless otherwise noted    ALLERGIES & MEDICATIONS  --------------------------------------------------------------------------------  Allergies    Augmentin (Hives)  ceftriaxone (Pruritus)      Standing Inpatient Medications  aspirin enteric coated 81 milliGRAM(s) Oral daily  calcium acetate 2001 milliGRAM(s) Oral two times a day with meals  carvedilol 25 milliGRAM(s) Oral every 12 hours  CREON (Pancrelipase 3000 units) 1 Capsule(s) 1 Capsule(s) Oral three times a day with meals  dextrose 5%. 1000 milliLiter(s) IV Continuous <Continuous>  dextrose 5%. 1000 milliLiter(s) IV Continuous <Continuous>  dextrose 50% Injectable 25 Gram(s) IV Push once  dextrose 50% Injectable 12.5 Gram(s) IV Push once  dextrose 50% Injectable 25 Gram(s) IV Push once  epoetin day (PROCRIT) Injectable 78739 Unit(s) IV Push <User Schedule>  glucagon  Injectable 1 milliGRAM(s) IntraMuscular once  hydrALAZINE 25 milliGRAM(s) Oral every 8 hours  insulin lispro (ADMELOG) corrective regimen sliding scale   SubCutaneous three times a day before meals  insulin lispro (ADMELOG) corrective regimen sliding scale   SubCutaneous at bedtime  levothyroxine 200 MICROGram(s) Oral daily  lisinopril 10 milliGRAM(s) Oral daily  metoclopramide 10 milliGRAM(s) Oral three times a day before meals  polyethylene glycol 3350 17 Gram(s) Oral daily  sertraline 200 milliGRAM(s) Oral daily  sucralfate suspension 1 Gram(s) Oral four times a day    PRN Inpatient Medications  dextrose Oral Gel 15 Gram(s) Oral once PRN  HYDROmorphone  Injectable 0.5 milliGRAM(s) IV Push every 4 hours PRN  ondansetron Injectable 4 milliGRAM(s) IV Push every 6 hours PRN      REVIEW OF SYSTEMS  --------------------------------------------------------------------------------  Gen: No weight changes, fatigue, fevers/chills, weakness  Skin: No rashes  Head/Eyes/Ears/Mouth: No headache  Respiratory: No dyspnea, cough,  CV: No chest pain, orthopnea  GI: No abdominal pain, diarrhea, constipation, nausea, vomiting,  MSK: No joint pain  Neuro: No dizziness/lightheadedness, weakness  Heme: No bleeding  Psych: No significant nervousness, anxiety, stress, depression    All other systems were reviewed and are negative, except as noted.    VITALS/PHYSICAL EXAM  --------------------------------------------------------------------------------  T(C): 37.7 (05-29-25 @ 11:46), Max: 37.7 (05-29-25 @ 05:06)  HR: 81 (05-29-25 @ 11:46) (72 - 83)  BP: 178/70 (05-29-25 @ 11:46) (165/66 - 193/70)  RR: 18 (05-29-25 @ 11:46) (18 - 20)  SpO2: 94% (05-29-25 @ 11:46) (93% - 100%)  Wt(kg): --  Height (cm): 165.1 (05-27-25 @ 20:19)  Weight (kg): 68.9 (05-27-25 @ 20:19)  BMI (kg/m2): 25.3 (05-27-25 @ 20:19)  BSA (m2): 1.76 (05-27-25 @ 20:19)      05-28-25 @ 07:01  -  05-29-25 @ 07:00  --------------------------------------------------------  IN: 800 mL / OUT: 2300 mL / NET: -1500 mL      Physical Exam:  	Gen: NAD, well-appearing  	HEENT: PERRL, supple neck,  	Pulm: CTA B/L  	CV: RRR, S1S2; no rub  	Abd: +BS, soft, nontender  	UE: Warm, intact strength; no asterixis  	LE: Warm, + edema  	Neuro: No focal deficits  	Psych: Normal affect and mood  	Skin: Warm, without rashes  	Vascular access:    LABS/STUDIES  --------------------------------------------------------------------------------              8.1    4.68  >-----------<  70       [05-29-25 @ 06:09]              25.3     135  |  96  |  28.5  ----------------------------<  192      [05-29-25 @ 06:09]  4.6   |  24.0  |  4.04        Ca     8.6     [05-29-25 @ 06:09]    TPro  6.2  /  Alb  3.6  /  TBili  0.3  /  DBili  x   /  AST  11  /  ALT  8   /  AlkPhos  97  [05-29-25 @ 06:09]    PT/INR: PT 11.8 , INR 1.04       [05-27-25 @ 23:28]  PTT: 34.9       [05-27-25 @ 23:28]      Iron 61, TIBC 365, %sat 17      [05-28-25 @ 09:03]  Ferritin 116      [05-28-25 @ 09:03]     [facility] DIVISION OF KIDNEY DISEASES AND HYPERTENSION -- HEMODIALYSIS NOTE  --------------------------------------------------------------------------------  Chief Complaint: ESRD/Ongoing hemodialysis requirement    24 hour events/subjective:  sp HD yesterday      PAST HISTORY  --------------------------------------------------------------------------------  No significant changes to PMH, PSH, FHx, SHx, unless otherwise noted    ALLERGIES & MEDICATIONS  --------------------------------------------------------------------------------  Allergies  Augmentin (Hives)  ceftriaxone (Pruritus)      Standing Inpatient Medications  aspirin enteric coated 81 milliGRAM(s) Oral daily  calcium acetate 2001 milliGRAM(s) Oral two times a day with meals  carvedilol 25 milliGRAM(s) Oral every 12 hours  CREON (Pancrelipase 3000 units) 1 Capsule(s) 1 Capsule(s) Oral three times a day with meals  dextrose 5%. 1000 milliLiter(s) IV Continuous <Continuous>  dextrose 5%. 1000 milliLiter(s) IV Continuous <Continuous>  dextrose 50% Injectable 25 Gram(s) IV Push once  dextrose 50% Injectable 12.5 Gram(s) IV Push once  dextrose 50% Injectable 25 Gram(s) IV Push once  epoetin day (PROCRIT) Injectable 67461 Unit(s) IV Push <User Schedule>  glucagon  Injectable 1 milliGRAM(s) IntraMuscular once  hydrALAZINE 25 milliGRAM(s) Oral every 8 hours  insulin lispro (ADMELOG) corrective regimen sliding scale   SubCutaneous three times a day before meals  insulin lispro (ADMELOG) corrective regimen sliding scale   SubCutaneous at bedtime  levothyroxine 200 MICROGram(s) Oral daily  lisinopril 10 milliGRAM(s) Oral daily  metoclopramide 10 milliGRAM(s) Oral three times a day before meals  polyethylene glycol 3350 17 Gram(s) Oral daily  sertraline 200 milliGRAM(s) Oral daily  sucralfate suspension 1 Gram(s) Oral four times a day    PRN Inpatient Medications  dextrose Oral Gel 15 Gram(s) Oral once PRN  HYDROmorphone  Injectable 0.5 milliGRAM(s) IV Push every 4 hours PRN  ondansetron Injectable 4 milliGRAM(s) IV Push every 6 hours PRN      REVIEW OF SYSTEMS  --------------------------------------------------------------------------------  Gen: No weight changes, fatigue, fevers/chills, weakness  Skin: No rashes  Head/Eyes/Ears/Mouth: No headache  Respiratory: No dyspnea, cough,  CV: No chest pain, orthopnea  GI: No abdominal pain, diarrhea, constipation, nausea, vomiting,  MSK: No joint pain  Neuro: No dizziness/lightheadedness, weakness  Heme: No bleeding  Psych: No significant nervousness, anxiety, stress, depression    All other systems were reviewed and are negative, except as noted.    VITALS/PHYSICAL EXAM  --------------------------------------------------------------------------------  T(C): 37.7 (05-29-25 @ 11:46), Max: 37.7 (05-29-25 @ 05:06)  HR: 81 (05-29-25 @ 11:46) (72 - 83)  BP: 178/70 (05-29-25 @ 11:46) (165/66 - 193/70)  RR: 18 (05-29-25 @ 11:46) (18 - 20)  SpO2: 94% (05-29-25 @ 11:46) (93% - 100%)  Wt(kg): --  Height (cm): 165.1 (05-27-25 @ 20:19)  Weight (kg): 68.9 (05-27-25 @ 20:19)  BMI (kg/m2): 25.3 (05-27-25 @ 20:19)  BSA (m2): 1.76 (05-27-25 @ 20:19)      05-28-25 @ 07:01  -  05-29-25 @ 07:00  --------------------------------------------------------  IN: 800 mL / OUT: 2300 mL / NET: -1500 mL      Physical Exam:  	Gen: NAD, well-appearing  	HEENT:  supple neck  	Pulm: CTA B/L  	CV: RRR, S1S2; no rub  	Abd: +BS, soft, nontender  	UE: Warm  	LE: Warm, no edema  	Neuro: No focal deficits  	Psych: Normal affect and mood  	Skin: Warm  	Vascular access: avf    LABS/STUDIES  --------------------------------------------------------------------------------              8.1    4.68  >-----------<  70       [05-29-25 @ 06:09]              25.3     135  |  96  |  28.5  ----------------------------<  192      [05-29-25 @ 06:09]  4.6   |  24.0  |  4.04        Ca     8.6     [05-29-25 @ 06:09]    TPro  6.2  /  Alb  3.6  /  TBili  0.3  /  DBili  x   /  AST  11  /  ALT  8   /  AlkPhos  97  [05-29-25 @ 06:09]    PT/INR: PT 11.8 , INR 1.04       [05-27-25 @ 23:28]  PTT: 34.9       [05-27-25 @ 23:28]      Iron 61, TIBC 365, %sat 17      [05-28-25 @ 09:03]  Ferritin 116      [05-28-25 @ 09:03]

## 2025-05-29 NOTE — PROGRESS NOTE ADULT - PROBLEM SELECTOR PLAN 1
RUQ pain is chronic and daily for months  Had EGd 2 months ago by Dr Kenyon , her GI in [place]  PPi BID  Will sign off. F/U with her primary GI Dr Kenyon   will sign off

## 2025-05-29 NOTE — PROGRESS NOTE ADULT - ASSESSMENT
69 year old female pt admitted for possible colitis- Nephrology consulted for managing HD needs.    ESRD on HD   MWF schedule at [facility] ; [facility]  HD tomorrow    anemia of CKD  Hb below goal  resume LASHELL with HD    HTN/ volume  Bp stable- pt euvolemic  UF as tolerated with HD    CKD MBD  ca++ at goal  Continue phos binders with meals    Monitor Phos levels

## 2025-05-29 NOTE — DISCHARGE NOTE PROVIDER - PROVIDER TOKENS
FREE:[LAST:[PCP],PHONE:[(   )    -],FAX:[(   )    -],FOLLOWUP:[1 week]],FREE:[LAST:[GI],PHONE:[(   )    -],FAX:[(   )    -],FOLLOWUP:[1-3 days]]

## 2025-05-29 NOTE — PROGRESS NOTE ADULT - SUBJECTIVE AND OBJECTIVE BOX
SUBJECTIVE    LAST 24 HOURS:  received HD yesterday    TODAY:  Pt seen at bedside in AM  No acute/overnight events  Pt states abd pain and distension improved. Pain still mildly present. Early afternoon pt developed fever 102F.    OBJECTIVE    PHYSICAL EXAM:  GENERAL: No acute distress, comfortably in bed  HEENT: Atraumatic, normocephalic, non-icteric  NEURO: A&Ox3, no focal deficits, moving all extremities spontaneously, no dysarthria, CN II-XII grossly intact  PSYCH: Normal affect, calm, appropriate insight and judgment, fluent speech  LUNGS: CTAB, no wrr, non-labored breathing  HEART: RRR, no murmur appreciated  ABD: Soft, mild tenderness R abd, some distension, no appreciable masses  EXTREMITIES: Nontender, no clubbing, cyanosis, or edema    Vital Signs Last 24 Hrs  T(C): 37.7 (29 May 2025 11:46), Max: 37.7 (29 May 2025 05:06)  T(F): 99.9 (29 May 2025 11:46), Max: 99.9 (29 May 2025 11:46)  HR: 81 (29 May 2025 11:46) (74 - 83)  BP: 178/70 (29 May 2025 11:46) (165/66 - 193/70)  BP(mean): --  RR: 18 (29 May 2025 11:46) (18 - 20)  SpO2: 94% (29 May 2025 11:46) (93% - 100%)    Parameters below as of 29 May 2025 11:46  Patient On (Oxygen Delivery Method): room air    MEDICATIONS  (STANDING):  aspirin enteric coated 81 milliGRAM(s) Oral daily  calcium acetate 2001 milliGRAM(s) Oral two times a day with meals  carvedilol 25 milliGRAM(s) Oral every 12 hours  CREON (Pancrelipase 3000 units) 1 Capsule(s) 1 Capsule(s) Oral three times a day with meals  dextrose 5%. 1000 milliLiter(s) (50 mL/Hr) IV Continuous <Continuous>  dextrose 5%. 1000 milliLiter(s) (100 mL/Hr) IV Continuous <Continuous>  dextrose 50% Injectable 25 Gram(s) IV Push once  dextrose 50% Injectable 12.5 Gram(s) IV Push once  dextrose 50% Injectable 25 Gram(s) IV Push once  epoetin day (PROCRIT) Injectable 72189 Unit(s) IV Push <User Schedule>  glucagon  Injectable 1 milliGRAM(s) IntraMuscular once  hydrALAZINE 25 milliGRAM(s) Oral every 8 hours  insulin lispro (ADMELOG) corrective regimen sliding scale   SubCutaneous three times a day before meals  insulin lispro (ADMELOG) corrective regimen sliding scale   SubCutaneous at bedtime  levothyroxine 200 MICROGram(s) Oral daily  lisinopril 10 milliGRAM(s) Oral daily  metoclopramide 10 milliGRAM(s) Oral three times a day before meals  polyethylene glycol 3350 17 Gram(s) Oral daily  sertraline 200 milliGRAM(s) Oral daily  sucralfate suspension 1 Gram(s) Oral four times a day    MEDICATIONS  (PRN):  dextrose Oral Gel 15 Gram(s) Oral once PRN Blood Glucose LESS THAN 70 milliGRAM(s)/deciliter  HYDROmorphone  Injectable 0.5 milliGRAM(s) IV Push every 4 hours PRN abdominal pain  ondansetron Injectable 4 milliGRAM(s) IV Push every 6 hours PRN Nausea and/or Vomiting    Allergies    Augmentin (Hives)  ceftriaxone (Pruritus)    Intolerances        LABS:                        8.1    4.68  )-----------( 70       ( 29 May 2025 06:09 )             25.3     05-29    135  |  96  |  28.5[H]  ----------------------------<  192[H]  4.6   |  24.0  |  4.04[H]    Ca    8.6      29 May 2025 06:09    TPro  6.2[L]  /  Alb  3.6  /  TBili  0.3[L]  /  DBili  x   /  AST  11  /  ALT  8   /  AlkPhos  97  05-29    PT/INR - ( 27 May 2025 23:28 )   PT: 11.8 sec;   INR: 1.04 ratio         PTT - ( 27 May 2025 23:28 )  PTT:34.9 sec  Urinalysis Basic - ( 29 May 2025 06:09 )    Color: x / Appearance: x / SG: x / pH: x  Gluc: 192 mg/dL / Ketone: x  / Bili: x / Urobili: x   Blood: x / Protein: x / Nitrite: x   Leuk Esterase: x / RBC: x / WBC x   Sq Epi: x / Non Sq Epi: x / Bacteria: x      CAPILLARY BLOOD GLUCOSE      POCT Blood Glucose.: 185 mg/dL (29 May 2025 13:23)  POCT Blood Glucose.: 170 mg/dL (29 May 2025 09:00)  POCT Blood Glucose.: 188 mg/dL (28 May 2025 21:23)  POCT Blood Glucose.: 93 mg/dL (28 May 2025 16:41)      CULTURE DATA:  sending bcx today    RADIOLOGY & ADDITIONAL TESTS:  No new imaging to review

## 2025-05-29 NOTE — PROGRESS NOTE ADULT - PROBLEM SELECTOR PLAN 2
NO BM since admission. Unable to obtain stool samples  give regular diet  Hx of chronic pancreatitis- can resume Creon on discharge ( resume all her previous GI med )

## 2025-05-29 NOTE — DISCHARGE NOTE PROVIDER - DETAILS OF MALNUTRITION DIAGNOSIS/DIAGNOSES
This patient has been assessed with a concern for Malnutrition and was treated during this hospitalization for the following Nutrition diagnosis/diagnoses:     -  06/04/2025: Moderate protein-calorie malnutrition

## 2025-05-29 NOTE — DISCHARGE NOTE PROVIDER - HOSPITAL COURSE
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 Patient had missed HD this past monday. GI consulted for abd distension/swelling. Nephro consulted for HD. CT revealed trace ascites, diffuse thickening of colon, and Prominent pancreatic duct measuring 5 to 6 mm w/ subcentimeter cyst in uncinate process. Patient received session of dialysis with improvement of abd distension and pain. GI w/ low suspicion for colitis, pt with stable vitals and no leukocytosis. Patient is to follow up outpt for MRCP for pancreas duct/cyst follow up. Patient is medically stable for discharge.      Patient was explained hospital course, risks and benefits of treatment, and discharge planning, along with follow-up. Patient expresses understanding of all of the above. Patient is medically stable for discharge with appropriate followup. 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 Patient had missed HD this past monday. GI consulted for abd distension/swelling. Nephro consulted for HD. CT revealed trace ascites, diffuse thickening of colon, and Prominent pancreatic duct measuring 5 to 6 mm w/ subcentimeter cyst in uncinate process. Patient received session of dialysis with improvement of abd distension and pain. GI w/ low suspicion for colitis, pt with stable vitals and no leukocytosis.    Patient's hospital course was complicated by further abdominal pain, mostly directed in the RUQ. Repeat CT A/P w/IV scan was performed revealing multiple hepatic abscesses. IR was consulted, abscesses were deemed to small as candidates for drainage. Patient was started on IV renally dosed Meropenem. Patient's blood cultures revealed...... ID recommends ....... for ...... weeks upon discharge. Patient is to follow up outpt for MRCP for pancreas duct/cyst follow up. Patient is medically stable for discharge.    On day of discharge, patient is clinically stable with no new exam findings or acute symptoms compared to prior. The patient was seen by the attending physician on the date of discharge and deemed stable and acceptable for discharge. The patient's chronic medical conditions were treated accordingly per the patient's home medication regimen. The patient's medication reconciliation (with changes made to chronic medications), follow up appointments, discharge orders, instructions, and significant lab and diagnostic studies are as noted.   Patient was explained hospital course, risks and benefits of treatment, and discharge planning, along with follow-up.  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 Patient had missed HD this past monday. GI consulted for abd distension/swelling. Nephro consulted for HD. CT revealed trace ascites, diffuse thickening of colon, and Prominent pancreatic duct measuring 5 to 6 mm w/ subcentimeter cyst in uncinate process. Patient received session of dialysis with improvement of abd distension and pain. GI w/ low suspicion for colitis, pt with stable vitals and no leukocytosis.    Patient's hospital course was complicated by further abdominal pain, mostly directed in the RUQ. Repeat CT A/P w/IV scan was performed revealing multiple hepatic abscesses. IR was consulted, abscesses were deemed to small as candidates for drainage. Patient was started on IV renally dosed Meropenem. Patient's blood cultures revealed no growth. ID recommends 6 weeks of Meropenem 500mg IV Q24h (on HD days give post-HD) through July 15th, 2025. Also, weekly CBC and CMP to be faxed to . PICC placed 6/9. Patient is to follow up outpt for MRCP for pancreas duct/cyst follow up. Patient is medically stable for discharge.    On day of discharge, patient is clinically stable with no new exam findings or acute symptoms compared to prior. The patient was seen by the attending physician on the date of discharge and deemed stable and acceptable for discharge. The patient's chronic medical conditions were treated accordingly per the patient's home medication regimen. The patient's medication reconciliation (with changes made to chronic medications), follow up appointments, discharge orders, instructions, and significant lab and diagnostic studies are as noted.   Patient was explained hospital course, risks and benefits of treatment, and discharge planning, along with follow-up.  Patient expresses understanding of all of the above    Patient is medically stable for discharge

## 2025-05-29 NOTE — DISCHARGE NOTE PROVIDER - CARE PROVIDER_API CALL
PCP,   Phone: (   )    -  Fax: (   )    -  Follow Up Time: 1 week    GI,   Phone: (   )    -  Fax: (   )    -  Follow Up Time: 1-3 days

## 2025-05-29 NOTE — PROGRESS NOTE ADULT - SUBJECTIVE AND OBJECTIVE BOX
Chief Complaint:  Patient is a 69y old  Female who presents with a chief complaint of Abdominal pain  Dialysis (28 May 2025 15:35)      HPI/ 24 hr events: Patient seen and examined at bedside. Pt feeling better after dialysis. Still with her chronic abdominal pain. Tolerating diet. No BMs, unable to collect stool studies. Denies nausea, vomiting, diarrhea. Vitals are overall stable, no leukocytosis.      REVIEW OF SYSTEMS:   General: Negative  HEENT: Negative  CV: Negative  Respiratory: Negative  GI: See HPI  : Negative  MSK: Negative  Hematologic: Negative  Skin: Negative    MEDICATIONS:   MEDICATIONS  (STANDING):  aspirin enteric coated 81 milliGRAM(s) Oral daily  calcium acetate 2001 milliGRAM(s) Oral two times a day with meals  carvedilol 25 milliGRAM(s) Oral every 12 hours  CREON (Pancrelipase 3000 units) 1 Capsule(s) 1 Capsule(s) Oral three times a day with meals  dextrose 5%. 1000 milliLiter(s) (50 mL/Hr) IV Continuous <Continuous>  dextrose 5%. 1000 milliLiter(s) (100 mL/Hr) IV Continuous <Continuous>  dextrose 50% Injectable 25 Gram(s) IV Push once  dextrose 50% Injectable 12.5 Gram(s) IV Push once  dextrose 50% Injectable 25 Gram(s) IV Push once  epoetin day (PROCRIT) Injectable 32694 Unit(s) IV Push <User Schedule>  glucagon  Injectable 1 milliGRAM(s) IntraMuscular once  hydrALAZINE 25 milliGRAM(s) Oral every 8 hours  insulin lispro (ADMELOG) corrective regimen sliding scale   SubCutaneous three times a day before meals  insulin lispro (ADMELOG) corrective regimen sliding scale   SubCutaneous at bedtime  levothyroxine 200 MICROGram(s) Oral daily  lisinopril 10 milliGRAM(s) Oral daily  metoclopramide 10 milliGRAM(s) Oral three times a day before meals  polyethylene glycol 3350 17 Gram(s) Oral daily  sertraline 200 milliGRAM(s) Oral daily  sucralfate suspension 1 Gram(s) Oral four times a day    MEDICATIONS  (PRN):  dextrose Oral Gel 15 Gram(s) Oral once PRN Blood Glucose LESS THAN 70 milliGRAM(s)/deciliter  HYDROmorphone  Injectable 0.5 milliGRAM(s) IV Push every 4 hours PRN abdominal pain  ondansetron Injectable 4 milliGRAM(s) IV Push every 6 hours PRN Nausea and/or Vomiting            DIET:  Diet, DASH/TLC:   Sodium & Cholesterol Restricted  Consistent Carbohydrate Evening Snack (CSTCHOSN)  Low Fat (LOWFAT)  For patients receiving Renal Replacement - No Protein Restr, No Conc K, No Conc Phos, Low  Sodium (RENAL) (05-28-25 @ 08:42) [Active]          ALLERGIES:   Allergies    Augmentin (Hives)  ceftriaxone (Pruritus)    Intolerances        VITAL SIGNS:   Vital Signs Last 24 Hrs  T(C): 37.7 (29 May 2025 08:43), Max: 37.7 (29 May 2025 05:06)  T(F): 99.8 (29 May 2025 08:43), Max: 99.8 (29 May 2025 05:06)  HR: 81 (29 May 2025 08:43) (68 - 83)  BP: 180/73 (29 May 2025 08:43) (165/66 - 193/70)  BP(mean): --  RR: 18 (29 May 2025 08:43) (18 - 20)  SpO2: 93% (29 May 2025 08:43) (93% - 100%)    Parameters below as of 29 May 2025 08:43  Patient On (Oxygen Delivery Method): room air      I&O's Summary    28 May 2025 07:01  -  29 May 2025 07:00  --------------------------------------------------------  IN: 800 mL / OUT: 2300 mL / NET: -1500 mL        PHYSICAL EXAM:   GENERAL:  No acute distress  HEENT:  NC/AT, conjunctiva clear, sclera anicteric  CHEST:  No increased effort  HEART:  Regular rate  ABDOMEN:  Soft, non-tender, non-distended, normoactive bowel sounds, no rebound or guarding  EXTREMITIES: No edema  SKIN:  Warm, dry  NEURO:  Calm, cooperative    LABS:                        8.1    4.68  )-----------( 70       ( 29 May 2025 06:09 )             25.3     Hemoglobin: 8.1 g/dL (05-29-25 @ 06:09)  Hemoglobin: 8.3 g/dL (05-27-25 @ 23:28)    05-29    135  |  96  |  28.5[H]  ----------------------------<  192[H]  4.6   |  24.0  |  4.04[H]    Ca    8.6      29 May 2025 06:09    TPro  6.2[L]  /  Alb  3.6  /  TBili  0.3[L]  /  DBili  x   /  AST  11  /  ALT  8   /  AlkPhos  97  05-29    LIVER FUNCTIONS - ( 29 May 2025 06:09 )  Alb: 3.6 g/dL / Pro: 6.2 g/dL / ALK PHOS: 97 U/L / ALT: 8 U/L / AST: 11 U/L / GGT: x             PT/INR - ( 27 May 2025 23:28 )   PT: 11.8 sec;   INR: 1.04 ratio         PTT - ( 27 May 2025 23:28 )  PTT:34.9 sec        RADIOLOGY & ADDITIONAL STUDIES:      ACC: [id number] EXAM:  CT ABDOMEN AND PELVIS IC   ORDERED BY: EVARISTO DOBBINS     ACC: [id number] EXAM:  CT ANGIO CHEST PULM ART WAWIC   ORDERED BY: EVARISTO DOBBINS     PROCEDURE DATE:  05/28/2025          INTERPRETATION:  CLINICAL INFORMATION: Hypoxia, chest pain. Generalized   abdominal pain.    COMPARISON: CT abdomen and pelvis 5/21/2025..    CONTRAST/COMPLICATIONS:  IV Contrast: Omnipaque 350  90 cc administered   10 cc discarded  Oral Contrast: NONE  .    PROCEDURE:  CT Angiography of the Chest was performed followed by portal venous phase   imaging of the Abdomen and Pelvis.  Sagittal and coronal reformats were performed as well as 3D (MIP)   reconstructions.    FINDINGS:  CHEST:  LUNGS PLEURA AND LARGE AIRWAYS: Patent central airways.  Trace bilateral pleural effusions. Mild bibasilar atelectasis. VESSELS:   No pulmonary embolism. Aortic calcifications. Coronary artery   calcifications.  HEART: Cardiomegaly. No pericardial effusion.  MEDIASTINUM AND MANDIE: No lymphadenopathy.  CHEST WALL AND LOWER NECK: Within normal limits.    ABDOMEN AND PELVIS:  LIVER: Nodular contour to the liver suspicious for cirrhosis. Correlate   clinically.  BILE DUCTS: Prominent common bile duct consistent with   postcholecystectomy state, unchanged  GALLBLADDER: Cholecystectomy.  SPLEEN: Within normal limits.  PANCREAS: Prominent pancreatic duct measuring 5 to 6 mm. Subcentimeter   cyst in uncinate process. Small calcification within the uncinate   process. Recommend nonemergent MRI abdomen withMRCP for further   evaluation.  ADRENALS: Within normal limits.  KIDNEYS/URETERS: Small right renal cyst. Cortical hypodensities too small   to characterize.    BLADDER: Within normal limits.  REPRODUCTIVE ORGANS: Uterus and adnexa within normal limits.    BOWEL: Mild diffuse thickening of the colon. Question colitis. Sigmoid   diverticulosis without diverticulitis. No bowel obstruction. Appendix is   normal. The cecum is in a midline anterior position consistent with a   bascule  PERITONEUM/RETROPERITONEUM: Trace abdominal and pelvic ascites.  VESSELS: Atherosclerotic changes.  LYMPH NODES: No lymphadenopathy.  ABDOMINAL WALL: Within normal limits.  BONES: Degenerative changes.    IMPRESSION:    No pulmonary embolism.    Trace bilateral pleural effusions. Mild bibasilar atelectasis.    Nodular contour to the liver suspicious for cirrhosis. Correlate   clinically.    Trace abdominal and pelvic ascites.    Prominent pancreatic duct measuring 5 to 6 mm. Subcentimeter cyst in   uncinate process. Small calcification within the uncinate process.   Recommend nonemergent MRI abdomen with MRCP for further evaluation.    Mild diffuse thickening of the colon. Question colitis.      --- End of Report ---            MAI SERVIN MD; Attending Radiologist  This document has been electronically signed. May 28 2025  5:38AM  05-28-25 @ 04:52

## 2025-05-29 NOTE — DISCHARGE NOTE PROVIDER - NSDCDCMDCOMP_GEN_ALL_CORE
[facility]  Progress Note  Name: Vadim Chapa I  MRN: [id number]  [facility] Date of Admission: 12/13/2023   Date of Service: 12/14/2023 I Hospital Day: 1    Assessment/Plan   * Wide-complex tachycardia  Assessment & Plan  Patient with known history of both VT as well as atrial fibrillation and atrial flutter. Suspect 1:1 atrial flutter based on EKG   12/13/23: Presented to [facility] ED with palpitations and fatigue. Was noted to be in a wide complex tachycardia. Cardioversion x1 with successful conversion to NSR. Had recurrent wide complex tachycardia with unsuccessful cardioversion. Given 150mg IV amiodarone and started on infusion of amiodarone and lidocaine. Amiodarone 150mg x1 given on arrival   12/14/23: 150mg amiodarone + Cardioversion (150J) x1 to NSR     Plan:   Continue antiarrythmic's:   Lidocaine 1mg/min   Amiodarone 1mg/min  Holding home AV lou blockers now in the setting of marginal BP   Metoprolol succinate 75mg q12h   Optimize electrolytes: K > 4.0, Mag > 2.0  EP consulted, follow up recommendations     Chronic HFrEF (heart failure with reduced ejection fraction) (HCC)  Assessment & Plan  Secondary to nonischemic, dilated cardiomyopathy from suspected coxsackievirus   Relevant echocardiograms:  12/3/23 TTE:  LV cavity size is moderately dilated. Wall thickness is normal. LVEF 15% by biplane measurement. Systolic function is severely reduced. Severe global hypokinesis. No thrombus. RV normal. Mild to moderate MR. Mild TR.    Plan:   Holding home diuresis for now as patient appears clinically dry  Lasix 40mg PO daily   Spironolactone 25mg daily  Keep euvolemic today  Daily weights  Holding home afterload reduction agents while slightly hypotensive   Entresto 24-26mg BID   Holding home metoprolol succiante 75mg q12h   Heart failure cardiology consulted     Paroxysmal atrial fibrillation (HCC)  Assessment & Plan  Currently in wide complex tachycardia,  which is suspicious for 1:1 atrial flutter versus true ventricular arrhythmia   Known history of atrial fibrillation, atrial flutter, and rapid ventricular responses     Plan:  Goal HR < 110   Holding all AV lou blockers at this time given low BP  Metoprolol succinate 75mg q12h   Continue current antiarrythmics:   Amiodarone 1 mg/min   Lidocaine 1mg/min    Optimize electrolytes: K > 4.0, mag > 2.0   Systemic anticoagulation: Heparin drip   In place of home Xarelto 20mg daily    Continue to monitor telemetry       HOANG (acute kidney injury) (HCC)  Assessment & Plan  Secondary to tachyarrhythmia, likely causing poor renal perfusion in the setting of ongoing diuretic use   Baseline creatinine: 0.93 - 1.08  Initial creatinine: 1.35 (12/13/23)   Last creatinine 1.15 (12/14/23)     Plan:   Strict q4h I/O monitoring  Monitor without boateng catheter   Continue to follow renal function tests  Holding diuretics  Volume as indicated  Continue supportive are     Dilated cardiomyopathy (HCC)  Assessment & Plan  Secondary to suspected coxsackievirus    Follow with heart failure cardiology as outpatient   Given high BMI, not a candidate for transplant    Plan  See above    Leukocytosis  Assessment & Plan  Suspect secondary to leukemoid stress response from tachyarrhythmia. Do not suspect sepsis     Plan:   Current Antibiotics: none  Continue to monitor fever and WBC curve   Obtain baseline blood cultures for completeness     Hypertension  Assessment & Plan  Currently low-normal to slightly hypotensive in the setting of tachyrhythmia     Plan:  Goal SBP < 160  Goal MAP > 65  Holding home antihypertensive medications:  Metoprolol succinate 75mg q12h   Entreseto 24-26mg BID    Obstructive sleep apnea  Assessment & Plan  Last sleep study 2019  Not compliant with CPAP at home    Plan:   Encourage use   Monitor while in patient and apply CPAP as needed              Disposition: Critical care    ICU Core Measures     A: Assess, Prevent,  and Manage Pain Has pain been assessed? Yes  Need for changes to pain regimen? NA   B: Both SAT/SAT  N/A   C: Choice of Sedation RASS Goal: 0 Alert and Calm or N/A patient not on sedation  Need for changes to sedation or analgesia regimen? NA   D: Delirium CAM-ICU: Negative   E: Early Mobility  Plan for early mobility? Yes   F: Family Engagement Plan for family engagement today? Yes         Prophylaxis:  VTE VTE covered by:  heparin (porcine), Intravenous, 11.1 Units/kg/hr at 12/13/23 2151  heparin (porcine), Intravenous  heparin (porcine), Intravenous       Stress Ulcer  not orderedcovered byfamotidine (PEPCID) 20 mg tablet [[id number]] (Long-Term Med)         Significant 24hr Events     24hr events: See overnight events. Patient successfully cardioverted to NSR/SB. Low BP with MAP's > 65 not requiring vasopressors      Subjective   Review of Systems   Constitutional:  Positive for fatigue.   Respiratory:  Negative for shortness of breath.    Cardiovascular:  Negative for chest pain and palpitations.   Gastrointestinal:  Negative for nausea.   Neurological:  Negative for dizziness.      Objective                            Vitals I/O      Most Recent Min/Max in 24hrs   Temp 98.2 °F (36.8 °C) Temp  Min: 98.2 °F (36.8 °C)  Max: 98.5 °F (36.9 °C)   Pulse 58 Pulse  Min: 58  Max: 156   Resp 19 Resp  Min: 10  Max: 27   BP (!) 86/53 BP  Min: 71/50  Max: 172/108   O2 Sat 96 % SpO2  Min: 93 %  Max: 100 %      Intake/Output Summary (Last 24 hours) at 12/14/2023 0300  Last data filed at 12/14/2023 0200  Gross per 24 hour   Intake 617.98 ml   Output 430 ml   Net 187.98 ml       Diet NPO; Sips with meds    Invasive Monitoring           Physical Exam   Physical Exam  Vitals and nursing note reviewed.   Eyes:      Conjunctiva/sclera: Conjunctivae normal.      Pupils: Pupils are equal, round, and reactive to light.   Skin:     General: Skin is warm and dry.      Capillary Refill: Capillary refill takes less than 2 seconds.   HENT:       Head: Normocephalic and atraumatic.      Mouth/Throat:      Lips: Pink.      Mouth: Mucous membranes are dry.   Cardiovascular:      Rate and Rhythm: Regular rhythm. Bradycardia present.      Pulses:           Radial pulses are 2+ on the right side and 2+ on the left side.        Dorsalis pedis pulses are 1+ on the right side and 1+ on the left side.      Heart sounds: Normal heart sounds.   Musculoskeletal:      Cervical back: Full passive range of motion without pain.      Right lower leg: No edema.      Left lower leg: No edema.   Abdominal: General: Abdomen is protuberant. There is no distension.      Palpations: Abdomen is soft.      Tenderness: There is no abdominal tenderness.   Constitutional:       General: He is not in acute distress.     Appearance: Normal appearance. He is well-developed and well-nourished. He is obese.      Interventions: Nasal cannula in place.   Pulmonary:      Effort: Pulmonary effort is normal.      Breath sounds: Examination of the right-lower field reveals decreased breath sounds. Examination of the left-lower field reveals decreased breath sounds. Decreased breath sounds present. No wheezing, rhonchi or rales.   Neurological:      General: No focal deficit present.      Mental Status: He is alert and easily aroused.      GCS: GCS eye subscore is 4. GCS verbal subscore is 5. GCS motor subscore is 6.            Diagnostic Studies      EKG: Sinus bradycardia  Imaging: No new imaging I have personally reviewed pertinent reports.   and I have personally reviewed pertinent films in PACS     Medications:  Scheduled PRN   chlorhexidine, 15 mL, Q12H SAMUEL      heparin (porcine), 2,000 Units, Q6H PRN  heparin (porcine), 4,000 Units, Q6H PRN       Continuous    amiodarone (CORDARONE) 900 mg in dextrose 5 % 500 mL infusion, 1 mg/min, Last Rate: 1 mg/min (12/13/23 2155)  heparin (porcine), 3-20 Units/kg/hr (Order-Specific), Last Rate: 11.1 Units/kg/hr (12/13/23 2151)  lidocaine in dextrose  5%, 1 mg/min, Last Rate: 1 mg/min (12/13/23 2156)         Labs:    CBC    Recent Labs     12/13/23  1526 12/13/23 2147   WBC 12.81* 17.19*   HGB 18.4* 17.2*   HCT 55.5* 51.0*    266   BANDSPCT 1  --      BMP    Recent Labs     12/13/23  1526 12/13/23 2147   SODIUM 135 133*   K 4.5 6.1*    107   CO2 26 19*   AGAP 8 7   BUN 22 21   CREATININE 1.35* 1.15   CALCIUM 9.3 7.8*       Coags    Recent Labs     12/13/23  1526 12/13/23 2147   INR 1.34*  --    PTT  --  30        Additional Electrolytes  No recent results       Blood Gas    No recent results  No recent results LFTs  Recent Labs     12/13/23  1526   ALT 47   AST 38   ALKPHOS 84   ALB 4.4   TBILI 1.39*       Infectious  No recent results  Glucose  Recent Labs     12/13/23  1526 12/13/23 2147   GLUC 132 151*                   VILMA Saldana     This document is complete and the patient is ready for discharge.

## 2025-05-29 NOTE — PROGRESS NOTE ADULT - ATTENDING COMMENTS
abd pain  diarrhea that has resolved thus far  missed HD session    planned for dc but became hypertensive and febrile  culture, rvp.  abx for colitis for now

## 2025-05-29 NOTE — PROGRESS NOTE ADULT - NS ATTEST RISK PROBLEM GEN_ALL_CORE FT
RUQ pain is chronic and daily for months  Had EGd 2 months ago by Dr Kenyon , her GI in [place]  PPi BID  Will sign off. F/U with her primary GI Dr Kenyon   will sign off    NO BM since admission. Unable to obtain stool samples  give regular diet  Hx of chronic pancreatitis- can resume Creon on discharge ( resume all her previous GI med )

## 2025-05-29 NOTE — DISCHARGE NOTE PROVIDER - NSDCMRMEDTOKEN_GEN_ALL_CORE_FT
aspirin 81 mg oral delayed release capsule: orally once a day  calcium acetate 667 mg oral capsule: 3 cap(s) orally 2 times a day  carvedilol 25 mg oral tablet: 1 tab(s) orally every 12 hours  Creon 3000 units oral delayed release capsule: 1 cap(s) orally every 8 hours  HumaLOG KwikPen 100 units/mL injectable solution: injectable 3 times a day  hydrALAZINE 25 mg oral tablet: 1 tab(s) orally every 8 hours  levothyroxine 200 mcg (0.2 mg) oral tablet: 1 tab(s) orally once a day  lisinopril 10 mg oral tablet: 1 tab(s) orally once a day  sertraline 100 mg oral tablet: 2 tab(s) orally once a day  sucralfate 1 g/10 mL oral suspension: 10 milliliter(s) orally 3 times a day   aspirin 81 mg oral delayed release tablet: 1 tab(s) orally once a day  calcium acetate 667 mg oral capsule: 3 cap(s) orally 2 times a day  carvedilol 25 mg oral tablet: 1 tab(s) orally every 12 hours  Creon 3000 units oral delayed release capsule: 1 cap(s) orally every 8 hours  HumaLOG KwikPen 100 units/mL injectable solution: injectable 3 times a day  hydrALAZINE 25 mg oral tablet: 1 tab(s) orally every 8 hours  levothyroxine 200 mcg (0.2 mg) oral tablet: 1 tab(s) orally once a day  lisinopril 10 mg oral tablet: 1 tab(s) orally once a day  sertraline 100 mg oral tablet: 2 tab(s) orally once a day  sucralfate 1 g/10 mL oral suspension: 10 milliliter(s) orally 3 times a day   aspirin 81 mg oral delayed release tablet: 1 tab(s) orally once a day  calcium acetate 667 mg oral capsule: 3 cap(s) orally 2 times a day  carvedilol 25 mg oral tablet: 1 tab(s) orally every 12 hours  Creon 3000 units oral delayed release capsule: 1 cap(s) orally every 8 hours  HumaLOG KwikPen 100 units/mL injectable solution: injectable 3 times a day  hydrALAZINE 25 mg oral tablet: 1 tab(s) orally every 8 hours  levothyroxine 200 mcg (0.2 mg) oral tablet: 1 tab(s) orally once a day  lisinopril 10 mg oral tablet: 1 tab(s) orally once a day  meropenem 500 mg intravenous injection: 500 milligram(s) intravenously once a day through July 15th, 2025. On HD days take the dose post-HD. Weekly CBC and CMP to be faxed to .  sertraline 100 mg oral tablet: 2 tab(s) orally once a day  sucralfate 1 g/10 mL oral suspension: 10 milliliter(s) orally 3 times a day   aspirin 81 mg oral delayed release tablet: 1 tab(s) orally once a day  calcium acetate 667 mg oral capsule: 3 cap(s) orally 2 times a day  carvedilol 25 mg oral tablet: 1 tab(s) orally every 12 hours  Creon 3000 units oral delayed release capsule: 1 cap(s) orally every 8 hours  HumaLOG KwikPen 100 units/mL injectable solution: injectable 3 times a day  hydrALAZINE 25 mg oral tablet: 1 tab(s) orally every 8 hours  levothyroxine 200 mcg (0.2 mg) oral tablet: 1 tab(s) orally once a day  lisinopril 10 mg oral tablet: 1 tab(s) orally once a day  meropenem 500 mg intravenous injection: 500 milligram(s) intravenously once a day through July 15th, 2025. On HD days take the dose post-HD. Weekly CBC and CMP to be faxed to .  sertraline 100 mg oral tablet: 2 tab(s) orally once a day  sucralfate 1 g/10 mL oral suspension: 10 milliliter(s) orally 3 times a day  traMADol 50 mg oral tablet: 1 tab(s) orally every 8 hours MDD: 3

## 2025-05-29 NOTE — PROGRESS NOTE ADULT - ASSESSMENT
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 CT abdomen with diffuse colon thickening.     # Abdominal pain, unclear etiology. Possible colitis (non-infectious as afebrile with mild leukopenia) vs pain from adhesions from multiple surgeries  # Gastroparesis  # Chronic Pancreatitis, currently with normal Lipase. CT findings as noted  # Erosive Esophagitis history  # Cirrhosis, prior HCV  - Admit to medical service  - Hydromorphone PRN  - Ondansetron PRN  - Metoclopramide for gastroparesis  - Pancreatic enzymes supplements TID w/ meals  - Sucralfate QID  - Miralax daily  - f/u stool PCR  - GI signed off, recs appreciated  - pt febrile 5/29 102F oral  - sent bcx today    # ESRD  # Hyperkalemia  # Hyperphosphatemia  # AoCKD  - Telemetry  - HD per Nephrology  - Phosphate binder  - LASHELL with HD  - iron stores wnl    # Hypertension  # History of CAD  # History of CHF, likely diastolic  - continue home lisinopril 10mg daily  - increasing hydralazing 25mg TID -> 50MG TID  - ASA to be continued  - Fluid balance monitoring and daily weight    # Diabetes  # Hypothyroidism, history of thyroidectomy for cancer  - Blood glucose monitoring with sliding scale Lispro  - A1c 7.3  - Levothyroxine 200mcg daily    # Anxiety  # Depression  - Sertraline to be continued    VTE prophylaxis - Intermittent pneumatic compression devices  Ambulate ad gareth with walker    Medication management - patient unsure but takes 20 pills daily in addition to PRN Insulins. [place]  check and DrFirst and medications reconciled accordingly  Palliative consultation placed to help with goals of care and reported possible ongoing hospice evaluation    Disposition - acute, sending bcx, HD tmm

## 2025-05-29 NOTE — DISCHARGE NOTE PROVIDER - ATTENDING DISCHARGE PHYSICAL EXAMINATION:
Vital Signs Last 24 Hrs  T(C): 37.7 (29 May 2025 11:46), Max: 37.7 (29 May 2025 05:06)  T(F): 99.9 (29 May 2025 11:46), Max: 99.9 (29 May 2025 11:46)  HR: 81 (29 May 2025 11:46) (72 - 83)  BP: 178/70 (29 May 2025 11:46) (165/66 - 193/70)  BP(mean): --  RR: 18 (29 May 2025 11:46) (18 - 20)  SpO2: 94% (29 May 2025 11:46) (93% - 100%)    Parameters below as of 29 May 2025 11:46  Patient On (Oxygen Delivery Method): room air    PHYSICAL EXAM:  GENERAL: NAD, lying in bed comfortably  HEART: Regular rate and rhythm, no murmurs, rubs, or gallops  LUNGS: Unlabored respirations.  Clear to auscultation bilaterally, no crackles, wheezing, or rhonchi  ABDOMEN: Soft, +BS, obese, nondistended  EXTREMITIES: 2+ peripheral pulses bilaterally. No clubbing, cyanosis, or edema  NERVOUS SYSTEM:  A&Ox3, no focal deficits   SKIN: No rashes or lesions VITALS:   T(C): 36.8 (06-09-25 @ 10:52), Max: 36.8 (06-09-25 @ 00:31)  HR: 73 (06-09-25 @ 10:52) (69 - 75)  BP: 176/77 (06-09-25 @ 10:52) (157/67 - 176/77)  RR: 18 (06-09-25 @ 10:52) (18 - 18)  SpO2: 98% (06-09-25 @ 10:52) (95% - 98%)      GENERAL: Not in acute distress, lying comfortably  HEAD:  Atraumatic, Normocephalic  EYES: EOMI, PERRLA, conjunctiva and sclera clear  NECK: Supple, No JVD, Normal thyroid  NERVOUS SYSTEM:  Alert & Oriented X3, No gross focal deficits  CHEST/LUNG: Clear to auscultation bilaterally; No rales, rhonchi, wheezing, or rubs  HEART: Regular rate and rhythm; No murmurs, rubs, or gallops  ABDOMEN: Soft, Nontender, Nondistended; Bowel sounds present  EXTREMITIES:  No clubbing, cyanosis, or edema  SKIN: No rashes or lesions VITALS:   T(C): 37.6 (06-10-25 @ 05:12), Max: 37.6 (06-10-25 @ 05:12)  HR: 79 (06-10-25 @ 05:12) (70 - 80)  BP: 165/66 (06-10-25 @ 05:35) (156/60 - 183/73)  RR: 18 (06-10-25 @ 05:12) (17 - 20)  SpO2: 96% (06-10-25 @ 05:12) (94% - 98%)    GENERAL: NAD, lying in bed comfortably  HEAD:  Atraumatic, normocephalic  EYES: EOMI, PERRLA, conjunctiva and sclera clear  ENT: Moist mucous membranes  NECK: Supple, no JVD  HEART: Regular rate and rhythm, no murmurs, rubs, or gallops  LUNGS: Unlabored respirations.  Clear to auscultation bilaterally, no crackles, wheezing, or rhonchi  ABDOMEN: Soft, nontender, nondistended, +BS  EXTREMITIES: 2+ peripheral pulses bilaterally. No clubbing, cyanosis, or edema. PICC RUE  NERVOUS SYSTEM:  A&Ox3, no focal deficits   SKIN: No rashes or lesions VITALS:   VITALS:   T(C): 36.8 (06-11-25 @ 08:52), Max: 37.2 (06-11-25 @ 04:22)  HR: 79 (06-11-25 @ 08:52) (78 - 88)  BP: 135/90 (06-11-25 @ 08:52) (135/90 - 203/69)  RR: 18 (06-11-25 @ 08:52) (18 - 18)  SpO2: 97% (06-11-25 @ 08:52) (95% - 97%)      GENERAL: NAD, lying in bed comfortably  HEAD:  Atraumatic, normocephalic  EYES: EOMI, PERRLA, conjunctiva and sclera clear  ENT: Moist mucous membranes  NECK: Supple, no JVD  HEART: Regular rate and rhythm, no murmurs, rubs, or gallops  LUNGS: Unlabored respirations.  Clear to auscultation bilaterally, no crackles, wheezing, or rhonchi  ABDOMEN: Soft, nontender, nondistended, +BS  EXTREMITIES: 2+ peripheral pulses bilaterally. No clubbing, cyanosis, or edema. PICC RUE  NERVOUS SYSTEM:  A&Ox3, no focal deficits   SKIN: No rashes or lesions

## 2025-05-29 NOTE — PROGRESS NOTE ADULT - NS ATTEND AMEND GEN_ALL_CORE FT
A- +BS, soft. Non tender on RUQ palpation     RUQ pain is chronic and daily for months  Had EGd 2 months ago by Dr Kenyon , her GI in [place]  PPi BID  Will sign off. F/U with her primary GI Dr Kenyon   will sign off    NO BM since admission. Unable to obtain stool samples  give regular diet  Hx of chronic pancreatitis- can resume Creon on discharge ( resume all her previous GI med )

## 2025-05-29 NOTE — PROGRESS NOTE ADULT - ASSESSMENT
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 GI asked to consult for abdominal pain.    #abdominal pain   #Chronic Pancreatitis  #CKD  CT Abdomen and Pelvis w/ IV Cont (05.28.25) [id number] Prominent pancreatic duct measuring 5 to 6 mm. Subcentimeter cyst in uncinate process. Small calcification within the uncinate process. Recommend nonemergent MRI abdomen with MRCP for further evaluation. Mild diffuse thickening of the colon. Question colitis.  pt has chronic diarrhea, doubt a true colitis  -Trend CBC and BMP daily, no leukocytosis and remains afebrile  -check GI PCR and CDiff, no BMs so unable to be collected   -Continue Creon  -Low fiber diet as tolerated  -HD per nephro  -Can follow up pancreatic cyst as outpatient   -Outpatient follow up with her primary GI Dr. Kenyon  GI will sign off now. Please call us back with any questions or concern.  _________________________________________________________________  Assessment and recommendations are final when note is signed by the attending physician.

## 2025-05-29 NOTE — DISCHARGE NOTE PROVIDER - NSDCFUADDAPPT_GEN_ALL_CORE_FT
APPTS ARE READY TO BE MADE: [X] YES    Best Family or Patient Contact (if needed):    Additional Information about above appointments (if needed):    1: PCP within 1 week  2: Pt's GI Dr. Kenyon in 1-3 days  3:     Other comments or requests:  needs outpt MRCP APPTS ARE READY TO BE MADE: [X] YES    Best Family or Patient Contact (if needed):    Additional Information about above appointments (if needed):    1: PCP within 1 week  2: Pt's GI Dr. Kenyon in 1-3 days  3: ID follow-up in 2 weeks      Other comments or requests:  needs outpt MRCP

## 2025-05-30 LAB
ALBUMIN SERPL ELPH-MCNC: 3.6 G/DL — SIGNIFICANT CHANGE UP (ref 3.3–5.2)
ALP SERPL-CCNC: 100 U/L — SIGNIFICANT CHANGE UP (ref 40–120)
ALT FLD-CCNC: 12 U/L — SIGNIFICANT CHANGE UP
ANION GAP SERPL CALC-SCNC: 15 MMOL/L — SIGNIFICANT CHANGE UP (ref 5–17)
APPEARANCE CSF: CLEAR — SIGNIFICANT CHANGE UP
AST SERPL-CCNC: 18 U/L — SIGNIFICANT CHANGE UP
BILIRUB SERPL-MCNC: 0.4 MG/DL — SIGNIFICANT CHANGE UP (ref 0.4–2)
BUN SERPL-MCNC: 37 MG/DL — HIGH (ref 8–20)
CALCIUM SERPL-MCNC: 8.4 MG/DL — SIGNIFICANT CHANGE UP (ref 8.4–10.5)
CHLORIDE SERPL-SCNC: 94 MMOL/L — LOW (ref 96–108)
CO2 SERPL-SCNC: 23 MMOL/L — SIGNIFICANT CHANGE UP (ref 22–29)
COLOR CSF: SIGNIFICANT CHANGE UP
CREAT SERPL-MCNC: 5.52 MG/DL — HIGH (ref 0.5–1.3)
CRP SERPL-MCNC: 68 MG/L — HIGH
CRYPTOC AG CSF-ACNC: NEGATIVE — SIGNIFICANT CHANGE UP
EGFR: 8 ML/MIN/1.73M2 — LOW
EGFR: 8 ML/MIN/1.73M2 — LOW
ERYTHROCYTE [SEDIMENTATION RATE] IN BLOOD: 65 MM/HR — HIGH (ref 0–20)
GLUCOSE BLDC GLUCOMTR-MCNC: 200 MG/DL — HIGH (ref 70–99)
GLUCOSE BLDC GLUCOMTR-MCNC: 205 MG/DL — HIGH (ref 70–99)
GLUCOSE BLDC GLUCOMTR-MCNC: 214 MG/DL — HIGH (ref 70–99)
GLUCOSE CSF-MCNC: 84 MG/DLG/24H — HIGH (ref 40–70)
GLUCOSE SERPL-MCNC: 139 MG/DL — HIGH (ref 70–99)
GRAM STN FLD: SIGNIFICANT CHANGE UP
HCT VFR BLD CALC: 27.3 % — LOW (ref 34.5–45)
HGB BLD-MCNC: 8.7 G/DL — LOW (ref 11.5–15.5)
IMMATURE PLATELET FRACTION #: 2.7 K/UL — LOW (ref 4.7–11.1)
IMMATURE PLATELET FRACTION %: 4.1 % — SIGNIFICANT CHANGE UP (ref 1.6–4.9)
INR BLD: 1.2 RATIO — HIGH (ref 0.85–1.16)
MAGNESIUM SERPL-MCNC: 2.2 MG/DL — SIGNIFICANT CHANGE UP (ref 1.6–2.6)
MCHC RBC-ENTMCNC: 30.1 PG — SIGNIFICANT CHANGE UP (ref 27–34)
MCHC RBC-ENTMCNC: 31.9 G/DL — LOW (ref 32–36)
MCV RBC AUTO: 94.5 FL — SIGNIFICANT CHANGE UP (ref 80–100)
MELD SCORE WITH DIALYSIS: 25 POINTS — SIGNIFICANT CHANGE UP
MELD SCORE WITHOUT DIALYSIS: 25 POINTS — SIGNIFICANT CHANGE UP
MRSA PCR RESULT.: SIGNIFICANT CHANGE UP
NEUTROPHILS # CSF: SIGNIFICANT CHANGE UP % (ref 0–6)
NRBC # BLD AUTO: 0 K/UL — SIGNIFICANT CHANGE UP (ref 0–0)
NRBC # FLD: 0 K/UL — SIGNIFICANT CHANGE UP (ref 0–0)
NRBC BLD AUTO-RTO: 0 /100 WBCS — SIGNIFICANT CHANGE UP (ref 0–0)
NRBC NFR CSF: 1 /UL — SIGNIFICANT CHANGE UP (ref 0–5)
PHOSPHATE SERPL-MCNC: 3.6 MG/DL — SIGNIFICANT CHANGE UP (ref 2.4–4.7)
PLATELET # BLD AUTO: 67 K/UL — LOW (ref 150–400)
PMV BLD: 12.3 FL — SIGNIFICANT CHANGE UP (ref 7–13)
POTASSIUM SERPL-MCNC: 4.6 MMOL/L — SIGNIFICANT CHANGE UP (ref 3.5–5.3)
POTASSIUM SERPL-SCNC: 4.6 MMOL/L — SIGNIFICANT CHANGE UP (ref 3.5–5.3)
PROT CSF-MCNC: 59 MG/DL — HIGH (ref 15–45)
PROT SERPL-MCNC: 6.4 G/DL — LOW (ref 6.6–8.7)
PROTHROM AB SERPL-ACNC: 13.5 SEC — HIGH (ref 9.9–13.4)
RBC # BLD: 2.89 M/UL — LOW (ref 3.8–5.2)
RBC # CSF: 0 /CMM — SIGNIFICANT CHANGE UP (ref 0–1)
RBC # FLD: 16.2 % — HIGH (ref 10.3–14.5)
S AUREUS DNA NOSE QL NAA+PROBE: DETECTED
SODIUM SERPL-SCNC: 132 MMOL/L — LOW (ref 135–145)
TUBE TYPE: SIGNIFICANT CHANGE UP
WBC # BLD: 8.58 K/UL — SIGNIFICANT CHANGE UP (ref 3.8–10.5)
WBC # FLD AUTO: 8.58 K/UL — SIGNIFICANT CHANGE UP (ref 3.8–10.5)

## 2025-05-30 PROCEDURE — 62270 DX LMBR SPI PNXR: CPT

## 2025-05-30 PROCEDURE — 90935 HEMODIALYSIS ONE EVALUATION: CPT

## 2025-05-30 PROCEDURE — 99233 SBSQ HOSP IP/OBS HIGH 50: CPT | Mod: 25

## 2025-05-30 PROCEDURE — G0545: CPT

## 2025-05-30 PROCEDURE — 70450 CT HEAD/BRAIN W/O DYE: CPT | Mod: 26

## 2025-05-30 PROCEDURE — 99223 1ST HOSP IP/OBS HIGH 75: CPT

## 2025-05-30 PROCEDURE — 90937 HEMODIALYSIS REPEATED EVAL: CPT

## 2025-05-30 PROCEDURE — 99233 SBSQ HOSP IP/OBS HIGH 50: CPT | Mod: GC

## 2025-05-30 RX ORDER — ACETAMINOPHEN 500 MG/5ML
650 LIQUID (ML) ORAL EVERY 6 HOURS
Refills: 0 | Status: DISCONTINUED | OUTPATIENT
Start: 2025-05-30 | End: 2025-06-11

## 2025-05-30 RX ORDER — ACETAMINOPHEN 500 MG/5ML
1000 LIQUID (ML) ORAL ONCE
Refills: 0 | Status: COMPLETED | OUTPATIENT
Start: 2025-05-30 | End: 2025-05-30

## 2025-05-30 RX ORDER — MEROPENEM 1 G/30ML
1000 INJECTION INTRAVENOUS EVERY 24 HOURS
Refills: 0 | Status: DISCONTINUED | OUTPATIENT
Start: 2025-05-30 | End: 2025-05-31

## 2025-05-30 RX ORDER — LACTULOSE 10 G/15ML
10 SOLUTION ORAL ONCE
Refills: 0 | Status: COMPLETED | OUTPATIENT
Start: 2025-05-30 | End: 2025-05-30

## 2025-05-30 RX ORDER — ONDANSETRON HCL/PF 4 MG/2 ML
4 VIAL (ML) INJECTION EVERY 4 HOURS
Refills: 0 | Status: DISCONTINUED | OUTPATIENT
Start: 2025-05-30 | End: 2025-06-11

## 2025-05-30 RX ORDER — VANCOMYCIN HCL IN 5 % DEXTROSE 1.5G/250ML
1250 PLASTIC BAG, INJECTION (ML) INTRAVENOUS ONCE
Refills: 0 | Status: COMPLETED | OUTPATIENT
Start: 2025-05-30 | End: 2025-05-30

## 2025-05-30 RX ORDER — AZITHROMYCIN 250 MG
500 CAPSULE ORAL DAILY
Refills: 0 | Status: DISCONTINUED | OUTPATIENT
Start: 2025-05-30 | End: 2025-05-30

## 2025-05-30 RX ADMIN — LACTULOSE 10 GRAM(S): 10 SOLUTION ORAL at 19:11

## 2025-05-30 RX ADMIN — Medication 500 MILLIGRAM(S): at 05:09

## 2025-05-30 RX ADMIN — Medication 200 MICROGRAM(S): at 05:06

## 2025-05-30 RX ADMIN — Medication 400 MILLIGRAM(S): at 12:19

## 2025-05-30 RX ADMIN — Medication 1000 MILLIGRAM(S): at 01:01

## 2025-05-30 RX ADMIN — Medication 1 GRAM(S): at 23:51

## 2025-05-30 RX ADMIN — Medication 0.5 MILLIGRAM(S): at 09:24

## 2025-05-30 RX ADMIN — INSULIN LISPRO 0: 100 INJECTION, SOLUTION INTRAVENOUS; SUBCUTANEOUS at 21:49

## 2025-05-30 RX ADMIN — INSULIN LISPRO 1: 100 INJECTION, SOLUTION INTRAVENOUS; SUBCUTANEOUS at 08:09

## 2025-05-30 RX ADMIN — Medication 106.8 MILLIGRAM(S): at 15:32

## 2025-05-30 RX ADMIN — Medication 10 MILLIGRAM(S): at 14:28

## 2025-05-30 RX ADMIN — Medication 50 MILLIGRAM(S): at 05:05

## 2025-05-30 RX ADMIN — Medication 166.67 MILLIGRAM(S): at 17:42

## 2025-05-30 RX ADMIN — SERTRALINE 200 MILLIGRAM(S): 100 TABLET, FILM COATED ORAL at 14:29

## 2025-05-30 RX ADMIN — Medication 1000 MILLIGRAM(S): at 07:30

## 2025-05-30 RX ADMIN — CARVEDILOL 25 MILLIGRAM(S): 3.12 TABLET, FILM COATED ORAL at 17:41

## 2025-05-30 RX ADMIN — Medication 50 MILLIGRAM(S): at 14:33

## 2025-05-30 RX ADMIN — Medication 400 MILLIGRAM(S): at 06:40

## 2025-05-30 RX ADMIN — Medication 1 GRAM(S): at 17:42

## 2025-05-30 RX ADMIN — Medication 2001 MILLIGRAM(S): at 08:11

## 2025-05-30 RX ADMIN — INSULIN LISPRO 2: 100 INJECTION, SOLUTION INTRAVENOUS; SUBCUTANEOUS at 17:39

## 2025-05-30 RX ADMIN — Medication 1000 MILLIGRAM(S): at 13:00

## 2025-05-30 RX ADMIN — MEROPENEM 1000 MILLIGRAM(S): 1 INJECTION INTRAVENOUS at 14:30

## 2025-05-30 RX ADMIN — CARVEDILOL 25 MILLIGRAM(S): 3.12 TABLET, FILM COATED ORAL at 05:05

## 2025-05-30 RX ADMIN — Medication 4 MILLIGRAM(S): at 05:05

## 2025-05-30 RX ADMIN — Medication 50 MILLIGRAM(S): at 21:49

## 2025-05-30 RX ADMIN — Medication 1 GRAM(S): at 05:05

## 2025-05-30 RX ADMIN — Medication 4 MILLIGRAM(S): at 15:33

## 2025-05-30 RX ADMIN — Medication 4 MILLIGRAM(S): at 09:36

## 2025-05-30 RX ADMIN — Medication 81 MILLIGRAM(S): at 14:28

## 2025-05-30 RX ADMIN — Medication 0.5 MILLIGRAM(S): at 06:00

## 2025-05-30 RX ADMIN — EPOETIN ALFA 10000 UNIT(S): 10000 SOLUTION INTRAVENOUS; SUBCUTANEOUS at 10:34

## 2025-05-30 RX ADMIN — Medication 0.5 MILLIGRAM(S): at 14:57

## 2025-05-30 RX ADMIN — Medication 10 MILLIGRAM(S): at 05:11

## 2025-05-30 RX ADMIN — Medication 10 MILLIGRAM(S): at 17:44

## 2025-05-30 RX ADMIN — Medication 1 GRAM(S): at 14:28

## 2025-05-30 RX ADMIN — LISINOPRIL 10 MILLIGRAM(S): 5 TABLET ORAL at 05:05

## 2025-05-30 RX ADMIN — Medication 0.5 MILLIGRAM(S): at 05:05

## 2025-05-30 NOTE — PROGRESS NOTE ADULT - SUBJECTIVE AND OBJECTIVE BOX
SUBJECTIVE    LAST 24 HOURS:  pt developed fever 102F, chills, and severe headache yesterday, at that time was hypertensive w/  SBP 200s, afternoon BP meds due at that time. ofirmev given, sent bcx x2, RVP negative. Within the hour patient reported improving headache, though was still present.    Overnight pt developed 2 fevers, given ofirmev each time.    TODAY:  Pt seen at bedside in AM  Pt reports her headache never fully went away yesterday and began worsening in PM, has been consistent unchanged since then. Also reporting poor po intake 2/2 nausea, no vomiting. States she had missed her HD session on 5/26 for experiencing similar symptoms  no BM yet during hospitalization  pt reports chronic mild abd pain, currently at baseline.  HD today    OBJECTIVE    PHYSICAL EXAM:  GENERAL: appears uncomfortable, laying in bed, was able to walk to stretcher for transport  HEENT: Atraumatic, normocephalic, non-icteric  NEURO: A&Ox3, no focal deficits, moving all extremities spontaneously, no dysarthria, CN II-XII grossly intact  PSYCH: Normal affect, calm, appropriate insight and judgment, fluent speech  LUNGS: CTAB, no wrr, non-labored breathing  HEART: RRR, no murmur appreciated  ABD: Soft, mild R tenderness, non-distended, no organomegaly, no appreciable masses, +bs all 4 quadrants  EXTREMITIES: Nontender, no clubbing, cyanosis, or edema    Vital Signs Last 24 Hrs  T(C): 37.2 (30 May 2025 09:55), Max: 39.2 (29 May 2025 21:45)  T(F): 99 (30 May 2025 09:55), Max: 102.6 (29 May 2025 21:45)  HR: 75 (30 May 2025 09:55) (75 - 92)  BP: 114/55 (30 May 2025 09:55) (114/55 - 212/74)  BP(mean): 95 (30 May 2025 06:00) (95 - 108)  RR: 18 (30 May 2025 09:55) (16 - 18)  SpO2: 95% (30 May 2025 09:55) (92% - 97%)    Parameters below as of 30 May 2025 09:55  Patient On (Oxygen Delivery Method): room air    MEDICATIONS  (STANDING):  acyclovir IVPB 340 milliGRAM(s) IV Intermittent every 24 hours  aspirin enteric coated 81 milliGRAM(s) Oral daily  calcium acetate 2001 milliGRAM(s) Oral two times a day with meals  carvedilol 25 milliGRAM(s) Oral every 12 hours  CREON (Pancrelipase 3000 units) 1 Capsule(s) 1 Capsule(s) Oral three times a day with meals  dextrose 5%. 1000 milliLiter(s) (50 mL/Hr) IV Continuous <Continuous>  dextrose 5%. 1000 milliLiter(s) (100 mL/Hr) IV Continuous <Continuous>  dextrose 50% Injectable 25 Gram(s) IV Push once  dextrose 50% Injectable 12.5 Gram(s) IV Push once  dextrose 50% Injectable 25 Gram(s) IV Push once  epoetin day (PROCRIT) Injectable 25521 Unit(s) IV Push <User Schedule>  glucagon  Injectable 1 milliGRAM(s) IntraMuscular once  hydrALAZINE 50 milliGRAM(s) Oral three times a day  insulin lispro (ADMELOG) corrective regimen sliding scale   SubCutaneous three times a day before meals  insulin lispro (ADMELOG) corrective regimen sliding scale   SubCutaneous at bedtime  levothyroxine 200 MICROGram(s) Oral daily  lisinopril 10 milliGRAM(s) Oral daily  meropenem Injectable 1000 milliGRAM(s) IV Push every 24 hours  metoclopramide 10 milliGRAM(s) Oral three times a day before meals  polyethylene glycol 3350 17 Gram(s) Oral daily  sertraline 200 milliGRAM(s) Oral daily  sucralfate suspension 1 Gram(s) Oral four times a day  vancomycin  IVPB 1250 milliGRAM(s) IV Intermittent once    MEDICATIONS  (PRN):  acetaminophen     Tablet .. 650 milliGRAM(s) Oral every 6 hours PRN Mild Pain (1 - 3)  dextrose Oral Gel 15 Gram(s) Oral once PRN Blood Glucose LESS THAN 70 milliGRAM(s)/deciliter  HYDROmorphone  Injectable 0.5 milliGRAM(s) IV Push every 4 hours PRN abdominal pain  ondansetron Injectable 4 milliGRAM(s) IV Push every 4 hours PRN Nausea and/or Vomiting    Allergies    Augmentin (Hives)  ceftriaxone (Pruritus)    Intolerances    LABS:                        8.7    8.58  )-----------( 69       ( 30 May 2025 04:44 )             27.3     05-30    132[L]  |  94[L]  |  37.0[H]  ----------------------------<  139[H]  4.6   |  23.0  |  5.52[H]    Ca    8.4      30 May 2025 04:44  Phos  3.6     05-30  Mg     2.2     05-30    TPro  6.4[L]  /  Alb  3.6  /  TBili  0.4  /  DBili  x   /  AST  18  /  ALT  12  /  AlkPhos  100  05-30    PT/INR - ( 30 May 2025 04:44 )   PT: 13.5 sec;   INR: 1.20 ratio      Urinalysis Basic - ( 30 May 2025 04:44 )    Color: x / Appearance: x / SG: x / pH: x  Gluc: 139 mg/dL / Ketone: x  / Bili: x / Urobili: x   Blood: x / Protein: x / Nitrite: x   Leuk Esterase: x / RBC: x / WBC x   Sq Epi: x / Non Sq Epi: x / Bacteria: x    CAPILLARY BLOOD GLUCOSE    POCT Blood Glucose.: 200 mg/dL (30 May 2025 07:51)  POCT Blood Glucose.: 187 mg/dL (29 May 2025 21:38)  POCT Blood Glucose.: 147 mg/dL (29 May 2025 17:23)  POCT Blood Glucose.: 185 mg/dL (29 May 2025 13:23)    CULTURE DATA:    RADIOLOGY & ADDITIONAL TESTS:  No new imaging to review    ordered CT head

## 2025-05-30 NOTE — CONSULT NOTE ADULT - ASSESSMENT
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     Abdominal pain, nausea and vomiting - improved  Thrombocytopenia in setting of cirrhosis  ESRD on HD  Course complicated by;  Fevers  Headache  Photophobia   Allergy to Augmentin (hives) and Ceftriaxone (pruritus)      Concern for CNS infection vs aseptic meningitis given constellation of symptoms (fevers, HA, photophobia)   Flagyl can cause aseptic meningitis but usually rare and associated with higher doses     Suggest;  Stop Flagyl, stop Azithromycin   Start Vancomycin IV, Meropenem IV and Acyclovir IV, HD dosing   Recommend lumbar puncture - please send CSF for cell count, glucose, protein, routine gram stain and culture, CSF PCR panel, CSF Cryptococcal Antigen, Lyme and West Nile Virus antibodies   Check CT Head  Follow up blood cultures sent 5/29  Patient denies any tick exposure, and no evidence of hemolysis or transaminitis on labs   Low concern for tick borne disease but will send Lyme VISE and tick borne disease PCR testing   Monitor fever curve   Trend WBC count    Case discussed with Dr Estrada and Neurology was called for evaluation for lumbar puncture. Case also discussed with Clinical Pharmacist.  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     Abdominal pain, nausea and vomiting - improved  Thrombocytopenia in setting of cirrhosis  ESRD on HD  Course complicated by;  Fevers  Headache  Photophobia   Allergy to Augmentin (hives) and Ceftriaxone (pruritus)      Concern for CNS infection vs aseptic meningitis given constellation of symptoms (fevers, HA, photophobia)   Flagyl can cause aseptic meningitis but usually rare and associated with higher doses     Suggest;  Stop Flagyl, stop Azithromycin   Start Vancomycin IV, Meropenem IV and Acyclovir IV, HD dosing   Recommend lumbar puncture - please send CSF for cell count, glucose, protein, routine gram stain and culture, CSF PCR panel, CSF Cryptococcal Antigen, Lyme and West Nile Virus antibodies   Check CT Head  Follow up blood cultures sent 5/29  Patient denies any tick exposure, and no evidence of hemolysis or transaminitis on labs   Low concern for tick borne disease but will send Lyme VISE and tick borne disease PCR testing   Check GI PCR if pt develops diarrhea   Monitor fever curve   Trend WBC count    Case discussed with Dr Estrada and Neurology was called for evaluation. Case also discussed with Clinical Pharmacist.

## 2025-05-30 NOTE — CONSULT NOTE ADULT - SUBJECTIVE AND OBJECTIVE BOX
[facility]                                        [facility]                                 Jayda Ellison, & Willie                                  [address]                                        [place], [place], [address]                                             [phone]        CC: Fever and rule out meningitis.     HPI:   The patient is a 69 year old woman known from prior admission in February 2024.   She has multiple medical issues including End stage renal disease on hemodialysis.  Now presenting with abdominal pain and fevers. She was seen by ID and I was called to obtain CSF for study.  LP discussed with patient who is agreeable.     ROS:   Denies headache or dizziness.  Denies chest pain.  Denies shortness of breath.    MEDICATIONS  (STANDING):  acyclovir IVPB 340 milliGRAM(s) IV Intermittent every 24 hours  aspirin enteric coated 81 milliGRAM(s) Oral daily  calcium acetate 2001 milliGRAM(s) Oral two times a day with meals  carvedilol 25 milliGRAM(s) Oral every 12 hours  CREON (Pancrelipase 3000 units) 1 Capsule(s) 1 Capsule(s) Oral three times a day with meals  dextrose 5%. 1000 milliLiter(s) (50 mL/Hr) IV Continuous <Continuous>  dextrose 5%. 1000 milliLiter(s) (100 mL/Hr) IV Continuous <Continuous>  dextrose 50% Injectable 25 Gram(s) IV Push once  dextrose 50% Injectable 25 Gram(s) IV Push once  dextrose 50% Injectable 12.5 Gram(s) IV Push once  epoetin day (PROCRIT) Injectable 73293 Unit(s) IV Push <User Schedule>  glucagon  Injectable 1 milliGRAM(s) IntraMuscular once  hydrALAZINE 50 milliGRAM(s) Oral three times a day  insulin lispro (ADMELOG) corrective regimen sliding scale   SubCutaneous three times a day before meals  insulin lispro (ADMELOG) corrective regimen sliding scale   SubCutaneous at bedtime  levothyroxine 200 MICROGram(s) Oral daily  lisinopril 10 milliGRAM(s) Oral daily  meropenem Injectable 1000 milliGRAM(s) IV Push every 24 hours  metoclopramide 10 milliGRAM(s) Oral three times a day before meals  polyethylene glycol 3350 17 Gram(s) Oral daily  sertraline 200 milliGRAM(s) Oral daily  sucralfate suspension 1 Gram(s) Oral four times a day  vancomycin  IVPB 1250 milliGRAM(s) IV Intermittent once    Vital Signs Last 24 Hrs  T(C): 37.4 (30 May 2025 14:10), Max: 39.2 (29 May 2025 21:45)  T(F): 99.3 (30 May 2025 14:10), Max: 102.6 (29 May 2025 21:45)  HR: 78 (30 May 2025 14:10) (75 - 92)  BP: 129/65 (30 May 2025 14:10) (114/55 - 188/78)  BP(mean): 95 (30 May 2025 06:00) (95 - 108)  RR: 18 (30 May 2025 14:10) (16 - 18)  SpO2: 95% (30 May 2025 14:10) (93% - 97%)    Parameters below as of 30 May 2025 14:10  Patient On (Oxygen Delivery Method): room air    Detailed Neurologic Exam:    Mental status: The patient is awake and alert. There is no aphasia. There is no dysarthria.     Cranial nerves: Pupils equal and react symmetrically to light. There is no visual field deficit to threat. Extraocular motion is full with no nystagmus. Facial sensation is intact. Facial musculature is symmetric. Palate elevates symmetrically. Tongue is midline.    Motor: There is normal bulk and tone.  There is no tremor.  Strength grossly 5/5 bilaterally.    Sensation: Grossly intact to light touch and pin.    Reflexes: 2+ throughout and plantar responses are flexor.    Cerebellar: No dysmetria on finger nose testing.    Labs:     05-30    132[L]  |  94[L]  |  37.0[H]  ----------------------------<  139[H]  4.6   |  23.0  |  5.52[H]    Ca    8.4      30 May 2025 04:44  Phos  3.6     05-30  Mg     2.2     05-30    TPro  6.4[L]  /  Alb  3.6  /  TBili  0.4  /  DBili  x   /  AST  18  /  ALT  12  /  AlkPhos  100  05-30                            8.7    8.58  )-----------( 69       ( 30 May 2025 04:44 )             27.3

## 2025-05-30 NOTE — PROGRESS NOTE ADULT - SUBJECTIVE AND OBJECTIVE BOX
Patient was seen and evaluated on dialysis.   Patient is tolerating the procedure well.   T(C): 37.2 (05-30-25 @ 10:00), Max: 39.2 (05-29-25 @ 21:45)  HR: 75 (05-30-25 @ 10:00) (75 - 92)  BP: 114/55 (05-30-25 @ 10:00) (114/55 - 212/74)  Continue dialysis:   Dialyzer:   revaclear 300  QB:400 ml      QD: 500ml.,  Goal UF 1 kg over 3 Hours   Hb 8.7, LASHELL given      Patient was seen and re-evaluated on dialysis.   pt was febrile overnight-reports poor po intake  T(C): 37.2 (05-30-25 @ 10:00), Max: 39.2 (05-29-25 @ 21:45)  HR: 75 (05-30-25 @ 10:00) (75 - 92)  BP: 114/55 (05-30-25 @ 10:00) (114/55 - 212/74)  Continue dialysis:   Dialyzer:   revaclear 300  QB:400 ml      QD: 500ml.,  Goal UF decreased to 1 kg over 3 Hours   Hb 8.7, LASHELL given

## 2025-05-30 NOTE — PROGRESS NOTE ADULT - ATTENDING COMMENTS
seen at HD  tolerating HD via left AVF  complaining of photophobia and occipital headache  mild abd pain, no longer with diarrhea    fevers  headache  missed HD    hd per renal  f/u cultures  ID consulted, IV abx, LP requested  neuro consulted for LP  check esr/crp as well

## 2025-05-30 NOTE — CONSULT NOTE ADULT - SUBJECTIVE AND OBJECTIVE BOX
[facility]  INFECTIOUS DISEASES at [place] / [place] / [place]  =======================================================                              Kai Kenyon MD                              Professor Emeritus:  Dr Billy Bolaños MD            Diplomates American Board of Internal Medicine & Infectious Diseases                                   Tel  [phone] Fax [phone]                                  Hospital Consult line:  [phone]  =======================================================    Patient is a 69 Female who presents with a chief complaint of abd pain     HPI:  69 F PMH HTN, DM (A1c 7.3), CAD, ESRD on HD, gastroparesis, IBS, chronic pancreatitis, 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     Abdominal pain associated with nausea and vomiting. CBC with leukopenia, and also with TPC (pt has cirrhosis). She denies rash, cough, dysuria, loose stools, recent travel, sick contacts, insect or tick bites. Her GI symptoms have improved however her hospital course was complicated by development of fevers, headache and photophobia for which ID was consulted.     REVIEW OF SYSTEMS  Constitutional: + Fevers   Skin: No rash	  Eyes: + Photophobia   ENMT: No sore throat  Respiratory: No cough, no SOB  Cardiovascular: No chest pain  Gastrointestinal: No abd pain, no diarrhea   Genitourinary: No dysuria  Neurological: + HA     prior hospital charts reviewed [V]  primary team notes reviewed [V]  other consultant notes reviewed [V]    PAST MEDICAL & SURGICAL HISTORY:  Diabetes    Bernard syndrome    Pancreatitis    Cirrhosis    ESRD on dialysis  MWF at [facility]    HTN (hypertension)    CAD (coronary artery disease)    Chronic diastolic congestive heart failure    IBS (irritable bowel syndrome)    Gastroparesis    Thyroid cancer    Esophagitis, erosive    HCV (hepatitis C virus)    History of liver biopsy    H/O total thyroidectomy    History of cholecystectomy  1990s    S/P     SOCIAL HISTORY:  No sick contacts  No travel  No known insect or tick bites    FAMILY HISTORY:  FH: breast cancer (Mother)    FH: brain cancer (Father)    Allergies  Augmentin (Hives)  ceftriaxone (Pruritus)    ANTIMICROBIALS:  acyclovir IVPB 340 every 24 hours  meropenem Injectable 1000 every 24 hours  vancomycin  IVPB 1250 once    ANTIMICROBIALS (past 90 days):  MEDICATIONS  (STANDING):    metroNIDAZOLE    Tablet   500 milliGRAM(s) Oral (25 @ 05:09)   500 milliGRAM(s) Oral (25 @ 17:26)    OTHER MEDS:   MEDICATIONS  (STANDING):  acetaminophen     Tablet .. 650 every 6 hours PRN  aspirin enteric coated 81 daily  carvedilol 25 every 12 hours  dextrose 50% Injectable 25 once  dextrose 50% Injectable 12.5 once  dextrose 50% Injectable 25 once  dextrose Oral Gel 15 once PRN  epoetin day (PROCRIT) Injectable 40861 <User Schedule>  glucagon  Injectable 1 once  hydrALAZINE 50 three times a day  HYDROmorphone  Injectable 0.5 every 4 hours PRN  insulin lispro (ADMELOG) corrective regimen sliding scale  three times a day before meals  insulin lispro (ADMELOG) corrective regimen sliding scale  at bedtime  levothyroxine 200 daily  lisinopril 10 daily  metoclopramide 10 three times a day before meals  ondansetron Injectable 4 every 4 hours PRN  polyethylene glycol 3350 17 daily  sertraline 200 daily  sucralfate suspension 1 four times a day    VITALS:  Vital Signs Last 24 Hrs  T(F): 99 (25 @ 09:55), Max: 102.6 (25 @ 21:45)    Vital Signs Last 24 Hrs  HR: 75 (25 @ 09:55) (75 - 92)  BP: 114/55 (25 @ 09:55) (114/55 - 212/74)  RR: 18 (25 @ 09:55)  SpO2: 95% (25 @ 09:55) (92% - 97%)  Wt(kg): --    EXAM:  General: In mild distress 2/2 headache   HEENT: NCAT  CV: S1+S2  Lungs: No respiratory distress, CTAB  Abd: Soft, nontender  Ext: No cyanosis  Neuro: Alert and oriented  + Photophobia   Skin: No rash     Labs:                        8.7    8.58  )-----------( 69       ( 30 May 2025 04:44 )             27.3         132[L]  |  94[L]  |  37.0[H]  ----------------------------<  139[H]  4.6   |  23.0  |  5.52[H]    Ca    8.4      30 May 2025 04:44  Phos  3.6       Mg     2.2         TPro  6.4[L]  /  Alb  3.6  /  TBili  0.4  /  DBili  x   /  AST  18  /  ALT  12  /  AlkPhos  100      WBC Trend:  WBC Count: 8.58 (25 @ 04:44)  WBC Count: 4.68 (25 @ 06:09)  WBC Count: 3.70 (25 @ 23:28)    Auto Neutrophil #: 2.26 K/uL (25 @ 23:28)  Auto Neutrophil #: 2.50 K/uL (25 @ 16:20)  Auto Neutrophil #: 2.84 K/uL (24 @ 15:10)    Creatine Trend:  Creatinine: 5.52 ()  Creatinine: 4.04 ()  Creatinine: 6.16 ()    Liver Biochemical Testing Trend:  Alanine Aminotransferase (ALT/SGPT): 12 ()  Alanine Aminotransferase (ALT/SGPT): 8 ()  Alanine Aminotransferase (ALT/SGPT): 10 ()  Alanine Aminotransferase (ALT/SGPT): 11 ()  Alanine Aminotransferase (ALT/SGPT): 17 ()  Aspartate Aminotransferase (AST/SGOT): 18 (25 @ 04:44)  Aspartate Aminotransferase (AST/SGOT): 11 (25 @ 06:09)  Aspartate Aminotransferase (AST/SGOT): 13 (25 @ 23:28)  Aspartate Aminotransferase (AST/SGOT): 18 (25 @ 16:20)  Aspartate Aminotransferase (AST/SGOT): 17 (24 @ 15:10)  Bilirubin Total: 0.4 (05-30)  Bilirubin Total: 0.3 (05-29)  Bilirubin Total: 0.2 (05-27)  Bilirubin Direct: 0.1 (05-21)  Bilirubin Total: 0.4 (05-21)    Auto Eosinophil %: 4.3 % (25 @ 23:28)    Urinalysis Basic - ( 30 May 2025 04:44 )    Color: x / Appearance: x / SG: x / pH: x  Gluc: 139 mg/dL / Ketone: x  / Bili: x / Urobili: x   Blood: x / Protein: x / Nitrite: x   Leuk Esterase: x / RBC: x / WBC x   Sq Epi: x / Non Sq Epi: x / Bacteria: x    MICROBIOLOGY:    MRSA PCR Result.: NotDetec (24 @ 08:11)    Culture - Urine (collected 2024 15:10)  Source: Clean Catch Clean Catch (Midstream)  Final Report:    >100,000 CFU/ml Coag Negative Staphylococcus "Susceptibilities not    performed"    <10,000 CFU/ml Normal Urogenital christina present    Culture - Urine (collected 2024 02:00)  Source: Catheterized Catheterized  Final Report:    <10,000 CFU/mL Normal Urogenital Christina    Rapid RVP Result: NotDetec ( @ 14:20)    Ferritin: 116 ()    A1C with Estimated Average Glucose Result: 7.3 % (25 @ 06:07)    RADIOLOGY:  imaging below personally reviewed    < from: CT Angio Chest PE Protocol w/ IV Cont (25 @ 04:52) >  IMPRESSION:    No pulmonary embolism.    Trace bilateral pleural effusions. Mild bibasilar atelectasis.    Nodular contour to the liver suspicious for cirrhosis. Correlate   clinically.    Trace abdominal and pelvic ascites.    Prominent pancreatic duct measuring 5 to 6 mm. Subcentimeter cyst in   uncinate process. Small calcification within the uncinate process.   Recommend nonemergent MRI abdomen with MRCP for further evaluation.    Mild diffuse thickening of the colon. Question colitis.    < end of copied text >

## 2025-05-30 NOTE — PROGRESS NOTE ADULT - ASSESSMENT
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 CT showed possible colitis. Course complicated by fever, chills, nausea, and severe headache. Infectious w/u initiated. ID and Neuro consulted.    #possible CNS infection  -Pt w/ persistent headaches, recurrent fevers, chills, nausea  -no leukocytosis  -sent bcx x2 5/29  -started meropenem 1g q24hrs  -started vanco 750mg TIW post dialysis  -started acyclovir 340mg q24hrs  -f/u CTH  -f/u Lyme VISE and tick  -Check GI PCR if pt develops diarrhea  -ID following, recs appreciated  -Neuro consulted for LP  -If pt consents to LP will send CSF for cell count, glucose, protein, routine gram stain and culture, CSF PCR panel, CSF Cryptococcal Antigen, Lyme and West Nile Virus antibodies      # Abdominal pain. Possible colitis (non-infectious as afebrile with mild leukopenia) vs pain from adhesions from multiple surgeries- improved  # Gastroparesis  # Chronic Pancreatitis, currently with normal Lipase.  # Erosive Esophagitis history  # Cirrhosis, prior HCV  - Admit to medical service  - Hydromorphone PRN  - Ondansetron PRN  - Metoclopramide for gastroparesis  - Pancreatic enzymes supplements TID w/ meals  - Sucralfate QID  - Miralax daily  - GI signed off, recs appreciated    # ESRD  # Hyperkalemia  # Hyperphosphatemia  # AoCKD  - Telemetry  - HD per Nephrology  - Phosphate binder  - LASHELL with HD  - iron stores wnl    # Hypertension  # History of CAD  # History of CHF, likely diastolic  - continue home lisinopril 10mg daily  - continue hydralazine 50MG TID  - ASA to be continued  - I &Os and daily weight    # Diabetes  # Hypothyroidism, history of thyroidectomy for cancer  - Blood glucose monitoring with sliding scale Lispro  - A1c 7.3  - Levothyroxine 200mcg daily    # Anxiety  # Depression  - Sertraline to be continued    VTE prophylaxis - Intermittent pneumatic compression devices  Ambulate ad gareth with walker  Disposition - acute

## 2025-05-30 NOTE — CONSULT NOTE ADULT - ASSESSMENT
The patient is a 69y Female with fevers of unclear origin.     She was seen by ID who is recommending LP for CSF evaluation.   Will send CSF for study.  Further plan pending results.     Case discussed with medical team (Dr Estrada attending) and with ID Dr Kenyon.

## 2025-05-31 LAB
ALBUMIN SERPL ELPH-MCNC: 3.2 G/DL — LOW (ref 3.3–5.2)
ALP SERPL-CCNC: 87 U/L — SIGNIFICANT CHANGE UP (ref 40–120)
ALT FLD-CCNC: 13 U/L — SIGNIFICANT CHANGE UP
ANION GAP SERPL CALC-SCNC: 13 MMOL/L — SIGNIFICANT CHANGE UP (ref 5–17)
AST SERPL-CCNC: 18 U/L — SIGNIFICANT CHANGE UP
B BURGDOR C6 AB SER-ACNC: NEGATIVE — SIGNIFICANT CHANGE UP
B BURGDOR IGG+IGM SER QL IB: SIGNIFICANT CHANGE UP
B BURGDOR IGG+IGM SER-ACNC: 0.03 INDEX — SIGNIFICANT CHANGE UP (ref 0.01–0.9)
B MICROTI DNA BLD QL NAA+PROBE: SIGNIFICANT CHANGE UP
BABESIA 18S RRNA BLD QL NAA+PROBE: SIGNIFICANT CHANGE UP
BASOPHILS # BLD AUTO: 0.03 K/UL — SIGNIFICANT CHANGE UP (ref 0–0.2)
BASOPHILS NFR BLD AUTO: 0.5 % — SIGNIFICANT CHANGE UP (ref 0–2)
BILIRUB SERPL-MCNC: 0.3 MG/DL — LOW (ref 0.4–2)
BUN SERPL-MCNC: 23.6 MG/DL — HIGH (ref 8–20)
CALCIUM SERPL-MCNC: 8.2 MG/DL — LOW (ref 8.4–10.5)
CHLORIDE SERPL-SCNC: 96 MMOL/L — SIGNIFICANT CHANGE UP (ref 96–108)
CO2 SERPL-SCNC: 27 MMOL/L — SIGNIFICANT CHANGE UP (ref 22–29)
CREAT SERPL-MCNC: 3.88 MG/DL — HIGH (ref 0.5–1.3)
CSF PCR RESULT: SIGNIFICANT CHANGE UP
EGFR: 12 ML/MIN/1.73M2 — LOW
EGFR: 12 ML/MIN/1.73M2 — LOW
EOSINOPHIL # BLD AUTO: 0.14 K/UL — SIGNIFICANT CHANGE UP (ref 0–0.5)
EOSINOPHIL NFR BLD AUTO: 2.2 % — SIGNIFICANT CHANGE UP (ref 0–6)
GLUCOSE BLDC GLUCOMTR-MCNC: 143 MG/DL — HIGH (ref 70–99)
GLUCOSE BLDC GLUCOMTR-MCNC: 160 MG/DL — HIGH (ref 70–99)
GLUCOSE BLDC GLUCOMTR-MCNC: 201 MG/DL — HIGH (ref 70–99)
GLUCOSE BLDC GLUCOMTR-MCNC: 224 MG/DL — HIGH (ref 70–99)
GLUCOSE SERPL-MCNC: 129 MG/DL — HIGH (ref 70–99)
GRAM STN FLD: SIGNIFICANT CHANGE UP
HCT VFR BLD CALC: 23.5 % — LOW (ref 34.5–45)
HGB BLD-MCNC: 7.4 G/DL — LOW (ref 11.5–15.5)
IMM GRANULOCYTES # BLD AUTO: 0.03 K/UL — SIGNIFICANT CHANGE UP (ref 0–0.07)
IMM GRANULOCYTES NFR BLD AUTO: 0.5 % — SIGNIFICANT CHANGE UP (ref 0–0.9)
IMMATURE PLATELET FRACTION #: 4.4 K/UL — LOW (ref 4.7–11.1)
IMMATURE PLATELET FRACTION %: 6.8 % — HIGH (ref 1.6–4.9)
LYMPHOCYTES # BLD AUTO: 0.77 K/UL — LOW (ref 1–3.3)
LYMPHOCYTES NFR BLD AUTO: 11.9 % — LOW (ref 13–44)
MAGNESIUM SERPL-MCNC: 2 MG/DL — SIGNIFICANT CHANGE UP (ref 1.6–2.6)
MCHC RBC-ENTMCNC: 30.3 PG — SIGNIFICANT CHANGE UP (ref 27–34)
MCHC RBC-ENTMCNC: 31.5 G/DL — LOW (ref 32–36)
MCV RBC AUTO: 96.3 FL — SIGNIFICANT CHANGE UP (ref 80–100)
MONOCYTES # BLD AUTO: 0.9 K/UL — SIGNIFICANT CHANGE UP (ref 0–0.9)
MONOCYTES NFR BLD AUTO: 13.9 % — SIGNIFICANT CHANGE UP (ref 2–14)
NEUTROPHILS # BLD AUTO: 4.61 K/UL — SIGNIFICANT CHANGE UP (ref 1.8–7.4)
NEUTROPHILS NFR BLD AUTO: 71 % — SIGNIFICANT CHANGE UP (ref 43–77)
NIGHT BLUE STAIN TISS: SIGNIFICANT CHANGE UP
NRBC # BLD AUTO: 0 K/UL — SIGNIFICANT CHANGE UP (ref 0–0)
NRBC # FLD: 0 K/UL — SIGNIFICANT CHANGE UP (ref 0–0)
NRBC BLD AUTO-RTO: 0 /100 WBCS — SIGNIFICANT CHANGE UP (ref 0–0)
PHOSPHATE SERPL-MCNC: 2.6 MG/DL — SIGNIFICANT CHANGE UP (ref 2.4–4.7)
PLATELET # BLD AUTO: 69 K/UL — LOW (ref 150–400)
PMV BLD: 12.5 FL — SIGNIFICANT CHANGE UP (ref 7–13)
POTASSIUM SERPL-MCNC: 4.4 MMOL/L — SIGNIFICANT CHANGE UP (ref 3.5–5.3)
POTASSIUM SERPL-SCNC: 4.4 MMOL/L — SIGNIFICANT CHANGE UP (ref 3.5–5.3)
PROT SERPL-MCNC: 5.8 G/DL — LOW (ref 6.6–8.7)
RBC # BLD: 2.44 M/UL — LOW (ref 3.8–5.2)
RBC # FLD: 16.2 % — HIGH (ref 10.3–14.5)
SODIUM SERPL-SCNC: 136 MMOL/L — SIGNIFICANT CHANGE UP (ref 135–145)
SPECIMEN SOURCE: SIGNIFICANT CHANGE UP
SPECIMEN SOURCE: SIGNIFICANT CHANGE UP
VANCOMYCIN FLD-MCNC: 18.6 UG/ML — SIGNIFICANT CHANGE UP
WBC # BLD: 6.48 K/UL — SIGNIFICANT CHANGE UP (ref 3.8–10.5)
WBC # FLD AUTO: 6.48 K/UL — SIGNIFICANT CHANGE UP (ref 3.8–10.5)

## 2025-05-31 PROCEDURE — G0545: CPT

## 2025-05-31 PROCEDURE — 99232 SBSQ HOSP IP/OBS MODERATE 35: CPT

## 2025-05-31 PROCEDURE — 99233 SBSQ HOSP IP/OBS HIGH 50: CPT | Mod: GC

## 2025-05-31 RX ADMIN — Medication 0.5 MILLIGRAM(S): at 11:22

## 2025-05-31 RX ADMIN — Medication 0.5 MILLIGRAM(S): at 17:32

## 2025-05-31 RX ADMIN — Medication 1 GRAM(S): at 05:24

## 2025-05-31 RX ADMIN — Medication 10 MILLIGRAM(S): at 05:24

## 2025-05-31 RX ADMIN — Medication 2001 MILLIGRAM(S): at 09:01

## 2025-05-31 RX ADMIN — Medication 1 GRAM(S): at 17:33

## 2025-05-31 RX ADMIN — INSULIN LISPRO 1: 100 INJECTION, SOLUTION INTRAVENOUS; SUBCUTANEOUS at 09:01

## 2025-05-31 RX ADMIN — Medication 0.5 MILLIGRAM(S): at 01:40

## 2025-05-31 RX ADMIN — Medication 1 GRAM(S): at 22:10

## 2025-05-31 RX ADMIN — Medication 4 MILLIGRAM(S): at 05:26

## 2025-05-31 RX ADMIN — SERTRALINE 200 MILLIGRAM(S): 100 TABLET, FILM COATED ORAL at 12:42

## 2025-05-31 RX ADMIN — INSULIN LISPRO 2: 100 INJECTION, SOLUTION INTRAVENOUS; SUBCUTANEOUS at 12:43

## 2025-05-31 RX ADMIN — CARVEDILOL 25 MILLIGRAM(S): 3.12 TABLET, FILM COATED ORAL at 05:23

## 2025-05-31 RX ADMIN — Medication 0.5 MILLIGRAM(S): at 06:25

## 2025-05-31 RX ADMIN — LISINOPRIL 10 MILLIGRAM(S): 5 TABLET ORAL at 05:23

## 2025-05-31 RX ADMIN — Medication 0.5 MILLIGRAM(S): at 18:02

## 2025-05-31 RX ADMIN — POLYETHYLENE GLYCOL 3350 17 GRAM(S): 17 POWDER, FOR SOLUTION ORAL at 12:43

## 2025-05-31 RX ADMIN — Medication 0.5 MILLIGRAM(S): at 05:27

## 2025-05-31 RX ADMIN — Medication 81 MILLIGRAM(S): at 12:42

## 2025-05-31 RX ADMIN — Medication 50 MILLIGRAM(S): at 05:23

## 2025-05-31 RX ADMIN — Medication 50 MILLIGRAM(S): at 22:10

## 2025-05-31 RX ADMIN — Medication 0.5 MILLIGRAM(S): at 10:52

## 2025-05-31 RX ADMIN — Medication 2001 MILLIGRAM(S): at 17:32

## 2025-05-31 RX ADMIN — Medication 50 MILLIGRAM(S): at 14:33

## 2025-05-31 RX ADMIN — Medication 4 MILLIGRAM(S): at 01:08

## 2025-05-31 RX ADMIN — Medication 0.5 MILLIGRAM(S): at 22:10

## 2025-05-31 RX ADMIN — Medication 0.5 MILLIGRAM(S): at 22:25

## 2025-05-31 RX ADMIN — CARVEDILOL 25 MILLIGRAM(S): 3.12 TABLET, FILM COATED ORAL at 17:33

## 2025-05-31 RX ADMIN — Medication 0.5 MILLIGRAM(S): at 01:09

## 2025-05-31 RX ADMIN — Medication 10 MILLIGRAM(S): at 12:43

## 2025-05-31 RX ADMIN — Medication 200 MICROGRAM(S): at 05:23

## 2025-05-31 RX ADMIN — Medication 10 MILLIGRAM(S): at 17:33

## 2025-05-31 RX ADMIN — Medication 1 GRAM(S): at 12:43

## 2025-05-31 NOTE — PROGRESS NOTE ADULT - ASSESSMENT
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     Abdominal pain, nausea and vomiting - improved  Thrombocytopenia in setting of cirrhosis  ESRD on HD  Course complicated by;  Fevers, Headache, Photophobia - improved   Allergy to Augmentin (hives) and Ceftriaxone (pruritus)      Given rapid improvement of symptoms and negative CSF PCR, suspect ?aseptic meningitis as cause of her symptoms   Flagyl can cause aseptic meningitis but usually rare and associated with higher doses     Suggest;  Stop Vancomycin, Meropenem and Acyclovir IV   LP studies negative to date except for elevated protein. Follow up pending studies.    CT Head negative for acute findings   Follow up blood cultures sent 5/29 (negative to date)  Patient denies any tick exposure, and no evidence of hemolysis or transaminitis on labs   Low concern for tick borne disease but will send Lyme VISE and tick borne disease PCR testing, follow up results   Check GI PCR if pt develops diarrhea   Monitor fever curve (improved)  Trend WBC count    Case discussed with pt at bedside.

## 2025-05-31 NOTE — PROGRESS NOTE ADULT - ASSESSMENT
69y Female with fevers of unclear origin.     She was seen by ID who is recommending LP for CSF evaluation.   CSF negative for meningitis thus far.   Further plans per ID/Medicine.     No further specific neurologic recommendations. Will be available as needed.

## 2025-05-31 NOTE — PROGRESS NOTE ADULT - SUBJECTIVE AND OBJECTIVE BOX
[facility]                                        Neurology at [place]                                 Jayda Ellison, & Willie                                  [address]                                        [place], [place], [address]                                             [phone]        CC: Fever and rule out meningitis.     HPI:   The patient is a 69 year old woman known from prior admission in February 2024.   She has multiple medical issues including End stage renal disease on hemodialysis.  Now presenting with abdominal pain and fevers. She was seen by ID and I was called to obtain CSF for study.  LP done 5/30/25.    Interim history:  Remains on [address].   No headache.    ROS:   Denies headache or dizziness.  Denies chest pain.  Denies shortness of breath.    MEDICATIONS  (STANDING):  aspirin enteric coated 81 milliGRAM(s) Oral daily  calcium acetate 2001 milliGRAM(s) Oral two times a day with meals  carvedilol 25 milliGRAM(s) Oral every 12 hours  CREON (Pancrelipase 3000 units) 1 Capsule(s) 1 Capsule(s) Oral three times a day with meals  dextrose 5%. 1000 milliLiter(s) (50 mL/Hr) IV Continuous <Continuous>  dextrose 5%. 1000 milliLiter(s) (100 mL/Hr) IV Continuous <Continuous>  epoetin day (PROCRIT) Injectable 86322 Unit(s) IV Push <User Schedule>  glucagon  Injectable 1 milliGRAM(s) IntraMuscular once  hydrALAZINE 50 milliGRAM(s) Oral three times a day  insulin lispro (ADMELOG) corrective regimen sliding scale   SubCutaneous three times a day before meals  insulin lispro (ADMELOG) corrective regimen sliding scale   SubCutaneous at bedtime  levothyroxine 200 MICROGram(s) Oral daily  lisinopril 10 milliGRAM(s) Oral daily  metoclopramide 10 milliGRAM(s) Oral three times a day before meals  polyethylene glycol 3350 17 Gram(s) Oral daily  sertraline 200 milliGRAM(s) Oral daily  sucralfate suspension 1 Gram(s) Oral four times a day    Vital Signs Last 24 Hrs  T(C): 37.5 (31 May 2025 05:42), Max: 37.7 (30 May 2025 21:45)  T(F): 99.5 (31 May 2025 05:42), Max: 99.9 (30 May 2025 21:45)  HR: 95 (31 May 2025 10:48) (77 - 95)  BP: 124/58 (31 May 2025 10:48) (124/58 - 158/66)  BP(mean): 93 (30 May 2025 21:45) (93 - 93)  RR: 18 (31 May 2025 05:42) (18 - 18)  SpO2: 97% (31 May 2025 10:48) (93% - 97%)    Parameters below as of 31 May 2025 10:48  Patient On (Oxygen Delivery Method): room air    Detailed Neurologic Exam:  LP site, clean/non tender.    Mental status: The patient is awake and alert. There is no aphasia. There is no dysarthria.     Cranial nerves: Pupils equal and react symmetrically to light. There is no visual field deficit to threat. Extraocular motion is full with no nystagmus. Facial sensation is intact. Facial musculature is symmetric. Palate elevates symmetrically. Tongue is midline.    Motor: There is normal bulk and tone.  There is no tremor.  Strength grossly 5/5 bilaterally.    Sensation: Grossly intact to light touch and pin.    Reflexes: 2+ throughout and plantar responses are flexor.    Cerebellar: No dysmetria on finger nose testing.    Labs:     05-31    136  |  96  |  23.6[H]  ----------------------------<  129[H]  4.4   |  27.0  |  3.88[H]    Ca    8.2[L]      31 May 2025 06:09  Phos  2.6     05-31  Mg     2.0     05-31    TPro  5.8[L]  /  Alb  3.2[L]  /  TBili  0.3[L]  /  DBili  x   /  AST  18  /  ALT  13  /  AlkPhos  87  05-31                            7.4    6.48  )-----------( 69       ( 31 May 2025 06:09 )             23.5       CSF  WBC: 1  RBC: 0  Protein: 59  Glucose: 84 (serum 139)  Cryptococcal Ag: negative   Gram stain negative.  PCR panel: None detected.

## 2025-05-31 NOTE — PROGRESS NOTE ADULT - SUBJECTIVE AND OBJECTIVE BOX
[facility]  INFECTIOUS DISEASES at [place] / [place] / [place]  =======================================================                              Kai Kenyon MD                              Professor Emeritus:  Dr Billy Bolaños MD            Diplomates American Board of Internal Medicine & Infectious Diseases                                   Tel  [phone] Fax [phone]                                  Hospital Consult line:  [phone]  =======================================================    Follow Up: Fevers     Interval History/ROS: Seen at bedside. Feels much better. Fevers improved. HA and photophobia has improved also.     REVIEW OF SYSTEMS  Fevers, HA and photophobia IMPROVED  Rest ROS unchanged from prior     Allergies  Augmentin (Hives)  ceftriaxone (Pruritus)    ANTIMICROBIALS:      OTHER MEDS: MEDICATIONS  (STANDING):  acetaminophen     Tablet .. 650 every 6 hours PRN  aspirin enteric coated 81 daily  carvedilol 25 every 12 hours  dextrose 50% Injectable 25 once  dextrose 50% Injectable 12.5 once  dextrose 50% Injectable 25 once  dextrose Oral Gel 15 once PRN  epoetin day (PROCRIT) Injectable 68188 <User Schedule>  glucagon  Injectable 1 once  hydrALAZINE 50 three times a day  HYDROmorphone  Injectable 0.5 every 4 hours PRN  insulin lispro (ADMELOG) corrective regimen sliding scale  three times a day before meals  insulin lispro (ADMELOG) corrective regimen sliding scale  at bedtime  levothyroxine 200 daily  lisinopril 10 daily  metoclopramide 10 three times a day before meals  ondansetron Injectable 4 every 4 hours PRN  polyethylene glycol 3350 17 daily  sertraline 200 daily  sucralfate suspension 1 four times a day    Vital Signs Last 24 Hrs  T(F): 97.7 (05-31-25 @ 04:29), Max: 102.6 (05-29-25 @ 21:45)    Vital Signs Last 24 Hrs  HR: 87 (05-31-25 @ 04:29) (75 - 93)  BP: 158/66 (05-31-25 @ 04:29) (114/55 - 158/66)  RR: 18 (05-31-25 @ 04:29)  SpO2: 94% (05-31-25 @ 04:29) (94% - 97%)  Wt(kg): --    EXAM:  General: NAD   HEENT: NCAT  CV: S1+S2  Lungs: No respiratory distress, CTAB  Abd: Soft, nontender  Ext: No cyanosis  Neuro: Alert and oriented, calm   Skin: No rash     Labs:                        7.4    6.48  )-----------( 69       ( 31 May 2025 06:09 )             23.5     05-31    136  |  96  |  23.6[H]  ----------------------------<  129[H]  4.4   |  27.0  |  3.88[H]    Ca    8.2[L]      31 May 2025 06:09  Phos  2.6     05-31  Mg     2.0     05-31    TPro  5.8[L]  /  Alb  3.2[L]  /  TBili  0.3[L]  /  DBili  x   /  AST  18  /  ALT  13  /  AlkPhos  87  05-31    WBC Trend:  WBC Count: 6.48 (05-31-25 @ 06:09)  WBC Count: 8.58 (05-30-25 @ 04:44)  WBC Count: 4.68 (05-29-25 @ 06:09)  WBC Count: 3.70 (05-27-25 @ 23:28)    Creatine Trend:  Creatinine: 3.88 (05-31)  Creatinine: 5.52 (05-30)  Creatinine: 4.04 (05-29)  Creatinine: 6.16 (05-27)    Liver Biochemical Testing Trend:  Alanine Aminotransferase (ALT/SGPT): 13 (05-31)  Alanine Aminotransferase (ALT/SGPT): 12 (05-30)  Alanine Aminotransferase (ALT/SGPT): 8 (05-29)  Alanine Aminotransferase (ALT/SGPT): 10 (05-27)  Alanine Aminotransferase (ALT/SGPT): 11 (05-21)  Aspartate Aminotransferase (AST/SGOT): 18 (05-31-25 @ 06:09)  Aspartate Aminotransferase (AST/SGOT): 18 (05-30-25 @ 04:44)  Aspartate Aminotransferase (AST/SGOT): 11 (05-29-25 @ 06:09)  Aspartate Aminotransferase (AST/SGOT): 13 (05-27-25 @ 23:28)  Aspartate Aminotransferase (AST/SGOT): 18 (05-21-25 @ 16:20)  Bilirubin Total: 0.3 (05-31)  Bilirubin Total: 0.4 (05-30)  Bilirubin Total: 0.3 (05-29)  Bilirubin Total: 0.2 (05-27)  Bilirubin Direct: 0.1 (05-21)    Urinalysis Basic - ( 31 May 2025 06:09 )    Color: x / Appearance: x / SG: x / pH: x  Gluc: 129 mg/dL / Ketone: x  / Bili: x / Urobili: x   Blood: x / Protein: x / Nitrite: x   Leuk Esterase: x / RBC: x / WBC x   Sq Epi: x / Non Sq Epi: x / Bacteria: x    MICROBIOLOGY:    MRSA PCR Result.: NotDetec (05-30-25 @ 21:23)  MRSA PCR Result.: NotDetec (08-01-24 @ 08:11)    Culture - CSF with Gram Stain (collected 30 May 2025 15:30)  Source: CSF CSF    Culture - Fungal, CSF (collected 30 May 2025 15:30)  Source: CSF CSF  Preliminary Report:    Testing in progress    Culture - Blood (collected 29 May 2025 15:33)  Source: Blood Blood  Preliminary Report:    No growth at 24 hours    Culture - Blood (collected 29 May 2025 15:30)  Source: Blood Blood  Preliminary Report:    No growth at 24 hours    Culture - Urine (collected 31 Jul 2024 15:10)  Source: Clean Catch Clean Catch (Midstream)  Final Report:    >100,000 CFU/ml Coag Negative Staphylococcus "Susceptibilities not    performed"    <10,000 CFU/ml Normal Urogenital christina present    Culture - Urine (collected 08 Feb 2024 02:00)  Source: Catheterized Catheterized  Final Report:    <10,000 CFU/mL Normal Urogenital Christina    Cryptococcal Antigen - CSF: Negative (05-30-25 @ 15:30)    Rapid RVP Result: NotDetec (05-29 @ 14:20)    C-Reactive Protein: 68 (05-30)    Ferritin: 116 (05-28)    RADIOLOGY:  imaging below personally reviewed    < from: CT Head No Cont (05.30.25 @ 20:00) >  IMPRESSION:    No evidence of acute intracranial hemorrhage, midline shift or CT   evidence of acute territorial infarct.    < end of copied text >

## 2025-05-31 NOTE — PROGRESS NOTE ADULT - SUBJECTIVE AND OBJECTIVE BOX
SUBJECTIVE    Yesterday:  s/p LP  s/p dialysis    TODAY:  Pt seen at bedside in AM  No acute/overnight events  No acute medical complaints, she states her headache has resolved. Denies any other pain, SOB, palpitations, N/V  BP improved  had first BM since hospitalization this morning    OBJECTIVE    PHYSICAL EXAM:  GENERAL: no acute distress, laying in bed,   HEENT: Atraumatic, normocephalic, non-icteric  NEURO: A&Ox3, no focal deficits, moving all extremities spontaneously, no dysarthria, CN II-XII grossly intact  PSYCH: Normal affect, calm, appropriate insight and judgment, fluent speech  LUNGS: CTAB, no wrr, non-labored breathing  HEART: RRR, no murmur appreciated  ABD: Soft, mild R tenderness, non-distended, no appreciable masses, +bs all 4 quadrants  EXTREMITIES: Nontender, no clubbing, cyanosis, or edema    Vital Signs Last 24 Hrs  T(C): 37.5 (31 May 2025 05:42), Max: 37.7 (30 May 2025 21:45)  T(F): 99.5 (31 May 2025 05:42), Max: 99.9 (30 May 2025 21:45)  HR: 95 (31 May 2025 10:48) (77 - 95)  BP: 124/58 (31 May 2025 10:48) (124/58 - 158/66)  BP(mean): 93 (30 May 2025 21:45) (93 - 93)  RR: 18 (31 May 2025 05:42) (18 - 18)  SpO2: 97% (31 May 2025 10:48) (93% - 97%)    Parameters below as of 31 May 2025 10:48  Patient On (Oxygen Delivery Method): room air    MEDICATIONS  (STANDING):  aspirin enteric coated 81 milliGRAM(s) Oral daily  calcium acetate 2001 milliGRAM(s) Oral two times a day with meals  carvedilol 25 milliGRAM(s) Oral every 12 hours  CREON (Pancrelipase 3000 units) 1 Capsule(s) 1 Capsule(s) Oral three times a day with meals  dextrose 5%. 1000 milliLiter(s) (50 mL/Hr) IV Continuous <Continuous>  dextrose 5%. 1000 milliLiter(s) (100 mL/Hr) IV Continuous <Continuous>  dextrose 50% Injectable 25 Gram(s) IV Push once  dextrose 50% Injectable 12.5 Gram(s) IV Push once  dextrose 50% Injectable 25 Gram(s) IV Push once  epoetin day (PROCRIT) Injectable 08571 Unit(s) IV Push <User Schedule>  glucagon  Injectable 1 milliGRAM(s) IntraMuscular once  hydrALAZINE 50 milliGRAM(s) Oral three times a day  insulin lispro (ADMELOG) corrective regimen sliding scale   SubCutaneous three times a day before meals  insulin lispro (ADMELOG) corrective regimen sliding scale   SubCutaneous at bedtime  levothyroxine 200 MICROGram(s) Oral daily  lisinopril 10 milliGRAM(s) Oral daily  metoclopramide 10 milliGRAM(s) Oral three times a day before meals  polyethylene glycol 3350 17 Gram(s) Oral daily  sertraline 200 milliGRAM(s) Oral daily  sucralfate suspension 1 Gram(s) Oral four times a day    MEDICATIONS  (PRN):  acetaminophen     Tablet .. 650 milliGRAM(s) Oral every 6 hours PRN Mild Pain (1 - 3)  dextrose Oral Gel 15 Gram(s) Oral once PRN Blood Glucose LESS THAN 70 milliGRAM(s)/deciliter  HYDROmorphone  Injectable 0.5 milliGRAM(s) IV Push every 4 hours PRN abdominal pain  ondansetron Injectable 4 milliGRAM(s) IV Push every 4 hours PRN Nausea and/or Vomiting    Allergies    Augmentin (Hives)  ceftriaxone (Pruritus)    Intolerances        LABS:                        7.4    6.48  )-----------( 69       ( 31 May 2025 06:09 )             23.5     05-31    136  |  96  |  23.6[H]  ----------------------------<  129[H]  4.4   |  27.0  |  3.88[H]    Ca    8.2[L]      31 May 2025 06:09  Phos  2.6     05-31  Mg     2.0     05-31    TPro  5.8[L]  /  Alb  3.2[L]  /  TBili  0.3[L]  /  DBili  x   /  AST  18  /  ALT  13  /  AlkPhos  87  05-31    PT/INR - ( 30 May 2025 04:44 )   PT: 13.5 sec;   INR: 1.20 ratio           Urinalysis Basic - ( 31 May 2025 06:09 )    Color: x / Appearance: x / SG: x / pH: x  Gluc: 129 mg/dL / Ketone: x  / Bili: x / Urobili: x   Blood: x / Protein: x / Nitrite: x   Leuk Esterase: x / RBC: x / WBC x   Sq Epi: x / Non Sq Epi: x / Bacteria: x      CAPILLARY BLOOD GLUCOSE      POCT Blood Glucose.: 201 mg/dL (31 May 2025 12:41)  POCT Blood Glucose.: 160 mg/dL (31 May 2025 08:59)  POCT Blood Glucose.: 205 mg/dL (30 May 2025 21:26)  POCT Blood Glucose.: 214 mg/dL (30 May 2025 17:34)      CULTURE DATA:    Culture - Blood (collected 05-29-25 @ 15:30)  Source: Blood Blood  Preliminary Report (05-31-25 @ 01:02):    No growth at 24 hours    Culture - Blood (collected 05-29-25 @ 15:33)  Source: Blood Blood  Preliminary Report (05-31-25 @ 01:02):    No growth at 24 hours    Culture - CSF with Gram Stain (collected 05-30-25 @ 15:30)  Source: CSF CSF  Gram Stain (05-31-25 @ 02:18):    No polymorphonuclear cells seen    No organisms seen    by cytocentrifuge    Culture - Fungal, CSF (collected 05-30-25 @ 15:30)  Source: CSF CSF  Preliminary Report (05-31-25 @ 07:59):    Testing in progress        RADIOLOGY & ADDITIONAL TESTS:  No new imaging to review

## 2025-05-31 NOTE — PROGRESS NOTE ADULT - ASSESSMENT
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 CT showed possible colitis. Course complicated by fever, chills, nausea, and severe headache. Infectious w/u initiated. ID and Neuro consulted. S/P LP pending further CSF results.    #possible aseptic meningitis  -Pt was w/ persistent headaches, recurrent fevers, chills, nausea- now resolved.  -no leukocytosis  -bcx x2 5/29 negative to date  -s/p LP 5/30  -CSF appearance and cell counts unremarkable  -CSF gram stain and PCR negative  -f/u CSF culture  -f/u Lyme and West Nile Virus antibodies  -f/u serum tick panel   -d/toño meropenem 1g q24hrs  -d/toño vanco 750mg TIW post dialysis  -d/toño acyclovir 340mg q24hrs  -CTH unremarkable  -Check GI PCR if pt develops diarrhea  -ID following, recs appreciated    # Abdominal pain. Possible colitis (non-infectious as afebrile with mild leukopenia) vs pain from adhesions from multiple surgeries- improved  # Gastroparesis  # Chronic Pancreatitis, currently with normal Lipase.  # Erosive Esophagitis history  # Cirrhosis, prior HCV  - Hydromorphone PRN  - Ondansetron PRN  - Metoclopramide for gastroparesis  - Pancreatic enzymes supplements TID w/ meals  - Sucralfate QID  - Miralax daily  - GI signed off, recs appreciated    # ESRD  # Hyperkalemia  # Hyperphosphatemia  # AoCKD  - Telemetry  - HD per Nephrology  - Phosphate binder  - LASHELL with HD  - iron stores wnl    # Hypertension  # History of CAD  # History of CHF, likely diastolic  - continue home lisinopril 10mg daily  - continue hydralazine 50MG TID  - ASA to be continued  - I &Os and daily weight    # Diabetes  # Hypothyroidism, history of thyroidectomy for cancer  - Blood glucose monitoring with sliding scale Lispro  - A1c 7.3  - Levothyroxine 200mcg daily    # Anxiety  # Depression  - Sertraline to be continued    VTE prophylaxis - Intermittent pneumatic compression devices  Ambulate ad gareth with walker  Disposition - pending tick panel and further CSF results

## 2025-05-31 NOTE — PROGRESS NOTE ADULT - ATTENDING COMMENTS
#possible aseptic meningitis  # Abdominal pain. Possible colitis (non-infectious as afebrile with mild leukopenia) vs pain from adhesions from multiple surgeries- improved  # Gastroparesis  # Chronic Pancreatitis, currently with normal Lipase.  # Erosive Esophagitis history  # Cirrhosis, prior HCV  # esrd     -bcx x2 5/29 negative to date  -s/p LP 5/30  -CSF appearance and cell counts unremarkable, gram stain and PCR negative, cxs pending   -f/u Lyme and West Nile Virus antibodies,  tick panel   - monitor off vanc, Merrem , acyclovir   - hd as per schedule

## 2025-06-01 LAB
ANION GAP SERPL CALC-SCNC: 12 MMOL/L — SIGNIFICANT CHANGE UP (ref 5–17)
BLD GP AB SCN SERPL QL: SIGNIFICANT CHANGE UP
BUN SERPL-MCNC: 32.3 MG/DL — HIGH (ref 8–20)
CALCIUM SERPL-MCNC: 8 MG/DL — LOW (ref 8.4–10.5)
CHLORIDE SERPL-SCNC: 97 MMOL/L — SIGNIFICANT CHANGE UP (ref 96–108)
CO2 SERPL-SCNC: 26 MMOL/L — SIGNIFICANT CHANGE UP (ref 22–29)
CREAT SERPL-MCNC: 4.87 MG/DL — HIGH (ref 0.5–1.3)
EGFR: 9 ML/MIN/1.73M2 — LOW
EGFR: 9 ML/MIN/1.73M2 — LOW
GLUCOSE BLDC GLUCOMTR-MCNC: 161 MG/DL — HIGH (ref 70–99)
GLUCOSE BLDC GLUCOMTR-MCNC: 192 MG/DL — HIGH (ref 70–99)
GLUCOSE BLDC GLUCOMTR-MCNC: 218 MG/DL — HIGH (ref 70–99)
GLUCOSE BLDC GLUCOMTR-MCNC: 282 MG/DL — HIGH (ref 70–99)
GLUCOSE SERPL-MCNC: 146 MG/DL — HIGH (ref 70–99)
HCT VFR BLD CALC: 23.6 % — LOW (ref 34.5–45)
HGB BLD-MCNC: 7.4 G/DL — LOW (ref 11.5–15.5)
IMMATURE PLATELET FRACTION #: 5.6 K/UL — SIGNIFICANT CHANGE UP (ref 4.7–11.1)
IMMATURE PLATELET FRACTION %: 6.9 % — HIGH (ref 1.6–4.9)
MAGNESIUM SERPL-MCNC: 2.2 MG/DL — SIGNIFICANT CHANGE UP (ref 1.6–2.6)
MCHC RBC-ENTMCNC: 30.5 PG — SIGNIFICANT CHANGE UP (ref 27–34)
MCHC RBC-ENTMCNC: 31.4 G/DL — LOW (ref 32–36)
MCV RBC AUTO: 97.1 FL — SIGNIFICANT CHANGE UP (ref 80–100)
NRBC # BLD AUTO: 0 K/UL — SIGNIFICANT CHANGE UP (ref 0–0)
NRBC # FLD: 0 K/UL — SIGNIFICANT CHANGE UP (ref 0–0)
NRBC BLD AUTO-RTO: 0 /100 WBCS — SIGNIFICANT CHANGE UP (ref 0–0)
PHOSPHATE SERPL-MCNC: 2.4 MG/DL — SIGNIFICANT CHANGE UP (ref 2.4–4.7)
PLATELET # BLD AUTO: 81 K/UL — LOW (ref 150–400)
PMV BLD: 12.7 FL — SIGNIFICANT CHANGE UP (ref 7–13)
POTASSIUM SERPL-MCNC: 4.4 MMOL/L — SIGNIFICANT CHANGE UP (ref 3.5–5.3)
POTASSIUM SERPL-SCNC: 4.4 MMOL/L — SIGNIFICANT CHANGE UP (ref 3.5–5.3)
RBC # BLD: 2.43 M/UL — LOW (ref 3.8–5.2)
RBC # FLD: 16.1 % — HIGH (ref 10.3–14.5)
SODIUM SERPL-SCNC: 135 MMOL/L — SIGNIFICANT CHANGE UP (ref 135–145)
WBC # BLD: 5.34 K/UL — SIGNIFICANT CHANGE UP (ref 3.8–10.5)
WBC # FLD AUTO: 5.34 K/UL — SIGNIFICANT CHANGE UP (ref 3.8–10.5)

## 2025-06-01 PROCEDURE — 99232 SBSQ HOSP IP/OBS MODERATE 35: CPT

## 2025-06-01 RX ADMIN — Medication 1 GRAM(S): at 05:40

## 2025-06-01 RX ADMIN — Medication 50 MILLIGRAM(S): at 14:44

## 2025-06-01 RX ADMIN — Medication 10 MILLIGRAM(S): at 11:56

## 2025-06-01 RX ADMIN — CARVEDILOL 25 MILLIGRAM(S): 3.12 TABLET, FILM COATED ORAL at 05:40

## 2025-06-01 RX ADMIN — Medication 0.5 MILLIGRAM(S): at 20:39

## 2025-06-01 RX ADMIN — Medication 200 MICROGRAM(S): at 05:40

## 2025-06-01 RX ADMIN — Medication 0.5 MILLIGRAM(S): at 07:58

## 2025-06-01 RX ADMIN — LISINOPRIL 10 MILLIGRAM(S): 5 TABLET ORAL at 05:40

## 2025-06-01 RX ADMIN — Medication 0.5 MILLIGRAM(S): at 14:45

## 2025-06-01 RX ADMIN — INSULIN LISPRO 1: 100 INJECTION, SOLUTION INTRAVENOUS; SUBCUTANEOUS at 07:41

## 2025-06-01 RX ADMIN — Medication 10 MILLIGRAM(S): at 17:29

## 2025-06-01 RX ADMIN — Medication 0.5 MILLIGRAM(S): at 02:13

## 2025-06-01 RX ADMIN — CARVEDILOL 25 MILLIGRAM(S): 3.12 TABLET, FILM COATED ORAL at 17:27

## 2025-06-01 RX ADMIN — Medication 1 GRAM(S): at 11:55

## 2025-06-01 RX ADMIN — SERTRALINE 200 MILLIGRAM(S): 100 TABLET, FILM COATED ORAL at 11:56

## 2025-06-01 RX ADMIN — INSULIN LISPRO 1: 100 INJECTION, SOLUTION INTRAVENOUS; SUBCUTANEOUS at 22:01

## 2025-06-01 RX ADMIN — Medication 50 MILLIGRAM(S): at 05:40

## 2025-06-01 RX ADMIN — INSULIN LISPRO 1: 100 INJECTION, SOLUTION INTRAVENOUS; SUBCUTANEOUS at 11:54

## 2025-06-01 RX ADMIN — Medication 0.5 MILLIGRAM(S): at 20:24

## 2025-06-01 RX ADMIN — Medication 1 GRAM(S): at 17:27

## 2025-06-01 RX ADMIN — Medication 1 GRAM(S): at 22:02

## 2025-06-01 RX ADMIN — Medication 0.5 MILLIGRAM(S): at 02:28

## 2025-06-01 RX ADMIN — Medication 81 MILLIGRAM(S): at 11:55

## 2025-06-01 RX ADMIN — Medication 10 MILLIGRAM(S): at 07:43

## 2025-06-01 RX ADMIN — INSULIN LISPRO 2: 100 INJECTION, SOLUTION INTRAVENOUS; SUBCUTANEOUS at 17:29

## 2025-06-01 RX ADMIN — Medication 50 MILLIGRAM(S): at 22:02

## 2025-06-01 NOTE — PROGRESS NOTE ADULT - SUBJECTIVE AND OBJECTIVE BOX
[facility] DIVISION OF KIDNEY DISEASES AND HYPERTENSION -- HEMODIALYSIS NOTE  --------------------------------------------------------------------------------  Chief Complaint: ESRD/Ongoing hemodialysis requirement    24 hour events/subjective:  no acute complaint  pt is afebrile        PAST HISTORY  --------------------------------------------------------------------------------  No significant changes to PMH, PSH, FHx, SHx, unless otherwise noted    ALLERGIES & MEDICATIONS  --------------------------------------------------------------------------------  Allergies    Augmentin (Hives)  ceftriaxone (Pruritus)        Standing Inpatient Medications  aspirin enteric coated 81 milliGRAM(s) Oral daily  calcium acetate 2001 milliGRAM(s) Oral two times a day with meals  carvedilol 25 milliGRAM(s) Oral every 12 hours  CREON (Pancrelipase 3000 units) 1 Capsule(s) 1 Capsule(s) Oral three times a day with meals  dextrose 5%. 1000 milliLiter(s) IV Continuous <Continuous>  dextrose 5%. 1000 milliLiter(s) IV Continuous <Continuous>  dextrose 50% Injectable 25 Gram(s) IV Push once  dextrose 50% Injectable 12.5 Gram(s) IV Push once  dextrose 50% Injectable 25 Gram(s) IV Push once  epoetin day (PROCRIT) Injectable 63160 Unit(s) IV Push <User Schedule>  glucagon  Injectable 1 milliGRAM(s) IntraMuscular once  hydrALAZINE 50 milliGRAM(s) Oral three times a day  insulin lispro (ADMELOG) corrective regimen sliding scale   SubCutaneous three times a day before meals  insulin lispro (ADMELOG) corrective regimen sliding scale   SubCutaneous at bedtime  levothyroxine 200 MICROGram(s) Oral daily  lisinopril 10 milliGRAM(s) Oral daily  metoclopramide 10 milliGRAM(s) Oral three times a day before meals  polyethylene glycol 3350 17 Gram(s) Oral daily  sertraline 200 milliGRAM(s) Oral daily  sucralfate suspension 1 Gram(s) Oral four times a day    PRN Inpatient Medications  acetaminophen     Tablet .. 650 milliGRAM(s) Oral every 6 hours PRN  dextrose Oral Gel 15 Gram(s) Oral once PRN  HYDROmorphone  Injectable 0.5 milliGRAM(s) IV Push every 4 hours PRN  ondansetron Injectable 4 milliGRAM(s) IV Push every 4 hours PRN      REVIEW OF SYSTEMS  --------------------------------------------------------------------------------  Gen: No weight changes, fatigue, fevers/chills, weakness  Skin: No rashes  Head/Eyes/Ears/Mouth: No headache  Respiratory: No dyspnea, cough,  CV: No chest pain, orthopnea  GI: No abdominal pain, diarrhea, constipation, nausea, vomiting,  MSK: No joint pain  Neuro: No dizziness/lightheadedness, weakness  Heme: No bleeding  Psych: No significant nervousness, anxiety, stress, depression    All other systems were reviewed and are negative, except as noted.    VITALS/PHYSICAL EXAM  --------------------------------------------------------------------------------  T(C): 36.9 (06-01-25 @ 07:30), Max: 37.6 (05-31-25 @ 16:45)  HR: 85 (06-01-25 @ 07:30) (76 - 87)  BP: 158/72 (06-01-25 @ 07:30) (127/63 - 160/51)  RR: 18 (06-01-25 @ 07:30) (18 - 18)  SpO2: 95% (06-01-25 @ 07:30) (93% - 97%)  Wt(kg): --        Physical Exam:  	Gen: NAD, well-appearing  	HEENT: , supple neck,  	Pulm: CTA B/L  	CV: RRR, S1S2; no rub  	Abd: +BS, soft, nontender  	UE: Warm,  	LE: Warm, edema  	Neuro: No focal deficits  	Psych: Normal affect and mood  	Skin: Warm  	Vascular access: AVf    LABS/STUDIES  --------------------------------------------------------------------------------              7.4    5.34  >-----------<  81       [06-01-25 @ 05:10]              23.6     135  |  97  |  32.3  ----------------------------<  146      [06-01-25 @ 05:10]  4.4   |  26.0  |  4.87        Ca     8.0     [06-01-25 @ 05:10]      Mg     2.2     [06-01-25 @ 05:10]      Phos  2.4     [06-01-25 @ 05:10]    TPro  5.8  /  Alb  3.2  /  TBili  0.3  /  DBili  x   /  AST  18  /  ALT  13  /  AlkPhos  87  [05-31-25 @ 06:09]          Iron 61, TIBC 365, %sat 17      [05-28-25 @ 09:03]  Ferritin 116      [05-28-25 @ 09:03]

## 2025-06-01 NOTE — PROGRESS NOTE ADULT - ASSESSMENT
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 CT showed possible colitis. Course complicated by fever, chills, nausea, and severe headache. Infectious w/u initiated. ID and Neuro consulted. S/P LP pending further CSF results    #possible aseptic meningitis  -Pt was w/ persistent headaches, recurrent fevers, chills, nausea- now resolved.  -no leukocytosis  -bcx x2 5/29 negative to date  -s/p LP 5/30  -CSF appearance and cell counts unremarkable  -CSF gram stain and PCR negative  -f/u CSF culture  -f/u Lyme and West Nile Virus antibodies  -f/u serum tick panel   -d/toño meropenem 1g q24hrs  -d/toño vanco 750mg TIW post dialysis  -d/toño acyclovir 340mg q24hrs  -CTH unremarkable  -Check GI PCR if pt develops diarrhea  -ID following, recs appreciated    # Abdominal pain. Possible colitis (non-infectious as afebrile with mild leukopenia) vs pain from adhesions from multiple surgeries- improved  # Gastroparesis  # Chronic Pancreatitis, currently with normal Lipase.  # Erosive Esophagitis history  # Cirrhosis, prior HCV  - Hydromorphone PRN  - Ondansetron PRN  - Metoclopramide for gastroparesis  - Pancreatic enzymes supplements TID w/ meals  - Sucralfate QID  - Miralax daily  - GI signed off, recs appreciated    # ESRD  # Hyperkalemia  # Hyperphosphatemia  # AoCKD  - Telemetry  - HD per Nephrology  - Phosphate binder  - LASHELL with HD  - iron stores wnl    # Hypertension  # History of CAD  # History of CHF, likely diastolic  - continue home lisinopril 10mg daily  - continue hydralazine 50MG TID  - ASA to be continued  - I &Os and daily weight    # Diabetes  # Hypothyroidism, history of thyroidectomy for cancer  - Blood glucose monitoring with sliding scale Lispro  - A1c 7.3  - Levothyroxine 200mcg daily    # Anxiety  # Depression  - Sertraline to be continued    VTE prophylaxis - Intermittent pneumatic compression devices  Ambulate with walker  Disposition - dc home pending tick panel and further CSF results

## 2025-06-01 NOTE — PROGRESS NOTE ADULT - ASSESSMENT
69 year old female pt admitted for possible colitis- Nephrology consulted for managing HD needs.    ESRD on HD   MWF schedule at [facility] ; [facility]  HD tomorrow    anemia of CKD  Hb below goal  continue LASHELL with HD    HTN/ volume  Bp stable- pt euvolemic  UF as tolerated with HD    CKD MBD  ca++ at goal  Continue phos binders with meals    Monitor Phos levels    fever, photobia and HA  sp LP on 05/30

## 2025-06-01 NOTE — PROGRESS NOTE ADULT - SUBJECTIVE AND OBJECTIVE BOX
Patient is a 69y old  Female who presents with a chief complaint of Abdominal pain  Dialysis (01 Jun 2025 08:38)      Patient seen and examined at bedside  No overnight events reported  sitting in chair   no fever     ALLERGIES:  Augmentin (Hives)  ceftriaxone (Pruritus)    MEDICATIONS  (STANDING):  aspirin enteric coated 81 milliGRAM(s) Oral daily  calcium acetate 2001 milliGRAM(s) Oral two times a day with meals  carvedilol 25 milliGRAM(s) Oral every 12 hours  CREON (Pancrelipase 3000 units) 1 Capsule(s) 1 Capsule(s) Oral three times a day with meals  dextrose 5%. 1000 milliLiter(s) (50 mL/Hr) IV Continuous <Continuous>  dextrose 5%. 1000 milliLiter(s) (100 mL/Hr) IV Continuous <Continuous>  dextrose 50% Injectable 25 Gram(s) IV Push once  dextrose 50% Injectable 12.5 Gram(s) IV Push once  dextrose 50% Injectable 25 Gram(s) IV Push once  epoetin day (PROCRIT) Injectable 86225 Unit(s) IV Push <User Schedule>  glucagon  Injectable 1 milliGRAM(s) IntraMuscular once  hydrALAZINE 50 milliGRAM(s) Oral three times a day  insulin lispro (ADMELOG) corrective regimen sliding scale   SubCutaneous three times a day before meals  insulin lispro (ADMELOG) corrective regimen sliding scale   SubCutaneous at bedtime  levothyroxine 200 MICROGram(s) Oral daily  lisinopril 10 milliGRAM(s) Oral daily  metoclopramide 10 milliGRAM(s) Oral three times a day before meals  polyethylene glycol 3350 17 Gram(s) Oral daily  sertraline 200 milliGRAM(s) Oral daily  sucralfate suspension 1 Gram(s) Oral four times a day    MEDICATIONS  (PRN):  acetaminophen     Tablet .. 650 milliGRAM(s) Oral every 6 hours PRN Mild Pain (1 - 3)  dextrose Oral Gel 15 Gram(s) Oral once PRN Blood Glucose LESS THAN 70 milliGRAM(s)/deciliter  HYDROmorphone  Injectable 0.5 milliGRAM(s) IV Push every 4 hours PRN abdominal pain  ondansetron Injectable 4 milliGRAM(s) IV Push every 4 hours PRN Nausea and/or Vomiting    Vital Signs Last 24 Hrs  T(F): 98.9 (01 Jun 2025 14:25), Max: 99.7 (31 May 2025 16:45)  HR: 78 (01 Jun 2025 14:25) (76 - 87)  BP: 144/68 (01 Jun 2025 14:25) (127/63 - 160/51)  RR: 18 (01 Jun 2025 14:25) (18 - 18)  SpO2: 96% (01 Jun 2025 14:25) (93% - 97%)  I&O's Summary    PHYSICAL EXAM:  General: NAD, A/O x 3  ENT: MMM, no thrush  Neck: Supple, No JVD  Lungs: Clear to auscultation bilaterally, good air entry, non-labored breathing  Cardio: RRR, S1/S2, No murmur  Abdomen: Soft, Nontender, Nondistended; Bowel sounds present  Extremities: No calf tenderness, No pitting edema    LABS:                        7.4    5.34  )-----------( 81       ( 01 Jun 2025 05:10 )             23.6     06-01    135  |  97  |  32.3  ----------------------------<  146  4.4   |  26.0  |  4.87    Ca    8.0      01 Jun 2025 05:10  Phos  2.4     06-01  Mg     2.2     06-01    TPro  5.8  /  Alb  3.2  /  TBili  0.3  /  DBili  x   /  AST  18  /  ALT  13  /  AlkPhos  87  05-31      PT/INR - ( 30 May 2025 04:44 )   PT: 13.5 sec;   INR: 1.20 ratio          POCT Blood Glucose.: 192 mg/dL (01 Jun 2025 11:51)  POCT Blood Glucose.: 161 mg/dL (01 Jun 2025 07:35)  POCT Blood Glucose.: 224 mg/dL (31 May 2025 22:07)  POCT Blood Glucose.: 143 mg/dL (31 May 2025 17:28)      Urinalysis Basic - ( 01 Jun 2025 05:10 )    Color: x / Appearance: x / SG: x / pH: x  Gluc: 146 mg/dL / Ketone: x  / Bili: x / Urobili: x   Blood: x / Protein: x / Nitrite: x   Leuk Esterase: x / RBC: x / WBC x   Sq Epi: x / Non Sq Epi: x / Bacteria: x        Culture - Acid Fast - CSF (collected 30 May 2025 15:30)  Source: CSF CSF    Culture - Fungal, CSF (collected 30 May 2025 15:30)  Source: CSF CSF  Preliminary Report (01 Jun 2025 10:40):    No growth    Culture - CSF with Gram Stain (collected 30 May 2025 15:30)  Source: CSF CSF  Gram Stain (31 May 2025 02:18):    No polymorphonuclear cells seen    No organisms seen    by cytocentrifuge  Preliminary Report (31 May 2025 15:36):    No growth    Culture - Blood (collected 29 May 2025 15:33)  Source: Blood Blood  Preliminary Report (01 Jun 2025 01:02):    No growth at 48 Hours    Culture - Blood (collected 29 May 2025 15:30)  Source: Blood Blood  Preliminary Report (01 Jun 2025 01:02):    No growth at 48 Hours        RADIOLOGY & ADDITIONAL TESTS:    Care Discussed with Consultants/Other Providers:

## 2025-06-02 LAB
ALBUMIN SERPL ELPH-MCNC: 3.3 G/DL — SIGNIFICANT CHANGE UP (ref 3.3–5.2)
ALP SERPL-CCNC: 89 U/L — SIGNIFICANT CHANGE UP (ref 40–120)
ALT FLD-CCNC: 9 U/L — SIGNIFICANT CHANGE UP
ANION GAP SERPL CALC-SCNC: 16 MMOL/L — SIGNIFICANT CHANGE UP (ref 5–17)
AST SERPL-CCNC: 12 U/L — SIGNIFICANT CHANGE UP
BILIRUB SERPL-MCNC: 0.2 MG/DL — LOW (ref 0.4–2)
BUN SERPL-MCNC: 40.1 MG/DL — HIGH (ref 8–20)
CALCIUM SERPL-MCNC: 8 MG/DL — LOW (ref 8.4–10.5)
CHLORIDE SERPL-SCNC: 100 MMOL/L — SIGNIFICANT CHANGE UP (ref 96–108)
CO2 SERPL-SCNC: 24 MMOL/L — SIGNIFICANT CHANGE UP (ref 22–29)
CREAT SERPL-MCNC: 6.25 MG/DL — HIGH (ref 0.5–1.3)
EGFR: 7 ML/MIN/1.73M2 — LOW
EGFR: 7 ML/MIN/1.73M2 — LOW
GLUCOSE BLDC GLUCOMTR-MCNC: 136 MG/DL — HIGH (ref 70–99)
GLUCOSE BLDC GLUCOMTR-MCNC: 190 MG/DL — HIGH (ref 70–99)
GLUCOSE BLDC GLUCOMTR-MCNC: 193 MG/DL — HIGH (ref 70–99)
GLUCOSE BLDC GLUCOMTR-MCNC: 201 MG/DL — HIGH (ref 70–99)
GLUCOSE BLDC GLUCOMTR-MCNC: 231 MG/DL — HIGH (ref 70–99)
GLUCOSE SERPL-MCNC: 156 MG/DL — HIGH (ref 70–99)
HCT VFR BLD CALC: 25 % — LOW (ref 34.5–45)
HGB BLD-MCNC: 7.8 G/DL — LOW (ref 11.5–15.5)
IMMATURE PLATELET FRACTION #: 4.8 K/UL — SIGNIFICANT CHANGE UP (ref 4.7–11.1)
IMMATURE PLATELET FRACTION %: 5.7 % — HIGH (ref 1.6–4.9)
MAGNESIUM SERPL-MCNC: 2.4 MG/DL — SIGNIFICANT CHANGE UP (ref 1.6–2.6)
MCHC RBC-ENTMCNC: 29.8 PG — SIGNIFICANT CHANGE UP (ref 27–34)
MCHC RBC-ENTMCNC: 31.2 G/DL — LOW (ref 32–36)
MCV RBC AUTO: 95.4 FL — SIGNIFICANT CHANGE UP (ref 80–100)
NRBC # BLD AUTO: 0 K/UL — SIGNIFICANT CHANGE UP (ref 0–0)
NRBC # FLD: 0 K/UL — SIGNIFICANT CHANGE UP (ref 0–0)
NRBC BLD AUTO-RTO: 0 /100 WBCS — SIGNIFICANT CHANGE UP (ref 0–0)
PLATELET # BLD AUTO: 84 K/UL — LOW (ref 150–400)
PMV BLD: 11.8 FL — SIGNIFICANT CHANGE UP (ref 7–13)
POTASSIUM SERPL-MCNC: 4.4 MMOL/L — SIGNIFICANT CHANGE UP (ref 3.5–5.3)
POTASSIUM SERPL-SCNC: 4.4 MMOL/L — SIGNIFICANT CHANGE UP (ref 3.5–5.3)
PROT SERPL-MCNC: 5.9 G/DL — LOW (ref 6.6–8.7)
RBC # BLD: 2.62 M/UL — LOW (ref 3.8–5.2)
RBC # FLD: 16.1 % — HIGH (ref 10.3–14.5)
SODIUM SERPL-SCNC: 140 MMOL/L — SIGNIFICANT CHANGE UP (ref 135–145)
WBC # BLD: 4.36 K/UL — SIGNIFICANT CHANGE UP (ref 3.8–10.5)
WBC # FLD AUTO: 4.36 K/UL — SIGNIFICANT CHANGE UP (ref 3.8–10.5)

## 2025-06-02 PROCEDURE — 90935 HEMODIALYSIS ONE EVALUATION: CPT

## 2025-06-02 PROCEDURE — 74176 CT ABD & PELVIS W/O CONTRAST: CPT | Mod: 26

## 2025-06-02 PROCEDURE — 70450 CT HEAD/BRAIN W/O DYE: CPT | Mod: 26

## 2025-06-02 PROCEDURE — 99232 SBSQ HOSP IP/OBS MODERATE 35: CPT | Mod: GC

## 2025-06-02 RX ORDER — HYDROMORPHONE/SOD CHLOR,ISO/PF 2 MG/10 ML
0.5 SYRINGE (ML) INJECTION ONCE
Refills: 0 | Status: DISCONTINUED | OUTPATIENT
Start: 2025-06-02 | End: 2025-06-02

## 2025-06-02 RX ADMIN — EPOETIN ALFA 10000 UNIT(S): 10000 SOLUTION INTRAVENOUS; SUBCUTANEOUS at 10:43

## 2025-06-02 RX ADMIN — LISINOPRIL 10 MILLIGRAM(S): 5 TABLET ORAL at 06:01

## 2025-06-02 RX ADMIN — Medication 0.5 MILLIGRAM(S): at 22:10

## 2025-06-02 RX ADMIN — Medication 0.5 MILLIGRAM(S): at 21:55

## 2025-06-02 RX ADMIN — Medication 0.5 MILLIGRAM(S): at 00:37

## 2025-06-02 RX ADMIN — Medication 0.5 MILLIGRAM(S): at 18:10

## 2025-06-02 RX ADMIN — Medication 1 APPLICATION(S): at 12:47

## 2025-06-02 RX ADMIN — SERTRALINE 200 MILLIGRAM(S): 100 TABLET, FILM COATED ORAL at 12:47

## 2025-06-02 RX ADMIN — Medication 1 GRAM(S): at 06:01

## 2025-06-02 RX ADMIN — Medication 1 GRAM(S): at 12:46

## 2025-06-02 RX ADMIN — CARVEDILOL 25 MILLIGRAM(S): 3.12 TABLET, FILM COATED ORAL at 17:53

## 2025-06-02 RX ADMIN — Medication 0.5 MILLIGRAM(S): at 08:25

## 2025-06-02 RX ADMIN — Medication 50 MILLIGRAM(S): at 21:32

## 2025-06-02 RX ADMIN — Medication 0.5 MILLIGRAM(S): at 05:40

## 2025-06-02 RX ADMIN — Medication 50 MILLIGRAM(S): at 06:01

## 2025-06-02 RX ADMIN — Medication 1 GRAM(S): at 21:33

## 2025-06-02 RX ADMIN — Medication 200 MICROGRAM(S): at 06:01

## 2025-06-02 RX ADMIN — Medication 0.5 MILLIGRAM(S): at 13:01

## 2025-06-02 RX ADMIN — Medication 0.5 MILLIGRAM(S): at 17:53

## 2025-06-02 RX ADMIN — Medication 10 MILLIGRAM(S): at 17:15

## 2025-06-02 RX ADMIN — INSULIN LISPRO 1: 100 INJECTION, SOLUTION INTRAVENOUS; SUBCUTANEOUS at 17:18

## 2025-06-02 RX ADMIN — Medication 50 MILLIGRAM(S): at 14:47

## 2025-06-02 RX ADMIN — CARVEDILOL 25 MILLIGRAM(S): 3.12 TABLET, FILM COATED ORAL at 06:01

## 2025-06-02 RX ADMIN — Medication 81 MILLIGRAM(S): at 12:46

## 2025-06-02 RX ADMIN — Medication 1 GRAM(S): at 17:14

## 2025-06-02 RX ADMIN — Medication 0.5 MILLIGRAM(S): at 08:18

## 2025-06-02 RX ADMIN — Medication 0.5 MILLIGRAM(S): at 05:55

## 2025-06-02 RX ADMIN — Medication 0.5 MILLIGRAM(S): at 12:46

## 2025-06-02 RX ADMIN — Medication 0.5 MILLIGRAM(S): at 00:52

## 2025-06-02 RX ADMIN — Medication 10 MILLIGRAM(S): at 12:47

## 2025-06-02 NOTE — PROGRESS NOTE ADULT - SUBJECTIVE AND OBJECTIVE BOX
Reason for visit: ESRD on HD    Subjective: No acute overnight event. Patient denied any cardiac or urinary complains. No fever/chills. Seen on HD.    ROS: All systems were reviewed in detail pertinent positive and negative mentioned above, rest are negative.    Physical Exam:  Gen: no acute distress  MS: alert, conversing normally  Eyes: EOMI, no icterus  HENT: NCAT, MMM  CV: rhythm reg reg, rate normal, no m/g/r  Chest: CTAB, no w/r/r,  Abd: soft, NT, ND  Extremities: No edema    =======================================================  Vital Signs Last 24 Hrs  T(C): 36.9 (02 Jun 2025 14:44), Max: 37.1 (02 Jun 2025 08:40)  T(F): 98.5 (02 Jun 2025 14:44), Max: 98.7 (02 Jun 2025 08:40)  HR: 83 (02 Jun 2025 14:44) (69 - 85)  BP: 146/74 (02 Jun 2025 14:44) (121/61 - 161/72)  BP(mean): --  RR: 18 (02 Jun 2025 14:44) (18 - 18)  SpO2: 98% (02 Jun 2025 14:44) (96% - 98%)    Parameters below as of 02 Jun 2025 14:44  Patient On (Oxygen Delivery Method): room air      I&O's Summary    02 Jun 2025 07:01  -  02 Jun 2025 15:35  --------------------------------------------------------  IN: 800 mL / OUT: 2300 mL / NET: -1500 mL      =======================================================  Current Antibiotics:    Other medications:  aspirin enteric coated 81 milliGRAM(s) Oral daily  calcium acetate 2001 milliGRAM(s) Oral two times a day with meals  carvedilol 25 milliGRAM(s) Oral every 12 hours  chlorhexidine 2% Cloths 1 Application(s) Topical daily  CREON (Pancrelipase 3000 units) 1 Capsule(s) 1 Capsule(s) Oral three times a day with meals  dextrose 5%. 1000 milliLiter(s) IV Continuous <Continuous>  dextrose 5%. 1000 milliLiter(s) IV Continuous <Continuous>  dextrose 50% Injectable 12.5 Gram(s) IV Push once  dextrose 50% Injectable 25 Gram(s) IV Push once  dextrose 50% Injectable 25 Gram(s) IV Push once  epoetin day (PROCRIT) Injectable 44786 Unit(s) IV Push <User Schedule>  glucagon  Injectable 1 milliGRAM(s) IntraMuscular once  hydrALAZINE 50 milliGRAM(s) Oral three times a day  insulin lispro (ADMELOG) corrective regimen sliding scale   SubCutaneous three times a day before meals  insulin lispro (ADMELOG) corrective regimen sliding scale   SubCutaneous at bedtime  levothyroxine 200 MICROGram(s) Oral daily  lisinopril 10 milliGRAM(s) Oral daily  metoclopramide 10 milliGRAM(s) Oral three times a day before meals  polyethylene glycol 3350 17 Gram(s) Oral daily  sertraline 200 milliGRAM(s) Oral daily  sucralfate suspension 1 Gram(s) Oral four times a day    =======================================================  06-02    140  |  100  |  40.1[H]  ----------------------------<  156[H]  4.4   |  24.0  |  6.25[H]    Ca    8.0[L]      02 Jun 2025 05:35  Phos  2.4     06-01  Mg     2.4     06-02    TPro  5.9[L]  /  Alb  3.3  /  TBili  0.2[L]  /  DBili  x   /  AST  12  /  ALT  9   /  AlkPhos  89  06-02    Creatinine: 6.25 mg/dL (06-02-25 @ 05:35)  Creatinine: 4.87 mg/dL (06-01-25 @ 05:10)  Creatinine: 3.88 mg/dL (05-31-25 @ 06:09)  Creatinine: 5.52 mg/dL (05-30-25 @ 04:44)  Creatinine: 4.04 mg/dL (05-29-25 @ 06:09)                          7.8    4.36  )-----------( 84 ( 02 Jun 2025 05:35 )             25.0       =======================================================

## 2025-06-02 NOTE — PROGRESS NOTE ADULT - ATTENDING COMMENTS
patient stable  h/h stable   plan for colonoscopy as op    plan for fariba   needs placement patient c/o abd pain   denies any n/v   was initially admitted for colitis , abd pain was improved but now worse again.   check ct abd   monitor off abxs   follow all cxs

## 2025-06-02 NOTE — PROGRESS NOTE ADULT - SUBJECTIVE AND OBJECTIVE BOX
Patient is a 69y old  Female who presents with a chief complaint of Abdominal pain  Dialysis (01 Jun 2025 16:08)      INTERVAL HPI/OVERNIGHT EVENTS:  - No acute overnight events    TODAY:  - Patient examined bedside, complaining of abdominal pain bedside prior to dialysis -> received 0.5mg IV Dilaudid 5:40 AM  - Gave another STAT dose after evaluation prior to dialysis  - Patient denies CP, SOB, fever, nausea, vomiting    MEDICATIONS  (STANDING):  aspirin enteric coated 81 milliGRAM(s) Oral daily  calcium acetate 2001 milliGRAM(s) Oral two times a day with meals  carvedilol 25 milliGRAM(s) Oral every 12 hours  CREON (Pancrelipase 3000 units) 1 Capsule(s) 1 Capsule(s) Oral three times a day with meals  dextrose 5%. 1000 milliLiter(s) (50 mL/Hr) IV Continuous <Continuous>  dextrose 5%. 1000 milliLiter(s) (100 mL/Hr) IV Continuous <Continuous>  dextrose 50% Injectable 25 Gram(s) IV Push once  dextrose 50% Injectable 12.5 Gram(s) IV Push once  dextrose 50% Injectable 25 Gram(s) IV Push once  epoetin day (PROCRIT) Injectable 57188 Unit(s) IV Push <User Schedule>  glucagon  Injectable 1 milliGRAM(s) IntraMuscular once  hydrALAZINE 50 milliGRAM(s) Oral three times a day  HYDROmorphone  Injectable 0.5 milliGRAM(s) IV Push once  insulin lispro (ADMELOG) corrective regimen sliding scale   SubCutaneous three times a day before meals  insulin lispro (ADMELOG) corrective regimen sliding scale   SubCutaneous at bedtime  levothyroxine 200 MICROGram(s) Oral daily  lisinopril 10 milliGRAM(s) Oral daily  metoclopramide 10 milliGRAM(s) Oral three times a day before meals  polyethylene glycol 3350 17 Gram(s) Oral daily  sertraline 200 milliGRAM(s) Oral daily  sucralfate suspension 1 Gram(s) Oral four times a day    MEDICATIONS  (PRN):  acetaminophen     Tablet .. 650 milliGRAM(s) Oral every 6 hours PRN Mild Pain (1 - 3)  dextrose Oral Gel 15 Gram(s) Oral once PRN Blood Glucose LESS THAN 70 milliGRAM(s)/deciliter  HYDROmorphone  Injectable 0.5 milliGRAM(s) IV Push every 4 hours PRN abdominal pain  ondansetron Injectable 4 milliGRAM(s) IV Push every 4 hours PRN Nausea and/or Vomiting      Allergies    Augmentin (Hives)  ceftriaxone (Pruritus)    Intolerances        REVIEW OF SYSTEMS:  as above      Vital Signs Last 24 Hrs  T(C): 36.7 (02 Jun 2025 07:34), Max: 37.2 (01 Jun 2025 14:25)  T(F): 98.1 (02 Jun 2025 07:34), Max: 98.9 (01 Jun 2025 14:25)  HR: 84 (02 Jun 2025 07:34) (73 - 85)  BP: 121/61 (02 Jun 2025 07:34) (121/61 - 161/72)  BP(mean): --  RR: 18 (02 Jun 2025 07:34) (18 - 18)  SpO2: 96% (02 Jun 2025 07:34) (96% - 98%)    Parameters below as of 02 Jun 2025 07:34  Patient On (Oxygen Delivery Method): room air        PHYSICAL EXAM:  GENERAL: Not in acute distress, lying comfortably  HEAD:  Atraumatic, Normocephalic  EYES: EOMI, PERRLA, conjunctiva and sclera clear  NECK: Supple, No JVD, Normal thyroid  NERVOUS SYSTEM:  Alert & Oriented X3, No gross focal deficits  CHEST/LUNG: Clear to auscultation bilaterally; No rales, rhonchi, wheezing, or rubs  HEART: Regular rate and rhythm; No murmurs, rubs, or gallops  ABDOMEN: Soft, Nontender, Nondistended; Bowel sounds present  EXTREMITIES:  No clubbing, cyanosis, or edema  SKIN: No rashes or lesions    LABS:                        7.8    4.36  )-----------( 84       ( 02 Jun 2025 05:35 )             25.0     06-02    140  |  100  |  40.1[H]  ----------------------------<  156[H]  4.4   |  24.0  |  6.25[H]    Ca    8.0[L]      02 Jun 2025 05:35  Phos  2.4     06-01  Mg     2.4     06-02    TPro  5.9[L]  /  Alb  3.3  /  TBili  0.2[L]  /  DBili  x   /  AST  12  /  ALT  9   /  AlkPhos  89  06-02      Urinalysis Basic - ( 02 Jun 2025 05:35 )    Color: x / Appearance: x / SG: x / pH: x  Gluc: 156 mg/dL / Ketone: x  / Bili: x / Urobili: x   Blood: x / Protein: x / Nitrite: x   Leuk Esterase: x / RBC: x / WBC x   Sq Epi: x / Non Sq Epi: x / Bacteria: x      CAPILLARY BLOOD GLUCOSE      POCT Blood Glucose.: 282 mg/dL (01 Jun 2025 21:58)  POCT Blood Glucose.: 218 mg/dL (01 Jun 2025 17:24)  POCT Blood Glucose.: 192 mg/dL (01 Jun 2025 11:51)      RADIOLOGY & ADDITIONAL TESTS:    Imaging Personally Reviewed:  [X] YES  [ ] NO    Consultant(s) Notes Reviewed:  [X] YES  [ ] NO    Care Discussed with Consultants/Other Providers [X] YES  [ ] NO    Plan of Care discussed with House Staff: [X]YES [ ] NO

## 2025-06-02 NOTE — PROGRESS NOTE ADULT - ASSESSMENT
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 CT showed possible colitis. Course complicated by fever, chills, nausea, and severe headache. Infectious w/u initiated. ID and Neuro consulted. S/P LP pending further CSF results    PLAN:  #Possible Aseptic Meningitis  - Pt was w/ persistent headaches, recurrent fevers, chills, nausea- now resolved.  - no leukocytosis  - bcx x2 5/29 negative to date  - s/p LP 5/30  - CSF appearance and cell counts unremarkable  - CSF gram stain and PCR negative  - f/u CSF culture  - f/u Lyme and West Nile Virus antibodies  - f/u serum tick panel   - d/toño meropenem 1g q24hrs  - d/toño vanco 750mg TIW post dialysis  - d/toño acyclovir 340mg q24hrs  - CTH unremarkable  - Check GI PCR if pt develops diarrhea  - ID following, recs appreciated    # Abdominal pain. Possible colitis (non-infectious as afebrile with mild leukopenia) vs pain from adhesions from multiple surgeries- improved  # Gastroparesis  # Chronic Pancreatitis, currently with normal Lipase.  # Erosive Esophagitis history  # Cirrhosis, prior HCV  - Hydromorphone PRN  - Ondansetron PRN  - Metoclopramide for gastroparesis  - Pancreatic enzymes supplements TID w/ meals  - Sucralfate QID  - Miralax daily  - GI signed off, recs appreciated    # ESRD  # Hyperkalemia  # Hyperphosphatemia  # AoCKD  - Telemetry  - HD per Nephrology  - Phosphate binder  - LASHELL with HD  - iron stores wnl    #Hypertension  #History of CAD  #History of CHF, likely diastolic  - continue home lisinopril 10mg daily  - continue hydralazine 50MG TID  - ASA to be continued  - I &Os and daily weight    #Diabetes  #Hypothyroidism, history of thyroidectomy for cancer  - Blood glucose monitoring with sliding scale Lispro  - A1c 7.3  - Levothyroxine 200mcg daily    #Anxiety  #Depression  - Sertraline to be continued    VTE prophylaxis - Intermittent pneumatic compression devices  Ambulate with walker  Dispo: D/C home pending tick panel and further CSF results     Problem: Gas Exchange - Impaired:  Goal: Levels of oxygenation will improve  Levels of oxygenation will improve   Outcome: Met This Shift ASSESSMENT:  69 year old female with Hypertension, Diabetes, CAD s/p PCI, ESRD on HD for 2.5 years (M,W,F), Gastroparesis, IBS, Chronic Pancreatitis, Erosive Esophagitis, 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 CT showed possible colitis. Course complicated by fever, chills, nausea, and severe headache. Infectious w/u initiated. ID and Neuro consulted. S/P LP pending further CSF results    PLAN:  #Possible Aseptic Meningitis  - Pt was w/ persistent headaches, recurrent fevers, chills, nausea- now resolved.  - no leukocytosis  - bcx x2 5/29 negative to date  - s/p LP 5/30  - CSF appearance and cell counts unremarkable  - CSF gram stain and PCR negative  - CSF culture negative  - f/u Lyme and West Nile Virus antibodies  - f/u serum tick panel   - s/p meropenem 1g q24hrs  - s/p vanco 750mg TIW post dialysis  - s/p acyclovir 340mg q24hrs  - CTH unremarkable  - Check GI PCR if pt develops diarrhea  - ID following, recs appreciated    # Abdominal pain. Possible colitis (non-infectious as afebrile with mild leukopenia) vs pain from adhesions from multiple surgeries- improved  # Gastroparesis  # Chronic Pancreatitis, currently with normal Lipase.  # Erosive Esophagitis history  # Cirrhosis, prior HCV  - Hydromorphone PRN  - Ondansetron PRN  - Metoclopramide for gastroparesis  - Pancreatic enzymes supplements TID w/ meals  - Sucralfate QID  - Miralax daily  - GI signed off, recs appreciated    # ESRD  # Hyperkalemia  # Hyperphosphatemia  # AoCKD  - Telemetry  - HD per Nephrology  - Phosphate binder  - LASHELL with HD  - iron stores wnl    #Hypertension  #History of CAD  #History of CHF, likely diastolic  - continue home lisinopril 10mg daily  - continue hydralazine 50MG TID  - ASA to be continued  - I &Os and daily weight    #Diabetes  #Hypothyroidism, history of thyroidectomy for cancer  - Blood glucose monitoring with sliding scale Lispro  - A1c 7.3  - Levothyroxine 200mcg daily    #Anxiety  #Depression  - Sertraline to be continued    VTE prophylaxis - Intermittent pneumatic compression devices  Ambulate with walker  Dispo: D/C home pending tick panel

## 2025-06-02 NOTE — PHYSICAL THERAPY INITIAL EVALUATION ADULT - ADDITIONAL COMMENTS
Pt reports living with spouse in a house with 2 FAMILIA with rail, and 6 steps with rail to bedroom (has chairlift). Pt reports amb with RW or SAC and is independent (has assist as needed) with functional mobility, ADLs, and IADLs. Pt's spouse works nights, however is able to assist as needed.

## 2025-06-02 NOTE — PHYSICAL THERAPY INITIAL EVALUATION ADULT - PERTINENT HX OF CURRENT PROBLEM, REHAB EVAL
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 CT showed possible colitis. Course complicated by fever, chills, nausea, and severe headache. Infectious w/u initiated. ID and Neuro consulted. S/P LP pending further CSF results

## 2025-06-02 NOTE — PROGRESS NOTE ADULT - ASSESSMENT
69 year old female pt admitted for possible colitis- Nephrology consulted for managing HD needs.    ESRD on HD  MWF schedule at [facility]  Access ; [facility]  Seen on HD.  Qd, Qb, UF rate reviewed.  Vitals stable.  Access working well.  Patient tolerating HD well.    anemia of CKD  Hb below goal  continue LASHELL with HD    HTN/ volume  Bp stable- pt euvolemic  UF as tolerated with HD    CKD MBD  ca++ at goal  Continue phos binders with meals    Monitor Phos levels    fever, photobia and HA  sp LP on 05/30

## 2025-06-03 DIAGNOSIS — Z71.89 OTHER SPECIFIED COUNSELING: ICD-10-CM

## 2025-06-03 DIAGNOSIS — Z51.5 ENCOUNTER FOR PALLIATIVE CARE: ICD-10-CM

## 2025-06-03 LAB
A PHAGOCYTOPH DNA BLD QL NAA+PROBE: NEGATIVE — SIGNIFICANT CHANGE UP
ANION GAP SERPL CALC-SCNC: 13 MMOL/L — SIGNIFICANT CHANGE UP (ref 5–17)
BASOPHILS # BLD AUTO: 0.02 K/UL — SIGNIFICANT CHANGE UP (ref 0–0.2)
BASOPHILS NFR BLD AUTO: 0.5 % — SIGNIFICANT CHANGE UP (ref 0–2)
BUN SERPL-MCNC: 19.5 MG/DL — SIGNIFICANT CHANGE UP (ref 8–20)
CALCIUM SERPL-MCNC: 8.4 MG/DL — SIGNIFICANT CHANGE UP (ref 8.4–10.5)
CHLORIDE SERPL-SCNC: 98 MMOL/L — SIGNIFICANT CHANGE UP (ref 96–108)
CO2 SERPL-SCNC: 28 MMOL/L — SIGNIFICANT CHANGE UP (ref 22–29)
CREAT SERPL-MCNC: 4.33 MG/DL — HIGH (ref 0.5–1.3)
E CHAFFEENSIS DNA BLD QL NAA+PROBE: NEGATIVE — SIGNIFICANT CHANGE UP
E EWINGII DNA SPEC QL NAA+PROBE: NEGATIVE — SIGNIFICANT CHANGE UP
EGFR: 11 ML/MIN/1.73M2 — LOW
EGFR: 11 ML/MIN/1.73M2 — LOW
EHRLICHIA DNA SPEC QL NAA+PROBE: NEGATIVE — SIGNIFICANT CHANGE UP
EOSINOPHIL # BLD AUTO: 0.22 K/UL — SIGNIFICANT CHANGE UP (ref 0–0.5)
EOSINOPHIL NFR BLD AUTO: 5.5 % — SIGNIFICANT CHANGE UP (ref 0–6)
GLUCOSE BLDC GLUCOMTR-MCNC: 148 MG/DL — HIGH (ref 70–99)
GLUCOSE BLDC GLUCOMTR-MCNC: 184 MG/DL — HIGH (ref 70–99)
GLUCOSE BLDC GLUCOMTR-MCNC: 188 MG/DL — HIGH (ref 70–99)
GLUCOSE BLDC GLUCOMTR-MCNC: 203 MG/DL — HIGH (ref 70–99)
GLUCOSE SERPL-MCNC: 149 MG/DL — HIGH (ref 70–99)
HCT VFR BLD CALC: 25 % — LOW (ref 34.5–45)
HGB BLD-MCNC: 7.8 G/DL — LOW (ref 11.5–15.5)
IMM GRANULOCYTES # BLD AUTO: 0.02 K/UL — SIGNIFICANT CHANGE UP (ref 0–0.07)
IMM GRANULOCYTES NFR BLD AUTO: 0.5 % — SIGNIFICANT CHANGE UP (ref 0–0.9)
IMMATURE PLATELET FRACTION #: 4.4 K/UL — LOW (ref 4.7–11.1)
IMMATURE PLATELET FRACTION %: 5 % — HIGH (ref 1.6–4.9)
LYMPHOCYTES # BLD AUTO: 0.7 K/UL — LOW (ref 1–3.3)
LYMPHOCYTES NFR BLD AUTO: 17.5 % — SIGNIFICANT CHANGE UP (ref 13–44)
MAGNESIUM SERPL-MCNC: 2.2 MG/DL — SIGNIFICANT CHANGE UP (ref 1.6–2.6)
MCHC RBC-ENTMCNC: 29.8 PG — SIGNIFICANT CHANGE UP (ref 27–34)
MCHC RBC-ENTMCNC: 31.2 G/DL — LOW (ref 32–36)
MCV RBC AUTO: 95.4 FL — SIGNIFICANT CHANGE UP (ref 80–100)
MONOCYTES # BLD AUTO: 0.63 K/UL — SIGNIFICANT CHANGE UP (ref 0–0.9)
MONOCYTES NFR BLD AUTO: 15.8 % — HIGH (ref 2–14)
NEUTROPHILS # BLD AUTO: 2.41 K/UL — SIGNIFICANT CHANGE UP (ref 1.8–7.4)
NEUTROPHILS NFR BLD AUTO: 60.2 % — SIGNIFICANT CHANGE UP (ref 43–77)
NRBC # BLD AUTO: 0 K/UL — SIGNIFICANT CHANGE UP (ref 0–0)
NRBC # FLD: 0 K/UL — SIGNIFICANT CHANGE UP (ref 0–0)
NRBC BLD AUTO-RTO: 0 /100 WBCS — SIGNIFICANT CHANGE UP (ref 0–0)
PLATELET # BLD AUTO: 87 K/UL — LOW (ref 150–400)
PMV BLD: 12.1 FL — SIGNIFICANT CHANGE UP (ref 7–13)
POTASSIUM SERPL-MCNC: 4.1 MMOL/L — SIGNIFICANT CHANGE UP (ref 3.5–5.3)
POTASSIUM SERPL-SCNC: 4.1 MMOL/L — SIGNIFICANT CHANGE UP (ref 3.5–5.3)
RBC # BLD: 2.62 M/UL — LOW (ref 3.8–5.2)
RBC # FLD: 15.8 % — HIGH (ref 10.3–14.5)
SODIUM SERPL-SCNC: 138 MMOL/L — SIGNIFICANT CHANGE UP (ref 135–145)
VDRL CSF-TITR: SIGNIFICANT CHANGE UP
WBC # BLD: 4 K/UL — SIGNIFICANT CHANGE UP (ref 3.8–10.5)
WBC # FLD AUTO: 4 K/UL — SIGNIFICANT CHANGE UP (ref 3.8–10.5)
WNV IGG CSF IA-ACNC: NEGATIVE — SIGNIFICANT CHANGE UP
WNV IGM CSF IA-ACNC: NEGATIVE — SIGNIFICANT CHANGE UP

## 2025-06-03 PROCEDURE — 99221 1ST HOSP IP/OBS SF/LOW 40: CPT

## 2025-06-03 PROCEDURE — 74177 CT ABD & PELVIS W/CONTRAST: CPT | Mod: 26

## 2025-06-03 PROCEDURE — 99497 ADVNCD CARE PLAN 30 MIN: CPT | Mod: 25

## 2025-06-03 PROCEDURE — G0545: CPT

## 2025-06-03 PROCEDURE — 99232 SBSQ HOSP IP/OBS MODERATE 35: CPT

## 2025-06-03 PROCEDURE — 99233 SBSQ HOSP IP/OBS HIGH 50: CPT | Mod: GC

## 2025-06-03 PROCEDURE — 99223 1ST HOSP IP/OBS HIGH 75: CPT

## 2025-06-03 PROCEDURE — 76700 US EXAM ABDOM COMPLETE: CPT | Mod: 26

## 2025-06-03 RX ORDER — ASPIRIN 325 MG
81 TABLET ORAL DAILY
Refills: 0 | Status: DISCONTINUED | OUTPATIENT
Start: 2025-06-03 | End: 2025-06-11

## 2025-06-03 RX ORDER — MEROPENEM 1 G/30ML
500 INJECTION INTRAVENOUS EVERY 24 HOURS
Refills: 0 | Status: DISCONTINUED | OUTPATIENT
Start: 2025-06-03 | End: 2025-06-09

## 2025-06-03 RX ORDER — MEROPENEM 1 G/30ML
500 INJECTION INTRAVENOUS EVERY 24 HOURS
Refills: 0 | Status: DISCONTINUED | OUTPATIENT
Start: 2025-06-03 | End: 2025-06-03

## 2025-06-03 RX ADMIN — Medication 50 MILLIGRAM(S): at 15:11

## 2025-06-03 RX ADMIN — Medication 81 MILLIGRAM(S): at 11:48

## 2025-06-03 RX ADMIN — INSULIN LISPRO 1: 100 INJECTION, SOLUTION INTRAVENOUS; SUBCUTANEOUS at 07:53

## 2025-06-03 RX ADMIN — Medication 0.5 MILLIGRAM(S): at 10:01

## 2025-06-03 RX ADMIN — Medication 0.5 MILLIGRAM(S): at 15:11

## 2025-06-03 RX ADMIN — Medication 1 GRAM(S): at 11:48

## 2025-06-03 RX ADMIN — Medication 1 GRAM(S): at 17:46

## 2025-06-03 RX ADMIN — SERTRALINE 200 MILLIGRAM(S): 100 TABLET, FILM COATED ORAL at 11:49

## 2025-06-03 RX ADMIN — INSULIN LISPRO 2: 100 INJECTION, SOLUTION INTRAVENOUS; SUBCUTANEOUS at 11:47

## 2025-06-03 RX ADMIN — CARVEDILOL 25 MILLIGRAM(S): 3.12 TABLET, FILM COATED ORAL at 17:44

## 2025-06-03 RX ADMIN — Medication 0.5 MILLIGRAM(S): at 15:25

## 2025-06-03 RX ADMIN — Medication 0.5 MILLIGRAM(S): at 05:50

## 2025-06-03 RX ADMIN — Medication 0.5 MILLIGRAM(S): at 05:35

## 2025-06-03 RX ADMIN — Medication 1 GRAM(S): at 05:35

## 2025-06-03 RX ADMIN — Medication 50 MILLIGRAM(S): at 05:36

## 2025-06-03 RX ADMIN — Medication 200 MICROGRAM(S): at 05:36

## 2025-06-03 RX ADMIN — Medication 1 GRAM(S): at 23:53

## 2025-06-03 RX ADMIN — Medication 50 MILLIGRAM(S): at 21:04

## 2025-06-03 RX ADMIN — Medication 10 MILLIGRAM(S): at 11:49

## 2025-06-03 RX ADMIN — Medication 0.5 MILLIGRAM(S): at 20:58

## 2025-06-03 RX ADMIN — MEROPENEM 500 MILLIGRAM(S): 1 INJECTION INTRAVENOUS at 10:02

## 2025-06-03 RX ADMIN — Medication 0.5 MILLIGRAM(S): at 22:00

## 2025-06-03 RX ADMIN — CARVEDILOL 25 MILLIGRAM(S): 3.12 TABLET, FILM COATED ORAL at 05:36

## 2025-06-03 RX ADMIN — LISINOPRIL 10 MILLIGRAM(S): 5 TABLET ORAL at 05:36

## 2025-06-03 RX ADMIN — Medication 0.5 MILLIGRAM(S): at 10:25

## 2025-06-03 RX ADMIN — Medication 1 APPLICATION(S): at 11:48

## 2025-06-03 NOTE — CONSULT NOTE ADULT - TIME BILLING
Total Time Spent 75 minutes  This include chart review, patient assessment, discussion and collaboration with interdisciplinary team members, documentation.     COUNSELING:  Face to face meeting to discuss Advanced Care Planning - Time Spent 30 Minutes    Thank you for the opportunity to assist with the care of this patient.   [facility] Palliative Medicine Consult Service [phone]

## 2025-06-03 NOTE — PROGRESS NOTE ADULT - ASSESSMENT
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 CT showed possible colitis. Course complicated by fever, chills, nausea, and severe headache. Infectious w/u initiated. ID and Neuro consulted. S/P LP pending further CSF results    PLAN:  # Abdominal pain -> Hepatic Abscess  # Gastroparesis  # Chronic Pancreatitis  # Erosive Esophagitis history  # Cirrhosis, prior HCV  - Likely cause of fevers  - Abd pain increased RUQ -> CT A/P non contrast ordered 6/2PM  - CT A/P 6/2 Hepatic Abscess  - Could be possible biliary sepsis picture  - CT A/P w/IVC ordered to characterize hepatic abscess  - ABD US ordered to assess abscess and biliary tree  - Repeat BCx ordered  - Start Meropenem 500mg q24hrs (allergic to penicillins)  - Hydromorphone PRN  - Ondansetron PRN  - Metoclopramide for gastroparesis  - Pancreatic enzymes supplements TID w/ meals  - Sucralfate QID  - Miralax daily  - GI signed off, recs appreciated    #Possible Aseptic Meningitis  - Pt was w/ persistent headaches, recurrent fevers, chills, nausea- now resolved.  - no leukocytosis  - bcx x2 5/29 negative to date  - s/p LP 5/30  - CSF appearance and cell counts unremarkable  - CSF gram stain and PCR negative  - CSF culture negative  - f/u Lyme and West Nile Virus antibodies  - f/u serum tick panel   - s/p meropenem 1g q24hrs  - s/p vanco 750mg TIW post dialysis  - s/p acyclovir 340mg q24hrs  - CTH unremarkable  - Check GI PCR if pt develops diarrhea  - ID following, recs appreciated    # ESRD  # Hyperkalemia  # Hyperphosphatemia  # AoCKD  - Telemetry  - HD per Nephrology  - Phosphate binder  - LASHELL with HD  - Iron stores wnl    #Hypertension  #History of CAD  #History of CHF, likely diastolic  - Continue home lisinopril 10mg daily  - Continue hydralazine 50MG TID  - ASA to be continued  - I &Os and daily weight    #Diabetes  #Hypothyroidism, history of thyroidectomy for cancer  - Blood glucose monitoring with sliding scale Lispro  - A1c 7.3  - Levothyroxine 200mcg daily    #Anxiety  #Depression  - Sertraline to be continued    VTE prophylaxis - Intermittent pneumatic compression devices  Ambulate with walker -> Home w/home PT  Dispo: Hepatic Abscess w/u ASSESSMENT:  69 year old female with Hypertension, Diabetes, CAD s/p PCI, ESRD on HD for 2.5 years (M,W,F), Gastroparesis, IBS, Chronic Pancreatitis, Erosive Esophagitis, 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 CT showed possible colitis. Course complicated by fever, chills, nausea, and severe headache. Infectious w/u initiated. ID and Neuro consulted. S/P LP pending further CSF results    PLAN:  # Abdominal pain -> Hepatic Abscess  # Gastroparesis  # Chronic Pancreatitis  # Erosive Esophagitis history  # Cirrhosis, prior HCV  - Likely cause of fevers  - Abd pain increased RUQ -> CT A/P non contrast ordered 6/2PM  - CT A/P 6/2 Hepatic Abscess  - Could be possible biliary sepsis picture  - CT A/P w/IVC confirms liver abscess  - ABD US ordered to assess abscess and biliary tree  - Repeat BCx ordered  - Start Meropenem 500mg q24hrs (allergic to penicillins)  - Consult IR for Drainage/Culture  - Hydromorphone PRN  - Ondansetron PRN  - Metoclopramide for gastroparesis  - Pancreatic enzymes supplements TID w/ meals  - Sucralfate QID  - Miralax daily  - GI signed off, recs appreciated    #Possible Aseptic Meningitis  - Pt was w/ persistent headaches, recurrent fevers, chills, nausea- now resolved.  - no leukocytosis  - bcx x2 5/29 negative to date  - s/p LP 5/30  - CSF appearance and cell counts unremarkable  - CSF gram stain and PCR negative  - CSF culture negative  - f/u Lyme and West Nile Virus antibodies  - f/u serum tick panel   - s/p meropenem 1g q24hrs  - s/p vanco 750mg TIW post dialysis  - s/p acyclovir 340mg q24hrs  - CTH unremarkable  - Check GI PCR if pt develops diarrhea  - ID following, recs appreciated    # ESRD  # Hyperkalemia  # Hyperphosphatemia  # AoCKD  - Telemetry  - HD per Nephrology  - Phosphate binder  - LASHELL with HD  - Iron stores wnl    #Hypertension  #History of CAD  #History of CHF, likely diastolic  - Continue home lisinopril 10mg daily  - Continue hydralazine 50MG TID  - ASA to be continued  - I &Os and daily weight    #Diabetes  #Hypothyroidism, history of thyroidectomy for cancer  - Blood glucose monitoring with sliding scale Lispro  - A1c 7.3  - Levothyroxine 200mcg daily    #Anxiety  #Depression  - Sertraline to be continued    VTE prophylaxis - Intermittent pneumatic compression devices  Ambulate with walker -> Home w/home PT  Dispo: Hepatic Abscess w/u Was The Patient On Physician Recommended Anticoagulation Therapy?: Please Select the Appropriate Response

## 2025-06-03 NOTE — PROGRESS NOTE ADULT - ATTENDING COMMENTS
ct abd with iv contrast confirmed hepatic abscess   IR consulted for possible drainage   imaging reviewed by IR , not able to drain   c/w iv abxs   will likely need long term iv abx

## 2025-06-03 NOTE — CONSULT NOTE ADULT - CONSULT REQUESTED DATE/TIME
03-Jun-2025 15:04
28-May-2025 15:35
28-May-2025 09:48
30-May-2025 12:29
30-May-2025 15:18
28-May-2025

## 2025-06-03 NOTE — CONSULT NOTE ADULT - ASSESSMENT
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 Palliative was consulted for complex decision making in the context of advanced illness.     #Encounter for Palliative Care  #Advance Care Planning  - Patient has capacity to make medical decisions at this time  - HCP completed by patient, placed in folder  - HCP is  Leopoldo Pitts [phone]  - Alternate HCP is sister Lisa Wilkinson [phone]  - Full code, no long term ventilator, continue medical management  - See above for GOC. In summary  1) Discussed hospice philosophy, eligibility, benefits and location. Patient declined home hospice, as patient would like to continue HD as long as she could tolerated it.   2) Patient would pursue further medical management   3) Patient requested SW assistance for dispo planning. Patient was interested in rehab facility to regain strength.   Encourage ongoing GOC conversation    No further recommendations from a palliative standpoint. Will sign off. Please reconsult PRN if goals of care should change and assistance needed in further collaborative family discussions. Dispo planning per SW. Ongoing medical management per primary team.     #ESRD on HD  #Anemia of CKD  - Nephro is on board, recs appreciated.   - HD on MWF  - Phos binders with meals  - LASHELL with HD     # Hepatic Abscess  # Gastroparesis  # Chronic Pancreatitis  # Erosive Esophagitis history  # Cirrhosis, prior HCV  - MELD 25  - CT AP confirms liver abscess  - On Meropenem  - IR consulted, no drainable component, continue antibiotics.   - GI signed off  - Pain control  - Ondansetron PRN  - Metoclopramide for gastroparesis  - Pancreatic enzymes supplements TID w/ meals  - Sucralfate QID  - Miralax daily    #Possible Aseptic Meningitis   - ID is on board, recs appreciated  - On Meropenem   - Check 2 sets of blood cultures  - Check GI PCR  - Trend WBC  - Monitor fever

## 2025-06-03 NOTE — CONSULT NOTE ADULT - REASON FOR ADMISSION
Abdominal pain  Dialysis

## 2025-06-03 NOTE — CONSULT NOTE ADULT - SUBJECTIVE AND OBJECTIVE BOX
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 Intermittent and relieved with hydromorphone. Associated nausea and vomited bilious material 2 days ago and 1 episode of loose brown stool yesterday without any blood. Chronic chills. Scant urination, denies any dysuria. She feels dizzy all the time and has lately noticed dyspnea and hypoxia without any chest pain or palpitations or syncope.  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 Palliative was consulted for complex decision making in the context of advanced illness.     PERTINENT PMH REVIEWED: Yes     PAST MEDICAL & SURGICAL HISTORY:  Diabetes      Bernard syndrome      Pancreatitis      Cirrhosis      ESRD on dialysis  MWF at [facility]      HTN (hypertension)      CAD (coronary artery disease)      Chronic diastolic congestive heart failure      IBS (irritable bowel syndrome)      Gastroparesis      Thyroid cancer      Esophagitis, erosive      HCV (hepatitis C virus)      History of liver biopsy      H/O total thyroidectomy      History of cholecystectomy  1990s      S/P           SOCIAL HISTORY:                      Substance history: Former smoker                    Admitted from:  home                      Mosque/spirituality: Adventist                    Cultural concerns:                      Surrogate/HCP/Guardian: Phone#:    HCP is  Leopoldo Pitts [phone]  Alternate HCP is sister Lisa Wilkinson [phone]    FAMILY HISTORY:  FH: breast cancer (Mother)    FH: brain cancer (Father)        Allergies    Augmentin (Hives)  ceftriaxone (Pruritus)    Intolerances        ADVANCE DIRECTIVES/TREATMENT PREFERENCES:  Full code, all aggressive measures desired     Baseline ADLs (prior to admission):  Dependent      Karnofsky/Palliative Performance Status Version 2: 50 %  http://npcrc.org/files/news/palliative_performance_scale_ppsv2.pdf    Present Symptoms:     Dyspnea: No  Nausea/Vomiting: No  Anxiety:  No  Depression: No  Fatigue: No  Loss of appetite: No  Constipation: As per pt, BM 2 days ago    Pain: Yes No            Character-            Duration-            Effect-            Factors-            Frequency-            Location-            Severity-    Pain AD Score:  [email or web address].[email or web address]/painad.pdf (press ctrl + left click to view)    Review of Systems: Reviewed  CONSTITUTIONAL: Denies fever  HEENT: Denies acute changes in vision and hearing  CARDIO: Denies CP  PULM: Denies SOB  ABD: +abd pain from abdominal distention  : Denies dysuria  NEURO: Denies HA  EXTREMITIES: Denies LE swelling    MEDICATIONS  (STANDING):  aspirin enteric coated 81 milliGRAM(s) Oral daily  calcium acetate 2001 milliGRAM(s) Oral two times a day with meals  carvedilol 25 milliGRAM(s) Oral every 12 hours  chlorhexidine 2% Cloths 1 Application(s) Topical daily  CREON (Pancrelipase 3000 units) 1 Capsule(s) 1 Capsule(s) Oral three times a day with meals  dextrose 5%. 1000 milliLiter(s) (100 mL/Hr) IV Continuous <Continuous>  dextrose 5%. 1000 milliLiter(s) (50 mL/Hr) IV Continuous <Continuous>  dextrose 50% Injectable 25 Gram(s) IV Push once  dextrose 50% Injectable 12.5 Gram(s) IV Push once  dextrose 50% Injectable 25 Gram(s) IV Push once  epoetin day (PROCRIT) Injectable 28068 Unit(s) IV Push <User Schedule>  glucagon  Injectable 1 milliGRAM(s) IntraMuscular once  hydrALAZINE 50 milliGRAM(s) Oral three times a day  insulin lispro (ADMELOG) corrective regimen sliding scale   SubCutaneous three times a day before meals  insulin lispro (ADMELOG) corrective regimen sliding scale   SubCutaneous at bedtime  levothyroxine 200 MICROGram(s) Oral daily  lisinopril 10 milliGRAM(s) Oral daily  meropenem Injectable 500 milliGRAM(s) IV Push every 24 hours  metoclopramide 10 milliGRAM(s) Oral three times a day before meals  polyethylene glycol 3350 17 Gram(s) Oral daily  sertraline 200 milliGRAM(s) Oral daily  sucralfate suspension 1 Gram(s) Oral four times a day    MEDICATIONS  (PRN):  acetaminophen     Tablet .. 650 milliGRAM(s) Oral every 6 hours PRN Mild Pain (1 - 3)  dextrose Oral Gel 15 Gram(s) Oral once PRN Blood Glucose LESS THAN 70 milliGRAM(s)/deciliter  HYDROmorphone  Injectable 0.5 milliGRAM(s) IV Push every 4 hours PRN abdominal pain  ondansetron Injectable 4 milliGRAM(s) IV Push every 4 hours PRN Nausea and/or Vomiting      PHYSICAL EXAM:    Vital Signs Last 24 Hrs  T(C): 36.9 (2025 07:49), Max: 37.7 (2025 05:10)  T(F): 98.4 (2025 07:49), Max: 99.8 (2025 05:10)  HR: 78 (2025 07:49) (74 - 79)  BP: 133/64 (2025 07:49) (133/64 - 179/70)  BP(mean): --  RR: 18 (2025 07:49) (18 - 18)  SpO2: 98% (2025 07:49) (95% - 98%)    Parameters below as of 2025 07:49  Patient On (Oxygen Delivery Method): room air        General: alert  oriented x 3    HEENT: normal      Lungs: comfortable     CV: normal      GI: +distended + tender      : normal     MSK: +weakness     Neuro: no focal deficits    Skin:  no rash    LABS:                        7.8    4.00  )-----------( 87       ( 2025 05:15 )             25.0     06-03    138  |  98  |  19.5  ----------------------------<  149[H]  4.1   |  28.0  |  4.33[H]    Ca    8.4      2025 05:15  Mg     2.2     06-03    TPro  5.9[L]  /  Alb  3.3  /  TBili  0.2[L]  /  DBili  x   /  AST  12  /  ALT  9   /  AlkPhos  89  06-02      Urinalysis Basic - ( 2025 05:15 )    Color: x / Appearance: x / SG: x / pH: x  Gluc: 149 mg/dL / Ketone: x  / Bili: x / Urobili: x   Blood: x / Protein: x / Nitrite: x   Leuk Esterase: x / RBC: x / WBC x   Sq Epi: x / Non Sq Epi: x / Bacteria: x      I&O's Summary    2025 07:01  -  2025 07:00  --------------------------------------------------------  IN: 800 mL / OUT: 2300 mL / NET: -1500 mL        RADIOLOGY & ADDITIONAL STUDIES:    CT AP 6/3  FINDINGS:  LOWER CHEST: Trace right pleural effusion, similar to prior. Minimal,   bibasilar subsegmental atelectasis. Coronary artery calcifications. Trace   pericardial effusion, stable. Mitral annular calcifications.    LIVER: There is a cluster of multiple tiny hypodensities in the lateral   segment of the left hepatic dome, overall measuring 3.6 x 3.0 x 2.5 cm,   new from 2025, concerning for liver abscess.   No gas is noted   within the abscess. Cirrhotic morphology of the liver. Previously   described 9 mm hypodensity in the medial segment of the left hepatic lobe   is not reliably seen on the current exam (prior study image 26, series 3).  BILE DUCTS: No intrahepatic biliary ductal dilatation. The common bile   duct is normal in caliber.  GALLBLADDER: Cholecystectomy.  SPLEEN: Mild splenomegaly, measuring 13.3 cm in craniocaudal dimension.  PANCREAS: Subcentimeter hypodensity is again seen in uncinate process,   stable in retrospect at least from . Small calcification within the   uncinate process. The pancreatic duct does not appear dilated, as   previously reported on prior study.  ADRENALS: Within normal limits.  KIDNEYS/URETERS: No renal stones or hydronephrosis. Symmetric enhancement   bilaterally. Lower pole right renal cyst measuring up to 1.2 cm and   additional bilateral subcentimeter renal hypodensities are too small to   characterize.  Left upper pole punctate nonobstructing calculi or   vascular calcifications.    BLADDER: Minimally distended.  REPRODUCTIVE ORGANS: Fibroid uterus.    BOWEL: No bowel obstruction. Colonic diverticulosis without   diverticulitis. Appendix is normal. Abundant stool is seen throughout the   colon.  PERITONEUM/RETROPERITONEUM: Trace pelvic free fluid.  No free air.  VESSELS: Extensive aortoiliac atherosclerotic changes without aneurysm.   Patent celiac axis, SMA and JEANNIE. Hepatic veins, portal vein, SMV, splenic   vein are patent. Suspect right renal artery stenosis. No abdominal aortic   aneurysm.  LYMPH NODES: No lymphadenopathy.  ABDOMINAL WALL: Within normal limits. Diffuse subcutaneous edema.  BONES: Degenerative changes of the spine. No aggressive osseous   lesions.Mild, stable retrolisthesis of L2 in relation to L3.    IMPRESSION:  Cluster of multiple tiny hypodensities in the lateral segment of the left   hepatic dome, overall measuring 3.6 x 3.0 x 2.5 cm, new from 2025,   concerning for liver abscess.    Morphologic features of cirrhosis and splenomegaly, suggesting portal   hypertension.    Trace right pleural effusion, similar to prior.    Other findings, as above.    CTH   IMPRESSION:    No evidence of acute intracranial hemorrhage, midline shift or CT   evidence of acute territorial infarct.    If the patient's symptoms persist, consider short interval follow-up head   CT or brain MRI if there are no MRI contraindications.    US Abdomen complete 6/3  FINDINGS:  Liver: Cirrhotic morphology. There is hepatopedal flow in the main portal   vein. Hypoechoic mass left lobe measures 2.9 x 3.4 x 3.5 cm and   corresponds to the CT abnormality.  Bile ducts: Normal caliber. Common bile duct measures 8 mm.  Gallbladder: Cholecystectomy.  Pancreas: Visualized portions are within normal limits.  Spleen: 13.5 cm. mild splenomegaly.  Right kidney: 9.1 cm. No hydronephrosis.  Left kidney: 8.8 cm. No hydronephrosis. Tiny exophytic lower pole cyst   measures 7 x 6 x 6 mm  Ascites: None.  Aorta and IVC: Visualized portions are within normal limits.    IMPRESSION:  Cirrhotic morphology of the liver with hypoechoic focus in the left lobe   corresponding to the CT abnormality.  Mild splenomegaly    CTAP     FINDINGS:  LOWER CHEST: Small right pleural effusion and atelectasis. Small volume   dependent atelectasis in the left lower lobe. Coronary calcifications.   Trace pericardial effusion.    LIVER: Cirrhotic liver. Lesion in the lateral left hepatic lobe measuring   3.7 x 4.1 cm, new since prior examination 2025, suspicious for   abscess with surrounding region of lower attenuation, likely edema   (3:23). Additional new lesion in the left hepatic lobe measures 9 mm.  BILE DUCTS: Normal caliber.  GALLBLADDER: Stable dilation of the CBD and central intrahepatic ducts   consistent with postcholecystectomy state  SPLEEN: Splenomegaly.  PANCREAS: Within normal limits. Previously reported prominent pancreatic   duct is not well seen the absence of IV contrast.  ADRENALS: Within normal limits.  KIDNEYS/URETERS: Few cortical cysts and subcentimeter hypoattenuating   lesions. No hydronephrosis    BLADDER: Within normal limits.  REPRODUCTIVE ORGANS: Uterus and adnexa are unremarkable.    BOWEL: No bowel obstruction. Appendix is normal. Extensive colonic   diverticulosis without evidence of acute diverticulitis.  PERITONEUM/RETROPERITONEUM: No free air. Trace pelvic ascites.  VESSELS: Atherosclerotic changes.  LYMPH NODES: Stable prominent periportal lymph nodes, which can be seen   with cirrhosis.  ABDOMINAL WALL: Mild diffuse subcutaneous edema.  BONES: Degenerative changes spine with trace retrolisthesis of L2 on L1.    IMPRESSION:  New lesions in the left hepatic lobe measuring up to 4.1 and 0.9 cm,   since prior examination 2025, concerning for abscess. Consider   further evaluation with contrast-enhanced exam.    Cirrhosis and portal hypertension.    Sequelae of mild hypervolemia including small pericardial and right   pleural effusions, small pelvic ascites, and mild subcutaneous edema.

## 2025-06-03 NOTE — PROGRESS NOTE ADULT - SUBJECTIVE AND OBJECTIVE BOX
Patient is a 69y old  Female who presents with a chief complaint of Abdominal pain  Dialysis (02 Jun 2025 15:35)      INTERVAL HPI/OVERNIGHT EVENTS:  - No acute overnight events    TODAY:  - Patient examined bedside  - Complaining of R-sided abdominal pain since yday -> CT scan was ordered yday evening  - Patient denies CP, SOB, fever, nausea, vomiting    MEDICATIONS  (STANDING):  aspirin enteric coated 81 milliGRAM(s) Oral daily  calcium acetate 2001 milliGRAM(s) Oral two times a day with meals  carvedilol 25 milliGRAM(s) Oral every 12 hours  chlorhexidine 2% Cloths 1 Application(s) Topical daily  CREON (Pancrelipase 3000 units) 1 Capsule(s) 1 Capsule(s) Oral three times a day with meals  dextrose 5%. 1000 milliLiter(s) (100 mL/Hr) IV Continuous <Continuous>  dextrose 5%. 1000 milliLiter(s) (50 mL/Hr) IV Continuous <Continuous>  dextrose 50% Injectable 25 Gram(s) IV Push once  dextrose 50% Injectable 12.5 Gram(s) IV Push once  dextrose 50% Injectable 25 Gram(s) IV Push once  epoetin day (PROCRIT) Injectable 29003 Unit(s) IV Push <User Schedule>  glucagon  Injectable 1 milliGRAM(s) IntraMuscular once  hydrALAZINE 50 milliGRAM(s) Oral three times a day  insulin lispro (ADMELOG) corrective regimen sliding scale   SubCutaneous three times a day before meals  insulin lispro (ADMELOG) corrective regimen sliding scale   SubCutaneous at bedtime  levothyroxine 200 MICROGram(s) Oral daily  lisinopril 10 milliGRAM(s) Oral daily  meropenem Injectable 500 milliGRAM(s) IV Push every 24 hours  metoclopramide 10 milliGRAM(s) Oral three times a day before meals  polyethylene glycol 3350 17 Gram(s) Oral daily  sertraline 200 milliGRAM(s) Oral daily  sucralfate suspension 1 Gram(s) Oral four times a day    MEDICATIONS  (PRN):  acetaminophen     Tablet .. 650 milliGRAM(s) Oral every 6 hours PRN Mild Pain (1 - 3)  dextrose Oral Gel 15 Gram(s) Oral once PRN Blood Glucose LESS THAN 70 milliGRAM(s)/deciliter  HYDROmorphone  Injectable 0.5 milliGRAM(s) IV Push every 4 hours PRN abdominal pain  ondansetron Injectable 4 milliGRAM(s) IV Push every 4 hours PRN Nausea and/or Vomiting      Allergies    Augmentin (Hives)  ceftriaxone (Pruritus)    Intolerances        REVIEW OF SYSTEMS:  as above      Vital Signs Last 24 Hrs  T(C): 37.7 (03 Jun 2025 05:10), Max: 37.7 (03 Jun 2025 05:10)  T(F): 99.8 (03 Jun 2025 05:10), Max: 99.8 (03 Jun 2025 05:10)  HR: 79 (03 Jun 2025 05:10) (69 - 84)  BP: 174/75 (03 Jun 2025 05:10) (121/61 - 179/70)  BP(mean): --  RR: 18 (03 Jun 2025 05:10) (18 - 18)  SpO2: 97% (03 Jun 2025 05:10) (95% - 98%)    Parameters below as of 03 Jun 2025 05:10  Patient On (Oxygen Delivery Method): room air        PHYSICAL EXAM:  GENERAL: Not in acute distress, lying comfortably  HEAD:  Atraumatic, Normocephalic  EYES: EOMI, PERRLA, conjunctiva and sclera clear  NECK: Supple, No JVD, Normal thyroid  NERVOUS SYSTEM:  Alert & Oriented X3, No gross focal deficits  CHEST/LUNG: Clear to auscultation bilaterally; No rales, rhonchi, wheezing, or rubs  HEART: Regular rate and rhythm; No murmurs, rubs, or gallops  ABDOMEN: Soft, tenderness/guarding RUQ, Nondistended; Bowel sounds present  EXTREMITIES:  No clubbing, cyanosis, or edema  SKIN: No rashes or lesions    LABS:                        7.8    4.00  )-----------( 87       ( 03 Jun 2025 05:15 )             25.0     06-03    138  |  98  |  19.5  ----------------------------<  149[H]  4.1   |  28.0  |  4.33[H]    Ca    8.4      03 Jun 2025 05:15  Mg     2.2     06-03    TPro  5.9[L]  /  Alb  3.3  /  TBili  0.2[L]  /  DBili  x   /  AST  12  /  ALT  9   /  AlkPhos  89  06-02      Urinalysis Basic - ( 03 Jun 2025 05:15 )    Color: x / Appearance: x / SG: x / pH: x  Gluc: 149 mg/dL / Ketone: x  / Bili: x / Urobili: x   Blood: x / Protein: x / Nitrite: x   Leuk Esterase: x / RBC: x / WBC x   Sq Epi: x / Non Sq Epi: x / Bacteria: x      CAPILLARY BLOOD GLUCOSE      POCT Blood Glucose.: 201 mg/dL (02 Jun 2025 21:31)  POCT Blood Glucose.: 190 mg/dL (02 Jun 2025 17:13)  POCT Blood Glucose.: 231 mg/dL (02 Jun 2025 12:28)  POCT Blood Glucose.: 136 mg/dL (02 Jun 2025 10:30)  POCT Blood Glucose.: 193 mg/dL (02 Jun 2025 08:56)      RADIOLOGY & ADDITIONAL TESTS:    Imaging Personally Reviewed:  [X] YES  [ ] NO    Consultant(s) Notes Reviewed:  [X] YES  [ ] NO    Care Discussed with Consultants/Other Providers [X] YES  [ ] NO    Plan of Care discussed with House Staff: [X]YES [ ] NO

## 2025-06-03 NOTE — PROGRESS NOTE ADULT - SUBJECTIVE AND OBJECTIVE BOX
[facility]  INFECTIOUS DISEASES at [place] / [place] / [place]  =======================================================                              Kai Kenyon MD                              Professor Emeritus:  Dr Billy Bolaños MD            Diplomates American Board of Internal Medicine & Infectious Diseases                                   Tel  [phone] Fax [phone]                                  Hospital Consult line:  [phone]  =======================================================    Follow Up: Abnormal CT     Interval History/ROS: Seen at bedside. No acute events overnight.     REVIEW OF SYSTEMS  + Abd pain, R sided  Rest ROS unchanged from prior     Allergies  Augmentin (Hives)  ceftriaxone (Pruritus)    ANTIMICROBIALS:    meropenem Injectable 500 every 24 hours    OTHER MEDS: MEDICATIONS  (STANDING):  acetaminophen     Tablet .. 650 every 6 hours PRN  aspirin enteric coated 81 daily  carvedilol 25 every 12 hours  dextrose 50% Injectable 25 once  dextrose 50% Injectable 12.5 once  dextrose 50% Injectable 25 once  dextrose Oral Gel 15 once PRN  epoetin day (PROCRIT) Injectable 33008 <User Schedule>  glucagon  Injectable 1 once  hydrALAZINE 50 three times a day  HYDROmorphone  Injectable 0.5 every 4 hours PRN  insulin lispro (ADMELOG) corrective regimen sliding scale  three times a day before meals  insulin lispro (ADMELOG) corrective regimen sliding scale  at bedtime  levothyroxine 200 daily  lisinopril 10 daily  metoclopramide 10 three times a day before meals  ondansetron Injectable 4 every 4 hours PRN  polyethylene glycol 3350 17 daily  sertraline 200 daily  sucralfate suspension 1 four times a day    Vital Signs Last 24 Hrs  T(F): 98.4 (06-03-25 @ 07:49), Max: 102.6 (05-29-25 @ 21:45)    Vital Signs Last 24 Hrs  HR: 78 (06-03-25 @ 07:49) (69 - 83)  BP: 133/64 (06-03-25 @ 07:49) (126/89 - 179/70)  RR: 18 (06-03-25 @ 07:49)  SpO2: 98% (06-03-25 @ 07:49) (95% - 98%)  Wt(kg): --    EXAM:  General: NAD   HEENT: NCAT  CV: S1+S2  Lungs: No respiratory distress, CTAB  Abd: Soft, no guarding, mild tenderness to palpation R side   Ext: No cyanosis  Neuro: Alert and oriented, calm   Skin: No rash     Labs:                        7.8    4.00  )-----------( 87       ( 03 Jun 2025 05:15 )             25.0     06-03    138  |  98  |  19.5  ----------------------------<  149[H]  4.1   |  28.0  |  4.33[H]    Ca    8.4      03 Jun 2025 05:15  Mg     2.2     06-03    TPro  5.9[L]  /  Alb  3.3  /  TBili  0.2[L]  /  DBili  x   /  AST  12  /  ALT  9   /  AlkPhos  89  06-02    WBC Trend:  WBC Count: 4.00 (06-03-25 @ 05:15)  WBC Count: 4.36 (06-02-25 @ 05:35)  WBC Count: 5.34 (06-01-25 @ 05:10)  WBC Count: 6.48 (05-31-25 @ 06:09)    Creatine Trend:  Creatinine: 4.33 (06-03)  Creatinine: 6.25 (06-02)  Creatinine: 4.87 (06-01)  Creatinine: 3.88 (05-31)    Liver Biochemical Testing Trend:  Alanine Aminotransferase (ALT/SGPT): 9 (06-02)  Alanine Aminotransferase (ALT/SGPT): 13 (05-31)  Alanine Aminotransferase (ALT/SGPT): 12 (05-30)  Alanine Aminotransferase (ALT/SGPT): 8 (05-29)  Alanine Aminotransferase (ALT/SGPT): 10 (05-27)  Aspartate Aminotransferase (AST/SGOT): 12 (06-02-25 @ 05:35)  Aspartate Aminotransferase (AST/SGOT): 18 (05-31-25 @ 06:09)  Aspartate Aminotransferase (AST/SGOT): 18 (05-30-25 @ 04:44)  Aspartate Aminotransferase (AST/SGOT): 11 (05-29-25 @ 06:09)  Aspartate Aminotransferase (AST/SGOT): 13 (05-27-25 @ 23:28)  Bilirubin Total: 0.2 (06-02)  Bilirubin Total: 0.3 (05-31)  Bilirubin Total: 0.4 (05-30)  Bilirubin Total: 0.3 (05-29)  Bilirubin Total: 0.2 (05-27)    Urinalysis Basic - ( 03 Jun 2025 05:15 )    Color: x / Appearance: x / SG: x / pH: x  Gluc: 149 mg/dL / Ketone: x  / Bili: x / Urobili: x   Blood: x / Protein: x / Nitrite: x   Leuk Esterase: x / RBC: x / WBC x   Sq Epi: x / Non Sq Epi: x / Bacteria: x    MICROBIOLOGY:  Vancomycin Level, Random: 18.6 (05-31 @ 06:09)    MRSA PCR Result.: NotDetec (05-30-25 @ 21:23)  MRSA PCR Result.: NotDetec (08-01-24 @ 08:11)    Culture - CSF with Gram Stain (collected 30 May 2025 15:30)  Source: CSF CSF  Preliminary Report:    No growth    Culture - Acid Fast - CSF (collected 30 May 2025 15:30)  Source: CSF CSF    Culture - Fungal, CSF (collected 30 May 2025 15:30)  Source: CSF CSF  Preliminary Report:    No growth    Culture - Blood (collected 29 May 2025 15:33)  Source: Blood Blood  Preliminary Report:    No growth at 4 days    Culture - Blood (collected 29 May 2025 15:30)  Source: Blood Blood  Preliminary Report:    No growth at 4 days    Culture - Urine (collected 31 Jul 2024 15:10)  Source: Clean Catch Clean Catch (Midstream)  Final Report:    >100,000 CFU/ml Coag Negative Staphylococcus "Susceptibilities not    performed"    <10,000 CFU/ml Normal Urogenital christina present    Culture - Urine (collected 08 Feb 2024 02:00)  Source: Catheterized Catheterized  Final Report:    <10,000 CFU/mL Normal Urogenital Christina    Cryptococcal Antigen - CSF: Negative (05-30-25 @ 15:30)    Rapid RVP Result: NotDetec (05-29 @ 14:20)    C-Reactive Protein: 68 (05-30)    Ferritin: 116 (05-28)    RADIOLOGY:  imaging below personally reviewed    < from: CT Abdomen and Pelvis No Cont (06.02.25 @ 15:45) >  IMPRESSION:  New lesions in the left hepatic lobe measuring up to 4.1 and 0.9 cm,   since prior examination 5/28/2025, concerning for abscess. Consider   further evaluation with contrast-enhanced exam.    Cirrhosis and portal hypertension.    Sequelae of mild hypervolemia including small pericardial and right   pleural effusions, small pelvic ascites, and mild subcutaneous edema.    < end of copied text >

## 2025-06-03 NOTE — CONSULT NOTE ADULT - SUBJECTIVE AND OBJECTIVE BOX
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 Intermittent and relieved with hydromorphone. Associated nausea and vomited bilious material 2 days ago and 1 episode of loose brown stool yesterday without any blood. Chronic chills. Scant urination, denies any dysuria. She feels dizzy all the time and has lately noticed dyspnea and hypoxia without any chest pain or palpitations or syncope.  She sees a GI Dr Kostas, who she stated was planning to do a nerve block and get her off Dilaudid  Of noted patient last had HD on Friday (5 days ago) and is due for HD today    IR consulted for drainage of liver abscess    ============================================================================  Medications:  Home Medications:  aspirin 81 mg oral delayed release capsule: orally once a day (28 May 2025 08:50)  HumaLOG KwikPen 100 units/mL injectable solution: injectable 3 times a day (28 May 2025 08:50)  levothyroxine 200 mcg (0.2 mg) oral tablet: 1 tab(s) orally once a day (28 May 2025 08:50)  sertraline 100 mg oral tablet: 2 tab(s) orally once a day (28 May 2025 08:50)    MEDICATIONS  (STANDING):  aspirin enteric coated 81 milliGRAM(s) Oral daily  calcium acetate 2001 milliGRAM(s) Oral two times a day with meals  carvedilol 25 milliGRAM(s) Oral every 12 hours  chlorhexidine 2% Cloths 1 Application(s) Topical daily  CREON (Pancrelipase 3000 units) 1 Capsule(s) 1 Capsule(s) Oral three times a day with meals  dextrose 5%. 1000 milliLiter(s) (100 mL/Hr) IV Continuous <Continuous>  dextrose 5%. 1000 milliLiter(s) (50 mL/Hr) IV Continuous <Continuous>  dextrose 50% Injectable 25 Gram(s) IV Push once  dextrose 50% Injectable 12.5 Gram(s) IV Push once  dextrose 50% Injectable 25 Gram(s) IV Push once  epoetin day (PROCRIT) Injectable 02451 Unit(s) IV Push <User Schedule>  glucagon  Injectable 1 milliGRAM(s) IntraMuscular once  hydrALAZINE 50 milliGRAM(s) Oral three times a day  insulin lispro (ADMELOG) corrective regimen sliding scale   SubCutaneous three times a day before meals  insulin lispro (ADMELOG) corrective regimen sliding scale   SubCutaneous at bedtime  levothyroxine 200 MICROGram(s) Oral daily  lisinopril 10 milliGRAM(s) Oral daily  meropenem Injectable 500 milliGRAM(s) IV Push every 24 hours  metoclopramide 10 milliGRAM(s) Oral three times a day before meals  polyethylene glycol 3350 17 Gram(s) Oral daily  sertraline 200 milliGRAM(s) Oral daily  sucralfate suspension 1 Gram(s) Oral four times a day    MEDICATIONS  (PRN):  acetaminophen     Tablet .. 650 milliGRAM(s) Oral every 6 hours PRN Mild Pain (1 - 3)  dextrose Oral Gel 15 Gram(s) Oral once PRN Blood Glucose LESS THAN 70 milliGRAM(s)/deciliter  HYDROmorphone  Injectable 0.5 milliGRAM(s) IV Push every 4 hours PRN abdominal pain  ondansetron Injectable 4 milliGRAM(s) IV Push every 4 hours PRN Nausea and/or Vomiting      Allergies:   Augmentin (Hives)  ceftriaxone (Pruritus)    ============================================================================  PAST MEDICAL & SURGICAL HISTORY:  Diabetes      Bernard syndrome      Pancreatitis      Cirrhosis      ESRD on dialysis  MWF at [facility]      HTN (hypertension)      CAD (coronary artery disease)      Chronic diastolic congestive heart failure      IBS (irritable bowel syndrome)      Gastroparesis      Thyroid cancer      Esophagitis, erosive      HCV (hepatitis C virus)      History of liver biopsy      H/O total thyroidectomy      History of cholecystectomy  1990s      S/P           FAMILY HISTORY:  FH: breast cancer (Mother)    FH: brain cancer (Father)        Social History:  , lives with . Has 2 adult children  Quit smoking 3 weeks ago, denies any alcohol or substance use  Uses walked for ambulation (28 May 2025 08:57)      ============================================================================  Vitals:  Vital Signs Last 24 Hrs  T(C): 36.9 (2025 07:49), Max: 37.7 (2025 05:10)  T(F): 98.4 (2025 07:49), Max: 99.8 (2025 05:10)  HR: 78 (2025 07:49) (74 - 79)  BP: 133/64 (2025 07:49) (133/64 - 179/70)  BP(mean): --  RR: 18 (2025 07:49) (18 - 18)  SpO2: 98% (2025 07:49) (95% - 98%)    Parameters below as of 2025 07:49  Patient On (Oxygen Delivery Method): room air    Labs:                        7.8    4.00  )-----------( 87       ( 2025 05:15 )             25.0     06-03    138  |  98  |  19.5  ----------------------------<  149[H]  4.1   |  28.0  |  4.33[H]    Ca    8.4      2025 05:15  Mg     2.2     06-03    TPro  5.9[L]  /  Alb  3.3  /  TBili  0.2[L]  /  DBili  x   /  AST  12  /  ALT  9   /  AlkPhos  89  06-02        Imaging:   Pertinent Imaging Reviewed.    ============================================================================

## 2025-06-03 NOTE — CONSULT NOTE ADULT - ASSESSMENT
Patient is a 69 year-old female admitted for Abdominal pain. IR consulted for evaluation of possible drainage of liver abscess. Imaging reviewed by Dr. Cortes, liver finding has no drainable component. Recommend continuing with antibiotic therapy.     Please call extension [phone] with any questions, concerns or issues regarding above.

## 2025-06-03 NOTE — CONSULT NOTE ADULT - CONSULT REASON
abdominal pain
ESRD- missed HD
Fever
fever and rule out meningitis.
liver abscess
Patient with multiple chronic end satge diagnoses...apparenty in talks with hospice

## 2025-06-03 NOTE — PROGRESS NOTE ADULT - ASSESSMENT
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     Abdominal pain, nausea and vomiting   Thrombocytopenia in setting of cirrhosis  ESRD on HD  Course complicated by;  Fevers, Headache, Photophobia - resolved   Allergy to Augmentin (hives) and Ceftriaxone (pruritus)      Given rapid improvement of symptoms and negative CSF PCR, suspect ?aseptic meningitis   Flagyl can cause aseptic meningitis but usually rare and associated with higher doses   Symptoms resolved     ID recalled as non con CT showing new lesions in the left hepatic lobe measuring up to 4.1 and 0.9 cm concerning for abscess (not seen on 5/28 CT)  Blood cultures from 5/29 are negative     Suggest;  Check 2 sets of blood cultures  After blood cultures, can start Meropenem IV (HD dosing)   Check CT A/P with IV contrast (will have to coordinate with Nephrology)    Check Abd US   Check GI PCR  Trend WBC count  Monitor fever curve     Case discussed with pt and her  at bedside and with Medicine Team.  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     Abdominal pain, nausea and vomiting   Thrombocytopenia in setting of cirrhosis  ESRD on HD  Course complicated by;  Fevers, Headache, Photophobia - resolved   Allergy to Augmentin (hives) and Ceftriaxone (pruritus)      Given rapid improvement of symptoms and negative CSF PCR, suspect ?aseptic meningitis   Flagyl can cause aseptic meningitis but usually rare and associated with higher doses   Symptoms resolved     ID recalled as non con CT showing new lesions in the left hepatic lobe measuring up to 4.1 and 0.9 cm concerning for abscess (not seen on 5/28 CT)  Blood cultures from 5/29 are negative     Suggest;  Check 2 sets of blood cultures  After blood cultures, can start Meropenem IV (HD dosing)   Check CT A/P with IV contrast (will have to coordinate with Nephrology)    Check RUQ Abd US   Check GI PCR  Trend WBC count  Monitor fever curve     Case discussed with pt and her  at bedside and with Medicine Team.

## 2025-06-03 NOTE — CONSULT NOTE ADULT - CONVERSATION DETAILS
Patient was alert and oriented, completed HCP - naming her  Leopoldo as primary HCP and her sister Lisa as alternate HCP.     At baseline, patient ambulated with walker. Care for her grandchildren as her daughter decided to "not be a mom anymore". Lives with .     Discussed diagnosis, current medical management, prognosis, and treatment options with patient. Discussed hospice philosophy, eligibility, benefits and locations. Discussed with [facility], not able to accept patient who is on dialysis at this time. Patient would like to continue hemodialysis as long as she was able to tolerate it and would like assistance at home. Patient declined home hospice at this time.     Patient requested for  assistance for dispo planning, as she was interested in rehab facility when she was ready for discharge.     Discussed risks and benefits of CPR, intubation, and NIV. Patient would like to remain full code, no long term ventilator.

## 2025-06-04 LAB
ALBUMIN SERPL ELPH-MCNC: 3.2 G/DL — LOW (ref 3.3–5.2)
ALP SERPL-CCNC: 94 U/L — SIGNIFICANT CHANGE UP (ref 40–120)
ALT FLD-CCNC: 7 U/L — SIGNIFICANT CHANGE UP
ANION GAP SERPL CALC-SCNC: 14 MMOL/L — SIGNIFICANT CHANGE UP (ref 5–17)
ANISOCYTOSIS BLD QL: SLIGHT — SIGNIFICANT CHANGE UP
AST SERPL-CCNC: 10 U/L — SIGNIFICANT CHANGE UP
BASOPHILS # BLD AUTO: 0.02 K/UL — SIGNIFICANT CHANGE UP (ref 0–0.2)
BASOPHILS # BLD MANUAL: 0 K/UL — SIGNIFICANT CHANGE UP (ref 0–0.2)
BASOPHILS NFR BLD AUTO: 0.5 % — SIGNIFICANT CHANGE UP (ref 0–2)
BASOPHILS NFR BLD MANUAL: 0 % — SIGNIFICANT CHANGE UP (ref 0–2)
BILIRUB SERPL-MCNC: <0.2 MG/DL — LOW (ref 0.4–2)
BUN SERPL-MCNC: 27.9 MG/DL — HIGH (ref 8–20)
CALCIUM SERPL-MCNC: 8.4 MG/DL — SIGNIFICANT CHANGE UP (ref 8.4–10.5)
CHLORIDE SERPL-SCNC: 97 MMOL/L — SIGNIFICANT CHANGE UP (ref 96–108)
CO2 SERPL-SCNC: 25 MMOL/L — SIGNIFICANT CHANGE UP (ref 22–29)
CREAT SERPL-MCNC: 5.41 MG/DL — HIGH (ref 0.5–1.3)
CULTURE RESULTS: SIGNIFICANT CHANGE UP
DACRYOCYTES BLD QL SMEAR: SLIGHT — SIGNIFICANT CHANGE UP
EGFR: 8 ML/MIN/1.73M2 — LOW
EGFR: 8 ML/MIN/1.73M2 — LOW
ELLIPTOCYTES BLD QL SMEAR: SLIGHT — SIGNIFICANT CHANGE UP
EOSINOPHIL # BLD AUTO: 0.18 K/UL — SIGNIFICANT CHANGE UP (ref 0–0.5)
EOSINOPHIL # BLD MANUAL: 0.19 K/UL — SIGNIFICANT CHANGE UP (ref 0–0.5)
EOSINOPHIL NFR BLD AUTO: 4.8 % — SIGNIFICANT CHANGE UP (ref 0–6)
EOSINOPHIL NFR BLD MANUAL: 5.1 % — SIGNIFICANT CHANGE UP (ref 0–6)
GIANT PLATELETS BLD QL SMEAR: PRESENT
GLUCOSE BLDC GLUCOMTR-MCNC: 147 MG/DL — HIGH (ref 70–99)
GLUCOSE BLDC GLUCOMTR-MCNC: 187 MG/DL — HIGH (ref 70–99)
GLUCOSE BLDC GLUCOMTR-MCNC: 251 MG/DL — HIGH (ref 70–99)
GLUCOSE SERPL-MCNC: 186 MG/DL — HIGH (ref 70–99)
HCT VFR BLD CALC: 24.9 % — LOW (ref 34.5–45)
HGB BLD-MCNC: 7.7 G/DL — LOW (ref 11.5–15.5)
IMM GRANULOCYTES # BLD AUTO: 0.03 K/UL — SIGNIFICANT CHANGE UP (ref 0–0.07)
IMM GRANULOCYTES NFR BLD AUTO: 0.8 % — SIGNIFICANT CHANGE UP (ref 0–0.9)
IMMATURE PLATELET FRACTION #: 3.7 K/UL — LOW (ref 4.7–11.1)
IMMATURE PLATELET FRACTION %: 4.3 % — SIGNIFICANT CHANGE UP (ref 1.6–4.9)
LYMPHOCYTES # BLD AUTO: 0.67 K/UL — LOW (ref 1–3.3)
LYMPHOCYTES # BLD MANUAL: 0.64 K/UL — LOW (ref 1–3.3)
LYMPHOCYTES NFR BLD AUTO: 17.8 % — SIGNIFICANT CHANGE UP (ref 13–44)
LYMPHOCYTES NFR BLD MANUAL: 17.1 % — SIGNIFICANT CHANGE UP (ref 13–44)
MAGNESIUM SERPL-MCNC: 2.3 MG/DL — SIGNIFICANT CHANGE UP (ref 1.6–2.6)
MANUAL NEUTROPHIL BANDS #: 0.03 K/UL — SIGNIFICANT CHANGE UP (ref 0–0.84)
MCHC RBC-ENTMCNC: 29.5 PG — SIGNIFICANT CHANGE UP (ref 27–34)
MCHC RBC-ENTMCNC: 30.9 G/DL — LOW (ref 32–36)
MCV RBC AUTO: 95.4 FL — SIGNIFICANT CHANGE UP (ref 80–100)
MONOCYTES # BLD AUTO: 0.55 K/UL — SIGNIFICANT CHANGE UP (ref 0–0.9)
MONOCYTES # BLD MANUAL: 0.39 K/UL — SIGNIFICANT CHANGE UP (ref 0–0.9)
MONOCYTES NFR BLD AUTO: 14.6 % — HIGH (ref 2–14)
MONOCYTES NFR BLD MANUAL: 10.3 % — SIGNIFICANT CHANGE UP (ref 2–14)
NEUTROPHILS # BLD AUTO: 2.32 K/UL — SIGNIFICANT CHANGE UP (ref 1.8–7.4)
NEUTROPHILS # BLD MANUAL: 2.51 K/UL — SIGNIFICANT CHANGE UP (ref 1.8–7.4)
NEUTROPHILS NFR BLD AUTO: 61.5 % — SIGNIFICANT CHANGE UP (ref 43–77)
NEUTROPHILS NFR BLD MANUAL: 66.6 % — SIGNIFICANT CHANGE UP (ref 43–77)
NEUTS BAND # BLD: 0.9 % — SIGNIFICANT CHANGE UP (ref 0–8)
NEUTS BAND NFR BLD: 0.9 % — SIGNIFICANT CHANGE UP (ref 0–8)
NRBC # BLD AUTO: 0 K/UL — SIGNIFICANT CHANGE UP (ref 0–0)
NRBC # FLD: 0 K/UL — SIGNIFICANT CHANGE UP (ref 0–0)
NRBC BLD AUTO-RTO: 0 /100 WBCS — SIGNIFICANT CHANGE UP (ref 0–0)
OVALOCYTES BLD QL SMEAR: SLIGHT — SIGNIFICANT CHANGE UP
PLAT MORPH BLD: NORMAL — SIGNIFICANT CHANGE UP
PLATELET # BLD AUTO: 86 K/UL — LOW (ref 150–400)
PMV BLD: 11.9 FL — SIGNIFICANT CHANGE UP (ref 7–13)
POIKILOCYTOSIS BLD QL AUTO: SLIGHT — SIGNIFICANT CHANGE UP
POLYCHROMASIA BLD QL SMEAR: ABNORMAL
POTASSIUM SERPL-MCNC: 4.1 MMOL/L — SIGNIFICANT CHANGE UP (ref 3.5–5.3)
POTASSIUM SERPL-SCNC: 4.1 MMOL/L — SIGNIFICANT CHANGE UP (ref 3.5–5.3)
PROT SERPL-MCNC: 6 G/DL — LOW (ref 6.6–8.7)
RBC # BLD: 2.61 M/UL — LOW (ref 3.8–5.2)
RBC # FLD: 15.6 % — HIGH (ref 10.3–14.5)
RBC BLD AUTO: ABNORMAL
SODIUM SERPL-SCNC: 136 MMOL/L — SIGNIFICANT CHANGE UP (ref 135–145)
SPECIMEN SOURCE: SIGNIFICANT CHANGE UP
WBC # BLD: 3.77 K/UL — LOW (ref 3.8–10.5)
WBC # FLD AUTO: 3.77 K/UL — LOW (ref 3.8–10.5)

## 2025-06-04 PROCEDURE — 83036 HEMOGLOBIN GLYCOSYLATED A1C: CPT

## 2025-06-04 PROCEDURE — 86850 RBC ANTIBODY SCREEN: CPT

## 2025-06-04 PROCEDURE — 85027 COMPLETE CBC AUTOMATED: CPT

## 2025-06-04 PROCEDURE — 85610 PROTHROMBIN TIME: CPT

## 2025-06-04 PROCEDURE — 80076 HEPATIC FUNCTION PANEL: CPT

## 2025-06-04 PROCEDURE — 86901 BLOOD TYPING SEROLOGIC RH(D): CPT

## 2025-06-04 PROCEDURE — 84100 ASSAY OF PHOSPHORUS: CPT

## 2025-06-04 PROCEDURE — 99261: CPT

## 2025-06-04 PROCEDURE — 81001 URINALYSIS AUTO W/SCOPE: CPT

## 2025-06-04 PROCEDURE — 99233 SBSQ HOSP IP/OBS HIGH 50: CPT

## 2025-06-04 PROCEDURE — 83735 ASSAY OF MAGNESIUM: CPT

## 2025-06-04 PROCEDURE — 99285 EMERGENCY DEPT VISIT HI MDM: CPT

## 2025-06-04 PROCEDURE — 99232 SBSQ HOSP IP/OBS MODERATE 35: CPT

## 2025-06-04 PROCEDURE — 96375 TX/PRO/DX INJ NEW DRUG ADDON: CPT

## 2025-06-04 PROCEDURE — 96376 TX/PRO/DX INJ SAME DRUG ADON: CPT

## 2025-06-04 PROCEDURE — 80048 BASIC METABOLIC PNL TOTAL CA: CPT

## 2025-06-04 PROCEDURE — 90935 HEMODIALYSIS ONE EVALUATION: CPT

## 2025-06-04 PROCEDURE — 86900 BLOOD TYPING SEROLOGIC ABO: CPT

## 2025-06-04 PROCEDURE — 36415 COLL VENOUS BLD VENIPUNCTURE: CPT

## 2025-06-04 PROCEDURE — 96374 THER/PROPH/DIAG INJ IV PUSH: CPT

## 2025-06-04 PROCEDURE — 85025 COMPLETE CBC W/AUTO DIFF WBC: CPT

## 2025-06-04 PROCEDURE — 74176 CT ABD & PELVIS W/O CONTRAST: CPT

## 2025-06-04 PROCEDURE — 82962 GLUCOSE BLOOD TEST: CPT

## 2025-06-04 PROCEDURE — G0545: CPT

## 2025-06-04 PROCEDURE — 83690 ASSAY OF LIPASE: CPT

## 2025-06-04 RX ADMIN — Medication 1 GRAM(S): at 12:23

## 2025-06-04 RX ADMIN — EPOETIN ALFA 10000 UNIT(S): 10000 SOLUTION INTRAVENOUS; SUBCUTANEOUS at 10:22

## 2025-06-04 RX ADMIN — Medication 50 MILLIGRAM(S): at 21:59

## 2025-06-04 RX ADMIN — Medication 1 GRAM(S): at 06:10

## 2025-06-04 RX ADMIN — Medication 0.5 MILLIGRAM(S): at 07:00

## 2025-06-04 RX ADMIN — Medication 0.5 MILLIGRAM(S): at 22:50

## 2025-06-04 RX ADMIN — INSULIN LISPRO 1: 100 INJECTION, SOLUTION INTRAVENOUS; SUBCUTANEOUS at 12:25

## 2025-06-04 RX ADMIN — Medication 10 MILLIGRAM(S): at 12:24

## 2025-06-04 RX ADMIN — Medication 10 MILLIGRAM(S): at 18:14

## 2025-06-04 RX ADMIN — INSULIN LISPRO 3: 100 INJECTION, SOLUTION INTRAVENOUS; SUBCUTANEOUS at 18:12

## 2025-06-04 RX ADMIN — Medication 1 GRAM(S): at 18:14

## 2025-06-04 RX ADMIN — Medication 81 MILLIGRAM(S): at 12:23

## 2025-06-04 RX ADMIN — Medication 0.5 MILLIGRAM(S): at 19:37

## 2025-06-04 RX ADMIN — Medication 10 MILLIGRAM(S): at 06:11

## 2025-06-04 RX ADMIN — SERTRALINE 200 MILLIGRAM(S): 100 TABLET, FILM COATED ORAL at 12:23

## 2025-06-04 RX ADMIN — MEROPENEM 500 MILLIGRAM(S): 1 INJECTION INTRAVENOUS at 12:24

## 2025-06-04 RX ADMIN — Medication 2001 MILLIGRAM(S): at 18:13

## 2025-06-04 RX ADMIN — Medication 0.5 MILLIGRAM(S): at 18:48

## 2025-06-04 RX ADMIN — CARVEDILOL 25 MILLIGRAM(S): 3.12 TABLET, FILM COATED ORAL at 18:13

## 2025-06-04 RX ADMIN — LISINOPRIL 10 MILLIGRAM(S): 5 TABLET ORAL at 12:24

## 2025-06-04 RX ADMIN — Medication 1 APPLICATION(S): at 12:23

## 2025-06-04 RX ADMIN — Medication 200 MICROGRAM(S): at 06:10

## 2025-06-04 RX ADMIN — Medication 0.5 MILLIGRAM(S): at 06:16

## 2025-06-04 RX ADMIN — Medication 50 MILLIGRAM(S): at 16:20

## 2025-06-04 RX ADMIN — Medication 0.5 MILLIGRAM(S): at 10:23

## 2025-06-04 NOTE — PROGRESS NOTE ADULT - ASSESSMENT
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     Abdominal pain, nausea and vomiting   Thrombocytopenia in setting of cirrhosis  ESRD on HD  Course complicated by;  Fevers, Headache, Photophobia - resolved   Allergy to Augmentin (hives) and Ceftriaxone (pruritus)      Given rapid improvement of symptoms and negative CSF PCR, suspect ?aseptic meningitis   Flagyl can cause aseptic meningitis but usually rare and associated with higher doses   Symptoms resolved     ID recalled as patient with abd pain, CT done showing L hepatic cluster of multiple tiny liver abscesses (not seen on 5/28 CT)  Abd US shows s/p cholecystectomy, no CBD dilation   Blood cultures from 5/29 are negative     Suggest;  Follow up blood cultures sent on 6/3 prior to initiation of Meropenem   Continue Meropenem IV (HD dosing)   IR evaluation noted, no drainable component   Monitor fever curve   Trend WBC count    Final ID plan depends on blood culture 6/3 results     Case discussed with pt and her  at bedside in detail. All questions answered.

## 2025-06-04 NOTE — DIETITIAN INITIAL EVALUATION ADULT - PERTINENT MEDS FT
MEDICATIONS  (STANDING):  aspirin enteric coated 81 milliGRAM(s) Oral daily  calcium acetate 2001 milliGRAM(s) Oral two times a day with meals  carvedilol 25 milliGRAM(s) Oral every 12 hours  chlorhexidine 2% Cloths 1 Application(s) Topical daily  CREON (Pancrelipase 3000 units) 1 Capsule(s) 1 Capsule(s) Oral three times a day with meals  epoetin day (PROCRIT) Injectable 41565 Unit(s) IV Push <User Schedule>  glucagon  Injectable 1 milliGRAM(s) IntraMuscular once  hydrALAZINE 50 milliGRAM(s) Oral three times a day  insulin lispro (ADMELOG) corrective regimen sliding scale   SubCutaneous at bedtime  insulin lispro (ADMELOG) corrective regimen sliding scale   SubCutaneous three times a day before meals  levothyroxine 200 MICROGram(s) Oral daily  lisinopril 10 milliGRAM(s) Oral daily  meropenem Injectable 500 milliGRAM(s) IV Push every 24 hours  metoclopramide 10 milliGRAM(s) Oral three times a day before meals  polyethylene glycol 3350 17 Gram(s) Oral daily  sertraline 200 milliGRAM(s) Oral daily  sucralfate suspension 1 Gram(s) Oral four times a day    MEDICATIONS  (PRN):  acetaminophen     Tablet .. 650 milliGRAM(s) Oral every 6 hours PRN Mild Pain (1 - 3)  dextrose Oral Gel 15 Gram(s) Oral once PRN Blood Glucose LESS THAN 70 milliGRAM(s)/deciliter  HYDROmorphone  Injectable 0.5 milliGRAM(s) IV Push every 4 hours PRN abdominal pain  ondansetron Injectable 4 milliGRAM(s) IV Push every 4 hours PRN Nausea and/or Vomiting

## 2025-06-04 NOTE — PROGRESS NOTE ADULT - SUBJECTIVE AND OBJECTIVE BOX
Patient is a 69y old  Female who presents with a chief complaint of Abdominal pain (04 Jun 2025 14:26)      Patient seen and examined at bedside. No overnight events reported.   c/o ruq pain , intermittent , same as yesterday     ALLERGIES:  Augmentin (Hives)  ceftriaxone (Pruritus)    MEDICATIONS  (STANDING):  aspirin enteric coated 81 milliGRAM(s) Oral daily  calcium acetate 2001 milliGRAM(s) Oral two times a day with meals  carvedilol 25 milliGRAM(s) Oral every 12 hours  chlorhexidine 2% Cloths 1 Application(s) Topical daily  CREON (Pancrelipase 3000 units) 1 Capsule(s) 1 Capsule(s) Oral three times a day with meals  dextrose 5%. 1000 milliLiter(s) (100 mL/Hr) IV Continuous <Continuous>  dextrose 5%. 1000 milliLiter(s) (50 mL/Hr) IV Continuous <Continuous>  dextrose 50% Injectable 25 Gram(s) IV Push once  dextrose 50% Injectable 12.5 Gram(s) IV Push once  dextrose 50% Injectable 25 Gram(s) IV Push once  epoetin day (PROCRIT) Injectable 82116 Unit(s) IV Push <User Schedule>  glucagon  Injectable 1 milliGRAM(s) IntraMuscular once  hydrALAZINE 50 milliGRAM(s) Oral three times a day  insulin lispro (ADMELOG) corrective regimen sliding scale   SubCutaneous at bedtime  insulin lispro (ADMELOG) corrective regimen sliding scale   SubCutaneous three times a day before meals  levothyroxine 200 MICROGram(s) Oral daily  lisinopril 10 milliGRAM(s) Oral daily  meropenem Injectable 500 milliGRAM(s) IV Push every 24 hours  metoclopramide 10 milliGRAM(s) Oral three times a day before meals  polyethylene glycol 3350 17 Gram(s) Oral daily  sertraline 200 milliGRAM(s) Oral daily  sucralfate suspension 1 Gram(s) Oral four times a day    MEDICATIONS  (PRN):  acetaminophen     Tablet .. 650 milliGRAM(s) Oral every 6 hours PRN Mild Pain (1 - 3)  dextrose Oral Gel 15 Gram(s) Oral once PRN Blood Glucose LESS THAN 70 milliGRAM(s)/deciliter  HYDROmorphone  Injectable 0.5 milliGRAM(s) IV Push every 4 hours PRN abdominal pain  ondansetron Injectable 4 milliGRAM(s) IV Push every 4 hours PRN Nausea and/or Vomiting    Vital Signs Last 24 Hrs  T(F): 97.6 (04 Jun 2025 11:16), Max: 98.5 (04 Jun 2025 11:00)  HR: 74 (04 Jun 2025 11:16) (73 - 76)  BP: 134/66 (04 Jun 2025 11:16) (132/70 - 181/76)  RR: 18 (04 Jun 2025 11:16) (18 - 18)  SpO2: 97% (04 Jun 2025 11:16) (95% - 100%)  I&O's Summary    04 Jun 2025 07:01  -  04 Jun 2025 15:41  --------------------------------------------------------  IN: 0 mL / OUT: 1500 mL / NET: -1500 mL      PHYSICAL EXAM:  General: NAD, A/O x 3  ENT: MMM, no thrush  Neck: Supple, No JVD  Lungs: Clear to auscultation bilaterally, good air entry, non-labored breathing  Cardio: RRR, S1/S2, No murmur  Abdomen: Soft, mild distended ruq , tender  ruq on deep palpation , no rebound tenderness,   Bowel sounds present  Extremities: No calf tenderness, No pitting edema    LABS:                        7.7    3.77  )-----------( 86       ( 04 Jun 2025 05:35 )             24.9     06-04    136  |  97  |  27.9  ----------------------------<  186  4.1   |  25.0  |  5.41    Ca    8.4      04 Jun 2025 05:35  Mg     2.3     06-04    TPro  6.0  /  Alb  3.2  /  TBili  <0.2  /  DBili  x   /  AST  10  /  ALT  7   /  AlkPhos  94  06-04                                  POCT Blood Glucose.: 187 mg/dL (04 Jun 2025 12:21)  POCT Blood Glucose.: 184 mg/dL (03 Jun 2025 21:52)  POCT Blood Glucose.: 148 mg/dL (03 Jun 2025 17:42)      Urinalysis Basic - ( 04 Jun 2025 05:35 )    Color: x / Appearance: x / SG: x / pH: x  Gluc: 186 mg/dL / Ketone: x  / Bili: x / Urobili: x   Blood: x / Protein: x / Nitrite: x   Leuk Esterase: x / RBC: x / WBC x   Sq Epi: x / Non Sq Epi: x / Bacteria: x        Culture - Acid Fast - CSF (collected 30 May 2025 15:30)  Source: CSF CSF    Culture - Fungal, CSF (collected 30 May 2025 15:30)  Source: CSF CSF  Preliminary Report (01 Jun 2025 10:40):    No growth    Culture - CSF with Gram Stain (collected 30 May 2025 15:30)  Source: CSF CSF  Gram Stain (31 May 2025 02:18):    No polymorphonuclear cells seen    No organisms seen    by cytocentrifuge  Final Report (04 Jun 2025 14:36):    No growth at 5 days    Culture - Blood (collected 29 May 2025 15:33)  Source: Blood Blood  Final Report (04 Jun 2025 01:00):    No growth at 5 days    Culture - Blood (collected 29 May 2025 15:30)  Source: Blood Blood  Final Report (04 Jun 2025 01:00):    No growth at 5 days        RADIOLOGY & ADDITIONAL TESTS:    Care Discussed with Consultants/Other Providers:

## 2025-06-04 NOTE — DIETITIAN INITIAL EVALUATION ADULT - ORAL INTAKE PTA/DIET HISTORY
Met with pt who reports po intake improving, still with some nausea and 20# weight loss x a few weeks/ Pt declining nutritional supplement and diet education. Reviewed verbally.

## 2025-06-04 NOTE — DIETITIAN INITIAL EVALUATION ADULT - ETIOLOGY
related to inadequate energy intake in setting of ESRD on HD, cirrhosis ,chronic pancreatitis, esophagitis

## 2025-06-04 NOTE — DIETITIAN INITIAL EVALUATION ADULT - OTHER INFO
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 CT showed possible colitis. Course complicated by fever, chills, nausea, and severe headache. Infectious w/u initiated. ID and Neuro consulted. S/P LP pending further CSF results.

## 2025-06-04 NOTE — PROGRESS NOTE ADULT - ASSESSMENT
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 CT showed possible colitis. Course complicated by fever, chills, nausea, and severe headache. Infectious w/u initiated. ID and Neuro consulted. S/P LP done CSF cxs neg. for infection. possible aseptic meningitis. admission complicated by ruq abd pain / noted with hepatic abscess. IR consulted , not able to drain. plan for long term iv abxs.     PLAN:  # Abdominal pain -> Hepatic Abscess  # Gastroparesis  # Chronic Pancreatitis  # Erosive Esophagitis history  # Cirrhosis, prior HCV  - Likely cause of fevers  - Abd pain increased RUQ -> CT A/P non contrast ordered 6/2PM  - CT A/P 6/2 Hepatic Abscess  - Could be possible biliary sepsis picture  - CT A/P w/IVC confirms liver abscess  - ABD US ordered to assess abscess and biliary tree  - Repeat BCx ordered  - Start Meropenem 500mg q24hrs (allergic to penicillins)  - Consult IR for Drainage/Culture  - Hydromorphone PRN  - Ondansetron PRN  - Metoclopramide for gastroparesis  - Pancreatic enzymes supplements TID w/ meals  - Sucralfate QID  - Miralax daily  - GI signed off, recs appreciated    #Possible Aseptic Meningitis  - Pt was w/ persistent headaches, recurrent fevers, chills, nausea- now resolved.  - no leukocytosis  - bcx x2 5/29 negative to date  - s/p LP 5/30  - CSF appearance and cell counts unremarkable  - CSF gram stain and PCR negative  - CSF culture negative  - f/u Lyme and West Nile Virus antibodies  - f/u serum tick panel   - s/p meropenem 1g q24hrs  - s/p vanco 750mg TIW post dialysis  - s/p acyclovir 340mg q24hrs  - CTH unremarkable  - Check GI PCR if pt develops diarrhea  - ID following, recs appreciated    # ESRD  # Hyperkalemia  # Hyperphosphatemia  # AoCKD  - Telemetry  - HD per Nephrology  - Phosphate binder  - LASHELL with HD  - Iron stores wnl    #Hypertension  #History of CAD  #History of CHF, likely diastolic  - Continue home lisinopril 10mg daily  - Continue hydralazine 50MG TID  - ASA to be continued  - I &Os and daily weight    #Diabetes  #Hypothyroidism, history of thyroidectomy for cancer  - Blood glucose monitoring with sliding scale Lispro  - A1c 7.3  - Levothyroxine 200mcg daily    #Anxiety  #Depression  - Sertraline to be continued    VTE prophylaxis - Intermittent pneumatic compression devices  Ambulate with walker -> Home w/home PT  Dispo: Hepatic Abscess w/u   69 year old female with Hypertension, Diabetes, CAD s/p PCI, ESRD on HD for 2.5 years (M,W,F), Gastroparesis, IBS, Chronic Pancreatitis, Erosive Esophagitis, 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 CT showed possible colitis. Course complicated by fever, chills, nausea, and severe headache. Infectious w/u initiated. ID and Neuro consulted. S/P LP done CSF cxs neg. for infection. possible aseptic meningitis. admission complicated by ruq abd pain / noted with hepatic abscess. IR consulted , not able to drain. plan for long term iv abxs.     # Abdominal pain -> Hepatic Abscess  # Gastroparesis  # Chronic Pancreatitis  # Erosive Esophagitis history  # Cirrhosis, prior HCV  - Likely cause of fevers  - Abd pain increased RUQ -> CT A/P non contrast ordered 6/2PM  - CT A/P 6/2 Hepatic Abscess  - Could be possible biliary sepsis picture  - CT A/P w/IVC confirms liver abscess  - ABD US ordered to assess abscess and biliary tree  - Hydromorphone PRN  - Ondansetron PRN  - Metoclopramide for gastroparesis  - Pancreatic enzymes supplements TID w/ meals  - Sucralfate QID  - Miralax daily  - Start Meropenem 500mg q24hrs (allergic to penicillins)  - Consult IR for Drainage/Culture, unable to drain   - Repeat BCx ordered  - id following   - final abxs recs pending repeat bcxs     #Possible Aseptic Meningitis  - Pt was w/ persistent headaches, recurrent fevers, chills, nausea- now resolved.  - no leukocytosis  - bcx x2 5/29 negative to date  - s/p LP 5/30  - CSF appearance and cell counts unremarkable  - CSF gram stain and PCR negative  - CSF culture negative  - f/u Lyme and West Nile Virus antibodies  - f/u serum tick panel   - s/p meropenem 1g q24hrs  - s/p vanco 750mg TIW post dialysis  - s/p acyclovir 340mg q24hrs  - CTH unremarkable  - Check GI PCR if pt develops diarrhea  - ID following, recs appreciated    # ESRD  # Hyperkalemia  # Hyperphosphatemia  # AoCKD  - Telemetry  - HD per Nephrology  - Phosphate binder  - LASHELL with HD  - Iron stores wnl    #Hypertension  #History of CAD  #History of CHF, likely diastolic  - Continue home lisinopril 10mg daily  - Continue hydralazine 50MG TID  - ASA to be continued  - I &Os and daily weight    #Diabetes  #Hypothyroidism, history of thyroidectomy for cancer  - Blood glucose monitoring with sliding scale Lispro  - A1c 7.3  - Levothyroxine 200mcg daily    #Anxiety  #Depression  - Sertraline to be continued    VTE prophylaxis - Intermittent pneumatic compression devices  Ambulate with walker -> Home w/home PT  Dispo: pending repeat bcxs , likely needs LT iv abxs , final id recs pending repeat bcxs

## 2025-06-04 NOTE — PROGRESS NOTE ADULT - ASSESSMENT
69 year old female pt admitted for possible colitis- Nephrology consulted for managing HD needs.    ESRD on HD  MWF schedule at [facility]  Access ; [facility]  Seen on HD.  Qd, Qb, UF rate reviewed.  Vitals stable.  Access working well.  Patient tolerating HD well.    anemia of CKD  Hb below goal  continue LASHELL with HD    HTN/ volume  Bp stable- pt euvolemic  UF as tolerated with HD    CKD MBD  ca++ at goal  Continue phos binders with meals    Monitor Phos levels    fever, photobia and HA  sp LP on 05/30

## 2025-06-04 NOTE — DIETITIAN INITIAL EVALUATION ADULT - ADD RECOMMEND
Declining nutritional supplement  Encourage improved appetite  Encourage diet compliance.   Encourage po intake, monitor diet tolerance, and provide assistance at meals as needed.   Provide HBV protein sources

## 2025-06-04 NOTE — DIETITIAN INITIAL EVALUATION ADULT - POUNDS LOST/GAINED
[facility] Hospital Medicine  Discharge Summary      Patient Name: Claire Bowser  MRN: [id number]  Patient Class: OP- Observation  Admission Date: 6/2/2022  Hospital Length of Stay: 0 days  Discharge Date and Time:  06/06/2022 11:18 AM  Attending Physician: Jose Jimenez MD   Discharging Provider: Jose Jimenez MD  Primary Care Provider: Samuel Brady MD      HPI:   Patient is a 63-year-old  female with past medical history significant for hypertension, hyperlipidemia, anxiety disorder and rheumatoid arthritis (on methotrexate therapy) and recent history of COVID-19 infection (May 13, 2022; received Paxlovid for 3 days) is being admitted to Hospital Medicine under observation status from [facility] Emergency Room with ongoing epigastric abdominal pain for 1 day.  Abdominal pain is moderate to severe in intensity, nonradiating, without any aggravating or relieving factors.  Patient reports markedly reduced oral intake and feels dehydrated.  Denies any recent fevers, chills, prior history of acute pancreatitis, melena, bleeding per rectum or hematemesis.  No recent use of nonsteroidal anti-inflammatory medications reported.  For COVID treatment patient has received and completed 10 days of oral doxycycline and 9 days of cefuroxime antibiotic therapy.  No recent fevers or chills reported.  Patient had 3 loose bowel movements today.  No mucus per rectum reported.  No urinary difficulties present.  Patient denies any alcohol consumption.              * No surgery found *      Hospital Course:   Patient was admitted to medicine service.  Patient was provided supportive care with bowel rest, IV fluid hydration and use of intravenous antiemetics and narcotics.  Serum lipase level was trended.  Dr. Salter from GI followed the patient.  Patient was also placed on antibiotic therapy.  Much allergy results remain negative.  A consultation with general surgeon obtain for  possibility of biliary pancreatitis.  Patient to follow-up with general surgeon as outpatient for elective laparoscopic cholecystectomy.  Discharge plan of care reviewed with patient and her  in detail.  During hospital stay patient was counseled regarding smoking cessation.  Patient will have LFTs checked next week.  Discharge plan of care reviewed with patient and her  who voiced understanding.    Goals of Care Treatment Preferences:  Code Status: Full Code      Consults:   Consults (From admission, onward)        Status Ordering Provider     Inpatient consult to General Surgery  Once        Provider:  Ramón Hwang III, MD    Completed SULTAN, AQIB     Inpatient consult to Gastroenterology  Once        Provider:  Antwan Salter MD    Completed SULTAN, AQIB     Case Management/  Once        Provider:  (Not yet assigned)    Completed SULTAN, AQIB        CT abdomen and pelvis without contrast:  1. Findings suspicious for acute pancreatitis.  Assessment somewhat limited in the absence of IV contrast.  2. Pulmonary emphysema with minimal scattered ground-glass disease which could reflect edema, fibrosis, pneumonitis.  3. Questionable gallbladder sludge.    Final Active Diagnoses:    Diagnosis Date Noted POA    PRINCIPAL PROBLEM:  Acute pancreatitis [K85.90] 06/02/2022 Yes    Severe malnutrition [E43] 06/05/2022 Yes    Epigastric pain [R10.13] 06/02/2022 Yes    Dehydration [E86.0] 06/02/2022 Yes    Hypercalcemia [E83.52] 06/02/2022 Yes    ACP (advance care planning) [Z71.89] 06/02/2022 Not Applicable    Hypokalemia [E87.6] 05/25/2022 Yes    Rheumatoid arthritis involving multiple sites with positive rheumatoid factor [M05.79] 01/14/2021 Yes    Mixed hyperlipidemia [E78.2] 10/28/2019 Yes    Tobacco use [Z72.0] 08/10/2018 Yes    Hypertension [I10] 10/04/2013 Yes    Anxiety [F41.9]  Yes      Problems Resolved During this Admission:       Discharged Condition:  good    Disposition: Home or Self Care    Follow Up:   Follow-up Information     Samuel Brady MD. Go on 6/8/2022.    Specialty: Family Medicine  Why: POST HOSPITAL FOLLOW UP- Wednesday Jun 8, 2022 1:40 PM  Contact information:  [address]  [address]  [place] [address]  [phone]             Ramón Hwang III, MD Follow up.    Specialties: General Surgery, Surgery  Why: Please call Dr. Hwang does office on Monday morning to schedule elective laparoscopic cholecystectomy.  Contact information:  [address]  [address]  [place] [address] [phone]             Antwan Salter MD Follow up.    Specialty: Gastroenterology  Why: As needed  Contact information:  [address]  [address]  [place] [address] [phone]                       Patient Instructions:      COMPREHENSIVE METABOLIC PANEL   Standing Status: Future Standing Exp. Date: 08/04/23     Diet Cardiac   Order Comments: Low-fat diet     Notify your health care provider if you experience any of the following:  temperature >100.4     Notify your health care provider if you experience any of the following:  persistent nausea and vomiting or diarrhea     Notify your health care provider if you experience any of the following:  severe uncontrolled pain     Activity as tolerated   Order Comments: Fall precautions       Significant Diagnostic Studies: Labs:   CMP   Recent Labs   Lab 06/05/22  0426 06/06/22  0632    138   K 3.4* 3.3*    105   CO2 26 25   GLU 69* 79   BUN 9 6*   CREATININE 1.0 0.9   CALCIUM 9.4 9.3   PROT 5.2* 5.4*   ALBUMIN 1.7* 1.7*   BILITOT 0.8 0.7   ALKPHOS 110 101   AST 27 26   ALT 13 15   ANIONGAP 7* 8   ESTGFRAFRICA >60 >60   EGFRNONAA >60 >60   , CBC   Recent Labs   Lab 06/05/22  0426 06/06/22  0632   WBC 17.15* 13.43*   HGB 9.2* 9.0*   HCT 26.3* 25.2*    217    and INR No results found for: INR, PROTIME    Pending Diagnostic Studies:     None         Medications:  Reconciled Home Medications:       Medication List      START taking these medications    ciprofloxacin HCl 500 MG tablet  Commonly known as: CIPRO  Take 1 tablet (500 mg total) by mouth every 12 (twelve) hours.        CONTINUE taking these medications    cholecalciferol (vitamin D3) 10 mcg (400 unit) Tab  Commonly known as: VITAMIN D3  Take 400 Units by mouth once daily.     cyclobenzaprine 5 MG tablet  Commonly known as: FLEXERIL  Take 1 tablet (5 mg total) by mouth 3 (three) times daily as needed for Muscle spasms.     folic acid 1 MG tablet  Commonly known as: FOLVITE  Take 1 tablet (1 mg total) by mouth once daily.     hydroCHLOROthiazide 25 MG tablet  Commonly known as: HYDRODIURIL  Take 1 tablet (25 mg total) by mouth once daily.     lisinopriL 40 MG tablet  Commonly known as: PRINIVIL,ZESTRIL  Take 1 tablet (40 mg total) by mouth once daily.     methotrexate 2.5 MG Tab  TAKE 10 TABLETS BY MOUTH EVERY 7 DAYS SPLIT DOSING. TAKE 5 TABS IN THE AM AND 5 TABS IN THE PM EVERY 7 DAYS THE SAME DAY     metoprolol succinate 50 MG 24 hr tablet  Commonly known as: TOPROL-XL  Take 1 tablet by mouth once daily     pantoprazole 40 MG tablet  Commonly known as: PROTONIX  Take 1 tablet (40 mg total) by mouth once daily.     sertraline 25 MG tablet  Commonly known as: ZOLOFT  Take 1 tablet by mouth once daily     traZODone 50 MG tablet  Commonly known as: DESYREL  Take 1 tablet by mouth in the evening        STOP taking these medications    cefdinir 300 MG capsule  Commonly known as: OMNICEF            Indwelling Lines/Drains at time of discharge:   Lines/Drains/Airways     None                 Time spent on the discharge of patient: 30 minutes         Jose Jimenez MD  Department of Hospital Medicine  [facility]   20

## 2025-06-04 NOTE — DIETITIAN INITIAL EVALUATION ADULT - PERTINENT LABORATORY DATA
06-04    136  |  97  |  27.9[H]  ----------------------------<  186[H]  4.1   |  25.0  |  5.41[H]    Ca    8.4      04 Jun 2025 05:35  Mg     2.3     06-04    TPro  6.0[L]  /  Alb  3.2[L]  /  TBili  <0.2[L]  /  DBili  x   /  AST  10  /  ALT  7   /  AlkPhos  94  06-04  POCT Blood Glucose.: 187 mg/dL (06-04-25 @ 12:21)  A1C with Estimated Average Glucose Result: 7.3 % (05-22-25 @ 06:07)  A1C with Estimated Average Glucose Result: 7.7 % (08-01-24 @ 06:16)

## 2025-06-04 NOTE — PROGRESS NOTE ADULT - SUBJECTIVE AND OBJECTIVE BOX
Reason for visit: ESRD on HD    Subjective: No acute overnight event. Patient denied any cardiac or urinary complains. No fever/chills. Seen on HD.    ROS: All systems were reviewed in detail pertinent positive and negative mentioned above, rest are negative.    Physical Exam:  Gen: no acute distress  MS: alert, conversing normally  Eyes: EOMI, no icterus  HENT: NCAT, MMM  CV: rhythm reg reg, rate normal, no m/g/r  Chest: CTAB, no w/r/r,  Abd: soft, NT, ND  Extremities: No edema    Vital Signs Last 24 Hrs  T(C): 36.7 (04 Jun 2025 07:50), Max: 36.8 (04 Jun 2025 04:42)  T(F): 98 (04 Jun 2025 07:50), Max: 98.3 (04 Jun 2025 07:45)  HR: 74 (04 Jun 2025 07:50) (73 - 76)  BP: 155/62 (04 Jun 2025 07:50) (148/68 - 181/76)  BP(mean): 98 (03 Jun 2025 20:45) (98 - 98)  RR: 18 (04 Jun 2025 07:50) (18 - 18)  SpO2: 95% (04 Jun 2025 07:50) (95% - 98%)    Parameters below as of 04 Jun 2025 07:50  Patient On (Oxygen Delivery Method): room air      I&O's Summary    Current Antibiotics:  meropenem Injectable 500 milliGRAM(s) IV Push every 24 hours    Other medications:  aspirin enteric coated 81 milliGRAM(s) Oral daily  calcium acetate 2001 milliGRAM(s) Oral two times a day with meals  carvedilol 25 milliGRAM(s) Oral every 12 hours  chlorhexidine 2% Cloths 1 Application(s) Topical daily  CREON (Pancrelipase 3000 units) 1 Capsule(s) 1 Capsule(s) Oral three times a day with meals  dextrose 5%. 1000 milliLiter(s) IV Continuous <Continuous>  dextrose 5%. 1000 milliLiter(s) IV Continuous <Continuous>  dextrose 50% Injectable 25 Gram(s) IV Push once  dextrose 50% Injectable 12.5 Gram(s) IV Push once  dextrose 50% Injectable 25 Gram(s) IV Push once  epoetin day (PROCRIT) Injectable 36857 Unit(s) IV Push <User Schedule>  glucagon  Injectable 1 milliGRAM(s) IntraMuscular once  hydrALAZINE 50 milliGRAM(s) Oral three times a day  insulin lispro (ADMELOG) corrective regimen sliding scale   SubCutaneous three times a day before meals  insulin lispro (ADMELOG) corrective regimen sliding scale   SubCutaneous at bedtime  levothyroxine 200 MICROGram(s) Oral daily  lisinopril 10 milliGRAM(s) Oral daily  metoclopramide 10 milliGRAM(s) Oral three times a day before meals  polyethylene glycol 3350 17 Gram(s) Oral daily  sertraline 200 milliGRAM(s) Oral daily  sucralfate suspension 1 Gram(s) Oral four times a day    06-04    136  |  97  |  27.9[H]  ----------------------------<  186[H]  4.1   |  25.0  |  5.41[H]    Ca    8.4      04 Jun 2025 05:35  Mg     2.3     06-04    TPro  6.0[L]  /  Alb  3.2[L]  /  TBili  <0.2[L]  /  DBili  x   /  AST  10  /  ALT  7   /  AlkPhos  94  06-04    Creatinine: 5.41 mg/dL (06-04-25 @ 05:35)  Creatinine: 4.33 mg/dL (06-03-25 @ 05:15)  Creatinine: 6.25 mg/dL (06-02-25 @ 05:35)  Creatinine: 4.87 mg/dL (06-01-25 @ 05:10)  Creatinine: 3.88 mg/dL (05-31-25 @ 06:09)                          7.7    3.77  )-----------( 86       ( 04 Jun 2025 05:35 )             24.9

## 2025-06-04 NOTE — PROGRESS NOTE ADULT - TIME BILLING
evaluating patient , reviewing labs , imaging , chart review , discussing plan of care with the consultants and medical team and documentation.  excludes teaching and separately reported services.
normal...

## 2025-06-04 NOTE — DIETITIAN INITIAL EVALUATION ADULT - NSICDXPASTMEDICALHX_GEN_ALL_CORE_FT
PAST MEDICAL HISTORY:  Bernard syndrome     CAD (coronary artery disease)     Chronic diastolic congestive heart failure     Cirrhosis     Diabetes     Esophagitis, erosive     ESRD on dialysis MWF at [facility]    Gastroparesis     HCV (hepatitis C virus)     HTN (hypertension)     IBS (irritable bowel syndrome)     Pancreatitis     Thyroid cancer

## 2025-06-04 NOTE — DIETITIAN NUTRITION RISK NOTIFICATION - TREATMENT: THE FOLLOWING DIET HAS BEEN RECOMMENDED
Diet, DASH/TLC:   Sodium & Cholesterol Restricted  Consistent Carbohydrate {Evening Snack} (CSTCHOSN)  Low Fat (LOWFAT)  For patients receiving Renal Replacement - No Protein Restr, No Conc K, No Conc Phos, Low  Sodium (RENAL) (05-28-25 @ 08:42) [Active]       Isotretinoin Counseling: Patient should get monthly blood tests, not donate blood, not drive at night if vision affected, not share medication, and not undergo elective surgery for 6 months after tx completed. Side effects reviewed, pt to contact office should one occur.

## 2025-06-04 NOTE — PROGRESS NOTE ADULT - SUBJECTIVE AND OBJECTIVE BOX
[facility]  INFECTIOUS DISEASES at [place] / [place] / [place]  =======================================================                              Kai Kenyon MD                              Professor Emeritus:  Dr Billy Bolaños MD            Diplomates American Board of Internal Medicine & Infectious Diseases                                   Tel  [phone] Fax [phone]                                  Hospital Consult line:  [phone]  =======================================================    Follow Up: Abnormal CT scan     Interval History/ROS: Seen at bedside.     REVIEW OF SYSTEMS  Unchanged from prior     Allergies  Augmentin (Hives)  ceftriaxone (Pruritus)    ANTIMICROBIALS:    meropenem Injectable 500 every 24 hours    OTHER MEDS: MEDICATIONS  (STANDING):  acetaminophen     Tablet .. 650 every 6 hours PRN  aspirin enteric coated 81 daily  carvedilol 25 every 12 hours  dextrose 50% Injectable 25 once  dextrose 50% Injectable 12.5 once  dextrose 50% Injectable 25 once  dextrose Oral Gel 15 once PRN  epoetin day (PROCRIT) Injectable 15372 <User Schedule>  glucagon  Injectable 1 once  hydrALAZINE 50 three times a day  HYDROmorphone  Injectable 0.5 every 4 hours PRN  insulin lispro (ADMELOG) corrective regimen sliding scale  at bedtime  insulin lispro (ADMELOG) corrective regimen sliding scale  three times a day before meals  levothyroxine 200 daily  lisinopril 10 daily  metoclopramide 10 three times a day before meals  ondansetron Injectable 4 every 4 hours PRN  polyethylene glycol 3350 17 daily  sertraline 200 daily  sucralfate suspension 1 four times a day    Vital Signs Last 24 Hrs  T(F): 97.6 (06-04-25 @ 11:16), Max: 102.6 (05-29-25 @ 21:45)    Vital Signs Last 24 Hrs  HR: 74 (06-04-25 @ 11:16) (73 - 76)  BP: 134/66 (06-04-25 @ 11:16) (132/70 - 181/76)  RR: 18 (06-04-25 @ 11:16)  SpO2: 97% (06-04-25 @ 11:16) (95% - 100%)  Wt(kg): --    EXAM:  General: In NAD   HEENT: NCAT  CV: S1+S2  Lungs: No respiratory distress, CTAB  Abd: Soft, no guarding, mild tenderness to palpation R side   Ext: No cyanosis  Neuro: Alert and oriented, calm   Skin: No rash     Labs:                        7.7    3.77  )-----------( 86       ( 04 Jun 2025 05:35 )             24.9     06-04    136  |  97  |  27.9[H]  ----------------------------<  186[H]  4.1   |  25.0  |  5.41[H]    Ca    8.4      04 Jun 2025 05:35  Mg     2.3     06-04    TPro  6.0[L]  /  Alb  3.2[L]  /  TBili  <0.2[L]  /  DBili  x   /  AST  10  /  ALT  7   /  AlkPhos  94  06-04    WBC Trend:  WBC Count: 3.77 (06-04-25 @ 05:35)  WBC Count: 4.00 (06-03-25 @ 05:15)  WBC Count: 4.36 (06-02-25 @ 05:35)  WBC Count: 5.34 (06-01-25 @ 05:10)    Creatine Trend:  Creatinine: 5.41 (06-04)  Creatinine: 4.33 (06-03)  Creatinine: 6.25 (06-02)  Creatinine: 4.87 (06-01)    Liver Biochemical Testing Trend:  Alanine Aminotransferase (ALT/SGPT): 7 (06-04)  Alanine Aminotransferase (ALT/SGPT): 9 (06-02)  Alanine Aminotransferase (ALT/SGPT): 13 (05-31)  Alanine Aminotransferase (ALT/SGPT): 12 (05-30)  Alanine Aminotransferase (ALT/SGPT): 8 (05-29)  Aspartate Aminotransferase (AST/SGOT): 10 (06-04-25 @ 05:35)  Aspartate Aminotransferase (AST/SGOT): 12 (06-02-25 @ 05:35)  Aspartate Aminotransferase (AST/SGOT): 18 (05-31-25 @ 06:09)  Aspartate Aminotransferase (AST/SGOT): 18 (05-30-25 @ 04:44)  Aspartate Aminotransferase (AST/SGOT): 11 (05-29-25 @ 06:09)  Bilirubin Total: <0.2 (06-04)  Bilirubin Total: 0.2 (06-02)  Bilirubin Total: 0.3 (05-31)  Bilirubin Total: 0.4 (05-30)  Bilirubin Total: 0.3 (05-29)    Urinalysis Basic - ( 04 Jun 2025 05:35 )    Color: x / Appearance: x / SG: x / pH: x  Gluc: 186 mg/dL / Ketone: x  / Bili: x / Urobili: x   Blood: x / Protein: x / Nitrite: x   Leuk Esterase: x / RBC: x / WBC x   Sq Epi: x / Non Sq Epi: x / Bacteria: x    MICROBIOLOGY:  Vancomycin Level, Random: 18.6 (05-31 @ 06:09)    MRSA PCR Result.: NotDetec (05-30-25 @ 21:23)  MRSA PCR Result.: NotDetec (08-01-24 @ 08:11)    Culture - CSF with Gram Stain (collected 30 May 2025 15:30)  Source: CSF CSF  Preliminary Report:    No growth    Culture - Acid Fast - CSF (collected 30 May 2025 15:30)  Source: CSF CSF    Culture - Fungal, CSF (collected 30 May 2025 15:30)  Source: CSF CSF  Preliminary Report:    No growth    Culture - Blood (collected 29 May 2025 15:33)  Source: Blood Blood  Final Report:    No growth at 5 days    Culture - Blood (collected 29 May 2025 15:30)  Source: Blood Blood  Final Report:    No growth at 5 days    Culture - Urine (collected 31 Jul 2024 15:10)  Source: Clean Catch Clean Catch (Midstream)  Final Report:    >100,000 CFU/ml Coag Negative Staphylococcus "Susceptibilities not    performed"    <10,000 CFU/ml Normal Urogenital christina present    Culture - Urine (collected 08 Feb 2024 02:00)  Source: Catheterized Catheterized  Final Report:    <10,000 CFU/mL Normal Urogenital Christina    Cryptococcal Antigen - CSF: Negative (05-30-25 @ 15:30)    Rapid RVP Result: NotDetec (05-29 @ 14:20)    C-Reactive Protein: 68 (05-30)    RADIOLOGY:  imaging below personally reviewed    < from: CT Abdomen and Pelvis w/ IV Cont (06.03.25 @ 09:40) >  IMPRESSION:  Cluster of multiple tiny hypodensities in the lateral segment of the left   hepatic dome, overall measuring 3.6 x 3.0 x 2.5 cm, new from 5/28/2025,   concerning for liver abscess.    Morphologic features of cirrhosis and splenomegaly, suggesting portal   hypertension.    Trace right pleural effusion, similar to prior.    < end of copied text >  < from: US Abdomen Complete (US Abdomen Complete .) (06.03.25 @ 12:57) >  IMPRESSION:  Cirrhotic morphology of the liver with hypoechoic focus in the left lobe   corresponding to the CT abnormality.  Mild splenomegaly    < end of copied text >

## 2025-06-05 LAB
ALBUMIN SERPL ELPH-MCNC: 3.1 G/DL — LOW (ref 3.3–5.2)
ALP SERPL-CCNC: 98 U/L — SIGNIFICANT CHANGE UP (ref 40–120)
ALT FLD-CCNC: 7 U/L — SIGNIFICANT CHANGE UP
ANION GAP SERPL CALC-SCNC: 12 MMOL/L — SIGNIFICANT CHANGE UP (ref 5–17)
AST SERPL-CCNC: 21 U/L — SIGNIFICANT CHANGE UP
BILIRUB SERPL-MCNC: 0.2 MG/DL — LOW (ref 0.4–2)
BUN SERPL-MCNC: 20.2 MG/DL — HIGH (ref 8–20)
CALCIUM SERPL-MCNC: 8.2 MG/DL — LOW (ref 8.4–10.5)
CHLORIDE SERPL-SCNC: 99 MMOL/L — SIGNIFICANT CHANGE UP (ref 96–108)
CO2 SERPL-SCNC: 26 MMOL/L — SIGNIFICANT CHANGE UP (ref 22–29)
CREAT SERPL-MCNC: 3.93 MG/DL — HIGH (ref 0.5–1.3)
EGFR: 12 ML/MIN/1.73M2 — LOW
EGFR: 12 ML/MIN/1.73M2 — LOW
GLUCOSE BLDC GLUCOMTR-MCNC: 155 MG/DL — HIGH (ref 70–99)
GLUCOSE BLDC GLUCOMTR-MCNC: 175 MG/DL — HIGH (ref 70–99)
GLUCOSE BLDC GLUCOMTR-MCNC: 221 MG/DL — HIGH (ref 70–99)
GLUCOSE BLDC GLUCOMTR-MCNC: 222 MG/DL — HIGH (ref 70–99)
GLUCOSE SERPL-MCNC: 126 MG/DL — HIGH (ref 70–99)
HCT VFR BLD CALC: 24 % — LOW (ref 34.5–45)
HGB BLD-MCNC: 7.5 G/DL — LOW (ref 11.5–15.5)
LYME ADDITIONAL INFORMATION: SIGNIFICANT CHANGE UP
LYME IGG LINE BLOT INTERP.: NEGATIVE — SIGNIFICANT CHANGE UP
LYME IGM LINE BLOT INTERP.: NEGATIVE — SIGNIFICANT CHANGE UP
MAGNESIUM SERPL-MCNC: 2.3 MG/DL — SIGNIFICANT CHANGE UP (ref 1.6–2.6)
MCHC RBC-ENTMCNC: 29.8 PG — SIGNIFICANT CHANGE UP (ref 27–34)
MCHC RBC-ENTMCNC: 31.3 G/DL — LOW (ref 32–36)
MCV RBC AUTO: 95.2 FL — SIGNIFICANT CHANGE UP (ref 80–100)
NRBC # BLD AUTO: 0 K/UL — SIGNIFICANT CHANGE UP (ref 0–0)
NRBC # FLD: 0 K/UL — SIGNIFICANT CHANGE UP (ref 0–0)
NRBC BLD AUTO-RTO: 0 /100 WBCS — SIGNIFICANT CHANGE UP (ref 0–0)
P18 AB. IGG: SIGNIFICANT CHANGE UP
P23 AB. IGG: SIGNIFICANT CHANGE UP
P23 AB. IGM: SIGNIFICANT CHANGE UP
P28 AB. IGG: SIGNIFICANT CHANGE UP
P30 AB. IGG: SIGNIFICANT CHANGE UP
P39 AB. IGG: SIGNIFICANT CHANGE UP
P39 AB. IGM: SIGNIFICANT CHANGE UP
P41 AB. IGG: SIGNIFICANT CHANGE UP
P41 AB. IGM: SIGNIFICANT CHANGE UP
P45 AB. IGG: SIGNIFICANT CHANGE UP
P58 AB. IGG: SIGNIFICANT CHANGE UP
P66 AB. IGG: SIGNIFICANT CHANGE UP
P93 AB. IGG: SIGNIFICANT CHANGE UP
PLATELET # BLD AUTO: 117 K/UL — LOW (ref 150–400)
PMV BLD: 13 FL — SIGNIFICANT CHANGE UP (ref 7–13)
POTASSIUM SERPL-MCNC: 4.6 MMOL/L — SIGNIFICANT CHANGE UP (ref 3.5–5.3)
POTASSIUM SERPL-SCNC: 4.6 MMOL/L — SIGNIFICANT CHANGE UP (ref 3.5–5.3)
PROT SERPL-MCNC: 5.9 G/DL — LOW (ref 6.6–8.7)
RBC # BLD: 2.52 M/UL — LOW (ref 3.8–5.2)
RBC # FLD: 15.5 % — HIGH (ref 10.3–14.5)
SODIUM SERPL-SCNC: 137 MMOL/L — SIGNIFICANT CHANGE UP (ref 135–145)
WBC # BLD: 3.62 K/UL — LOW (ref 3.8–10.5)
WBC # FLD AUTO: 3.62 K/UL — LOW (ref 3.8–10.5)

## 2025-06-05 PROCEDURE — 99233 SBSQ HOSP IP/OBS HIGH 50: CPT

## 2025-06-05 PROCEDURE — G0545: CPT

## 2025-06-05 PROCEDURE — 99232 SBSQ HOSP IP/OBS MODERATE 35: CPT

## 2025-06-05 RX ORDER — HYDROMORPHONE/SOD CHLOR,ISO/PF 2 MG/10 ML
0.5 SYRINGE (ML) INJECTION EVERY 4 HOURS
Refills: 0 | Status: DISCONTINUED | OUTPATIENT
Start: 2025-06-05 | End: 2025-06-11

## 2025-06-05 RX ADMIN — INSULIN LISPRO 1: 100 INJECTION, SOLUTION INTRAVENOUS; SUBCUTANEOUS at 08:35

## 2025-06-05 RX ADMIN — Medication 0.5 MILLIGRAM(S): at 14:05

## 2025-06-05 RX ADMIN — Medication 0.5 MILLIGRAM(S): at 09:01

## 2025-06-05 RX ADMIN — Medication 50 MILLIGRAM(S): at 22:12

## 2025-06-05 RX ADMIN — Medication 81 MILLIGRAM(S): at 14:02

## 2025-06-05 RX ADMIN — CARVEDILOL 25 MILLIGRAM(S): 3.12 TABLET, FILM COATED ORAL at 18:51

## 2025-06-05 RX ADMIN — INSULIN LISPRO 2: 100 INJECTION, SOLUTION INTRAVENOUS; SUBCUTANEOUS at 14:04

## 2025-06-05 RX ADMIN — Medication 200 MICROGRAM(S): at 05:38

## 2025-06-05 RX ADMIN — Medication 0.5 MILLIGRAM(S): at 23:48

## 2025-06-05 RX ADMIN — SERTRALINE 200 MILLIGRAM(S): 100 TABLET, FILM COATED ORAL at 14:03

## 2025-06-05 RX ADMIN — Medication 2001 MILLIGRAM(S): at 08:54

## 2025-06-05 RX ADMIN — Medication 0.5 MILLIGRAM(S): at 15:05

## 2025-06-05 RX ADMIN — Medication 50 MILLIGRAM(S): at 05:38

## 2025-06-05 RX ADMIN — Medication 0.5 MILLIGRAM(S): at 19:34

## 2025-06-05 RX ADMIN — Medication 0.5 MILLIGRAM(S): at 00:40

## 2025-06-05 RX ADMIN — Medication 1 GRAM(S): at 18:49

## 2025-06-05 RX ADMIN — Medication 0.5 MILLIGRAM(S): at 20:00

## 2025-06-05 RX ADMIN — LISINOPRIL 10 MILLIGRAM(S): 5 TABLET ORAL at 05:37

## 2025-06-05 RX ADMIN — MEROPENEM 500 MILLIGRAM(S): 1 INJECTION INTRAVENOUS at 14:03

## 2025-06-05 RX ADMIN — INSULIN LISPRO 1: 100 INJECTION, SOLUTION INTRAVENOUS; SUBCUTANEOUS at 18:51

## 2025-06-05 RX ADMIN — Medication 10 MILLIGRAM(S): at 14:03

## 2025-06-05 RX ADMIN — Medication 0.5 MILLIGRAM(S): at 08:01

## 2025-06-05 RX ADMIN — Medication 2001 MILLIGRAM(S): at 18:46

## 2025-06-05 RX ADMIN — CARVEDILOL 25 MILLIGRAM(S): 3.12 TABLET, FILM COATED ORAL at 05:37

## 2025-06-05 RX ADMIN — Medication 1 GRAM(S): at 14:03

## 2025-06-05 RX ADMIN — Medication 50 MILLIGRAM(S): at 14:04

## 2025-06-05 RX ADMIN — Medication 10 MILLIGRAM(S): at 18:50

## 2025-06-05 RX ADMIN — Medication 1 GRAM(S): at 23:48

## 2025-06-05 NOTE — PROGRESS NOTE ADULT - SUBJECTIVE AND OBJECTIVE BOX
[facility]  INFECTIOUS DISEASES at [place] / [place] / [place]  =======================================================                              Kai Kenyon MD                              Professor Emeritus:  Dr Billy Bolaños MD            Diplomates American Board of Internal Medicine & Infectious Diseases                                   Tel  [phone] Fax [phone]                                  Hospital Consult line:  [phone]  =======================================================    Follow Up: Hepatic abscess     Interval History/ROS: Seen at bedside.     REVIEW OF SYSTEMS  Unchanged from prior     Allergies  Augmentin (Hives)  ceftriaxone (Pruritus)    ANTIMICROBIALS:    meropenem Injectable 500 every 24 hours    OTHER MEDS: MEDICATIONS  (STANDING):  acetaminophen     Tablet .. 650 every 6 hours PRN  aspirin enteric coated 81 daily  carvedilol 25 every 12 hours  dextrose 50% Injectable 25 once  dextrose 50% Injectable 12.5 once  dextrose 50% Injectable 25 once  dextrose Oral Gel 15 once PRN  epoetin day (PROCRIT) Injectable 06202 <User Schedule>  glucagon  Injectable 1 once  hydrALAZINE 50 three times a day  HYDROmorphone  Injectable 0.5 every 4 hours PRN  insulin lispro (ADMELOG) corrective regimen sliding scale  at bedtime  insulin lispro (ADMELOG) corrective regimen sliding scale  three times a day before meals  levothyroxine 200 daily  lisinopril 10 daily  metoclopramide 10 three times a day before meals  ondansetron Injectable 4 every 4 hours PRN  polyethylene glycol 3350 17 daily  sertraline 200 daily  sucralfate suspension 1 four times a day    Vital Signs Last 24 Hrs  T(F): 98.6 (06-05-25 @ 07:46), Max: 102.6 (05-29-25 @ 21:45)    Vital Signs Last 24 Hrs  HR: 75 (06-05-25 @ 07:46) (75 - 85)  BP: 130/67 (06-05-25 @ 07:46) (130/67 - 158/64)  RR: 18 (06-05-25 @ 07:46)  SpO2: 98% (06-05-25 @ 07:46) (96% - 98%)  Wt(kg): --    EXAM:  General: NAD   HEENT: NCAT  CV: S1+S2  Lungs: No respiratory distress, CTAB  Abd: Soft, no guarding  Ext: No cyanosis  Neuro: Alert and oriented, calm   Skin: No rash     Labs:                        7.5    3.62  )-----------( 117      ( 05 Jun 2025 05:22 )             24.0     06-05    137  |  99  |  20.2[H]  ----------------------------<  126[H]  4.6   |  26.0  |  3.93[H]    Ca    8.2[L]      05 Jun 2025 05:22  Mg     2.3     06-05    TPro  5.9[L]  /  Alb  3.1[L]  /  TBili  0.2[L]  /  DBili  x   /  AST  21  /  ALT  7   /  AlkPhos  98  06-05    WBC Trend:  WBC Count: 3.62 (06-05-25 @ 05:22)  WBC Count: 3.77 (06-04-25 @ 05:35)  WBC Count: 4.00 (06-03-25 @ 05:15)  WBC Count: 4.36 (06-02-25 @ 05:35)    Creatine Trend:  Creatinine: 3.93 (06-05)  Creatinine: 5.41 (06-04)  Creatinine: 4.33 (06-03)  Creatinine: 6.25 (06-02)    Liver Biochemical Testing Trend:  Alanine Aminotransferase (ALT/SGPT): 7 (06-05)  Alanine Aminotransferase (ALT/SGPT): 7 (06-04)  Alanine Aminotransferase (ALT/SGPT): 9 (06-02)  Alanine Aminotransferase (ALT/SGPT): 13 (05-31)  Alanine Aminotransferase (ALT/SGPT): 12 (05-30)  Aspartate Aminotransferase (AST/SGOT): 21 (06-05-25 @ 05:22)  Aspartate Aminotransferase (AST/SGOT): 10 (06-04-25 @ 05:35)  Aspartate Aminotransferase (AST/SGOT): 12 (06-02-25 @ 05:35)  Aspartate Aminotransferase (AST/SGOT): 18 (05-31-25 @ 06:09)  Aspartate Aminotransferase (AST/SGOT): 18 (05-30-25 @ 04:44)  Bilirubin Total: 0.2 (06-05)  Bilirubin Total: <0.2 (06-04)  Bilirubin Total: 0.2 (06-02)  Bilirubin Total: 0.3 (05-31)  Bilirubin Total: 0.4 (05-30)    Urinalysis Basic - ( 05 Jun 2025 05:22 )    Color: x / Appearance: x / SG: x / pH: x  Gluc: 126 mg/dL / Ketone: x  / Bili: x / Urobili: x   Blood: x / Protein: x / Nitrite: x   Leuk Esterase: x / RBC: x / WBC x   Sq Epi: x / Non Sq Epi: x / Bacteria: x    MICROBIOLOGY:  Vancomycin Level, Random: 18.6 (05-31 @ 06:09)    MRSA PCR Result.: NotDetec (05-30-25 @ 21:23)  MRSA PCR Result.: NotDetec (08-01-24 @ 08:11)    Culture - Blood (collected 03 Jun 2025 07:25)  Source: Blood Blood-Peripheral  Preliminary Report:    No growth at 24 hours    Culture - Blood (collected 03 Jun 2025 07:17)  Source: Blood Blood-Peripheral  Preliminary Report:    No growth at 24 hours    Culture - Fungal, CSF (collected 30 May 2025 15:30)  Source: CSF CSF  Preliminary Report:    No growth    Culture - CSF with Gram Stain (collected 30 May 2025 15:30)  Source: CSF CSF  Final Report:    No growth at 5 days    Culture - Acid Fast - CSF (collected 30 May 2025 15:30)  Source: CSF CSF  Preliminary Report:    Culture is being performed.    Culture - Blood (collected 29 May 2025 15:33)  Source: Blood Blood  Final Report:    No growth at 5 days    Culture - Blood (collected 29 May 2025 15:30)  Source: Blood Blood  Final Report:    No growth at 5 days    Culture - Urine (collected 31 Jul 2024 15:10)  Source: Clean Catch Clean Catch (Midstream)  Final Report:    >100,000 CFU/ml Coag Negative Staphylococcus "Susceptibilities not    performed"    <10,000 CFU/ml Normal Urogenital christina present    Culture - Urine (collected 08 Feb 2024 02:00)  Source: Catheterized Catheterized  Final Report:    <10,000 CFU/mL Normal Urogenital Christina    Cryptococcal Antigen - CSF: Negative (05-30-25 @ 15:30)    Rapid RVP Result: NotDetec (05-29 @ 14:20)    C-Reactive Protein: 68 (05-30)

## 2025-06-05 NOTE — PROGRESS NOTE ADULT - SUBJECTIVE AND OBJECTIVE BOX
Patient is a 69y old  Female who presents with a chief complaint of Abdominal pain  Dialysis (04 Jun 2025 15:41)      INTERVAL HPI/OVERNIGHT EVENTS:  - No acute overnight events    TODAY:  - Patient examined bedside  - RUQ Pain complaints, same as yesterday, states it is slightly better however  - Patient denies CP, SOB, fever, nausea, vomiting    MEDICATIONS  (STANDING):  aspirin enteric coated 81 milliGRAM(s) Oral daily  calcium acetate 2001 milliGRAM(s) Oral two times a day with meals  carvedilol 25 milliGRAM(s) Oral every 12 hours  chlorhexidine 2% Cloths 1 Application(s) Topical daily  CREON (Pancrelipase 3000 units) 1 Capsule(s) 1 Capsule(s) Oral three times a day with meals  dextrose 5%. 1000 milliLiter(s) (50 mL/Hr) IV Continuous <Continuous>  dextrose 5%. 1000 milliLiter(s) (100 mL/Hr) IV Continuous <Continuous>  dextrose 50% Injectable 25 Gram(s) IV Push once  dextrose 50% Injectable 12.5 Gram(s) IV Push once  dextrose 50% Injectable 25 Gram(s) IV Push once  epoetin day (PROCRIT) Injectable 16579 Unit(s) IV Push <User Schedule>  glucagon  Injectable 1 milliGRAM(s) IntraMuscular once  hydrALAZINE 50 milliGRAM(s) Oral three times a day  insulin lispro (ADMELOG) corrective regimen sliding scale   SubCutaneous three times a day before meals  insulin lispro (ADMELOG) corrective regimen sliding scale   SubCutaneous at bedtime  levothyroxine 200 MICROGram(s) Oral daily  lisinopril 10 milliGRAM(s) Oral daily  meropenem Injectable 500 milliGRAM(s) IV Push every 24 hours  metoclopramide 10 milliGRAM(s) Oral three times a day before meals  polyethylene glycol 3350 17 Gram(s) Oral daily  sertraline 200 milliGRAM(s) Oral daily  sucralfate suspension 1 Gram(s) Oral four times a day    MEDICATIONS  (PRN):  acetaminophen     Tablet .. 650 milliGRAM(s) Oral every 6 hours PRN Mild Pain (1 - 3)  dextrose Oral Gel 15 Gram(s) Oral once PRN Blood Glucose LESS THAN 70 milliGRAM(s)/deciliter  ondansetron Injectable 4 milliGRAM(s) IV Push every 4 hours PRN Nausea and/or Vomiting      Allergies    Augmentin (Hives)  ceftriaxone (Pruritus)    Intolerances        REVIEW OF SYSTEMS:  as above      Vital Signs Last 24 Hrs  T(C): 36.7 (05 Jun 2025 04:32), Max: 36.9 (04 Jun 2025 11:00)  T(F): 98.1 (05 Jun 2025 04:32), Max: 98.5 (04 Jun 2025 11:00)  HR: 85 (05 Jun 2025 04:32) (73 - 85)  BP: 158/64 (05 Jun 2025 04:32) (132/70 - 181/76)  BP(mean): --  RR: 18 (05 Jun 2025 04:32) (18 - 18)  SpO2: 97% (05 Jun 2025 04:32) (95% - 100%)    Parameters below as of 05 Jun 2025 04:32  Patient On (Oxygen Delivery Method): room air        PHYSICAL EXAM:  GENERAL: Not in acute distress, lying comfortably  HEAD:  Atraumatic, Normocephalic  EYES: EOMI, PERRLA, conjunctiva and sclera clear  NECK: Supple, No JVD, Normal thyroid  NERVOUS SYSTEM:  Alert & Oriented X3, No gross focal deficits  CHEST/LUNG: Clear to auscultation bilaterally; No rales, rhonchi, wheezing, or rubs  HEART: Regular rate and rhythm; No murmurs, rubs, or gallops  ABDOMEN: Soft, mild RUQ Distention; Bowel sounds present; RUQ TTP, guarding  EXTREMITIES:  No clubbing, cyanosis, or edema  SKIN: No rashes or lesions    LABS:                        7.5    3.62  )-----------( 117      ( 05 Jun 2025 05:22 )             24.0     06-04    136  |  97  |  27.9[H]  ----------------------------<  186[H]  4.1   |  25.0  |  5.41[H]    Ca    8.4      04 Jun 2025 05:35  Mg     2.3     06-04    TPro  6.0[L]  /  Alb  3.2[L]  /  TBili  <0.2[L]  /  DBili  x   /  AST  10  /  ALT  7   /  AlkPhos  94  06-04      Urinalysis Basic - ( 04 Jun 2025 05:35 )    Color: x / Appearance: x / SG: x / pH: x  Gluc: 186 mg/dL / Ketone: x  / Bili: x / Urobili: x   Blood: x / Protein: x / Nitrite: x   Leuk Esterase: x / RBC: x / WBC x   Sq Epi: x / Non Sq Epi: x / Bacteria: x      CAPILLARY BLOOD GLUCOSE      POCT Blood Glucose.: 147 mg/dL (04 Jun 2025 21:58)  POCT Blood Glucose.: 251 mg/dL (04 Jun 2025 18:08)  POCT Blood Glucose.: 187 mg/dL (04 Jun 2025 12:21)      RADIOLOGY & ADDITIONAL TESTS:    Imaging Personally Reviewed:  [X] YES  [ ] NO    Consultant(s) Notes Reviewed:  [X] YES  [ ] NO    Care Discussed with Consultants/Other Providers [X] YES  [ ] NO    Plan of Care discussed with House Staff: [X]YES [ ] NO

## 2025-06-05 NOTE — PROGRESS NOTE ADULT - ATTENDING COMMENTS
70 y/o F with PMH of HTN, DM, CA s/p PCI, ESRD on HD admitted for abdominal pain due to hepatic abscess.   Continue Meropenem  ID recs appreciated  Blood cultures negative    DC pending ID recs

## 2025-06-05 NOTE — PROGRESS NOTE ADULT - ASSESSMENT
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 CT showed possible colitis. Course complicated by fever, chills, nausea, and severe headache. Infectious w/u initiated. ID and Neuro consulted. s/p LP CSF cxs neg for possible aseptic meningitis. Course further complicated by RUQ Abd pain/noted with hepatic abscess. IR consulted, unable to drain. Plan for long term IV ABxs, pending final culture data    PLAN:  # Abdominal pain -> Hepatic Abscess  # Gastroparesis  # Chronic Pancreatitis  # Erosive Esophagitis history  # Cirrhosis, prior HCV  - Likely cause of fevers  - Abd pain increased RUQ -> CT A/P non contrast ordered 6/2PM  - CT A/P 6/2 Hepatic Abscess  - Could be possible biliary sepsis picture  - CT A/P w/IVC confirms liver abscess  - ABD US ordered to assess abscess and biliary tree  - Hydromorphone PRN  - Ondansetron PRN  - Metoclopramide for gastroparesis  - Pancreatic enzymes supplements TID w/ meals  - Sucralfate QID  - Miralax daily  - Start Meropenem 500mg q24hrs (allergic to penicillins)  - IR unable to drain   - Repeat BCx ordered  - ID following   - Final ABxs recs pending repeat BCxs     #Possible Aseptic Meningitis  - Pt was w/ persistent headaches, recurrent fevers, chills, nausea- now resolved.  - no leukocytosis  - bcx x2 5/29 negative to date  - s/p LP 5/30  - CSF appearance and cell counts unremarkable  - CSF gram stain and PCR negative  - CSF culture negative  - f/u Lyme and West Nile Virus antibodies  - f/u serum tick panel   - s/p meropenem 1g q24hrs  - s/p vanco 750mg TIW post dialysis  - s/p acyclovir 340mg q24hrs  - CTH unremarkable  - Check GI PCR if pt develops diarrhea  - ID following, recs appreciated    # ESRD  # Hyperkalemia  # Hyperphosphatemia  # AoCKD  - Telemetry  - HD per Nephrology  - Phosphate binder  - LASHELL with HD  - Iron stores wnl    #Hypertension  #History of CAD  #History of CHF, likely diastolic  - Continue home lisinopril 10mg daily  - Continue hydralazine 50MG TID  - ASA to be continued  - I &Os and daily weight    #Diabetes  #Hypothyroidism, history of thyroidectomy for cancer  - Blood glucose monitoring with sliding scale Lispro  - A1c 7.3  - Levothyroxine 200mcg daily    #Anxiety  #Depression  - Sertraline to be continued    VTE prophylaxis - Intermittent pneumatic compression devices  Ambulate with walker -> Home w/home PT  Dispo: pending repeat bcxs , likely needs long-term IV ABxs , final ID recs pending repeat BCxs

## 2025-06-05 NOTE — PROGRESS NOTE ADULT - ASSESSMENT
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     Abdominal pain, nausea and vomiting   Thrombocytopenia in setting of cirrhosis  ESRD on HD  Course complicated by;  Fevers, Headache, Photophobia - resolved   Allergy to Augmentin (hives) and Ceftriaxone (pruritus)      Rapid improvement of symptoms and negative CSF studies - less likely aseptic meningitis  Flagyl can cause aseptic meningitis but usually rare and associated with higher doses   Symptoms resolved     ID recalled as patient with abd pain, CT done showing L hepatic cluster of multiple tiny liver abscesses (not seen on 5/28 CT)  Abd US shows s/p cholecystectomy, no CBD dilation   Blood cultures from 5/29 are negative     Suggest;  Follow up blood cultures sent on 6/3 prior to initiation of Meropenem (preliminarily negative)   Continue Meropenem IV (HD dosing)   IR evaluation noted, no drainable component   Monitor fever curve   Trend WBC count    ID plan will depend on 6/3 blood culture final results. Anticipate need for IV antibiotics, especially if blood cultures remain negative.     Case discussed with pt at bedside and with Dr Leach.

## 2025-06-06 LAB
ANION GAP SERPL CALC-SCNC: 14 MMOL/L — SIGNIFICANT CHANGE UP (ref 5–17)
BUN SERPL-MCNC: 41.7 MG/DL — HIGH (ref 8–20)
CALCIUM SERPL-MCNC: 8.3 MG/DL — LOW (ref 8.4–10.5)
CHLORIDE SERPL-SCNC: 98 MMOL/L — SIGNIFICANT CHANGE UP (ref 96–108)
CO2 SERPL-SCNC: 26 MMOL/L — SIGNIFICANT CHANGE UP (ref 22–29)
CREAT SERPL-MCNC: 5.35 MG/DL — HIGH (ref 0.5–1.3)
EGFR: 8 ML/MIN/1.73M2 — LOW
EGFR: 8 ML/MIN/1.73M2 — LOW
GLUCOSE BLDC GLUCOMTR-MCNC: 119 MG/DL — HIGH (ref 70–99)
GLUCOSE BLDC GLUCOMTR-MCNC: 129 MG/DL — HIGH (ref 70–99)
GLUCOSE BLDC GLUCOMTR-MCNC: 164 MG/DL — HIGH (ref 70–99)
GLUCOSE BLDC GLUCOMTR-MCNC: 215 MG/DL — HIGH (ref 70–99)
GLUCOSE BLDC GLUCOMTR-MCNC: 224 MG/DL — HIGH (ref 70–99)
GLUCOSE SERPL-MCNC: 152 MG/DL — HIGH (ref 70–99)
HCT VFR BLD CALC: 23.8 % — LOW (ref 34.5–45)
HGB BLD-MCNC: 7.3 G/DL — LOW (ref 11.5–15.5)
IMMATURE PLATELET FRACTION #: 3.4 K/UL — LOW (ref 4.7–11.1)
IMMATURE PLATELET FRACTION %: 3.8 % — SIGNIFICANT CHANGE UP (ref 1.6–4.9)
MAGNESIUM SERPL-MCNC: 2.3 MG/DL — SIGNIFICANT CHANGE UP (ref 1.6–2.6)
MCHC RBC-ENTMCNC: 29.2 PG — SIGNIFICANT CHANGE UP (ref 27–34)
MCHC RBC-ENTMCNC: 30.7 G/DL — LOW (ref 32–36)
MCV RBC AUTO: 95.2 FL — SIGNIFICANT CHANGE UP (ref 80–100)
NRBC # BLD AUTO: 0 K/UL — SIGNIFICANT CHANGE UP (ref 0–0)
NRBC # FLD: 0 K/UL — SIGNIFICANT CHANGE UP (ref 0–0)
NRBC BLD AUTO-RTO: 0 /100 WBCS — SIGNIFICANT CHANGE UP (ref 0–0)
PLATELET # BLD AUTO: 93 K/UL — LOW (ref 150–400)
PMV BLD: 11.5 FL — SIGNIFICANT CHANGE UP (ref 7–13)
POTASSIUM SERPL-MCNC: 4.8 MMOL/L — SIGNIFICANT CHANGE UP (ref 3.5–5.3)
POTASSIUM SERPL-SCNC: 4.8 MMOL/L — SIGNIFICANT CHANGE UP (ref 3.5–5.3)
RBC # BLD: 2.5 M/UL — LOW (ref 3.8–5.2)
RBC # FLD: 15.1 % — HIGH (ref 10.3–14.5)
SODIUM SERPL-SCNC: 137 MMOL/L — SIGNIFICANT CHANGE UP (ref 135–145)
WBC # BLD: 4.06 K/UL — SIGNIFICANT CHANGE UP (ref 3.8–10.5)
WBC # FLD AUTO: 4.06 K/UL — SIGNIFICANT CHANGE UP (ref 3.8–10.5)

## 2025-06-06 PROCEDURE — 99232 SBSQ HOSP IP/OBS MODERATE 35: CPT

## 2025-06-06 PROCEDURE — 90935 HEMODIALYSIS ONE EVALUATION: CPT

## 2025-06-06 RX ADMIN — Medication 50 MILLIGRAM(S): at 21:40

## 2025-06-06 RX ADMIN — EPOETIN ALFA 10000 UNIT(S): 10000 SOLUTION INTRAVENOUS; SUBCUTANEOUS at 10:37

## 2025-06-06 RX ADMIN — Medication 0.5 MILLIGRAM(S): at 21:41

## 2025-06-06 RX ADMIN — Medication 2001 MILLIGRAM(S): at 13:28

## 2025-06-06 RX ADMIN — INSULIN LISPRO 2: 100 INJECTION, SOLUTION INTRAVENOUS; SUBCUTANEOUS at 17:17

## 2025-06-06 RX ADMIN — SERTRALINE 200 MILLIGRAM(S): 100 TABLET, FILM COATED ORAL at 13:31

## 2025-06-06 RX ADMIN — Medication 50 MILLIGRAM(S): at 06:22

## 2025-06-06 RX ADMIN — Medication 1 GRAM(S): at 13:31

## 2025-06-06 RX ADMIN — Medication 50 MILLIGRAM(S): at 13:33

## 2025-06-06 RX ADMIN — Medication 200 MICROGRAM(S): at 06:21

## 2025-06-06 RX ADMIN — Medication 1 APPLICATION(S): at 08:00

## 2025-06-06 RX ADMIN — MEROPENEM 500 MILLIGRAM(S): 1 INJECTION INTRAVENOUS at 13:54

## 2025-06-06 RX ADMIN — Medication 0.5 MILLIGRAM(S): at 00:00

## 2025-06-06 RX ADMIN — Medication 0.5 MILLIGRAM(S): at 23:20

## 2025-06-06 RX ADMIN — LISINOPRIL 10 MILLIGRAM(S): 5 TABLET ORAL at 06:21

## 2025-06-06 RX ADMIN — Medication 0.5 MILLIGRAM(S): at 10:10

## 2025-06-06 RX ADMIN — Medication 81 MILLIGRAM(S): at 13:28

## 2025-06-06 RX ADMIN — Medication 0.5 MILLIGRAM(S): at 10:45

## 2025-06-06 RX ADMIN — Medication 10 MILLIGRAM(S): at 13:30

## 2025-06-06 RX ADMIN — Medication 1 APPLICATION(S): at 13:54

## 2025-06-06 RX ADMIN — CARVEDILOL 25 MILLIGRAM(S): 3.12 TABLET, FILM COATED ORAL at 06:21

## 2025-06-06 RX ADMIN — Medication 1 GRAM(S): at 06:22

## 2025-06-06 RX ADMIN — Medication 0.5 MILLIGRAM(S): at 06:20

## 2025-06-06 RX ADMIN — Medication 1 GRAM(S): at 23:22

## 2025-06-06 RX ADMIN — INSULIN LISPRO 2: 100 INJECTION, SOLUTION INTRAVENOUS; SUBCUTANEOUS at 07:48

## 2025-06-06 NOTE — PROGRESS NOTE ADULT - SUBJECTIVE AND OBJECTIVE BOX
Reason for visit: ESRD on HD    Subjective: No acute overnight event. Patient denied any cardiac or urinary complains. No fever/chills. Seen on HD.    ROS: All systems were reviewed in detail pertinent positive and negative mentioned above, rest are negative.    Physical Exam:  Gen: no acute distress  MS: alert, conversing normally  Eyes: EOMI, no icterus  HENT: NCAT, MMM  CV: rhythm reg reg, rate normal, no m/g/r  Chest: CTAB, no w/r/r,  Abd: soft, NT, ND  Extremities: No edema    Vital Signs Last 24 Hrs  T(C): 37.2 (06 Jun 2025 15:45), Max: 37.2 (06 Jun 2025 15:45)  T(F): 98.9 (06 Jun 2025 15:45), Max: 98.9 (06 Jun 2025 15:45)  HR: 78 (06 Jun 2025 15:45) (67 - 78)  BP: 153/55 (06 Jun 2025 15:45) (138/68 - 172/71)  BP(mean): --  RR: 18 (06 Jun 2025 15:45) (17 - 18)  SpO2: 95% (06 Jun 2025 15:45) (95% - 99%)    Parameters below as of 06 Jun 2025 15:45  Patient On (Oxygen Delivery Method): room air      I&O's Summary    05 Jun 2025 07:01  -  06 Jun 2025 07:00  --------------------------------------------------------  IN: 1000 mL / OUT: 1 mL / NET: 999 mL    06 Jun 2025 07:01  -  06 Jun 2025 21:21  --------------------------------------------------------  IN: 0 mL / OUT: 1000 mL / NET: -1000 mL      Current Antibiotics:  meropenem Injectable 500 milliGRAM(s) IV Push every 24 hours    Other medications:  aspirin enteric coated 81 milliGRAM(s) Oral daily  calcium acetate 2001 milliGRAM(s) Oral two times a day with meals  carvedilol 25 milliGRAM(s) Oral every 12 hours  chlorhexidine 2% Cloths 1 Application(s) Topical daily  CREON (Pancrelipase 3000 units) 1 Capsule(s) 1 Capsule(s) Oral three times a day with meals  dextrose 5%. 1000 milliLiter(s) IV Continuous <Continuous>  dextrose 5%. 1000 milliLiter(s) IV Continuous <Continuous>  dextrose 50% Injectable 25 Gram(s) IV Push once  dextrose 50% Injectable 12.5 Gram(s) IV Push once  dextrose 50% Injectable 25 Gram(s) IV Push once  epoetin day (PROCRIT) Injectable 92222 Unit(s) IV Push <User Schedule>  ethyl chloride Spray 1 Application(s) Topical <User Schedule>  glucagon  Injectable 1 milliGRAM(s) IntraMuscular once  hydrALAZINE 50 milliGRAM(s) Oral three times a day  insulin lispro (ADMELOG) corrective regimen sliding scale   SubCutaneous three times a day before meals  insulin lispro (ADMELOG) corrective regimen sliding scale   SubCutaneous at bedtime  levothyroxine 200 MICROGram(s) Oral daily  lisinopril 10 milliGRAM(s) Oral daily  metoclopramide 10 milliGRAM(s) Oral three times a day before meals  polyethylene glycol 3350 17 Gram(s) Oral daily  sertraline 200 milliGRAM(s) Oral daily  sucralfate suspension 1 Gram(s) Oral four times a day    06-06    137  |  98  |  41.7[H]  ----------------------------<  152[H]  4.8   |  26.0  |  5.35[H]    Ca    8.3[L]      06 Jun 2025 06:26  Mg     2.3     06-06    TPro  5.9[L]  /  Alb  3.1[L]  /  TBili  0.2[L]  /  DBili  x   /  AST  21  /  ALT  7   /  AlkPhos  98  06-05    Creatinine: 5.35 mg/dL (06-06-25 @ 06:26)  Creatinine: 3.93 mg/dL (06-05-25 @ 05:22)  Creatinine: 5.41 mg/dL (06-04-25 @ 05:35)  Creatinine: 4.33 mg/dL (06-03-25 @ 05:15)  Creatinine: 6.25 mg/dL (06-02-25 @ 05:35)                          7.3    4.06  )-----------( 93       ( 06 Jun 2025 06:26 )             23.8

## 2025-06-06 NOTE — PROGRESS NOTE ADULT - SUBJECTIVE AND OBJECTIVE BOX
Chief complaint: Hepatic abscess    Patient seen and examined at bedside. No acute overnight events reported. No fever, chills, nausea or vomiting. HD today.     Vital Signs Last 24 Hrs  T(F): 97.8 (06 Jun 2025 08:08), Max: 98.1 (06 Jun 2025 07:55)  HR: 75 (06 Jun 2025 08:08) (75 - 78)  BP: 162/72 (06 Jun 2025 08:08) (138/68 - 172/71)  RR: 17 (06 Jun 2025 08:08) (17 - 18)  SpO2: 99% (06 Jun 2025 08:08) (97% - 99%)    Physical Exam:  Constitutional: alert and oriented, in no acute distress   Neck: Soft and supple  Respiratory: Clear to auscultation bilaterally, no wheezes or crackles  Cardiovascular: Regular rate and rhyhtm, no murmurs, gallops, rubs  Gastrointestinal: Soft, non-tender to palpation, +bs  Vascular: 2+ peripheral pulses  Neurological: A/O x 3, no focal neurological deficits  Musculoskeletal: 5/5 strength b/l upper and lower extremities, no lower extremity edema bilaterally    Labs:                        7.3    4.06  )-----------( 93       ( 06 Jun 2025 06:26 )             23.8

## 2025-06-06 NOTE — PROGRESS NOTE ADULT - ASSESSMENT
69 year old female pt admitted for possible colitis- Nephrology consulted for managing HD needs.    ESRD on HD  MWF schedule at [facility]  Access ; [facility]  Seen on HD.  Qd, Qb, UF rate reviewed.  Vitals stable.  Access working well.  Patient tolerating HD well.    anemia of CKD  Hb below goal  continue LASHELL with HD    HTN/ volume  Bp stable- pt euvolemic  UF as tolerated with HD    CKD MBD  ca++ at goal  Continue phos binders with meals    Monitor Phos levels    fever, photobia and HA  sp LP on 05/30

## 2025-06-06 NOTE — PROGRESS NOTE ADULT - ASSESSMENT
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 CT showed possible colitis. Course complicated by fever, chills, nausea, and severe headache. Infectious w/u initiated. ID and Neuro consulted. s/p LP CSF cxs neg for possible aseptic meningitis. Course further complicated by RUQ Abd pain/noted with hepatic abscess. IR consulted, unable to drain. Plan for long term IV ABxs, pending final culture data    PLAN:  # Abdominal pain -> Hepatic Abscess  # Gastroparesis  # Chronic Pancreatitis  # Erosive Esophagitis history  # Cirrhosis, prior HCV  - Likely cause of fevers  - Abd pain increased RUQ -> CT A/P non contrast ordered 6/2PM  - CT A/P 6/2 Hepatic Abscess  - Could be possible biliary sepsis picture  - CT A/P w/IVC confirms liver abscess  - ABD US ordered to assess abscess and biliary tree  - Hydromorphone PRN  - Ondansetron PRN  - Metoclopramide for gastroparesis  - Pancreatic enzymes supplements TID w/ meals  - Sucralfate QID  - Miralax daily  - Start Meropenem 500mg q24hrs (allergic to penicillins)  - IR unable to drain   - Repeat BCx ordered  - ID following   - Repeat blood culture form 6/3 prelim negative    #Possible Aseptic Meningitis  - Pt was w/ persistent headaches, recurrent fevers, chills, nausea- now resolved.  - no leukocytosis  - bcx x2 5/29 negative to date  - s/p LP 5/30  - CSF appearance and cell counts unremarkable  - CSF gram stain and PCR negative  - CSF culture negative  - f/u Lyme and West Nile Virus antibodies  - f/u serum tick panel   - s/p meropenem 1g q24hrs  - s/p vanco 750mg TIW post dialysis  - s/p acyclovir 340mg q24hrs  - CTH unremarkable  - Check GI PCR if pt develops diarrhea  - ID following, recs appreciated    # ESRD  # Hyperkalemia  # Hyperphosphatemia  # AoCKD  - Telemetry  - HD per Nephrology  - Phosphate binder  - LASHELL with HD  - Iron stores wnl    #Hypertension  #History of CAD  #History of CHF, likely diastolic  - Continue home lisinopril 10mg daily  - Continue hydralazine 50MG TID  - ASA to be continued  - I &Os and daily weight    #Diabetes  #Hypothyroidism, history of thyroidectomy for cancer  - Blood glucose monitoring with sliding scale Lispro  - A1c 7.3  - Levothyroxine 200mcg daily    #Anxiety  #Depression  - Sertraline to be continued    DVT ppx  - SCD    Dispo: DC likely [facility] pending final blood cultures

## 2025-06-07 LAB
ANION GAP SERPL CALC-SCNC: 10 MMOL/L — SIGNIFICANT CHANGE UP (ref 5–17)
ANISOCYTOSIS BLD QL: SLIGHT — SIGNIFICANT CHANGE UP
BASOPHILS # BLD AUTO: 0.02 K/UL — SIGNIFICANT CHANGE UP (ref 0–0.2)
BASOPHILS # BLD MANUAL: 0.06 K/UL — SIGNIFICANT CHANGE UP (ref 0–0.2)
BASOPHILS NFR BLD AUTO: 0.6 % — SIGNIFICANT CHANGE UP (ref 0–2)
BASOPHILS NFR BLD MANUAL: 1.8 % — SIGNIFICANT CHANGE UP (ref 0–2)
BUN SERPL-MCNC: 29.6 MG/DL — HIGH (ref 8–20)
CALCIUM SERPL-MCNC: 8.4 MG/DL — SIGNIFICANT CHANGE UP (ref 8.4–10.5)
CHLORIDE SERPL-SCNC: 100 MMOL/L — SIGNIFICANT CHANGE UP (ref 96–108)
CO2 SERPL-SCNC: 29 MMOL/L — SIGNIFICANT CHANGE UP (ref 22–29)
CREAT SERPL-MCNC: 3.89 MG/DL — HIGH (ref 0.5–1.3)
EGFR: 12 ML/MIN/1.73M2 — LOW
EGFR: 12 ML/MIN/1.73M2 — LOW
EOSINOPHIL # BLD AUTO: 0.17 K/UL — SIGNIFICANT CHANGE UP (ref 0–0.5)
EOSINOPHIL # BLD MANUAL: 0.21 K/UL — SIGNIFICANT CHANGE UP (ref 0–0.5)
EOSINOPHIL NFR BLD AUTO: 4.8 % — SIGNIFICANT CHANGE UP (ref 0–6)
EOSINOPHIL NFR BLD MANUAL: 6.1 % — HIGH (ref 0–6)
GIANT PLATELETS BLD QL SMEAR: PRESENT
GLUCOSE BLDC GLUCOMTR-MCNC: 159 MG/DL — HIGH (ref 70–99)
GLUCOSE BLDC GLUCOMTR-MCNC: 188 MG/DL — HIGH (ref 70–99)
GLUCOSE BLDC GLUCOMTR-MCNC: 199 MG/DL — HIGH (ref 70–99)
GLUCOSE BLDC GLUCOMTR-MCNC: 254 MG/DL — HIGH (ref 70–99)
GLUCOSE SERPL-MCNC: 173 MG/DL — HIGH (ref 70–99)
HCT VFR BLD CALC: 23.7 % — LOW (ref 34.5–45)
HGB BLD-MCNC: 7.4 G/DL — LOW (ref 11.5–15.5)
HYPOCHROMIA BLD QL: SLIGHT — SIGNIFICANT CHANGE UP
IMM GRANULOCYTES # BLD AUTO: 0.01 K/UL — SIGNIFICANT CHANGE UP (ref 0–0.07)
IMM GRANULOCYTES NFR BLD AUTO: 0.3 % — SIGNIFICANT CHANGE UP (ref 0–0.9)
IMMATURE PLATELET FRACTION #: 4.6 K/UL — LOW (ref 4.7–11.1)
IMMATURE PLATELET FRACTION %: 4.9 % — SIGNIFICANT CHANGE UP (ref 1.6–4.9)
LYMPHOCYTES # BLD AUTO: 0.84 K/UL — LOW (ref 1–3.3)
LYMPHOCYTES # BLD MANUAL: 0.92 K/UL — LOW (ref 1–3.3)
LYMPHOCYTES NFR BLD AUTO: 23.9 % — SIGNIFICANT CHANGE UP (ref 13–44)
LYMPHOCYTES NFR BLD MANUAL: 26.3 % — SIGNIFICANT CHANGE UP (ref 13–44)
MCHC RBC-ENTMCNC: 29.7 PG — SIGNIFICANT CHANGE UP (ref 27–34)
MCHC RBC-ENTMCNC: 31.2 G/DL — LOW (ref 32–36)
MCV RBC AUTO: 95.2 FL — SIGNIFICANT CHANGE UP (ref 80–100)
MICROCYTES BLD QL: SLIGHT — SIGNIFICANT CHANGE UP
MONOCYTES # BLD AUTO: 0.48 K/UL — SIGNIFICANT CHANGE UP (ref 0–0.9)
MONOCYTES # BLD MANUAL: 0.21 K/UL — SIGNIFICANT CHANGE UP (ref 0–0.9)
MONOCYTES NFR BLD AUTO: 13.7 % — SIGNIFICANT CHANGE UP (ref 2–14)
MONOCYTES NFR BLD MANUAL: 6.1 % — SIGNIFICANT CHANGE UP (ref 2–14)
NEUTROPHILS # BLD AUTO: 1.99 K/UL — SIGNIFICANT CHANGE UP (ref 1.8–7.4)
NEUTROPHILS # BLD MANUAL: 2.1 K/UL — SIGNIFICANT CHANGE UP (ref 1.8–7.4)
NEUTROPHILS NFR BLD AUTO: 56.7 % — SIGNIFICANT CHANGE UP (ref 43–77)
NEUTROPHILS NFR BLD MANUAL: 59.7 % — SIGNIFICANT CHANGE UP (ref 43–77)
NRBC # BLD AUTO: 0 K/UL — SIGNIFICANT CHANGE UP (ref 0–0)
NRBC # FLD: 0 K/UL — SIGNIFICANT CHANGE UP (ref 0–0)
NRBC BLD AUTO-RTO: 0 /100 WBCS — SIGNIFICANT CHANGE UP (ref 0–0)
PLAT MORPH BLD: NORMAL — SIGNIFICANT CHANGE UP
PLATELET # BLD AUTO: 93 K/UL — LOW (ref 150–400)
PMV BLD: 12.2 FL — SIGNIFICANT CHANGE UP (ref 7–13)
POLYCHROMASIA BLD QL SMEAR: SLIGHT — SIGNIFICANT CHANGE UP
POTASSIUM SERPL-MCNC: 4.5 MMOL/L — SIGNIFICANT CHANGE UP (ref 3.5–5.3)
POTASSIUM SERPL-SCNC: 4.5 MMOL/L — SIGNIFICANT CHANGE UP (ref 3.5–5.3)
RBC # BLD: 2.49 M/UL — LOW (ref 3.8–5.2)
RBC # FLD: 15 % — HIGH (ref 10.3–14.5)
RBC BLD AUTO: ABNORMAL
SODIUM SERPL-SCNC: 139 MMOL/L — SIGNIFICANT CHANGE UP (ref 135–145)
WBC # BLD: 3.51 K/UL — LOW (ref 3.8–10.5)
WBC # FLD AUTO: 3.51 K/UL — LOW (ref 3.8–10.5)

## 2025-06-07 PROCEDURE — 99232 SBSQ HOSP IP/OBS MODERATE 35: CPT

## 2025-06-07 RX ADMIN — Medication 50 MILLIGRAM(S): at 12:28

## 2025-06-07 RX ADMIN — Medication 200 MICROGRAM(S): at 05:42

## 2025-06-07 RX ADMIN — INSULIN LISPRO 1: 100 INJECTION, SOLUTION INTRAVENOUS; SUBCUTANEOUS at 17:40

## 2025-06-07 RX ADMIN — Medication 50 MILLIGRAM(S): at 21:59

## 2025-06-07 RX ADMIN — Medication 0.5 MILLIGRAM(S): at 06:26

## 2025-06-07 RX ADMIN — Medication 1 GRAM(S): at 23:50

## 2025-06-07 RX ADMIN — Medication 0.5 MILLIGRAM(S): at 19:38

## 2025-06-07 RX ADMIN — Medication 10 MILLIGRAM(S): at 17:41

## 2025-06-07 RX ADMIN — Medication 10 MILLIGRAM(S): at 06:24

## 2025-06-07 RX ADMIN — LISINOPRIL 10 MILLIGRAM(S): 5 TABLET ORAL at 05:41

## 2025-06-07 RX ADMIN — Medication 0.5 MILLIGRAM(S): at 20:38

## 2025-06-07 RX ADMIN — INSULIN LISPRO 1: 100 INJECTION, SOLUTION INTRAVENOUS; SUBCUTANEOUS at 21:59

## 2025-06-07 RX ADMIN — Medication 0.5 MILLIGRAM(S): at 12:10

## 2025-06-07 RX ADMIN — Medication 1 GRAM(S): at 12:24

## 2025-06-07 RX ADMIN — MEROPENEM 500 MILLIGRAM(S): 1 INJECTION INTRAVENOUS at 09:05

## 2025-06-07 RX ADMIN — Medication 0.5 MILLIGRAM(S): at 02:26

## 2025-06-07 RX ADMIN — Medication 0.5 MILLIGRAM(S): at 16:10

## 2025-06-07 RX ADMIN — Medication 0.5 MILLIGRAM(S): at 23:49

## 2025-06-07 RX ADMIN — Medication 0.5 MILLIGRAM(S): at 10:44

## 2025-06-07 RX ADMIN — Medication 1 GRAM(S): at 05:41

## 2025-06-07 RX ADMIN — Medication 10 MILLIGRAM(S): at 12:25

## 2025-06-07 RX ADMIN — Medication 2001 MILLIGRAM(S): at 17:42

## 2025-06-07 RX ADMIN — SERTRALINE 200 MILLIGRAM(S): 100 TABLET, FILM COATED ORAL at 12:25

## 2025-06-07 RX ADMIN — Medication 81 MILLIGRAM(S): at 12:25

## 2025-06-07 RX ADMIN — INSULIN LISPRO 1: 100 INJECTION, SOLUTION INTRAVENOUS; SUBCUTANEOUS at 07:56

## 2025-06-07 RX ADMIN — Medication 1 GRAM(S): at 17:41

## 2025-06-07 RX ADMIN — Medication 0.5 MILLIGRAM(S): at 06:23

## 2025-06-07 RX ADMIN — CARVEDILOL 25 MILLIGRAM(S): 3.12 TABLET, FILM COATED ORAL at 05:41

## 2025-06-07 RX ADMIN — Medication 1 APPLICATION(S): at 12:25

## 2025-06-07 RX ADMIN — Medication 2001 MILLIGRAM(S): at 07:57

## 2025-06-07 RX ADMIN — INSULIN LISPRO 1: 100 INJECTION, SOLUTION INTRAVENOUS; SUBCUTANEOUS at 12:24

## 2025-06-07 RX ADMIN — Medication 50 MILLIGRAM(S): at 05:42

## 2025-06-07 RX ADMIN — Medication 0.5 MILLIGRAM(S): at 15:41

## 2025-06-07 NOTE — PROGRESS NOTE ADULT - ASSESSMENT
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 CT showed possible colitis. Course complicated by fever, chills, nausea, and severe headache. Infectious w/u initiated. ID and Neuro consulted. s/p LP CSF cxs neg for possible aseptic meningitis. Course further complicated by RUQ Abd pain/noted with hepatic abscess. IR consulted, unable to drain. Plan for long term IV ABxs, pending final culture data    PLAN:  # Hepatic Abscess  # Gastroparesis  # Chronic Pancreatitis  # Erosive Esophagitis history  # Cirrhosis, prior HCV  - Likely cause of fevers  - Abd pain increased RUQ -> CT A/P non contrast ordered 6/2PM  - CT A/P 6/2 Hepatic Abscess  - Could be possible biliary sepsis picture  - CT A/P w/IVC confirms liver abscess  - ABD US ordered to assess abscess and biliary tree  - Hydromorphone PRN  - Ondansetron PRN  - Metoclopramide for gastroparesis  - Pancreatic enzymes supplements TID w/ meals  - Sucralfate QID  - Miralax daily  - Continue Meropenem  - IR unable to drain   - Repeat BCx ordered  - ID following   - Repeat blood culture form 6/3 prelim negative    #Possible Aseptic Meningitis  - Pt was w/ persistent headaches, recurrent fevers, chills, nausea- now resolved.  - no leukocytosis  - bcx x2 5/29 negative to date  - s/p LP 5/30  - CSF appearance and cell counts unremarkable  - CSF gram stain and PCR negative  - CSF culture negative  - f/u Lyme and West Nile Virus antibodies  - f/u serum tick panel   - s/p meropenem 1g q24hrs  - s/p vanco 750mg TIW post dialysis  - s/p acyclovir 340mg q24hrs  - CTH unremarkable  - Check GI PCR if pt develops diarrhea  - ID following, recs appreciated    # ESRD  # Hyperkalemia  # Hyperphosphatemia  # AoCKD  - Telemetry  - HD per Nephrology  - Phosphate binder  - LASHELL with HD  - Iron stores wnl    #Hypertension  #History of CAD  #History of CHF, likely diastolic  - Continue home lisinopril 10mg daily  - Continue hydralazine 50MG TID  - ASA to be continued  - I &Os and daily weight    #Diabetes  #Hypothyroidism, history of thyroidectomy for cancer  - Blood glucose monitoring with sliding scale Lispro  - A1c 7.3  - Levothyroxine 200mcg daily    #Anxiety  #Depression  - Sertraline to be continued    DVT ppx  - SCD    Dispo: DC likely [facility] pending final blood cultures

## 2025-06-07 NOTE — PROGRESS NOTE ADULT - SUBJECTIVE AND OBJECTIVE BOX
Chief complaint: Hepatic Abscess    Patient seen and examined at bedside. No acute overnight events reported. No fever, chills, nausea or vomiting.     Vital Signs Last 24 Hrs  T(F): 98.2 (07 Jun 2025 10:44), Max: 98.9 (06 Jun 2025 15:45)  HR: 76 (07 Jun 2025 10:44) (74 - 78)  BP: 128/62 (07 Jun 2025 10:44) (128/62 - 153/55)  RR: 18 (07 Jun 2025 10:44) (18 - 18)  SpO2: 96% (07 Jun 2025 10:44) (94% - 97%)    Physical Exam:  Constitutional: alert and oriented, in no acute distress   Neck: Soft and supple  Respiratory: Clear to auscultation bilaterally  Cardiovascular: Regular rate and rhyhtm  Gastrointestinal: Soft, non-tender to palpation, +bs  Musculoskeletal: no lower extremity edema bilaterally    Labs:                        7.4    3.51  )-----------( 93       ( 07 Jun 2025 06:09 )             23.7   06-07    139  |  100  |  29.6[H]  ----------------------------<  173[H]  4.5   |  29.0  |  3.89[H]    Ca    8.4      07 Jun 2025 06:09  Mg     2.3     06-06

## 2025-06-08 LAB
ANION GAP SERPL CALC-SCNC: 12 MMOL/L — SIGNIFICANT CHANGE UP (ref 5–17)
ANISOCYTOSIS BLD QL: SLIGHT — SIGNIFICANT CHANGE UP
BASOPHILS # BLD AUTO: 0.02 K/UL — SIGNIFICANT CHANGE UP (ref 0–0.2)
BASOPHILS # BLD MANUAL: 0.03 K/UL — SIGNIFICANT CHANGE UP (ref 0–0.2)
BASOPHILS NFR BLD AUTO: 0.6 % — SIGNIFICANT CHANGE UP (ref 0–2)
BASOPHILS NFR BLD MANUAL: 0.9 % — SIGNIFICANT CHANGE UP (ref 0–2)
BUN SERPL-MCNC: 42 MG/DL — HIGH (ref 8–20)
CALCIUM SERPL-MCNC: 8.6 MG/DL — SIGNIFICANT CHANGE UP (ref 8.4–10.5)
CHLORIDE SERPL-SCNC: 97 MMOL/L — SIGNIFICANT CHANGE UP (ref 96–108)
CO2 SERPL-SCNC: 27 MMOL/L — SIGNIFICANT CHANGE UP (ref 22–29)
CREAT SERPL-MCNC: 4.95 MG/DL — HIGH (ref 0.5–1.3)
CULTURE RESULTS: SIGNIFICANT CHANGE UP
CULTURE RESULTS: SIGNIFICANT CHANGE UP
EGFR: 9 ML/MIN/1.73M2 — LOW
EGFR: 9 ML/MIN/1.73M2 — LOW
ELLIPTOCYTES BLD QL SMEAR: SLIGHT — SIGNIFICANT CHANGE UP
EOSINOPHIL # BLD AUTO: 0.22 K/UL — SIGNIFICANT CHANGE UP (ref 0–0.5)
EOSINOPHIL # BLD MANUAL: 0.18 K/UL — SIGNIFICANT CHANGE UP (ref 0–0.5)
EOSINOPHIL NFR BLD AUTO: 6.4 % — HIGH (ref 0–6)
EOSINOPHIL NFR BLD MANUAL: 5.2 % — SIGNIFICANT CHANGE UP (ref 0–6)
GLUCOSE BLDC GLUCOMTR-MCNC: 150 MG/DL — HIGH (ref 70–99)
GLUCOSE BLDC GLUCOMTR-MCNC: 185 MG/DL — HIGH (ref 70–99)
GLUCOSE BLDC GLUCOMTR-MCNC: 212 MG/DL — HIGH (ref 70–99)
GLUCOSE BLDC GLUCOMTR-MCNC: 215 MG/DL — HIGH (ref 70–99)
GLUCOSE SERPL-MCNC: 157 MG/DL — HIGH (ref 70–99)
HCT VFR BLD CALC: 25.5 % — LOW (ref 34.5–45)
HGB BLD-MCNC: 7.8 G/DL — LOW (ref 11.5–15.5)
IMM GRANULOCYTES # BLD AUTO: 0.02 K/UL — SIGNIFICANT CHANGE UP (ref 0–0.07)
IMM GRANULOCYTES NFR BLD AUTO: 0.6 % — SIGNIFICANT CHANGE UP (ref 0–0.9)
LYMPHOCYTES # BLD AUTO: 0.86 K/UL — LOW (ref 1–3.3)
LYMPHOCYTES # BLD MANUAL: 0.81 K/UL — LOW (ref 1–3.3)
LYMPHOCYTES NFR BLD AUTO: 24.9 % — SIGNIFICANT CHANGE UP (ref 13–44)
LYMPHOCYTES NFR BLD MANUAL: 23.3 % — SIGNIFICANT CHANGE UP (ref 13–44)
MANUAL REACTIVE LYMPHOCYTES #: 0.06 K/UL — SIGNIFICANT CHANGE UP (ref 0–0.63)
MCHC RBC-ENTMCNC: 29.2 PG — SIGNIFICANT CHANGE UP (ref 27–34)
MCHC RBC-ENTMCNC: 30.6 G/DL — LOW (ref 32–36)
MCV RBC AUTO: 95.5 FL — SIGNIFICANT CHANGE UP (ref 80–100)
MONOCYTES # BLD AUTO: 0.4 K/UL — SIGNIFICANT CHANGE UP (ref 0–0.9)
MONOCYTES # BLD MANUAL: 0.15 K/UL — SIGNIFICANT CHANGE UP (ref 0–0.9)
MONOCYTES NFR BLD AUTO: 11.6 % — SIGNIFICANT CHANGE UP (ref 2–14)
MONOCYTES NFR BLD MANUAL: 4.3 % — SIGNIFICANT CHANGE UP (ref 2–14)
NEUTROPHILS # BLD AUTO: 1.94 K/UL — SIGNIFICANT CHANGE UP (ref 1.8–7.4)
NEUTROPHILS # BLD MANUAL: 2.24 K/UL — SIGNIFICANT CHANGE UP (ref 1.8–7.4)
NEUTROPHILS NFR BLD AUTO: 55.9 % — SIGNIFICANT CHANGE UP (ref 43–77)
NEUTROPHILS NFR BLD MANUAL: 64.6 % — SIGNIFICANT CHANGE UP (ref 43–77)
NRBC # BLD AUTO: 0 K/UL — SIGNIFICANT CHANGE UP (ref 0–0)
NRBC # FLD: 0 K/UL — SIGNIFICANT CHANGE UP (ref 0–0)
NRBC BLD AUTO-RTO: 0 /100 WBCS — SIGNIFICANT CHANGE UP (ref 0–0)
OVALOCYTES BLD QL SMEAR: SLIGHT — SIGNIFICANT CHANGE UP
PLAT MORPH BLD: NORMAL — SIGNIFICANT CHANGE UP
PLATELET # BLD AUTO: 109 K/UL — LOW (ref 150–400)
PMV BLD: 11.7 FL — SIGNIFICANT CHANGE UP (ref 7–13)
POIKILOCYTOSIS BLD QL AUTO: SLIGHT — SIGNIFICANT CHANGE UP
POLYCHROMASIA BLD QL SMEAR: ABNORMAL
POTASSIUM SERPL-MCNC: 5 MMOL/L — SIGNIFICANT CHANGE UP (ref 3.5–5.3)
POTASSIUM SERPL-SCNC: 5 MMOL/L — SIGNIFICANT CHANGE UP (ref 3.5–5.3)
RBC # BLD: 2.67 M/UL — LOW (ref 3.8–5.2)
RBC # FLD: 15.2 % — HIGH (ref 10.3–14.5)
RBC BLD AUTO: ABNORMAL
SODIUM SERPL-SCNC: 136 MMOL/L — SIGNIFICANT CHANGE UP (ref 135–145)
SPECIMEN SOURCE: SIGNIFICANT CHANGE UP
SPECIMEN SOURCE: SIGNIFICANT CHANGE UP
VARIANT LYMPHS # BLD: 1.7 % — SIGNIFICANT CHANGE UP (ref 0–6)
VARIANT LYMPHS NFR BLD MANUAL: 1.7 % — SIGNIFICANT CHANGE UP (ref 0–6)
WBC # BLD: 3.46 K/UL — LOW (ref 3.8–10.5)
WBC # FLD AUTO: 3.46 K/UL — LOW (ref 3.8–10.5)

## 2025-06-08 PROCEDURE — 99232 SBSQ HOSP IP/OBS MODERATE 35: CPT

## 2025-06-08 RX ORDER — ASPIRIN 325 MG
1 TABLET ORAL
Qty: 0 | Refills: 0 | DISCHARGE
Start: 2025-06-08

## 2025-06-08 RX ORDER — TRAMADOL HYDROCHLORIDE 50 MG/1
25 TABLET, FILM COATED ORAL ONCE
Refills: 0 | Status: DISCONTINUED | OUTPATIENT
Start: 2025-06-08 | End: 2025-06-08

## 2025-06-08 RX ADMIN — Medication 0.5 MILLIGRAM(S): at 00:49

## 2025-06-08 RX ADMIN — Medication 0.5 MILLIGRAM(S): at 08:36

## 2025-06-08 RX ADMIN — Medication 0.5 MILLIGRAM(S): at 03:53

## 2025-06-08 RX ADMIN — Medication 0.5 MILLIGRAM(S): at 09:00

## 2025-06-08 RX ADMIN — Medication 0.5 MILLIGRAM(S): at 13:00

## 2025-06-08 RX ADMIN — Medication 1 GRAM(S): at 05:57

## 2025-06-08 RX ADMIN — CARVEDILOL 25 MILLIGRAM(S): 3.12 TABLET, FILM COATED ORAL at 17:08

## 2025-06-08 RX ADMIN — Medication 50 MILLIGRAM(S): at 13:04

## 2025-06-08 RX ADMIN — INSULIN LISPRO 2: 100 INJECTION, SOLUTION INTRAVENOUS; SUBCUTANEOUS at 17:08

## 2025-06-08 RX ADMIN — Medication 1 APPLICATION(S): at 11:22

## 2025-06-08 RX ADMIN — CARVEDILOL 25 MILLIGRAM(S): 3.12 TABLET, FILM COATED ORAL at 05:57

## 2025-06-08 RX ADMIN — MEROPENEM 500 MILLIGRAM(S): 1 INJECTION INTRAVENOUS at 11:20

## 2025-06-08 RX ADMIN — Medication 1 GRAM(S): at 17:07

## 2025-06-08 RX ADMIN — Medication 2001 MILLIGRAM(S): at 17:07

## 2025-06-08 RX ADMIN — Medication 200 MICROGRAM(S): at 05:57

## 2025-06-08 RX ADMIN — Medication 81 MILLIGRAM(S): at 08:26

## 2025-06-08 RX ADMIN — Medication 2001 MILLIGRAM(S): at 08:26

## 2025-06-08 RX ADMIN — Medication 10 MILLIGRAM(S): at 11:21

## 2025-06-08 RX ADMIN — Medication 10 MILLIGRAM(S): at 05:57

## 2025-06-08 RX ADMIN — Medication 10 MILLIGRAM(S): at 17:07

## 2025-06-08 RX ADMIN — Medication 1 GRAM(S): at 11:20

## 2025-06-08 RX ADMIN — Medication 50 MILLIGRAM(S): at 21:15

## 2025-06-08 RX ADMIN — LISINOPRIL 10 MILLIGRAM(S): 5 TABLET ORAL at 05:57

## 2025-06-08 RX ADMIN — INSULIN LISPRO 1: 100 INJECTION, SOLUTION INTRAVENOUS; SUBCUTANEOUS at 08:27

## 2025-06-08 RX ADMIN — Medication 1 GRAM(S): at 21:16

## 2025-06-08 RX ADMIN — SERTRALINE 200 MILLIGRAM(S): 100 TABLET, FILM COATED ORAL at 11:21

## 2025-06-08 RX ADMIN — Medication 0.5 MILLIGRAM(S): at 22:13

## 2025-06-08 RX ADMIN — Medication 0.5 MILLIGRAM(S): at 21:12

## 2025-06-08 RX ADMIN — Medication 0.5 MILLIGRAM(S): at 04:49

## 2025-06-08 RX ADMIN — TRAMADOL HYDROCHLORIDE 25 MILLIGRAM(S): 50 TABLET, FILM COATED ORAL at 10:59

## 2025-06-08 RX ADMIN — Medication 0.5 MILLIGRAM(S): at 17:30

## 2025-06-08 RX ADMIN — Medication 0.5 MILLIGRAM(S): at 12:45

## 2025-06-08 RX ADMIN — TRAMADOL HYDROCHLORIDE 25 MILLIGRAM(S): 50 TABLET, FILM COATED ORAL at 12:00

## 2025-06-08 RX ADMIN — Medication 0.5 MILLIGRAM(S): at 17:09

## 2025-06-08 RX ADMIN — Medication 50 MILLIGRAM(S): at 05:57

## 2025-06-08 NOTE — PROGRESS NOTE ADULT - SUBJECTIVE AND OBJECTIVE BOX
Patient is a 69y old  Female who presents with a chief complaint of Abdominal pain  Dialysis (07 Jun 2025 12:42)      INTERVAL HPI/OVERNIGHT EVENTS:  - No acute overnight events    TODAY:  - Patient examined bedside, no complaints  - Patient denies CP, SOB, fever, nausea, vomiting    MEDICATIONS  (STANDING):  aspirin enteric coated 81 milliGRAM(s) Oral daily  calcium acetate 2001 milliGRAM(s) Oral two times a day with meals  carvedilol 25 milliGRAM(s) Oral every 12 hours  chlorhexidine 2% Cloths 1 Application(s) Topical daily  CREON (Pancrelipase 3000 units) 1 Capsule(s) 1 Capsule(s) Oral three times a day with meals  dextrose 5%. 1000 milliLiter(s) (50 mL/Hr) IV Continuous <Continuous>  dextrose 5%. 1000 milliLiter(s) (100 mL/Hr) IV Continuous <Continuous>  dextrose 50% Injectable 12.5 Gram(s) IV Push once  dextrose 50% Injectable 25 Gram(s) IV Push once  dextrose 50% Injectable 25 Gram(s) IV Push once  epoetin day (PROCRIT) Injectable 36763 Unit(s) IV Push <User Schedule>  ethyl chloride Spray 1 Application(s) Topical <User Schedule>  glucagon  Injectable 1 milliGRAM(s) IntraMuscular once  hydrALAZINE 50 milliGRAM(s) Oral three times a day  insulin lispro (ADMELOG) corrective regimen sliding scale   SubCutaneous three times a day before meals  insulin lispro (ADMELOG) corrective regimen sliding scale   SubCutaneous at bedtime  levothyroxine 200 MICROGram(s) Oral daily  lisinopril 10 milliGRAM(s) Oral daily  meropenem Injectable 500 milliGRAM(s) IV Push every 24 hours  metoclopramide 10 milliGRAM(s) Oral three times a day before meals  polyethylene glycol 3350 17 Gram(s) Oral daily  sertraline 200 milliGRAM(s) Oral daily  sucralfate suspension 1 Gram(s) Oral four times a day    MEDICATIONS  (PRN):  acetaminophen     Tablet .. 650 milliGRAM(s) Oral every 6 hours PRN Mild Pain (1 - 3)  dextrose Oral Gel 15 Gram(s) Oral once PRN Blood Glucose LESS THAN 70 milliGRAM(s)/deciliter  HYDROmorphone  Injectable 0.5 milliGRAM(s) IV Push every 4 hours PRN abdominal pain  ondansetron Injectable 4 milliGRAM(s) IV Push every 4 hours PRN Nausea and/or Vomiting      Allergies    Augmentin (Hives)  ceftriaxone (Pruritus)    Intolerances        REVIEW OF SYSTEMS:  as above      Vital Signs Last 24 Hrs  T(C): 36.6 (08 Jun 2025 04:30), Max: 36.9 (08 Jun 2025 00:09)  T(F): 97.8 (08 Jun 2025 04:30), Max: 98.4 (08 Jun 2025 00:09)  HR: 76 (08 Jun 2025 04:30) (68 - 77)  BP: 152/61 (08 Jun 2025 04:30) (128/62 - 157/64)  BP(mean): --  RR: 18 (08 Jun 2025 04:30) (18 - 18)  SpO2: 96% (08 Jun 2025 04:30) (94% - 98%)    Parameters below as of 08 Jun 2025 04:30  Patient On (Oxygen Delivery Method): room air        PHYSICAL EXAM:  GENERAL: Not in acute distress, lying comfortably  HEAD:  Atraumatic, Normocephalic  EYES: EOMI, PERRLA, conjunctiva and sclera clear  NECK: Supple, No JVD, Normal thyroid  NERVOUS SYSTEM:  Alert & Oriented X3, No gross focal deficits  CHEST/LUNG: Clear to auscultation bilaterally; No rales, rhonchi, wheezing, or rubs  HEART: Regular rate and rhythm; No murmurs, rubs, or gallops  ABDOMEN: Soft, Nontender, Nondistended; Bowel sounds present  EXTREMITIES:  No clubbing, cyanosis, or edema  SKIN: No rashes or lesions    LABS:                        7.8    3.46  )-----------( 109      ( 08 Jun 2025 04:41 )             25.5     06-08    136  |  97  |  42.0[H]  ----------------------------<  157[H]  5.0   |  27.0  |  4.95[H]    Ca    8.6      08 Jun 2025 04:41        Urinalysis Basic - ( 08 Jun 2025 04:41 )    Color: x / Appearance: x / SG: x / pH: x  Gluc: 157 mg/dL / Ketone: x  / Bili: x / Urobili: x   Blood: x / Protein: x / Nitrite: x   Leuk Esterase: x / RBC: x / WBC x   Sq Epi: x / Non Sq Epi: x / Bacteria: x      CAPILLARY BLOOD GLUCOSE      POCT Blood Glucose.: 254 mg/dL (07 Jun 2025 21:55)  POCT Blood Glucose.: 199 mg/dL (07 Jun 2025 17:38)  POCT Blood Glucose.: 159 mg/dL (07 Jun 2025 12:22)  POCT Blood Glucose.: 188 mg/dL (07 Jun 2025 07:55)      RADIOLOGY & ADDITIONAL TESTS:    Imaging Personally Reviewed:  [X] YES  [ ] NO    Consultant(s) Notes Reviewed:  [X] YES  [ ] NO    Care Discussed with Consultants/Other Providers [X] YES  [ ] NO    Plan of Care discussed with House Staff: [X]YES [ ] NO

## 2025-06-08 NOTE — PROGRESS NOTE ADULT - ASSESSMENT
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 CT showed possible colitis. Course complicated by fever, chills, nausea, and severe headache. Infectious w/u initiated. ID and Neuro consulted. s/p LP CSF cxs neg for possible aseptic meningitis. Course further complicated by RUQ Abd pain/noted with hepatic abscess. IR consulted, unable to drain. Plan for long term IV ABxs, pending final culture data    PLAN:  # Hepatic Abscess  # Gastroparesis  # Chronic Pancreatitis  # Erosive Esophagitis history  # Cirrhosis, prior HCV  - Likely cause of fevers  - Abd pain increased RUQ -> CT A/P non contrast ordered 6/2PM  - CT A/P 6/2 Hepatic Abscess  - Could be possible biliary sepsis picture  - CT A/P w/IVC confirms liver abscess  - ABD US ordered to assess abscess and biliary tree  - Hydromorphone PRN  - Ondansetron PRN  - Metoclopramide for gastroparesis  - Pancreatic enzymes supplements TID w/ meals  - Sucralfate QID  - Miralax daily  - Continue Meropenem  - IR unable to drain   - Repeat BCx ordered  - ID following   - Repeat blood culture form 6/3 prelim negative    #Possible Aseptic Meningitis  - Pt was w/ persistent headaches, recurrent fevers, chills, nausea- now resolved.  - no leukocytosis  - bcx x2 5/29 negative to date  - s/p LP 5/30  - CSF appearance and cell counts unremarkable  - CSF gram stain and PCR negative  - CSF culture negative  - f/u Lyme and West Nile Virus antibodies  - f/u serum tick panel   - s/p meropenem 1g q24hrs  - s/p vanco 750mg TIW post dialysis  - s/p acyclovir 340mg q24hrs  - CTH unremarkable  - Check GI PCR if pt develops diarrhea  - ID following, recs appreciated    # ESRD  # Hyperkalemia  # Hyperphosphatemia  # AoCKD  - Telemetry  - HD per Nephrology  - Phosphate binder  - LASHELL with HD  - Iron stores wnl    #Hypertension  #History of CAD  #History of CHF, likely diastolic  - Continue home lisinopril 10mg daily  - Continue hydralazine 50MG TID  - ASA to be continued  - I &Os and daily weight    #Diabetes  #Hypothyroidism, history of thyroidectomy for cancer  - Blood glucose monitoring with sliding scale Lispro  - A1c 7.3  - Levothyroxine 200mcg daily    #Anxiety  #Depression  - Sertraline to be continued    DVT ppx  - SCD    Dispo: DC likely Monday pending final blood cultures

## 2025-06-08 NOTE — PROGRESS NOTE ADULT - ATTENDING COMMENTS
70 y/o F with PMH of HTN, DM, CA s/p PCI, ESRD on HD admitted for abdominal pain due to hepatic abscess.   Continue Meropenem  ID recs appreciated  Blood cultures negative  Add Tramadol    DC home tomorrow

## 2025-06-09 ENCOUNTER — TRANSCRIPTION ENCOUNTER (OUTPATIENT)
Age: 70
End: 2025-06-09

## 2025-06-09 LAB
ANION GAP SERPL CALC-SCNC: 12 MMOL/L — SIGNIFICANT CHANGE UP (ref 5–17)
ANISOCYTOSIS BLD QL: SLIGHT — SIGNIFICANT CHANGE UP
BASOPHILS # BLD AUTO: 0.04 K/UL — SIGNIFICANT CHANGE UP (ref 0–0.2)
BASOPHILS # BLD MANUAL: 0.04 K/UL — SIGNIFICANT CHANGE UP (ref 0–0.2)
BASOPHILS NFR BLD AUTO: 1 % — SIGNIFICANT CHANGE UP (ref 0–2)
BASOPHILS NFR BLD MANUAL: 0.9 % — SIGNIFICANT CHANGE UP (ref 0–2)
BUN SERPL-MCNC: 48.6 MG/DL — HIGH (ref 8–20)
CALCIUM SERPL-MCNC: 8.6 MG/DL — SIGNIFICANT CHANGE UP (ref 8.4–10.5)
CHLORIDE SERPL-SCNC: 100 MMOL/L — SIGNIFICANT CHANGE UP (ref 96–108)
CO2 SERPL-SCNC: 26 MMOL/L — SIGNIFICANT CHANGE UP (ref 22–29)
CREAT SERPL-MCNC: 5.68 MG/DL — HIGH (ref 0.5–1.3)
E HISTOLYT AB SER-ACNC: NEGATIVE — SIGNIFICANT CHANGE UP
EGFR: 8 ML/MIN/1.73M2 — LOW
EGFR: 8 ML/MIN/1.73M2 — LOW
ELLIPTOCYTES BLD QL SMEAR: SLIGHT — SIGNIFICANT CHANGE UP
EOSINOPHIL # BLD AUTO: 0.24 K/UL — SIGNIFICANT CHANGE UP (ref 0–0.5)
EOSINOPHIL # BLD MANUAL: 0.28 K/UL — SIGNIFICANT CHANGE UP (ref 0–0.5)
EOSINOPHIL NFR BLD AUTO: 5.9 % — SIGNIFICANT CHANGE UP (ref 0–6)
EOSINOPHIL NFR BLD MANUAL: 6.9 % — HIGH (ref 0–6)
GIANT PLATELETS BLD QL SMEAR: PRESENT
GLUCOSE BLDC GLUCOMTR-MCNC: 135 MG/DL — HIGH (ref 70–99)
GLUCOSE BLDC GLUCOMTR-MCNC: 141 MG/DL — HIGH (ref 70–99)
GLUCOSE BLDC GLUCOMTR-MCNC: 172 MG/DL — HIGH (ref 70–99)
GLUCOSE BLDC GLUCOMTR-MCNC: 175 MG/DL — HIGH (ref 70–99)
GLUCOSE SERPL-MCNC: 130 MG/DL — HIGH (ref 70–99)
HCT VFR BLD CALC: 24.6 % — LOW (ref 34.5–45)
HGB BLD-MCNC: 7.6 G/DL — LOW (ref 11.5–15.5)
IMM GRANULOCYTES # BLD AUTO: 0.02 K/UL — SIGNIFICANT CHANGE UP (ref 0–0.07)
IMM GRANULOCYTES NFR BLD AUTO: 0.5 % — SIGNIFICANT CHANGE UP (ref 0–0.9)
IMMATURE PLATELET FRACTION #: 3.3 K/UL — LOW (ref 4.7–11.1)
IMMATURE PLATELET FRACTION %: 3.5 % — SIGNIFICANT CHANGE UP (ref 1.6–4.9)
LIDOCAIN IGE QN: 22 U/L — SIGNIFICANT CHANGE UP (ref 22–51)
LYMPHOCYTES # BLD AUTO: 0.9 K/UL — LOW (ref 1–3.3)
LYMPHOCYTES # BLD MANUAL: 0.84 K/UL — LOW (ref 1–3.3)
LYMPHOCYTES NFR BLD AUTO: 22.3 % — SIGNIFICANT CHANGE UP (ref 13–44)
LYMPHOCYTES NFR BLD MANUAL: 20.7 % — SIGNIFICANT CHANGE UP (ref 13–44)
MACROCYTES BLD QL: SLIGHT — SIGNIFICANT CHANGE UP
MAGNESIUM SERPL-MCNC: 2.6 MG/DL — SIGNIFICANT CHANGE UP (ref 1.6–2.6)
MCHC RBC-ENTMCNC: 29.3 PG — SIGNIFICANT CHANGE UP (ref 27–34)
MCHC RBC-ENTMCNC: 30.9 G/DL — LOW (ref 32–36)
MCV RBC AUTO: 95 FL — SIGNIFICANT CHANGE UP (ref 80–100)
MICROCYTES BLD QL: SLIGHT — SIGNIFICANT CHANGE UP
MONOCYTES # BLD AUTO: 0.42 K/UL — SIGNIFICANT CHANGE UP (ref 0–0.9)
MONOCYTES # BLD MANUAL: 0.21 K/UL — SIGNIFICANT CHANGE UP (ref 0–0.9)
MONOCYTES NFR BLD AUTO: 10.4 % — SIGNIFICANT CHANGE UP (ref 2–14)
MONOCYTES NFR BLD MANUAL: 5.2 % — SIGNIFICANT CHANGE UP (ref 2–14)
NEUTROPHILS # BLD AUTO: 2.42 K/UL — SIGNIFICANT CHANGE UP (ref 1.8–7.4)
NEUTROPHILS # BLD MANUAL: 2.68 K/UL — SIGNIFICANT CHANGE UP (ref 1.8–7.4)
NEUTROPHILS NFR BLD AUTO: 59.9 % — SIGNIFICANT CHANGE UP (ref 43–77)
NEUTROPHILS NFR BLD MANUAL: 66.3 % — SIGNIFICANT CHANGE UP (ref 43–77)
NRBC # BLD AUTO: 0 K/UL — SIGNIFICANT CHANGE UP (ref 0–0)
NRBC # FLD: 0 K/UL — SIGNIFICANT CHANGE UP (ref 0–0)
NRBC BLD AUTO-RTO: 0 /100 WBCS — SIGNIFICANT CHANGE UP (ref 0–0)
OVALOCYTES BLD QL SMEAR: SLIGHT — SIGNIFICANT CHANGE UP
PLAT MORPH BLD: NORMAL — SIGNIFICANT CHANGE UP
PLATELET # BLD AUTO: 95 K/UL — LOW (ref 150–400)
PLATELET COUNT - ESTIMATE: ABNORMAL
PMV BLD: 11.5 FL — SIGNIFICANT CHANGE UP (ref 7–13)
POIKILOCYTOSIS BLD QL AUTO: SLIGHT — SIGNIFICANT CHANGE UP
POLYCHROMASIA BLD QL SMEAR: SLIGHT — SIGNIFICANT CHANGE UP
POTASSIUM SERPL-MCNC: 5.5 MMOL/L — HIGH (ref 3.5–5.3)
POTASSIUM SERPL-SCNC: 5.5 MMOL/L — HIGH (ref 3.5–5.3)
RBC # BLD: 2.59 M/UL — LOW (ref 3.8–5.2)
RBC # FLD: 15 % — HIGH (ref 10.3–14.5)
RBC BLD AUTO: ABNORMAL
SODIUM SERPL-SCNC: 138 MMOL/L — SIGNIFICANT CHANGE UP (ref 135–145)
TARGETS BLD QL SMEAR: SLIGHT — SIGNIFICANT CHANGE UP
WBC # BLD: 4.04 K/UL — SIGNIFICANT CHANGE UP (ref 3.8–10.5)
WBC # FLD AUTO: 4.04 K/UL — SIGNIFICANT CHANGE UP (ref 3.8–10.5)

## 2025-06-09 PROCEDURE — 90937 HEMODIALYSIS REPEATED EVAL: CPT

## 2025-06-09 PROCEDURE — 36573 INSJ PICC RS&I 5 YR+: CPT

## 2025-06-09 PROCEDURE — 71045 X-RAY EXAM CHEST 1 VIEW: CPT | Mod: 26

## 2025-06-09 PROCEDURE — 99233 SBSQ HOSP IP/OBS HIGH 50: CPT

## 2025-06-09 PROCEDURE — 76937 US GUIDE VASCULAR ACCESS: CPT | Mod: 26,59

## 2025-06-09 PROCEDURE — G0545: CPT

## 2025-06-09 PROCEDURE — 76942 ECHO GUIDE FOR BIOPSY: CPT | Mod: 26,59

## 2025-06-09 PROCEDURE — 36410 VNPNXR 3YR/> PHY/QHP DX/THER: CPT | Mod: 59

## 2025-06-09 PROCEDURE — 99232 SBSQ HOSP IP/OBS MODERATE 35: CPT

## 2025-06-09 RX ORDER — MEROPENEM 1 G/30ML
500 INJECTION INTRAVENOUS
Qty: 21 | Refills: 0
Start: 2025-06-09

## 2025-06-09 RX ORDER — MEROPENEM 1 G/30ML
500 INJECTION INTRAVENOUS EVERY 24 HOURS
Refills: 0 | Status: DISCONTINUED | OUTPATIENT
Start: 2025-06-09 | End: 2025-06-11

## 2025-06-09 RX ADMIN — Medication 0.5 MILLIGRAM(S): at 01:26

## 2025-06-09 RX ADMIN — Medication 50 MILLIGRAM(S): at 14:54

## 2025-06-09 RX ADMIN — Medication 0.5 MILLIGRAM(S): at 10:53

## 2025-06-09 RX ADMIN — Medication 1 GRAM(S): at 05:31

## 2025-06-09 RX ADMIN — Medication 0.5 MILLIGRAM(S): at 14:47

## 2025-06-09 RX ADMIN — Medication 1 GRAM(S): at 17:24

## 2025-06-09 RX ADMIN — MEROPENEM 500 MILLIGRAM(S): 1 INJECTION INTRAVENOUS at 14:55

## 2025-06-09 RX ADMIN — POLYETHYLENE GLYCOL 3350 17 GRAM(S): 17 POWDER, FOR SOLUTION ORAL at 14:55

## 2025-06-09 RX ADMIN — Medication 0.5 MILLIGRAM(S): at 21:19

## 2025-06-09 RX ADMIN — LISINOPRIL 10 MILLIGRAM(S): 5 TABLET ORAL at 05:32

## 2025-06-09 RX ADMIN — Medication 81 MILLIGRAM(S): at 14:54

## 2025-06-09 RX ADMIN — Medication 1 APPLICATION(S): at 09:00

## 2025-06-09 RX ADMIN — Medication 0.5 MILLIGRAM(S): at 06:16

## 2025-06-09 RX ADMIN — Medication 50 MILLIGRAM(S): at 21:19

## 2025-06-09 RX ADMIN — Medication 0.5 MILLIGRAM(S): at 09:53

## 2025-06-09 RX ADMIN — Medication 10 MILLIGRAM(S): at 14:59

## 2025-06-09 RX ADMIN — Medication 0.5 MILLIGRAM(S): at 02:00

## 2025-06-09 RX ADMIN — Medication 10 MILLIGRAM(S): at 05:36

## 2025-06-09 RX ADMIN — CARVEDILOL 25 MILLIGRAM(S): 3.12 TABLET, FILM COATED ORAL at 05:32

## 2025-06-09 RX ADMIN — INSULIN LISPRO 1: 100 INJECTION, SOLUTION INTRAVENOUS; SUBCUTANEOUS at 17:25

## 2025-06-09 RX ADMIN — SERTRALINE 200 MILLIGRAM(S): 100 TABLET, FILM COATED ORAL at 14:59

## 2025-06-09 RX ADMIN — Medication 2001 MILLIGRAM(S): at 17:27

## 2025-06-09 RX ADMIN — Medication 10 MILLIGRAM(S): at 17:24

## 2025-06-09 RX ADMIN — Medication 1 GRAM(S): at 15:00

## 2025-06-09 RX ADMIN — Medication 0.5 MILLIGRAM(S): at 05:30

## 2025-06-09 RX ADMIN — EPOETIN ALFA 10000 UNIT(S): 10000 SOLUTION INTRAVENOUS; SUBCUTANEOUS at 10:33

## 2025-06-09 RX ADMIN — Medication 200 MICROGRAM(S): at 05:33

## 2025-06-09 RX ADMIN — Medication 0.5 MILLIGRAM(S): at 15:10

## 2025-06-09 RX ADMIN — CARVEDILOL 25 MILLIGRAM(S): 3.12 TABLET, FILM COATED ORAL at 17:24

## 2025-06-09 RX ADMIN — Medication 0.5 MILLIGRAM(S): at 21:50

## 2025-06-09 RX ADMIN — Medication 50 MILLIGRAM(S): at 05:32

## 2025-06-09 RX ADMIN — Medication 1 APPLICATION(S): at 17:24

## 2025-06-09 NOTE — PROGRESS NOTE ADULT - ASSESSMENT
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 CT showed possible colitis. Course complicated by fever, chills, nausea, and severe headache. Infectious w/u initiated. ID and Neuro consulted. s/p LP CSF cxs neg for possible aseptic meningitis. Course further complicated by RUQ Abd pain/noted with hepatic abscess. IR consulted, unable to drain. Plan for long term IV ABxs, pending final culture data    PLAN:  # Hepatic Abscess  # Gastroparesis  # Chronic Pancreatitis  # Erosive Esophagitis history  # Cirrhosis, prior HCV  - Likely cause of fevers  - Abd pain increased RUQ -> CT A/P non contrast ordered 6/2PM  - CT A/P 6/2 Hepatic Abscess  - Could be possible biliary sepsis picture  - CT A/P w/IVC confirms liver abscess  - ABD US showed hepatic abscess, cirrhosis   - Hydromorphone PRN  - Ondansetron PRN  - Metoclopramide for gastroparesis  - Pancreatic enzymes supplements TID w/ meals  - Sucralfate QID  - Miralax daily  - Continue Meropenem  - IR unable to drain   - Repeat BCx, NGTD   - ID following     #Possible Aseptic Meningitis  - Pt was w/ persistent headaches, recurrent fevers, chills, nausea- now resolved.  - no leukocytosis  - bcx x2 5/29 negative to date  - s/p LP 5/30  - CSF appearance and cell counts unremarkable  - CSF gram stain and PCR negative  - CSF culture negative  - f/u Lyme and West Nile Virus antibodies  - f/u serum tick panel   - s/p meropenem 1g q24hrs  - s/p vanco 750mg TIW post dialysis  - s/p acyclovir 340mg q24hrs  - CTH unremarkable  - Check GI PCR if pt develops diarrhea  - ID following, recs appreciated    # ESRD  # Hyperkalemia  # Hyperphosphatemia  # AoCKD  - Telemetry  - HD per Nephrology  - Phosphate binder  - LASHELL with HD  - Iron stores wnl    #Hypertension  #History of CAD  #History of CHF, likely diastolic  - Continue home lisinopril 10mg daily  - Continue hydralazine 50MG TID  - ASA to be continued  - I &Os and daily weight    #Diabetes  #Hypothyroidism, history of thyroidectomy for cancer  - Blood glucose monitoring with sliding scale Lispro  - A1c 7.3  - Levothyroxine 200mcg daily    #Anxiety  #Depression  - Sertraline to be continued    DVT: SCDs  Diet: DASH/TLC  Dispo: DC possibly today   69 year old female with Hypertension, Diabetes, CAD s/p PCI, ESRD on HD for 2.5 years (M,W,F), Gastroparesis, IBS, Chronic Pancreatitis, Erosive Esophagitis, 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 CT showed possible colitis. Course complicated by fever, chills, nausea, and severe headache. Infectious w/u initiated. ID and Neuro consulted. s/p LP CSF cxs neg for possible aseptic meningitis. Course further complicated by RUQ Abd pain/noted with hepatic abscess. IR consulted, unable to drain. Plan for long term IV ABxs, pending final culture data    PLAN:  # Hepatic Abscess  # Gastroparesis  # Chronic Pancreatitis  # Erosive Esophagitis history  # Cirrhosis, prior HCV  - Likely cause of fevers  - Abd pain increased RUQ -> CT A/P non contrast ordered 6/2PM  - CT A/P 6/2 Hepatic Abscess  - Could be possible biliary sepsis picture  - CT A/P w/IVC confirms liver abscess  - ABD US showed hepatic abscess, cirrhosis   - Hydromorphone PRN  - Ondansetron PRN  - Metoclopramide for gastroparesis  - Pancreatic enzymes supplements TID w/ meals  - Sucralfate QID  - Miralax daily  - Continue Meropenem  - IR unable to drain   - Repeat BCx, NGTD   - ID following, 6 weeks of Meropenem 500mg IV Q24h (on HD days give post-HD) through July 15th, 2025. Weekly CBC and CMP to be faxed to .     #Possible Aseptic Meningitis  - Pt was w/ persistent headaches, recurrent fevers, chills, nausea- now resolved.  - no leukocytosis  - bcx x2 5/29 negative to date  - s/p LP 5/30  - CSF appearance and cell counts unremarkable  - CSF gram stain and PCR negative  - CSF culture negative  - f/u Lyme and West Nile Virus antibodies  - f/u serum tick panel   - s/p meropenem 1g q24hrs  - s/p vanco 750mg TIW post dialysis  - s/p acyclovir 340mg q24hrs  - CTH unremarkable  - Check GI PCR if pt develops diarrhea  - ID following, recs appreciated    # ESRD  # Hyperkalemia  # Hyperphosphatemia  # AoCKD  - Telemetry  - HD per Nephrology  - Phosphate binder  - LASHELL with HD  - Iron stores wnl    #Hypertension  #History of CAD  #History of CHF, likely diastolic  - Continue home lisinopril 10mg daily  - Continue hydralazine 50MG TID  - ASA to be continued  - I &Os and daily weight    #Diabetes  #Hypothyroidism, history of thyroidectomy for cancer  - Blood glucose monitoring with sliding scale Lispro  - A1c 7.3  - Levothyroxine 200mcg daily    #Anxiety  #Depression  - Sertraline to be continued    DVT: SCDs  Diet: DASH/TLC  Dispo: DC possibly today, post PICC

## 2025-06-09 NOTE — PROGRESS NOTE ADULT - SUBJECTIVE AND OBJECTIVE BOX
[facility]  INFECTIOUS DISEASES at [place] / [place] / [place]  =======================================================                              Kai Kenyon MD                              Professor Emeritus:  Dr Billy Bolaños MD            Diplomates American Board of Internal Medicine & Infectious Diseases                                   Tel  [phone] Fax [phone]                                  Hospital Consult line:  [phone]  =======================================================    Follow Up: Liver abscess     Interval History/ROS: Seen during HD. No acute events overnight    REVIEW OF SYSTEMS  Unchanged from prior     Allergies  Augmentin (Hives)  ceftriaxone (Pruritus)    ANTIMICROBIALS:    meropenem Injectable 500 every 24 hours    OTHER MEDS: MEDICATIONS  (STANDING):  acetaminophen     Tablet .. 650 every 6 hours PRN  aspirin enteric coated 81 daily  carvedilol 25 every 12 hours  dextrose 50% Injectable 25 once  dextrose 50% Injectable 12.5 once  dextrose 50% Injectable 25 once  dextrose Oral Gel 15 once PRN  epoetin day (PROCRIT) Injectable 55004 <User Schedule>  glucagon  Injectable 1 once  hydrALAZINE 50 three times a day  HYDROmorphone  Injectable 0.5 every 4 hours PRN  insulin lispro (ADMELOG) corrective regimen sliding scale  three times a day before meals  insulin lispro (ADMELOG) corrective regimen sliding scale  at bedtime  levothyroxine 200 daily  lisinopril 10 daily  metoclopramide 10 three times a day before meals  ondansetron Injectable 4 every 4 hours PRN  polyethylene glycol 3350 17 daily  sertraline 200 daily  sucralfate suspension 1 four times a day    Vital Signs Last 24 Hrs  T(F): 97.9 (06-09-25 @ 07:54), Max: 99.8 (06-03-25 @ 05:10)    Vital Signs Last 24 Hrs  HR: 70 (06-09-25 @ 07:54) (69 - 75)  BP: 161/75 (06-09-25 @ 07:54) (157/67 - 170/66)  RR: 18 (06-09-25 @ 07:54)  SpO2: 98% (06-09-25 @ 07:54) (95% - 98%)  Wt(kg): --    EXAM:  General: In NAD   HEENT: NCAT  CV: S1+S2  Lungs: No respiratory distress, CTAB  Abd: Soft, no guarding  Ext: No cyanosis  Neuro: Alert and oriented, calm   Skin: No rash     Labs:                        7.6    4.04  )-----------( 95       ( 09 Jun 2025 05:45 )             24.6     06-09    138  |  100  |  48.6[H]  ----------------------------<  130[H]  5.5[H]   |  26.0  |  5.68[H]    Ca    8.6      09 Jun 2025 05:45  Mg     2.6     06-09    WBC Trend:  WBC Count: 4.04 (06-09-25 @ 05:45)  WBC Count: 3.46 (06-08-25 @ 04:41)  WBC Count: 3.51 (06-07-25 @ 06:09)  WBC Count: 4.06 (06-06-25 @ 06:26)    Creatine Trend:  Creatinine: 5.68 (06-09)  Creatinine: 4.95 (06-08)  Creatinine: 3.89 (06-07)  Creatinine: 5.35 (06-06)    Liver Biochemical Testing Trend:  Alanine Aminotransferase (ALT/SGPT): 7 (06-05)  Alanine Aminotransferase (ALT/SGPT): 7 (06-04)  Alanine Aminotransferase (ALT/SGPT): 9 (06-02)  Alanine Aminotransferase (ALT/SGPT): 13 (05-31)  Alanine Aminotransferase (ALT/SGPT): 12 (05-30)  Aspartate Aminotransferase (AST/SGOT): 21 (06-05-25 @ 05:22)  Aspartate Aminotransferase (AST/SGOT): 10 (06-04-25 @ 05:35)  Aspartate Aminotransferase (AST/SGOT): 12 (06-02-25 @ 05:35)  Aspartate Aminotransferase (AST/SGOT): 18 (05-31-25 @ 06:09)  Aspartate Aminotransferase (AST/SGOT): 18 (05-30-25 @ 04:44)  Bilirubin Total: 0.2 (06-05)  Bilirubin Total: <0.2 (06-04)  Bilirubin Total: 0.2 (06-02)  Bilirubin Total: 0.3 (05-31)  Bilirubin Total: 0.4 (05-30)    Urinalysis Basic - ( 09 Jun 2025 05:45 )    Color: x / Appearance: x / SG: x / pH: x  Gluc: 130 mg/dL / Ketone: x  / Bili: x / Urobili: x   Blood: x / Protein: x / Nitrite: x   Leuk Esterase: x / RBC: x / WBC x   Sq Epi: x / Non Sq Epi: x / Bacteria: x    MICROBIOLOGY:  Vancomycin Level, Random: 18.6 (05-31 @ 06:09)    MRSA PCR Result.: NotDetec (05-30-25 @ 21:23)  MRSA PCR Result.: NotDetec (08-01-24 @ 08:11)    Culture - Blood (collected 03 Jun 2025 07:25)  Source: Blood Blood-Peripheral  Final Report:    No growth at 5 days    Culture - Blood (collected 03 Jun 2025 07:17)  Source: Blood Blood-Peripheral  Final Report:    No growth at 5 days    Culture - Fungal, CSF (collected 30 May 2025 15:30)  Source: CSF CSF  Preliminary Report:    No fungus isolated at 1 week.    Culture - CSF with Gram Stain (collected 30 May 2025 15:30)  Source: CSF CSF  Final Report:    No growth at 5 days    Culture - Acid Fast - CSF (collected 30 May 2025 15:30)  Source: CSF CSF  Preliminary Report:    No acid-fast bacilli isolated at 1 week. ****Acid-fast cultures are held    for 6 weeks.****    Culture - Blood (collected 29 May 2025 15:33)  Source: Blood Blood  Final Report:    No growth at 5 days    Culture - Blood (collected 29 May 2025 15:30)  Source: Blood Blood  Final Report:    No growth at 5 days    Culture - Urine (collected 31 Jul 2024 15:10)  Source: Clean Catch Clean Catch (Midstream)  Final Report:    >100,000 CFU/ml Coag Negative Staphylococcus "Susceptibilities not    performed"    <10,000 CFU/ml Normal Urogenital christina present    Culture - Urine (collected 08 Feb 2024 02:00)  Source: Catheterized Catheterized  Final Report:    <10,000 CFU/mL Normal Urogenital Christina

## 2025-06-09 NOTE — PROGRESS NOTE ADULT - SUBJECTIVE AND OBJECTIVE BOX
HAYLEY LARA 69y Female  MRN#: [id number]       SUBJECTIVE  Patient is a 69y old Female who presents with a chief complaint of Abdominal pain  Dialysis (2025 07:37)  Currently admitted to medicine with the primary diagnosis of Need for acute hemodialysis    Today is hospital day 12d, and this morning she is at dialysis and reports no overnight events.       REVIEW OF SYSTEMS:  Unable to assess     OBJECTIVE  PAST MEDICAL & SURGICAL HISTORY  Diabetes    Bernard syndrome    Pancreatitis    Cirrhosis    ESRD on dialysis  MWF at [facility]    HTN (hypertension)    CAD (coronary artery disease)    Chronic diastolic congestive heart failure    IBS (irritable bowel syndrome)    Gastroparesis    Thyroid cancer    Esophagitis, erosive    HCV (hepatitis C virus)    History of liver biopsy    H/O total thyroidectomy    History of cholecystectomy  1990s    S/P       Home Meds:  aspirin 81 mg oral delayed release tablet: 1 tab(s) orally once a day  calcium acetate 667 mg oral capsule: 3 cap(s) orally 2 times a day  carvedilol 25 mg oral tablet: 1 tab(s) orally every 12 hours  Creon 3000 units oral delayed release capsule: 1 cap(s) orally every 8 hours  HumaLOG KwikPen 100 units/mL injectable solution: injectable 3 times a day  hydrALAZINE 25 mg oral tablet: 1 tab(s) orally every 8 hours  levothyroxine 200 mcg (0.2 mg) oral tablet: 1 tab(s) orally once a day  lisinopril 10 mg oral tablet: 1 tab(s) orally once a day  sertraline 100 mg oral tablet: 2 tab(s) orally once a day  sucralfate 1 g/10 mL oral suspension: 10 milliliter(s) orally 3 times a day    ALLERGIES:  Augmentin (Hives)  ceftriaxone (Pruritus)    MEDICATIONS:  STANDING MEDICATIONS  aspirin enteric coated 81 milliGRAM(s) Oral daily  calcium acetate 2001 milliGRAM(s) Oral two times a day with meals  carvedilol 25 milliGRAM(s) Oral every 12 hours  chlorhexidine 2% Cloths 1 Application(s) Topical daily  CREON (Pancrelipase 3000 units) 1 Capsule(s) 1 Capsule(s) Oral three times a day with meals  dextrose 5%. 1000 milliLiter(s) IV Continuous <Continuous>  dextrose 5%. 1000 milliLiter(s) IV Continuous <Continuous>  dextrose 50% Injectable 25 Gram(s) IV Push once  dextrose 50% Injectable 12.5 Gram(s) IV Push once  dextrose 50% Injectable 25 Gram(s) IV Push once  epoetin day (PROCRIT) Injectable 90857 Unit(s) IV Push <User Schedule>  ethyl chloride Spray 1 Application(s) Topical <User Schedule>  glucagon  Injectable 1 milliGRAM(s) IntraMuscular once  hydrALAZINE 50 milliGRAM(s) Oral three times a day  insulin lispro (ADMELOG) corrective regimen sliding scale   SubCutaneous three times a day before meals  insulin lispro (ADMELOG) corrective regimen sliding scale   SubCutaneous at bedtime  levothyroxine 200 MICROGram(s) Oral daily  lisinopril 10 milliGRAM(s) Oral daily  meropenem Injectable 500 milliGRAM(s) IV Push every 24 hours  metoclopramide 10 milliGRAM(s) Oral three times a day before meals  polyethylene glycol 3350 17 Gram(s) Oral daily  sertraline 200 milliGRAM(s) Oral daily  sucralfate suspension 1 Gram(s) Oral four times a day    PRN MEDICATIONS  acetaminophen     Tablet .. 650 milliGRAM(s) Oral every 6 hours PRN  dextrose Oral Gel 15 Gram(s) Oral once PRN  HYDROmorphone  Injectable 0.5 milliGRAM(s) IV Push every 4 hours PRN  ondansetron Injectable 4 milliGRAM(s) IV Push every 4 hours PRN      VITAL SIGNS: Last 24 Hours  T(C): 36.6 (2025 07:54), Max: 36.8 (2025 00:31)  T(F): 97.9 (2025 07:54), Max: 98.3 (2025 00:31)  HR: 70 (2025 07:54) (69 - 75)  BP: 161/75 (2025 07:54) (157/67 - 170/66)  BP(mean): --  RR: 18 (2025 07:54) (18 - 18)  SpO2: 98% (2025 07:54) (95% - 98%)    LABS:                        7.6    4.04  )-----------( 95       ( 2025 05:45 )             24.6     06-09    138  |  100  |  48.6[H]  ----------------------------<  130[H]  5.5[H]   |  26.0  |  5.68[H]    Ca    8.6      2025 05:45  Mg     2.6           RADIOLOGY:  reviewed     PHYSICAL EXAM:  Unable to assess

## 2025-06-09 NOTE — PROCEDURE NOTE - ADDITIONAL PROCEDURE DETAILS
4FR 38CM 30CIRC BARD POWER PICC LINE good flash ns flush right basilic vein by ultrasound guidance.  Patient tolerated well.

## 2025-06-09 NOTE — PROCEDURE NOTE - NSPOSTCAREGUIDE_GEN_A_CORE
Verbal/written post procedure instructions were given to patient/caregiver
Verbal/written post procedure instructions were given to patient/caregiver/Instructed patient/caregiver to follow-up with primary care physician/Instructed patient/caregiver regarding signs and symptoms of infection/Keep the cast/splint/dressing clean and dry/Care for catheter as per unit/ICU protocols

## 2025-06-09 NOTE — DISCHARGE NOTE NURSING/CASE MANAGEMENT/SOCIAL WORK - NSDCCRTYPESERV_GEN_ALL_CORE_FT
Dialysis resumption - dialysis resumed for Wednesday, 6/11  Dialysis resumption - dialysis resumed for Friday, 6/13

## 2025-06-09 NOTE — DISCHARGE NOTE NURSING/CASE MANAGEMENT/SOCIAL WORK - NSDCFUADDAPPT_GEN_ALL_CORE_FT
APPTS ARE READY TO BE MADE: [X] YES    Best Family or Patient Contact (if needed):    Additional Information about above appointments (if needed):    1: PCP within 1 week  2: Pt's GI Dr. Kenyon in 1-3 days  3: ID follow-up in 2 weeks      Other comments or requests:  needs outpt MRCP

## 2025-06-09 NOTE — DISCHARGE NOTE NURSING/CASE MANAGEMENT/SOCIAL WORK - FINANCIAL ASSISTANCE
[facility] provides services at a reduced cost to those who are determined to be eligible through [facility]’s financial assistance program. Information regarding [facility]’s financial assistance program can be found by going to [email or web address] or by calling [phone].

## 2025-06-09 NOTE — CHART NOTE - NSCHARTNOTEFT_GEN_A_CORE
Source: Patient [ ]  Family [ ]   other [ x]EMR, IDR's    Current Diet: Dash, cons CHO, renal    Patient reports [ ] nausea  [ ] vomiting [ ] diarrhea [ ] constipation  [ ]chewing problems [ ] swallowing issues  [ ] other:     PO intake:  < 50% [ ]   50-75%  x ]   %  [ x]  other :    Source for PO intake [ ] Patient [ ] family [ x] chart [ ] staff [ ] other    Enteral /Parenteral Nutrition:     Current Weight: 5/27 151.8#, 6/6 145.7#    % Weight Change     Pertinent Medications: MEDICATIONS  (STANDING):    CREON (Pancrelipase 3000 units) 1 Capsule(s) 1 Capsule(s) Oral three times a day with meals  epoetin day (PROCRIT) Injectable 71082 Unit(s) IV Push <User Schedule>  ethyl chloride Spray 1 Application(s) Topical <User Schedule>  glucagon  Injectable 1 milliGRAM(s) IntraMuscular once  hydrALAZINE 50 milliGRAM(s) Oral three times a day  levothyroxine 200 MICROGram(s) Oral daily  lisinopril 10 milliGRAM(s) Oral daily  meropenem Injectable 500 milliGRAM(s) IV Push every 24 hours  metoclopramide 10 milliGRAM(s) Oral three times a day before meals  polyethylene glycol 3350 17 Gram(s) Oral daily  sertraline 200 milliGRAM(s) Oral daily  sucralfate suspension 1 Gram(s) Oral four times a day    MEDICATIONS  (PRN):  acetaminophen     Tablet .. 650 milliGRAM(s) Oral every 6 hours PRN Mild Pain (1 - 3)  dextrose Oral Gel 15 Gram(s) Oral once PRN Blood Glucose LESS THAN 70 milliGRAM(s)/deciliter  HYDROmorphone  Injectable 0.5 milliGRAM(s) IV Push every 4 hours PRN abdominal pain  ondansetron Injectable 4 milliGRAM(s) IV Push every 4 hours PRN Nausea and/or Vomiting    Pertinent Labs: CBC Full  -  ( 09 Jun 2025 05:45 )  WBC Count : 4.04 K/uL  RBC Count : 2.59 M/uL  Hemoglobin : 7.6 g/dL  Hematocrit : 24.6 %  Platelet Count - Automated : 95 K/uL  Mean Cell Volume : 95.0 fl  Mean Cell Hemoglobin : 29.3 pg  Mean Cell Hemoglobin Concentration : 30.9 g/dL    06-09 Na138 mmol/L Glu 130 mg/dL[H] K+ 5.5 mmol/L[H] Cr  5.68 mg/dL[H] BUN 48.6 mg/dL[H] Phos n/a   Alb n/a   PAB n/a           Skin:     Nutrition focused physical exam conducted - found signs of malnutrition [ ]absent [x ]present    Subcutaneous fat loss: [ ] Orbital fat pads region, [ ]Buccal fat region, [ ]Triceps region,  [ ]Ribs region    Muscle wasting: [ x]Temples region, [ ]Clavicle region, [ ]Shoulder region, [ ]Scapula region, [ ]Interosseous region,  [ ]thigh region, [ ]Calf region    Estimated Needs:   [ x] no change since previous assessment  [ ] recalculated:     Current Nutrition Diagnosis: Malnutrition...  moderate  related to inadequate energy intake in setting of ESRD on HD, cirrhosis ,chronic pancreatitis, esophagitis  as evidenced by 11.7% weight loss x weeks, <75%EER>1 mo, muscle depletion. Pt with improving po intake. Awaiting ID recs, picc line.       Recommendations:    Continue diet as ordered  Encourage po intake, monitor diet tolerance, and provide assistance at meals as needed.   Rec Nephrovite        Monitoring and Evaluation:   [x ] PO intake [x ] Tolerance to diet prescription [X] Weights  [X] Follow up per protocol [X] Labs: Source: Patient [ ]  Family [ ]   other [ x]EMR, IDR's    Current Diet: Dash, cons CHO, renal    Patient reports [ ] nausea  [ ] vomiting [ ] diarrhea [ ] constipation  [ ]chewing problems [ ] swallowing issues  [ ] other:     PO intake:  < 50% [ ]   50-75%  x ]   %  [ x]  other :    Source for PO intake [ ] Patient [ ] family [ x] chart [ ] staff [ ] other    Enteral /Parenteral Nutrition:     Current Weight: 5/27 151.8#, 6/6 145.7#    % Weight Change     Pertinent Medications: MEDICATIONS  (STANDING):    CREON (Pancrelipase 3000 units) 1 Capsule(s) 1 Capsule(s) Oral three times a day with meals  epoetin day (PROCRIT) Injectable 38048 Unit(s) IV Push <User Schedule>  ethyl chloride Spray 1 Application(s) Topical <User Schedule>  glucagon  Injectable 1 milliGRAM(s) IntraMuscular once  hydrALAZINE 50 milliGRAM(s) Oral three times a day  levothyroxine 200 MICROGram(s) Oral daily  lisinopril 10 milliGRAM(s) Oral daily  meropenem Injectable 500 milliGRAM(s) IV Push every 24 hours  metoclopramide 10 milliGRAM(s) Oral three times a day before meals  polyethylene glycol 3350 17 Gram(s) Oral daily  sertraline 200 milliGRAM(s) Oral daily  sucralfate suspension 1 Gram(s) Oral four times a day    MEDICATIONS  (PRN):  acetaminophen     Tablet .. 650 milliGRAM(s) Oral every 6 hours PRN Mild Pain (1 - 3)  dextrose Oral Gel 15 Gram(s) Oral once PRN Blood Glucose LESS THAN 70 milliGRAM(s)/deciliter  HYDROmorphone  Injectable 0.5 milliGRAM(s) IV Push every 4 hours PRN abdominal pain  ondansetron Injectable 4 milliGRAM(s) IV Push every 4 hours PRN Nausea and/or Vomiting    Pertinent Labs: CBC Full  -  ( 09 Jun 2025 05:45 )  WBC Count : 4.04 K/uL  RBC Count : 2.59 M/uL  Hemoglobin : 7.6 g/dL  Hematocrit : 24.6 %  Platelet Count - Automated : 95 K/uL  Mean Cell Volume : 95.0 fl  Mean Cell Hemoglobin : 29.3 pg  Mean Cell Hemoglobin Concentration : 30.9 g/dL    06-09 Na138 mmol/L Glu 130 mg/dL[H] K+ 5.5 mmol/L[H] Cr  5.68 mg/dL[H] BUN 48.6 mg/dL[H] Phos n/a   Alb n/a   PAB n/a           Skin:     Nutrition focused physical exam conducted - found signs of malnutrition [ ]absent [x ]present    Subcutaneous fat loss: [ ] Orbital fat pads region, [ ]Buccal fat region, [ ]Triceps region,  [ ]Ribs region    Muscle wasting: [ x]Temples region, [ ]Clavicle region, [ ]Shoulder region, [ ]Scapula region, [ ]Interosseous region,  [ ]thigh region, [ ]Calf region    Estimated Needs:   [ x] no change since previous assessment  [ ] recalculated:     Current Nutrition Diagnosis: Malnutrition...  moderate  related to inadequate energy intake in setting of ESRD on HD, cirrhosis ,chronic pancreatitis, esophagitis  as evidenced by 11.7% weight loss x weeks, <75%EER>1 mo, muscle depletion. Pt with improving po intake. Awaiting ID recs, picc line.       Recommendations:    Continue diet as ordered  Encourage po intake, monitor diet tolerance, and provide assistance at meals as needed.   Rec Nephrovite, Vit C        Monitoring and Evaluation:   [x ] PO intake [x ] Tolerance to diet prescription [X] Weights  [X] Follow up per protocol [X] Labs: Source: Patient [ ]  Family [ ]   other [ x]EMR, IDR's    Current Diet: Dash, cons CHO, renal    Patient reports [ ] nausea  [ ] vomiting [ ] diarrhea [ ] constipation  [ ]chewing problems [ ] swallowing issues  [ ] other:     PO intake:  < 50% [ ]   50-75%  x ]   %  [ x]  other :    Source for PO intake [ ] Patient [ ] family [ x] chart [ ] staff [ ] other    Enteral /Parenteral Nutrition:     Current Weight: 5/27 151.8#, 6/6 145.7#    % Weight Change     Pertinent Medications: MEDICATIONS  (STANDING):    CREON (Pancrelipase 3000 units) 1 Capsule(s) 1 Capsule(s) Oral three times a day with meals  epoetin day (PROCRIT) Injectable 04446 Unit(s) IV Push <User Schedule>  ethyl chloride Spray 1 Application(s) Topical <User Schedule>  glucagon  Injectable 1 milliGRAM(s) IntraMuscular once  hydrALAZINE 50 milliGRAM(s) Oral three times a day  levothyroxine 200 MICROGram(s) Oral daily  lisinopril 10 milliGRAM(s) Oral daily  meropenem Injectable 500 milliGRAM(s) IV Push every 24 hours  metoclopramide 10 milliGRAM(s) Oral three times a day before meals  polyethylene glycol 3350 17 Gram(s) Oral daily  sertraline 200 milliGRAM(s) Oral daily  sucralfate suspension 1 Gram(s) Oral four times a day    MEDICATIONS  (PRN):  acetaminophen     Tablet .. 650 milliGRAM(s) Oral every 6 hours PRN Mild Pain (1 - 3)  dextrose Oral Gel 15 Gram(s) Oral once PRN Blood Glucose LESS THAN 70 milliGRAM(s)/deciliter  HYDROmorphone  Injectable 0.5 milliGRAM(s) IV Push every 4 hours PRN abdominal pain  ondansetron Injectable 4 milliGRAM(s) IV Push every 4 hours PRN Nausea and/or Vomiting    Pertinent Labs: CBC Full  -  ( 09 Jun 2025 05:45 )  WBC Count : 4.04 K/uL  RBC Count : 2.59 M/uL  Hemoglobin : 7.6 g/dL  Hematocrit : 24.6 %  Platelet Count - Automated : 95 K/uL  Mean Cell Volume : 95.0 fl  Mean Cell Hemoglobin : 29.3 pg  Mean Cell Hemoglobin Concentration : 30.9 g/dL    06-09 Na138 mmol/L Glu 130 mg/dL[H] K+ 5.5 mmol/L[H] Cr  5.68 mg/dL[H] BUN 48.6 mg/dL[H] Phos n/a   Alb n/a   PAB n/a           Skin:     Nutrition focused physical exam conducted - found signs of malnutrition [ ]absent [x ]present    Subcutaneous fat loss: [ ] Orbital fat pads region, [ ]Buccal fat region, [ ]Triceps region,  [ ]Ribs region    Muscle wasting: [ x]Temples region, [ ]Clavicle region, [ ]Shoulder region, [ ]Scapula region, [ ]Interosseous region,  [ ]thigh region, [ ]Calf region    Estimated Needs:   [ x] no change since previous assessment  [ ] recalculated:     Current Nutrition Diagnosis: Malnutrition...  moderate  related to inadequate energy intake in setting of ESRD on HD, cirrhosis ,chronic pancreatitis, esophagitis  as evidenced by 11.7% weight loss x weeks, <75%EER>1 mo, muscle depletion. Pt with improving po intake. Awaiting ID recs, picc line.       Recommendations:    Continue diet as ordered  Encourage po intake, monitor diet tolerance, and provide assistance at meals as needed.   Rec Nephrovite        Monitoring and Evaluation:   [x ] PO intake [x ] Tolerance to diet prescription [X] Weights  [X] Follow up per protocol [X] Labs:

## 2025-06-09 NOTE — PROGRESS NOTE ADULT - ASSESSMENT
Goal Outcome Evaluation:      The patient has a blunted/flat affect, her speech is tangential and disorganized. She has been present in the milieu and has been social with select peers. During check-in when the patient was asked how her day is going she responded by  talking about being interested in testing related to life goals then talked about the hometown that her daughter lives in and a chiropractor. The patient reported she continues to be bored in the hospital, mentioned having a lack of interest in things and impaired concentration. Her SO visited this evening and the patient started arguing. The patient was asked not to argue and keep her voice down. Again the patient started loudly arguing with visitor. The visit time was ended early due to patient's emotional dysregulation and lack of restraint. After the visit ended the patient sat in the dinning room quietly coloring pictures. Compliant with scheduled medications.    Prn's given this shift: Tylenol    Blood sugar this shift: 122 & 120                       69 F PMH HTN, DM (A1c 7.3), CAD, ESRD on HD, gastroparesis, IBS, chronic pancreatitis, 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     Abdominal pain, nausea and vomiting   Thrombocytopenia in setting of cirrhosis  ESRD on HD  Course complicated by;  Fevers, Headache, Photophobia - resolved   Allergy to Augmentin (hives) and Ceftriaxone (pruritus)      Rapid improvement of symptoms and negative CSF studies - less likely aseptic meningitis  Flagyl can cause aseptic meningitis but usually rare and associated with higher doses   Symptoms resolved     ID recalled as patient with abd pain, CT done showing L hepatic cluster of multiple tiny liver abscesses (not seen on 5/28 CT)  Abd US shows s/p cholecystectomy, no CBD dilation   Blood cultures from 5/29 are negative     Suggest;  Follow up blood cultures sent on 6/3 prior to initiation of Meropenem (negative to date)   Continue Meropenem IV (HD dosing)   IR evaluation noted, no drainable component   Monitor fever curve   Trend WBC count    Will plan for 6 weeks of Meropenem 500mg IV Q24h (on HD days give post-HD) through July 15th, 2025. Weekly CBC and CMP to be faxed to .   Discussed PICC line procedure with the patient as well as IV antibiotic administration. Will plan for PICC     Case discussed with patient and with Dr Leach.

## 2025-06-09 NOTE — PROCEDURE NOTE - NSPROCDETAILS_GEN_ALL_CORE
procedure aborted/area cleaned in sterile fashion
location identified, draped/prepped, sterile technique used/sterile dressing applied/sterile technique, catheter placed/supine position/ultrasound guidance

## 2025-06-09 NOTE — PROGRESS NOTE ADULT - SUBJECTIVE AND OBJECTIVE BOX
Hemodialysis:    TIME:3 hours  Dialyzer: Revaclear 300   Dialysate:2 K/2.5 Calcium   Dialysate flow:  500 ML/MIN  Blood flow: 400 ML/MIN   UF Goal (Liters) : 2    Comments:BP is stable

## 2025-06-09 NOTE — PROGRESS NOTE ADULT - ASSESSMENT
69 year old female pt admitted for possible colitis- Nephrology consulted for managing HD needs.    ESRD on HD  MWF schedule at [facility]  Access ; RE[facility]CCA AVF  Seen on HD.  Qd, Qb, UF rate reviewed.  Vitals stable.  Access working well.  Patient tolerating HD well.    anemia of CKD  Hb below goal  continue LASHELL with HD    HTN/ volume  Bp stable- pt euvolemic  UF as tolerated with HD    CKD MBD  ca++ at goal  Continue phos binders with meals    Monitor Phos levels    fever, photobia and HA  sp LP on 05/30    Plan   HD today 3 hrs 2k bath Goal UF 2L as tolerated by BP   Planned for PICC line placement for meropenem 500 mg daily   Pain control as per primary team   Aranesp with HD   Dose medications for HD   Will follow

## 2025-06-09 NOTE — PROCEDURE NOTE - PROCEDURE DATE TIME, MLM
30-May-2025 15:25
09-Jun-2025
Rifampin Counseling: I discussed with the patient the risks of rifampin including but not limited to liver damage, kidney damage, red-orange body fluids, nausea/vomiting and severe allergy.

## 2025-06-09 NOTE — DISCHARGE NOTE NURSING/CASE MANAGEMENT/SOCIAL WORK - PATIENT PORTAL LINK FT
You can access the FollowMyHealth Patient Portal offered by [facility] by registering at the following website: [email or web address]. By joining [employer]’s FollowMyHealth portal, you will also be able to view your health information using other applications (apps) compatible with our system.

## 2025-06-09 NOTE — PROCEDURE NOTE - NSINFORMCONSENT_GEN_A_CORE
Please follow up with your Urologist regarding Stent replacement.
Benefits, risks, and possible complications of procedure explained to patient/caregiver who verbalized understanding and gave written consent.
Benefits, risks, and possible complications of procedure explained to patient/caregiver who verbalized understanding and gave written consent.

## 2025-06-09 NOTE — PROGRESS NOTE ADULT - SUBJECTIVE AND OBJECTIVE BOX
[facility] DIVISION OF KIDNEY DISEASES AND HYPERTENSION -- PROGRESS NOTE    24 hour events/subjective:  Seen today   For HD today   She is planned for PICC line placement for     MEDICATIONS    Standing Inpatient Medications  aspirin enteric coated 81 milliGRAM(s) Oral daily  calcium acetate 2001 milliGRAM(s) Oral two times a day with meals  carvedilol 25 milliGRAM(s) Oral every 12 hours  chlorhexidine 2% Cloths 1 Application(s) Topical daily  CREON (Pancrelipase 3000 units) 1 Capsule(s) 1 Capsule(s) Oral three times a day with meals  dextrose 5%. 1000 milliLiter(s) IV Continuous <Continuous>  dextrose 5%. 1000 milliLiter(s) IV Continuous <Continuous>  dextrose 50% Injectable 25 Gram(s) IV Push once  dextrose 50% Injectable 12.5 Gram(s) IV Push once  dextrose 50% Injectable 25 Gram(s) IV Push once  epoetin day (PROCRIT) Injectable 89625 Unit(s) IV Push <User Schedule>  ethyl chloride Spray 1 Application(s) Topical <User Schedule>  glucagon  Injectable 1 milliGRAM(s) IntraMuscular once  hydrALAZINE 50 milliGRAM(s) Oral three times a day  insulin lispro (ADMELOG) corrective regimen sliding scale   SubCutaneous three times a day before meals  insulin lispro (ADMELOG) corrective regimen sliding scale   SubCutaneous at bedtime  levothyroxine 200 MICROGram(s) Oral daily  lisinopril 10 milliGRAM(s) Oral daily  meropenem Injectable 500 milliGRAM(s) IV Push every 24 hours  metoclopramide 10 milliGRAM(s) Oral three times a day before meals  polyethylene glycol 3350 17 Gram(s) Oral daily  sertraline 200 milliGRAM(s) Oral daily  sucralfate suspension 1 Gram(s) Oral four times a day    PRN Inpatient Medications  acetaminophen     Tablet .. 650 milliGRAM(s) Oral every 6 hours PRN  dextrose Oral Gel 15 Gram(s) Oral once PRN  HYDROmorphone  Injectable 0.5 milliGRAM(s) IV Push every 4 hours PRN  ondansetron Injectable 4 milliGRAM(s) IV Push every 4 hours PRN      VITALS/PHYSICAL EXAM  --------------------------------------------------------------------------------  T(C): 36.9 (06-09-25 @ 14:46), Max: 36.9 (06-09-25 @ 14:46)  HR: 77 (06-09-25 @ 14:46) (69 - 77)  BP: 183/73 (06-09-25 @ 14:46) (157/67 - 183/73)  RR: 20 (06-09-25 @ 14:46) (18 - 20)  SpO2: 96% (06-09-25 @ 14:46) (95% - 98%)  Wt(kg): --        06-09-25 @ 07:01  -  06-09-25 @ 15:34  --------------------------------------------------------  IN: 0 mL / OUT: 1000 mL / NET: -1000 mL      Physical Exam:  Gen: NAD, well-appearing  HEENT: normal  Pulm: CTA B/L  CV: normal S1S2; no rub, no edema  Abd: soft, non-tender  MSK no deformities   Neuro: Alert and oriented x3     LABS/STUDIES  --------------------------------------------------------------------------------  138  |  100  |  48.6  ----------------------------<  130      [06-09-25 @ 05:45]  5.5   |  26.0  |  5.68        Ca     8.6     [06-09-25 @ 05:45]      Mg     2.6     [06-09-25 @ 05:45]            Creatinine Trend:  SCr 5.68 [06-09 @ 05:45]  SCr 4.95 [06-08 @ 04:41]  SCr 3.89 [06-07 @ 06:09]  SCr 5.35 [06-06 @ 06:26]  SCr 3.93 [06-05 @ 05:22]   [facility] DIVISION OF KIDNEY DISEASES AND HYPERTENSION -- PROGRESS NOTE    24 hour events/subjective:  Seen today   For HD today   She is planned for PICC line placement for meropenem     MEDICATIONS    Standing Inpatient Medications  aspirin enteric coated 81 milliGRAM(s) Oral daily  calcium acetate 2001 milliGRAM(s) Oral two times a day with meals  carvedilol 25 milliGRAM(s) Oral every 12 hours  chlorhexidine 2% Cloths 1 Application(s) Topical daily  CREON (Pancrelipase 3000 units) 1 Capsule(s) 1 Capsule(s) Oral three times a day with meals  dextrose 5%. 1000 milliLiter(s) IV Continuous <Continuous>  dextrose 5%. 1000 milliLiter(s) IV Continuous <Continuous>  dextrose 50% Injectable 25 Gram(s) IV Push once  dextrose 50% Injectable 12.5 Gram(s) IV Push once  dextrose 50% Injectable 25 Gram(s) IV Push once  epoetin day (PROCRIT) Injectable 79926 Unit(s) IV Push <User Schedule>  ethyl chloride Spray 1 Application(s) Topical <User Schedule>  glucagon  Injectable 1 milliGRAM(s) IntraMuscular once  hydrALAZINE 50 milliGRAM(s) Oral three times a day  insulin lispro (ADMELOG) corrective regimen sliding scale   SubCutaneous three times a day before meals  insulin lispro (ADMELOG) corrective regimen sliding scale   SubCutaneous at bedtime  levothyroxine 200 MICROGram(s) Oral daily  lisinopril 10 milliGRAM(s) Oral daily  meropenem Injectable 500 milliGRAM(s) IV Push every 24 hours  metoclopramide 10 milliGRAM(s) Oral three times a day before meals  polyethylene glycol 3350 17 Gram(s) Oral daily  sertraline 200 milliGRAM(s) Oral daily  sucralfate suspension 1 Gram(s) Oral four times a day    PRN Inpatient Medications  acetaminophen     Tablet .. 650 milliGRAM(s) Oral every 6 hours PRN  dextrose Oral Gel 15 Gram(s) Oral once PRN  HYDROmorphone  Injectable 0.5 milliGRAM(s) IV Push every 4 hours PRN  ondansetron Injectable 4 milliGRAM(s) IV Push every 4 hours PRN      VITALS/PHYSICAL EXAM  --------------------------------------------------------------------------------  T(C): 36.9 (06-09-25 @ 14:46), Max: 36.9 (06-09-25 @ 14:46)  HR: 77 (06-09-25 @ 14:46) (69 - 77)  BP: 183/73 (06-09-25 @ 14:46) (157/67 - 183/73)  RR: 20 (06-09-25 @ 14:46) (18 - 20)  SpO2: 96% (06-09-25 @ 14:46) (95% - 98%)  Wt(kg): --        06-09-25 @ 07:01  -  06-09-25 @ 15:34  --------------------------------------------------------  IN: 0 mL / OUT: 1000 mL / NET: -1000 mL      Physical Exam:  Gen: NAD, well-appearing  HEENT: normal  Pulm: CTA B/L  CV: normal S1S2; no rub, no edema  Abd: soft, non-tender  MSK no deformities   Neuro: Alert and oriented x3     LABS/STUDIES  --------------------------------------------------------------------------------  138  |  100  |  48.6  ----------------------------<  130      [06-09-25 @ 05:45]  5.5   |  26.0  |  5.68        Ca     8.6     [06-09-25 @ 05:45]      Mg     2.6     [06-09-25 @ 05:45]            Creatinine Trend:  SCr 5.68 [06-09 @ 05:45]  SCr 4.95 [06-08 @ 04:41]  SCr 3.89 [06-07 @ 06:09]  SCr 5.35 [06-06 @ 06:26]  SCr 3.93 [06-05 @ 05:22]

## 2025-06-09 NOTE — PROGRESS NOTE ADULT - ATTENDING COMMENTS
70 y/o F with PMH of HTN, DM, CA s/p PCI, ESRD on HD admitted for abdominal pain due to hepatic abscess.   Continue Meropenem  ID recs appreciated  Blood cultures negative  PICC line placement    DC home pending ID recs

## 2025-06-09 NOTE — DISCHARGE NOTE NURSING/CASE MANAGEMENT/SOCIAL WORK - NSDCPEFALRISK_GEN_ALL_CORE
For information on Fall & Injury Prevention, visit: [email or web address] OR  [email or web address] OR  https://www.cdc.gov/steadi/patient.html

## 2025-06-10 LAB
ANION GAP SERPL CALC-SCNC: 11 MMOL/L — SIGNIFICANT CHANGE UP (ref 5–17)
BUN SERPL-MCNC: 33.1 MG/DL — HIGH (ref 8–20)
CALCIUM SERPL-MCNC: 8.5 MG/DL — SIGNIFICANT CHANGE UP (ref 8.4–10.5)
CHLORIDE SERPL-SCNC: 99 MMOL/L — SIGNIFICANT CHANGE UP (ref 96–108)
CO2 SERPL-SCNC: 27 MMOL/L — SIGNIFICANT CHANGE UP (ref 22–29)
CREAT SERPL-MCNC: 4.08 MG/DL — HIGH (ref 0.5–1.3)
EGFR: 11 ML/MIN/1.73M2 — LOW
EGFR: 11 ML/MIN/1.73M2 — LOW
GLUCOSE BLDC GLUCOMTR-MCNC: 125 MG/DL — HIGH (ref 70–99)
GLUCOSE BLDC GLUCOMTR-MCNC: 161 MG/DL — HIGH (ref 70–99)
GLUCOSE BLDC GLUCOMTR-MCNC: 165 MG/DL — HIGH (ref 70–99)
GLUCOSE BLDC GLUCOMTR-MCNC: 166 MG/DL — HIGH (ref 70–99)
GLUCOSE SERPL-MCNC: 167 MG/DL — HIGH (ref 70–99)
HCT VFR BLD CALC: 24.1 % — LOW (ref 34.5–45)
HGB BLD-MCNC: 7.5 G/DL — LOW (ref 11.5–15.5)
IMMATURE PLATELET FRACTION #: 3.1 K/UL — LOW (ref 4.7–11.1)
IMMATURE PLATELET FRACTION %: 3.7 % — SIGNIFICANT CHANGE UP (ref 1.6–4.9)
MAGNESIUM SERPL-MCNC: 2.5 MG/DL — SIGNIFICANT CHANGE UP (ref 1.6–2.6)
MCHC RBC-ENTMCNC: 29.2 PG — SIGNIFICANT CHANGE UP (ref 27–34)
MCHC RBC-ENTMCNC: 31.1 G/DL — LOW (ref 32–36)
MCV RBC AUTO: 93.8 FL — SIGNIFICANT CHANGE UP (ref 80–100)
NRBC # BLD AUTO: 0 K/UL — SIGNIFICANT CHANGE UP (ref 0–0)
NRBC # FLD: 0 K/UL — SIGNIFICANT CHANGE UP (ref 0–0)
NRBC BLD AUTO-RTO: 0 /100 WBCS — SIGNIFICANT CHANGE UP (ref 0–0)
PHOSPHATE SERPL-MCNC: 2.6 MG/DL — SIGNIFICANT CHANGE UP (ref 2.4–4.7)
PLATELET # BLD AUTO: 84 K/UL — LOW (ref 150–400)
PMV BLD: 11.5 FL — SIGNIFICANT CHANGE UP (ref 7–13)
POTASSIUM SERPL-MCNC: 4.9 MMOL/L — SIGNIFICANT CHANGE UP (ref 3.5–5.3)
POTASSIUM SERPL-SCNC: 4.9 MMOL/L — SIGNIFICANT CHANGE UP (ref 3.5–5.3)
RBC # BLD: 2.57 M/UL — LOW (ref 3.8–5.2)
RBC # FLD: 14.6 % — HIGH (ref 10.3–14.5)
SODIUM SERPL-SCNC: 137 MMOL/L — SIGNIFICANT CHANGE UP (ref 135–145)
WBC # BLD: 3.99 K/UL — SIGNIFICANT CHANGE UP (ref 3.8–10.5)
WBC # FLD AUTO: 3.99 K/UL — SIGNIFICANT CHANGE UP (ref 3.8–10.5)

## 2025-06-10 PROCEDURE — 99232 SBSQ HOSP IP/OBS MODERATE 35: CPT

## 2025-06-10 RX ORDER — TRAMADOL HYDROCHLORIDE 50 MG/1
1 TABLET, FILM COATED ORAL
Qty: 9 | Refills: 0
Start: 2025-06-10 | End: 2025-06-12

## 2025-06-10 RX ORDER — HYDROMORPHONE/SOD CHLOR,ISO/PF 2 MG/10 ML
0.5 SYRINGE (ML) INJECTION ONCE
Refills: 0 | Status: DISCONTINUED | OUTPATIENT
Start: 2025-06-10 | End: 2025-06-10

## 2025-06-10 RX ADMIN — Medication 10 MILLIGRAM(S): at 16:45

## 2025-06-10 RX ADMIN — Medication 0.5 MILLIGRAM(S): at 10:33

## 2025-06-10 RX ADMIN — Medication 0.5 MILLIGRAM(S): at 22:00

## 2025-06-10 RX ADMIN — INSULIN LISPRO 1: 100 INJECTION, SOLUTION INTRAVENOUS; SUBCUTANEOUS at 08:36

## 2025-06-10 RX ADMIN — Medication 10 MILLIGRAM(S): at 08:36

## 2025-06-10 RX ADMIN — Medication 81 MILLIGRAM(S): at 11:37

## 2025-06-10 RX ADMIN — Medication 0.5 MILLIGRAM(S): at 02:05

## 2025-06-10 RX ADMIN — Medication 200 MICROGRAM(S): at 05:33

## 2025-06-10 RX ADMIN — Medication 50 MILLIGRAM(S): at 21:26

## 2025-06-10 RX ADMIN — Medication 50 MILLIGRAM(S): at 14:18

## 2025-06-10 RX ADMIN — Medication 0.5 MILLIGRAM(S): at 16:44

## 2025-06-10 RX ADMIN — INSULIN LISPRO 1: 100 INJECTION, SOLUTION INTRAVENOUS; SUBCUTANEOUS at 17:40

## 2025-06-10 RX ADMIN — Medication 2001 MILLIGRAM(S): at 16:45

## 2025-06-10 RX ADMIN — LISINOPRIL 10 MILLIGRAM(S): 5 TABLET ORAL at 05:33

## 2025-06-10 RX ADMIN — Medication 0.5 MILLIGRAM(S): at 06:06

## 2025-06-10 RX ADMIN — Medication 1 APPLICATION(S): at 08:41

## 2025-06-10 RX ADMIN — Medication 1 GRAM(S): at 00:38

## 2025-06-10 RX ADMIN — CARVEDILOL 25 MILLIGRAM(S): 3.12 TABLET, FILM COATED ORAL at 16:45

## 2025-06-10 RX ADMIN — MEROPENEM 500 MILLIGRAM(S): 1 INJECTION INTRAVENOUS at 14:18

## 2025-06-10 RX ADMIN — Medication 1 GRAM(S): at 05:34

## 2025-06-10 RX ADMIN — Medication 2001 MILLIGRAM(S): at 08:36

## 2025-06-10 RX ADMIN — Medication 10 MILLIGRAM(S): at 11:37

## 2025-06-10 RX ADMIN — Medication 0.5 MILLIGRAM(S): at 14:19

## 2025-06-10 RX ADMIN — Medication 50 MILLIGRAM(S): at 05:33

## 2025-06-10 RX ADMIN — Medication 0.5 MILLIGRAM(S): at 10:53

## 2025-06-10 RX ADMIN — Medication 0.5 MILLIGRAM(S): at 21:34

## 2025-06-10 RX ADMIN — Medication 0.5 MILLIGRAM(S): at 07:00

## 2025-06-10 RX ADMIN — SERTRALINE 200 MILLIGRAM(S): 100 TABLET, FILM COATED ORAL at 11:37

## 2025-06-10 RX ADMIN — Medication 0.5 MILLIGRAM(S): at 14:35

## 2025-06-10 RX ADMIN — Medication 0.5 MILLIGRAM(S): at 02:30

## 2025-06-10 RX ADMIN — Medication 1 GRAM(S): at 16:45

## 2025-06-10 RX ADMIN — Medication 0.5 MILLIGRAM(S): at 17:00

## 2025-06-10 RX ADMIN — CARVEDILOL 25 MILLIGRAM(S): 3.12 TABLET, FILM COATED ORAL at 05:34

## 2025-06-10 NOTE — PROGRESS NOTE ADULT - ASSESSMENT
69 year old female pt admitted for possible colitis- Nephrology consulted for managing HD needs.    ESRD on HD  MWF schedule at [facility]  Access ; REBECCA AVF  Seen on HD.  Qd, Qb, UF rate reviewed.  Vitals stable.  Access working well.  Patient tolerating HD well.    anemia of CKD  Hb below goal  continue LASHELL with HD    HTN/ volume  Bp stable- pt euvolemic  UF as tolerated with HD    CKD MBD  ca++ at goal  Continue phos binders with meals    Monitor Phos levels    fever, photobia and HA  sp LP on 05/30    Plan   HD tomorrow 3 hrs 2k bath Goal UF 2L as tolerated by BP   Status post PICC line placement for meropenem 500 mg daily   Pain control as per primary team   Aranesp with HD   Dose medications for HD   Will follow

## 2025-06-10 NOTE — PROGRESS NOTE ADULT - SUBJECTIVE AND OBJECTIVE BOX
[facility] DIVISION OF KIDNEY DISEASES AND HYPERTENSION -- PROGRESS NOTE    24 hour events/subjective:  Seen today   Doing well   BP is controlled   Planned to be discharged today if antibiotics are arranged     MEDICATIONS    Standing Inpatient Medications  aspirin enteric coated 81 milliGRAM(s) Oral daily  calcium acetate 2001 milliGRAM(s) Oral two times a day with meals  carvedilol 25 milliGRAM(s) Oral every 12 hours  chlorhexidine 2% Cloths 1 Application(s) Topical daily  chlorhexidine 2% Cloths 1 Application(s) Topical <User Schedule>  CREON (Pancrelipase 3000 units) 1 Capsule(s) 1 Capsule(s) Oral three times a day with meals  dextrose 5%. 1000 milliLiter(s) IV Continuous <Continuous>  dextrose 5%. 1000 milliLiter(s) IV Continuous <Continuous>  dextrose 50% Injectable 25 Gram(s) IV Push once  dextrose 50% Injectable 12.5 Gram(s) IV Push once  dextrose 50% Injectable 25 Gram(s) IV Push once  epoetin day (PROCRIT) Injectable 94104 Unit(s) IV Push <User Schedule>  ethyl chloride Spray 1 Application(s) Topical <User Schedule>  glucagon  Injectable 1 milliGRAM(s) IntraMuscular once  hydrALAZINE 50 milliGRAM(s) Oral three times a day  insulin lispro (ADMELOG) corrective regimen sliding scale   SubCutaneous three times a day before meals  insulin lispro (ADMELOG) corrective regimen sliding scale   SubCutaneous at bedtime  levothyroxine 200 MICROGram(s) Oral daily  lisinopril 10 milliGRAM(s) Oral daily  meropenem Injectable 500 milliGRAM(s) IV Push every 24 hours  metoclopramide 10 milliGRAM(s) Oral three times a day before meals  polyethylene glycol 3350 17 Gram(s) Oral daily  sertraline 200 milliGRAM(s) Oral daily  sucralfate suspension 1 Gram(s) Oral four times a day    PRN Inpatient Medications  acetaminophen     Tablet .. 650 milliGRAM(s) Oral every 6 hours PRN  dextrose Oral Gel 15 Gram(s) Oral once PRN  HYDROmorphone  Injectable 0.5 milliGRAM(s) IV Push every 4 hours PRN  ondansetron Injectable 4 milliGRAM(s) IV Push every 4 hours PRN  sodium chloride 0.9% lock flush 10 milliLiter(s) IV Push every 1 hour PRN      VITALS/PHYSICAL EXAM  --------------------------------------------------------------------------------  T(C): 36.8 (06-10-25 @ 08:33), Max: 37.6 (06-10-25 @ 05:12)  HR: 85 (06-10-25 @ 08:33) (79 - 85)  BP: 173/90 (06-10-25 @ 14:19) (156/60 - 179/77)  RR: 18 (06-10-25 @ 08:33) (17 - 18)  SpO2: 95% (06-10-25 @ 08:33) (94% - 97%)  Wt(kg): --        06-09-25 @ 07:01  -  06-10-25 @ 07:00  --------------------------------------------------------  IN: 0 mL / OUT: 1000 mL / NET: -1000 mL      Physical Exam:  Gen: NAD, well-appearing  HEENT: normal  Pulm: CTA B/L  CV: normal S1S2; no rub, no edema  Abd: soft, non-tender  MSK no deformities   Neuro: Alert and oriented x3     LABS/STUDIES  --------------------------------------------------------------------------------  137  |  99  |  33.1  ----------------------------<  167      [06-10-25 @ 05:30]  4.9   |  27.0  |  4.08        Ca     8.5     [06-10-25 @ 05:30]      Mg     2.5     [06-10-25 @ 05:30]      Phos  2.6     [06-10-25 @ 05:30]            Creatinine Trend:  SCr 4.08 [06-10 @ 05:30]  SCr 5.68 [06-09 @ 05:45]  SCr 4.95 [06-08 @ 04:41]  SCr 3.89 [06-07 @ 06:09]  SCr 5.35 [06-06 @ 06:26]

## 2025-06-11 VITALS
DIASTOLIC BLOOD PRESSURE: 82 MMHG | RESPIRATION RATE: 18 BRPM | SYSTOLIC BLOOD PRESSURE: 151 MMHG | HEART RATE: 76 BPM | OXYGEN SATURATION: 94 %

## 2025-06-11 LAB
GLUCOSE BLDC GLUCOMTR-MCNC: 137 MG/DL — HIGH (ref 70–99)
GLUCOSE BLDC GLUCOMTR-MCNC: 153 MG/DL — HIGH (ref 70–99)
HCT VFR BLD CALC: 23 % — LOW (ref 34.5–45)
HGB BLD-MCNC: 7.2 G/DL — LOW (ref 11.5–15.5)
IMMATURE PLATELET FRACTION #: 2.6 K/UL — LOW (ref 4.7–11.1)
IMMATURE PLATELET FRACTION %: 3.4 % — SIGNIFICANT CHANGE UP (ref 1.6–4.9)
MCHC RBC-ENTMCNC: 28.9 PG — SIGNIFICANT CHANGE UP (ref 27–34)
MCHC RBC-ENTMCNC: 31.3 G/DL — LOW (ref 32–36)
MCV RBC AUTO: 92.4 FL — SIGNIFICANT CHANGE UP (ref 80–100)
NRBC # BLD AUTO: 0 K/UL — SIGNIFICANT CHANGE UP (ref 0–0)
NRBC # FLD: 0 K/UL — SIGNIFICANT CHANGE UP (ref 0–0)
NRBC BLD AUTO-RTO: 0 /100 WBCS — SIGNIFICANT CHANGE UP (ref 0–0)
PLATELET # BLD AUTO: 75 K/UL — LOW (ref 150–400)
PMV BLD: 11.1 FL — SIGNIFICANT CHANGE UP (ref 7–13)
RBC # BLD: 2.49 M/UL — LOW (ref 3.8–5.2)
RBC # FLD: 14.6 % — HIGH (ref 10.3–14.5)
WBC # BLD: 4.06 K/UL — SIGNIFICANT CHANGE UP (ref 3.8–10.5)
WBC # FLD AUTO: 4.06 K/UL — SIGNIFICANT CHANGE UP (ref 3.8–10.5)

## 2025-06-11 PROCEDURE — 87015 SPECIMEN INFECT AGNT CONCNTJ: CPT

## 2025-06-11 PROCEDURE — 86705 HEP B CORE ANTIBODY IGM: CPT

## 2025-06-11 PROCEDURE — 87521 HEPATITIS C PROBE&RVRS TRNSC: CPT

## 2025-06-11 PROCEDURE — 87070 CULTURE OTHR SPECIMN AEROBIC: CPT

## 2025-06-11 PROCEDURE — 86753 PROTOZOA ANTIBODY NOS: CPT

## 2025-06-11 PROCEDURE — 83735 ASSAY OF MAGNESIUM: CPT

## 2025-06-11 PROCEDURE — 87102 FUNGUS ISOLATION CULTURE: CPT

## 2025-06-11 PROCEDURE — 89051 BODY FLUID CELL COUNT: CPT

## 2025-06-11 PROCEDURE — 83550 IRON BINDING TEST: CPT

## 2025-06-11 PROCEDURE — 86788 WEST NILE VIRUS AB IGM: CPT

## 2025-06-11 PROCEDURE — 99239 HOSP IP/OBS DSCHRG MGMT >30: CPT

## 2025-06-11 PROCEDURE — 87640 STAPH A DNA AMP PROBE: CPT

## 2025-06-11 PROCEDURE — 71275 CT ANGIOGRAPHY CHEST: CPT

## 2025-06-11 PROCEDURE — 96374 THER/PROPH/DIAG INJ IV PUSH: CPT

## 2025-06-11 PROCEDURE — 87641 MR-STAPH DNA AMP PROBE: CPT

## 2025-06-11 PROCEDURE — 99285 EMERGENCY DEPT VISIT HI MDM: CPT | Mod: 25

## 2025-06-11 PROCEDURE — 80053 COMPREHEN METABOLIC PANEL: CPT

## 2025-06-11 PROCEDURE — 76700 US EXAM ABDOM COMPLETE: CPT

## 2025-06-11 PROCEDURE — 82962 GLUCOSE BLOOD TEST: CPT

## 2025-06-11 PROCEDURE — 87205 SMEAR GRAM STAIN: CPT

## 2025-06-11 PROCEDURE — 84466 ASSAY OF TRANSFERRIN: CPT

## 2025-06-11 PROCEDURE — 74177 CT ABD & PELVIS W/CONTRAST: CPT

## 2025-06-11 PROCEDURE — 70450 CT HEAD/BRAIN W/O DYE: CPT

## 2025-06-11 PROCEDURE — 36415 COLL VENOUS BLD VENIPUNCTURE: CPT

## 2025-06-11 PROCEDURE — 85730 THROMBOPLASTIN TIME PARTIAL: CPT

## 2025-06-11 PROCEDURE — 0225U NFCT DS DNA&RNA 21 SARSCOV2: CPT

## 2025-06-11 PROCEDURE — 85025 COMPLETE CBC W/AUTO DIFF WBC: CPT

## 2025-06-11 PROCEDURE — 86618 LYME DISEASE ANTIBODY: CPT

## 2025-06-11 PROCEDURE — 85610 PROTHROMBIN TIME: CPT

## 2025-06-11 PROCEDURE — 84157 ASSAY OF PROTEIN OTHER: CPT

## 2025-06-11 PROCEDURE — 86803 HEPATITIS C AB TEST: CPT

## 2025-06-11 PROCEDURE — 97116 GAIT TRAINING THERAPY: CPT

## 2025-06-11 PROCEDURE — 97530 THERAPEUTIC ACTIVITIES: CPT

## 2025-06-11 PROCEDURE — 86850 RBC ANTIBODY SCREEN: CPT

## 2025-06-11 PROCEDURE — 80048 BASIC METABOLIC PNL TOTAL CA: CPT

## 2025-06-11 PROCEDURE — 83540 ASSAY OF IRON: CPT

## 2025-06-11 PROCEDURE — 87484 EHRLICHA CHAFFEENSIS AMP PRB: CPT

## 2025-06-11 PROCEDURE — 83690 ASSAY OF LIPASE: CPT

## 2025-06-11 PROCEDURE — 86140 C-REACTIVE PROTEIN: CPT

## 2025-06-11 PROCEDURE — 86403 PARTICLE AGGLUT ANTBDY SCRN: CPT

## 2025-06-11 PROCEDURE — 87040 BLOOD CULTURE FOR BACTERIA: CPT

## 2025-06-11 PROCEDURE — 86592 SYPHILIS TEST NON-TREP QUAL: CPT

## 2025-06-11 PROCEDURE — 87340 HEPATITIS B SURFACE AG IA: CPT

## 2025-06-11 PROCEDURE — 86617 LYME DISEASE ANTIBODY: CPT

## 2025-06-11 PROCEDURE — 74176 CT ABD & PELVIS W/O CONTRAST: CPT

## 2025-06-11 PROCEDURE — 86900 BLOOD TYPING SEROLOGIC ABO: CPT

## 2025-06-11 PROCEDURE — 99261: CPT

## 2025-06-11 PROCEDURE — 82728 ASSAY OF FERRITIN: CPT

## 2025-06-11 PROCEDURE — 87483 CNS DNA AMP PROBE TYPE 12-25: CPT

## 2025-06-11 PROCEDURE — 86704 HEP B CORE ANTIBODY TOTAL: CPT

## 2025-06-11 PROCEDURE — 80202 ASSAY OF VANCOMYCIN: CPT

## 2025-06-11 PROCEDURE — 87116 MYCOBACTERIA CULTURE: CPT

## 2025-06-11 PROCEDURE — 87798 DETECT AGENT NOS DNA AMP: CPT

## 2025-06-11 PROCEDURE — 86789 WEST NILE VIRUS ANTIBODY: CPT

## 2025-06-11 PROCEDURE — 84100 ASSAY OF PHOSPHORUS: CPT

## 2025-06-11 PROCEDURE — 86901 BLOOD TYPING SEROLOGIC RH(D): CPT

## 2025-06-11 PROCEDURE — 85027 COMPLETE CBC AUTOMATED: CPT

## 2025-06-11 PROCEDURE — 71045 X-RAY EXAM CHEST 1 VIEW: CPT

## 2025-06-11 PROCEDURE — 86706 HEP B SURFACE ANTIBODY: CPT

## 2025-06-11 PROCEDURE — 87468 ANAPLSMA PHGCYTOPHLM AMP PRB: CPT

## 2025-06-11 PROCEDURE — 82945 GLUCOSE OTHER FLUID: CPT

## 2025-06-11 PROCEDURE — 85652 RBC SED RATE AUTOMATED: CPT

## 2025-06-11 PROCEDURE — 90937 HEMODIALYSIS REPEATED EVAL: CPT

## 2025-06-11 RX ADMIN — SERTRALINE 200 MILLIGRAM(S): 100 TABLET, FILM COATED ORAL at 13:13

## 2025-06-11 RX ADMIN — EPOETIN ALFA 10000 UNIT(S): 10000 SOLUTION INTRAVENOUS; SUBCUTANEOUS at 11:20

## 2025-06-11 RX ADMIN — Medication 0.5 MILLIGRAM(S): at 01:52

## 2025-06-11 RX ADMIN — Medication 0.5 MILLIGRAM(S): at 02:20

## 2025-06-11 RX ADMIN — Medication 0.5 MILLIGRAM(S): at 10:18

## 2025-06-11 RX ADMIN — Medication 4 MILLIGRAM(S): at 05:55

## 2025-06-11 RX ADMIN — Medication 50 MILLIGRAM(S): at 05:15

## 2025-06-11 RX ADMIN — Medication 2001 MILLIGRAM(S): at 07:46

## 2025-06-11 RX ADMIN — Medication 1 GRAM(S): at 13:12

## 2025-06-11 RX ADMIN — Medication 1 GRAM(S): at 05:15

## 2025-06-11 RX ADMIN — Medication 200 MICROGRAM(S): at 05:15

## 2025-06-11 RX ADMIN — CARVEDILOL 25 MILLIGRAM(S): 3.12 TABLET, FILM COATED ORAL at 05:14

## 2025-06-11 RX ADMIN — MEROPENEM 500 MILLIGRAM(S): 1 INJECTION INTRAVENOUS at 13:17

## 2025-06-11 RX ADMIN — Medication 10 MILLIGRAM(S): at 05:55

## 2025-06-11 RX ADMIN — LISINOPRIL 10 MILLIGRAM(S): 5 TABLET ORAL at 05:15

## 2025-06-11 RX ADMIN — Medication 1 APPLICATION(S): at 13:12

## 2025-06-11 RX ADMIN — Medication 10 MILLIGRAM(S): at 13:13

## 2025-06-11 RX ADMIN — Medication 1 GRAM(S): at 01:53

## 2025-06-11 RX ADMIN — Medication 0.5 MILLIGRAM(S): at 05:55

## 2025-06-11 RX ADMIN — Medication 50 MILLIGRAM(S): at 13:13

## 2025-06-11 RX ADMIN — INSULIN LISPRO 1: 100 INJECTION, SOLUTION INTRAVENOUS; SUBCUTANEOUS at 07:46

## 2025-06-11 RX ADMIN — Medication 81 MILLIGRAM(S): at 13:12

## 2025-06-11 RX ADMIN — Medication 1 APPLICATION(S): at 05:16

## 2025-06-11 NOTE — PROGRESS NOTE ADULT - NUTRITIONAL ASSESSMENT
This patient has been assessed with a concern for Malnutrition and has been determined to have a diagnosis/diagnoses of Moderate protein-calorie malnutrition.    This patient is being managed with:   Diet DASH/TLC-  Sodium & Cholesterol Restricted  Consistent Carbohydrate {Evening Snack} (CSTCHOSN)  Low Fat (LOWFAT)  For patients receiving Renal Replacement - No Protein Restr No Conc K No Conc Phos Low  Sodium (RENAL)  Entered: May 28 2025  8:38AM  

## 2025-06-11 NOTE — PROGRESS NOTE ADULT - SUBJECTIVE AND OBJECTIVE BOX
Hemodialysis:    TIME: 3 hours  Dialyzer: Revaclear 300   Dialysate: 3K/2.5 Calcium   Dialysate flow:  500 ML/MIN  Blood flow: 400 ML/MIN   UF Goal (Liters) : 2.5L    Comments: BP is stable

## 2025-06-11 NOTE — PROGRESS NOTE ADULT - ATTENDING COMMENTS
70 y/o F with PMH of HTN, DM, CA s/p PCI, ESRD on HD admitted for abdominal pain due to hepatic abscess.   Continue Meropenem  ID recs appreciated  Blood cultures negative  PICC line in place    DC home

## 2025-06-11 NOTE — PROGRESS NOTE ADULT - ASSESSMENT
69 year old female pt admitted for possible colitis- Nephrology consulted for managing HD needs.    ESRD on HD  MWF schedule at [facility]  Access ; REBECCA AVF  Seen on HD.  Qd, Qb, UF rate reviewed.  Vitals stable.  Access working well.  Patient tolerating HD well.    anemia of CKD  Hb below goal  continue LASHELL with HD    HTN/ volume  Bp stable- pt euvolemic  UF as tolerated with HD    CKD MBD  ca++ at goal  Continue phos binders with meals    Monitor Phos levels    fever, photobia and HA  sp LP on 05/30    Plan   HD tomorrow 3 hrs 2k bath Goal UF 2L as tolerated by BP   Status post PICC line placement for meropenem 500 mg daily   Pain control as per primary team   Epogen with HD   Dose medications for HD   Will follow

## 2025-06-11 NOTE — PROGRESS NOTE ADULT - SUBJECTIVE AND OBJECTIVE BOX
[facility] DIVISION OF KIDNEY DISEASES AND HYPERTENSION -- PROGRESS NOTE    24 hour events/subjective:  Seen today   FOr HD today   Planned to be discharged afterwards     MEDICATIONS    Standing Inpatient Medications  aspirin enteric coated 81 milliGRAM(s) Oral daily  calcium acetate 2001 milliGRAM(s) Oral two times a day with meals  carvedilol 25 milliGRAM(s) Oral every 12 hours  chlorhexidine 2% Cloths 1 Application(s) Topical <User Schedule>  chlorhexidine 2% Cloths 1 Application(s) Topical daily  CREON (Pancrelipase 3000 units) 1 Capsule(s) 1 Capsule(s) Oral three times a day with meals  dextrose 5%. 1000 milliLiter(s) IV Continuous <Continuous>  dextrose 5%. 1000 milliLiter(s) IV Continuous <Continuous>  dextrose 50% Injectable 25 Gram(s) IV Push once  dextrose 50% Injectable 12.5 Gram(s) IV Push once  dextrose 50% Injectable 25 Gram(s) IV Push once  epoetin day (PROCRIT) Injectable 67283 Unit(s) IV Push <User Schedule>  ethyl chloride Spray 1 Application(s) Topical <User Schedule>  glucagon  Injectable 1 milliGRAM(s) IntraMuscular once  hydrALAZINE 50 milliGRAM(s) Oral three times a day  insulin lispro (ADMELOG) corrective regimen sliding scale   SubCutaneous at bedtime  insulin lispro (ADMELOG) corrective regimen sliding scale   SubCutaneous three times a day before meals  levothyroxine 200 MICROGram(s) Oral daily  lisinopril 10 milliGRAM(s) Oral daily  meropenem Injectable 500 milliGRAM(s) IV Push every 24 hours  metoclopramide 10 milliGRAM(s) Oral three times a day before meals  polyethylene glycol 3350 17 Gram(s) Oral daily  sertraline 200 milliGRAM(s) Oral daily  sucralfate suspension 1 Gram(s) Oral four times a day    PRN Inpatient Medications  acetaminophen     Tablet .. 650 milliGRAM(s) Oral every 6 hours PRN  dextrose Oral Gel 15 Gram(s) Oral once PRN  HYDROmorphone  Injectable 0.5 milliGRAM(s) IV Push every 4 hours PRN  ondansetron Injectable 4 milliGRAM(s) IV Push every 4 hours PRN  sodium chloride 0.9% lock flush 10 milliLiter(s) IV Push every 1 hour PRN      VITALS/PHYSICAL EXAM  --------------------------------------------------------------------------------  T(C): 36.8 (06-11-25 @ 12:05), Max: 37.2 (06-11-25 @ 04:22)  HR: 76 (06-11-25 @ 13:09) (73 - 88)  BP: 151/82 (06-11-25 @ 13:09) (135/90 - 203/69)  RR: 18 (06-11-25 @ 13:09) (18 - 18)  SpO2: 94% (06-11-25 @ 13:09) (94% - 97%)  Wt(kg): --        06-11-25 @ 07:01  -  06-11-25 @ 14:45  --------------------------------------------------------  IN: 0 mL / OUT: 2000 mL / NET: -2000 mL      Physical Exam:  Gen: NAD, well-appearing  HEENT: normal  Pulm: CTA B/L  CV: normal S1S2; no rub, no edema  Abd: soft, non-tender  MSK no deformities   Neuro: Alert and oriented x3     LABS/STUDIES  --------------------------------------------------------------------------------  137  |  99  |  33.1  ----------------------------<  167      [06-10-25 @ 05:30]  4.9   |  27.0  |  4.08        Ca     8.5     [06-10-25 @ 05:30]      Mg     2.5     [06-10-25 @ 05:30]      Phos  2.6     [06-10-25 @ 05:30]            Creatinine Trend:  SCr 4.08 [06-10 @ 05:30]  SCr 5.68 [06-09 @ 05:45]  SCr 4.95 [06-08 @ 04:41]  SCr 3.89 [06-07 @ 06:09]  SCr 5.35 [06-06 @ 06:26]

## 2025-06-11 NOTE — PROGRESS NOTE ADULT - SUBJECTIVE AND OBJECTIVE BOX
HAYLEY LARA 69y Female  MRN#: [id number]       SUBJECTIVE  Patient is a 69y old Female who presents with a chief complaint of Abdominal pain  Dialysis (10 Nish 2025 15:59)  Currently admitted to medicine with the primary diagnosis of Need for acute hemodialysis    Hospital course has been complicated by home care set up.   Today is hospital day 14d, and this morning she has no complaints and reports no overnight events. Patient is going for HD today, afterwards will receive dose of Meropenem and then will be discharged home. Patient was slated to go yesterday but there were unforeseen delays in home care set up delaying the planned discharge.       OBJECTIVE  PAST MEDICAL & SURGICAL HISTORY  Diabetes    Bernard syndrome    Pancreatitis    Cirrhosis    ESRD on dialysis  MWF at [facility]    HTN (hypertension)    CAD (coronary artery disease)    Chronic diastolic congestive heart failure    IBS (irritable bowel syndrome)    Gastroparesis    Thyroid cancer    Esophagitis, erosive    HCV (hepatitis C virus)    History of liver biopsy    H/O total thyroidectomy    History of cholecystectomy  1990s    S/P       Home Meds:  aspirin 81 mg oral delayed release tablet: 1 tab(s) orally once a day  calcium acetate 667 mg oral capsule: 3 cap(s) orally 2 times a day  carvedilol 25 mg oral tablet: 1 tab(s) orally every 12 hours  Creon 3000 units oral delayed release capsule: 1 cap(s) orally every 8 hours  HumaLOG KwikPen 100 units/mL injectable solution: injectable 3 times a day  hydrALAZINE 25 mg oral tablet: 1 tab(s) orally every 8 hours  levothyroxine 200 mcg (0.2 mg) oral tablet: 1 tab(s) orally once a day  lisinopril 10 mg oral tablet: 1 tab(s) orally once a day  meropenem 500 mg intravenous injection: 500 milligram(s) intravenously once a day through 2025. On HD days take the dose post-HD. Weekly CBC and CMP to be faxed to .  sertraline 100 mg oral tablet: 2 tab(s) orally once a day  sucralfate 1 g/10 mL oral suspension: 10 milliliter(s) orally 3 times a day  traMADol 50 mg oral tablet: 1 tab(s) orally every 8 hours MDD: 3    ALLERGIES:  Augmentin (Hives)  ceftriaxone (Pruritus)    MEDICATIONS:  STANDING MEDICATIONS  aspirin enteric coated 81 milliGRAM(s) Oral daily  calcium acetate 2001 milliGRAM(s) Oral two times a day with meals  carvedilol 25 milliGRAM(s) Oral every 12 hours  chlorhexidine 2% Cloths 1 Application(s) Topical daily  chlorhexidine 2% Cloths 1 Application(s) Topical <User Schedule>  CREON (Pancrelipase 3000 units) 1 Capsule(s) 1 Capsule(s) Oral three times a day with meals  dextrose 5%. 1000 milliLiter(s) IV Continuous <Continuous>  dextrose 5%. 1000 milliLiter(s) IV Continuous <Continuous>  dextrose 50% Injectable 25 Gram(s) IV Push once  dextrose 50% Injectable 12.5 Gram(s) IV Push once  dextrose 50% Injectable 25 Gram(s) IV Push once  epoetin day (PROCRIT) Injectable 36215 Unit(s) IV Push <User Schedule>  ethyl chloride Spray 1 Application(s) Topical <User Schedule>  glucagon  Injectable 1 milliGRAM(s) IntraMuscular once  hydrALAZINE 50 milliGRAM(s) Oral three times a day  insulin lispro (ADMELOG) corrective regimen sliding scale   SubCutaneous at bedtime  insulin lispro (ADMELOG) corrective regimen sliding scale   SubCutaneous three times a day before meals  levothyroxine 200 MICROGram(s) Oral daily  lisinopril 10 milliGRAM(s) Oral daily  meropenem Injectable 500 milliGRAM(s) IV Push every 24 hours  metoclopramide 10 milliGRAM(s) Oral three times a day before meals  polyethylene glycol 3350 17 Gram(s) Oral daily  sertraline 200 milliGRAM(s) Oral daily  sucralfate suspension 1 Gram(s) Oral four times a day    PRN MEDICATIONS  acetaminophen     Tablet .. 650 milliGRAM(s) Oral every 6 hours PRN  dextrose Oral Gel 15 Gram(s) Oral once PRN  HYDROmorphone  Injectable 0.5 milliGRAM(s) IV Push every 4 hours PRN  ondansetron Injectable 4 milliGRAM(s) IV Push every 4 hours PRN  sodium chloride 0.9% lock flush 10 milliLiter(s) IV Push every 1 hour PRN      VITAL SIGNS: Last 24 Hours  T(C): 36.8 (2025 08:52), Max: 37.2 (2025 04:22)  T(F): 98.3 (2025 08:52), Max: 98.9 (2025 04:22)  HR: 79 (2025 08:52) (78 - 88)  BP: 135/90 (2025 08:52) (135/90 - 203/69)  BP(mean): --  RR: 18 (2025 08:52) (18 - 18)  SpO2: 97% (2025 08:52) (95% - 97%)    LABS:                        7.2    4.06  )-----------( 75       ( 2025 09:15 )             23.0     06-10    137  |  99  |  33.1[H]  ----------------------------<  167[H]  4.9   |  27.0  |  4.08[H]    Ca    8.5      10 Nish 2025 05:30  Phos  2.6     06-10  Mg     2.5     06-10            RADIOLOGY:  Reviewed     PHYSICAL EXAM:  General: NAD, AAOx3  HEENT: atraumatic, normocephalic  Pulmonary: clear to auscultation bilaterally; No wheeze  Cardiovascular: Regular rate and rhythm; no murmurs, rubs or gallops. Normal S1S2  Gastrointestinal: Soft, nontender, nondistended; bowel sounds present  Musculoskeletal: 2+ peripheral pulses, no clubbing, cyanosis or edema  Neurology: Pt. alert and oriented, fluent speech, able to move all extremities  Skin: no rashes or lesions

## 2025-06-11 NOTE — PROGRESS NOTE ADULT - ASSESSMENT
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 CT showed possible colitis. Course complicated by fever, chills, nausea, and severe headache. Infectious w/u initiated. ID and Neuro consulted. s/p LP CSF cxs neg for possible aseptic meningitis. Course further complicated by RUQ Abd pain/noted with hepatic abscess. IR consulted, unable to drain.    PLAN:  # Hepatic Abscess  # Gastroparesis  # Chronic Pancreatitis  # Erosive Esophagitis history  # Cirrhosis, prior HCV  - Likely cause of fevers  - Abd pain increased RUQ -> CT A/P non contrast ordered 6/2PM  - CT A/P 6/2 Hepatic Abscess  - Could be possible biliary sepsis picture  - CT A/P w/IVC confirms liver abscess  - ABD US showed hepatic abscess, cirrhosis   - Hydromorphone PRN  - Ondansetron PRN  - Metoclopramide for gastroparesis  - Pancreatic enzymes supplements TID w/ meals  - Sucralfate QID  - Miralax daily  - Continue Meropenem  - IR unable to drain   - Repeat BCx, NGTD   - ID following, 6 weeks of Meropenem 500mg IV Q24h (on HD days give post-HD) through July 15th, 2025. Weekly CBC and CMP to be faxed to .     #Possible Aseptic Meningitis  - Pt was w/ persistent headaches, recurrent fevers, chills, nausea- now resolved.  - no leukocytosis  - bcx x2 5/29 negative to date  - s/p LP 5/30  - CSF appearance and cell counts unremarkable  - CSF gram stain and PCR negative  - CSF culture negative  - f/u Lyme and West Nile Virus antibodies  - f/u serum tick panel   - s/p meropenem 1g q24hrs  - s/p vanco 750mg TIW post dialysis  - s/p acyclovir 340mg q24hrs  - CTH unremarkable  - Check GI PCR if pt develops diarrhea  - ID following, recs appreciated    # ESRD  # Hyperkalemia  # Hyperphosphatemia  # AoCKD  - Telemetry  - HD per Nephrology  - Phosphate binder  - LASHELL with HD  - Iron stores wnl    #Hypertension  #History of CAD  #History of CHF, likely diastolic  - Continue home lisinopril 10mg daily  - Continue hydralazine 50MG TID  - ASA to be continued  - I &Os and daily weight    #Diabetes  #Hypothyroidism, history of thyroidectomy for cancer  - Blood glucose monitoring with sliding scale Lispro  - A1c 7.3  - Levothyroxine 200mcg daily    #Anxiety  #Depression  - Sertraline to be continued    DVT: SCDs  Diet: DASH/TLC  Dispo: DC today post dialysis and abx

## 2025-06-11 NOTE — PROGRESS NOTE ADULT - PROVIDER SPECIALTY LIST ADULT
Gastroenterology
Hospitalist
Infectious Disease
Internal Medicine
Nephrology
Hospitalist
Internal Medicine
Nephrology
Nephrology
Neurology
Hospitalist
Hospitalist
Internal Medicine
Internal Medicine
Nephrology
Infectious Disease
Infectious Disease
Internal Medicine
Nephrology
Internal Medicine

## 2025-06-11 NOTE — PROGRESS NOTE ADULT - REASON FOR ADMISSION
Abdominal pain  Dialysis
Abdominal pain
Abdominal pain
Abdominal pain  Dialysis
Abdominal pain
Abdominal pain  Dialysis
Abdominal pain
Abdominal pain  Dialysis

## 2025-06-12 ENCOUNTER — INPATIENT (INPATIENT)
Facility: HOSPITAL | Age: 70
LOS: 7 days | Discharge: HOME HEALTH | DRG: 420 | End: 2025-06-20
Attending: FAMILY MEDICINE | Admitting: HOSPITALIST
Payer: COMMERCIAL

## 2025-06-12 VITALS
HEIGHT: 65 IN | OXYGEN SATURATION: 97 % | RESPIRATION RATE: 18 BRPM | WEIGHT: 147.49 LBS | DIASTOLIC BLOOD PRESSURE: 77 MMHG | HEART RATE: 89 BPM | TEMPERATURE: 99 F | SYSTOLIC BLOOD PRESSURE: 173 MMHG

## 2025-06-12 DIAGNOSIS — Z98.891 HISTORY OF UTERINE SCAR FROM PREVIOUS SURGERY: Chronic | ICD-10-CM

## 2025-06-12 DIAGNOSIS — Z98.890 OTHER SPECIFIED POSTPROCEDURAL STATES: Chronic | ICD-10-CM

## 2025-06-12 DIAGNOSIS — Z90.49 ACQUIRED ABSENCE OF OTHER SPECIFIED PARTS OF DIGESTIVE TRACT: Chronic | ICD-10-CM

## 2025-06-12 DIAGNOSIS — E89.0 POSTPROCEDURAL HYPOTHYROIDISM: Chronic | ICD-10-CM

## 2025-06-12 LAB
ALBUMIN SERPL ELPH-MCNC: 3.7 G/DL — SIGNIFICANT CHANGE UP (ref 3.3–5.2)
ALP SERPL-CCNC: 125 U/L — HIGH (ref 40–120)
ALT FLD-CCNC: 10 U/L — SIGNIFICANT CHANGE UP
ANION GAP SERPL CALC-SCNC: 16 MMOL/L — SIGNIFICANT CHANGE UP (ref 5–17)
AST SERPL-CCNC: 18 U/L — SIGNIFICANT CHANGE UP
BASOPHILS # BLD AUTO: 0.03 K/UL — SIGNIFICANT CHANGE UP (ref 0–0.2)
BASOPHILS # BLD MANUAL: 0.07 K/UL — SIGNIFICANT CHANGE UP (ref 0–0.2)
BASOPHILS NFR BLD AUTO: 0.7 % — SIGNIFICANT CHANGE UP (ref 0–2)
BASOPHILS NFR BLD MANUAL: 1.8 % — SIGNIFICANT CHANGE UP (ref 0–2)
BILIRUB SERPL-MCNC: 0.2 MG/DL — LOW (ref 0.4–2)
BUN SERPL-MCNC: 41.2 MG/DL — HIGH (ref 8–20)
CALCIUM SERPL-MCNC: 8.3 MG/DL — LOW (ref 8.4–10.5)
CHLORIDE SERPL-SCNC: 99 MMOL/L — SIGNIFICANT CHANGE UP (ref 96–108)
CO2 SERPL-SCNC: 25 MMOL/L — SIGNIFICANT CHANGE UP (ref 22–29)
CREAT SERPL-MCNC: 4.2 MG/DL — HIGH (ref 0.5–1.3)
EGFR: 11 ML/MIN/1.73M2 — LOW
EGFR: 11 ML/MIN/1.73M2 — LOW
EOSINOPHIL # BLD AUTO: 0.21 K/UL — SIGNIFICANT CHANGE UP (ref 0–0.5)
EOSINOPHIL # BLD MANUAL: 0.18 K/UL — SIGNIFICANT CHANGE UP (ref 0–0.5)
EOSINOPHIL NFR BLD AUTO: 5.1 % — SIGNIFICANT CHANGE UP (ref 0–6)
EOSINOPHIL NFR BLD MANUAL: 4.4 % — SIGNIFICANT CHANGE UP (ref 0–6)
GIANT PLATELETS BLD QL SMEAR: PRESENT
GLUCOSE SERPL-MCNC: 254 MG/DL — HIGH (ref 70–99)
HBV CORE AB SER-ACNC: SIGNIFICANT CHANGE UP
HBV CORE IGM SER-ACNC: SIGNIFICANT CHANGE UP
HBV SURFACE AB SER-ACNC: 31.5 MIU/ML — SIGNIFICANT CHANGE UP
HBV SURFACE AG SER-ACNC: SIGNIFICANT CHANGE UP
HCT VFR BLD CALC: 23.1 % — LOW (ref 34.5–45)
HCV AB S/CO SERPL IA: 14.52 S/CO — HIGH (ref 0–0.79)
HCV AB SERPL-IMP: REACTIVE
HGB BLD-MCNC: 7.3 G/DL — LOW (ref 11.5–15.5)
IMM GRANULOCYTES # BLD AUTO: 0.01 K/UL — SIGNIFICANT CHANGE UP (ref 0–0.07)
IMM GRANULOCYTES NFR BLD AUTO: 0.2 % — SIGNIFICANT CHANGE UP (ref 0–0.9)
IMMATURE PLATELET FRACTION #: 3.9 K/UL — LOW (ref 4.7–11.1)
IMMATURE PLATELET FRACTION %: 4.5 % — SIGNIFICANT CHANGE UP (ref 1.6–4.9)
LIDOCAIN IGE QN: 70 U/L — HIGH (ref 22–51)
LYMPHOCYTES # BLD AUTO: 0.79 K/UL — LOW (ref 1–3.3)
LYMPHOCYTES # BLD MANUAL: 0.58 K/UL — LOW (ref 1–3.3)
LYMPHOCYTES NFR BLD AUTO: 19.3 % — SIGNIFICANT CHANGE UP (ref 13–44)
LYMPHOCYTES NFR BLD MANUAL: 14.2 % — SIGNIFICANT CHANGE UP (ref 13–44)
MCHC RBC-ENTMCNC: 29.1 PG — SIGNIFICANT CHANGE UP (ref 27–34)
MCHC RBC-ENTMCNC: 31.6 G/DL — LOW (ref 32–36)
MCV RBC AUTO: 92 FL — SIGNIFICANT CHANGE UP (ref 80–100)
MONOCYTES # BLD AUTO: 0.52 K/UL — SIGNIFICANT CHANGE UP (ref 0–0.9)
MONOCYTES # BLD MANUAL: 0.18 K/UL — SIGNIFICANT CHANGE UP (ref 0–0.9)
MONOCYTES NFR BLD AUTO: 12.7 % — SIGNIFICANT CHANGE UP (ref 2–14)
MONOCYTES NFR BLD MANUAL: 4.4 % — SIGNIFICANT CHANGE UP (ref 2–14)
NEUTROPHILS # BLD AUTO: 2.53 K/UL — SIGNIFICANT CHANGE UP (ref 1.8–7.4)
NEUTROPHILS # BLD MANUAL: 3.08 K/UL — SIGNIFICANT CHANGE UP (ref 1.8–7.4)
NEUTROPHILS NFR BLD AUTO: 62 % — SIGNIFICANT CHANGE UP (ref 43–77)
NEUTROPHILS NFR BLD MANUAL: 75.2 % — SIGNIFICANT CHANGE UP (ref 43–77)
NRBC # BLD AUTO: 0 K/UL — SIGNIFICANT CHANGE UP (ref 0–0)
NRBC # FLD: 0 K/UL — SIGNIFICANT CHANGE UP (ref 0–0)
NRBC BLD AUTO-RTO: 0 /100 WBCS — SIGNIFICANT CHANGE UP (ref 0–0)
PLAT MORPH BLD: NORMAL — SIGNIFICANT CHANGE UP
PLATELET # BLD AUTO: 86 K/UL — LOW (ref 150–400)
PMV BLD: 12.5 FL — SIGNIFICANT CHANGE UP (ref 7–13)
POTASSIUM SERPL-MCNC: 4.6 MMOL/L — SIGNIFICANT CHANGE UP (ref 3.5–5.3)
POTASSIUM SERPL-SCNC: 4.6 MMOL/L — SIGNIFICANT CHANGE UP (ref 3.5–5.3)
PROT SERPL-MCNC: 6.5 G/DL — LOW (ref 6.6–8.7)
RBC # BLD: 2.51 M/UL — LOW (ref 3.8–5.2)
RBC # FLD: 14.9 % — HIGH (ref 10.3–14.5)
RBC BLD AUTO: NORMAL — SIGNIFICANT CHANGE UP
SODIUM SERPL-SCNC: 140 MMOL/L — SIGNIFICANT CHANGE UP (ref 135–145)
WBC # BLD: 4.09 K/UL — SIGNIFICANT CHANGE UP (ref 3.8–10.5)
WBC # FLD AUTO: 4.09 K/UL — SIGNIFICANT CHANGE UP (ref 3.8–10.5)

## 2025-06-12 PROCEDURE — 99285 EMERGENCY DEPT VISIT HI MDM: CPT

## 2025-06-12 PROCEDURE — 74177 CT ABD & PELVIS W/CONTRAST: CPT | Mod: 26

## 2025-06-12 RX ORDER — HYDROMORPHONE/SOD CHLOR,ISO/PF 2 MG/10 ML
0.5 SYRINGE (ML) INJECTION ONCE
Refills: 0 | Status: DISCONTINUED | OUTPATIENT
Start: 2025-06-12 | End: 2025-06-12

## 2025-06-12 RX ORDER — HYDROMORPHONE/SOD CHLOR,ISO/PF 2 MG/10 ML
1 SYRINGE (ML) INJECTION ONCE
Refills: 0 | Status: DISCONTINUED | OUTPATIENT
Start: 2025-06-12 | End: 2025-06-12

## 2025-06-12 RX ORDER — METOCLOPRAMIDE HCL 10 MG
10 TABLET ORAL ONCE
Refills: 0 | Status: COMPLETED | OUTPATIENT
Start: 2025-06-12 | End: 2025-06-12

## 2025-06-12 RX ORDER — MEROPENEM 1 G/30ML
500 INJECTION INTRAVENOUS ONCE
Refills: 0 | Status: COMPLETED | OUTPATIENT
Start: 2025-06-12 | End: 2025-06-12

## 2025-06-12 RX ORDER — MEROPENEM 1 G/30ML
500 INJECTION INTRAVENOUS ONCE
Refills: 0 | Status: DISCONTINUED | OUTPATIENT
Start: 2025-06-12 | End: 2025-06-12

## 2025-06-12 RX ORDER — ONDANSETRON HCL/PF 4 MG/2 ML
4 VIAL (ML) INJECTION ONCE
Refills: 0 | Status: COMPLETED | OUTPATIENT
Start: 2025-06-12 | End: 2025-06-12

## 2025-06-12 RX ADMIN — Medication 4 MILLIGRAM(S): at 17:16

## 2025-06-12 RX ADMIN — Medication 10 MILLIGRAM(S): at 19:38

## 2025-06-12 RX ADMIN — MEROPENEM 500 MILLIGRAM(S): 1 INJECTION INTRAVENOUS at 18:55

## 2025-06-12 RX ADMIN — Medication 0.5 MILLIGRAM(S): at 17:16

## 2025-06-12 RX ADMIN — Medication 1 MILLIGRAM(S): at 19:38

## 2025-06-12 RX ADMIN — Medication 1 MILLIGRAM(S): at 20:52

## 2025-06-12 NOTE — ED PROVIDER NOTE - PHYSICAL EXAMINATION
VITAL SIGNS: I have reviewed nursing notes and confirm.  CONSTITUTIONAL:  in no acute distress.  SKIN: Skin exam is warm and dry, no acute rash.  HEAD: Normocephalic; atraumatic.  EYES: PERRL, EOM intact; conjunctiva and sclera clear.  ENT: No nasal discharge; airway clear. Throat clear.  NECK: Supple; non tender.    CARD: Regular rate and rhythm.  RESP: No wheezes,  no rales or rhonchi.   ABD:  soft; non-distended; (+) mild diffuse tenderness, distention. no rebound or guarding.   EXT: Normal ROM. No clubbing, cyanosis or edema.  NEURO: Alert, oriented x3. Grossly unremarkable. No focal deficits.  moves all extremities.   PSYCH: Cooperative, appropriate.

## 2025-06-12 NOTE — ED ADULT NURSE NOTE - NSICDXPASTMEDICALHX_GEN_ALL_CORE_FT
PAST MEDICAL HISTORY:  Bernard syndrome     CAD (coronary artery disease)     Chronic diastolic congestive heart failure     Cirrhosis     Diabetes     Esophagitis, erosive     ESRD on dialysis MWF at [facility]    Gastroparesis     HCV (hepatitis C virus)     HTN (hypertension)     IBS (irritable bowel syndrome)     Pancreatitis     Thyroid cancer

## 2025-06-12 NOTE — ED ADULT NURSE NOTE - OBJECTIVE STATEMENT
Assumed care of pt at 1700. Pt AA&Ox4, resp even/unlabored, presents to ED c/o RUQ abd pain and distention associated with n/v. States she was recently d/c'd from hospital yesterday with RUE PICC and LUE AV fistula, notes last dialysis yesterday 6/12. Denies CP/SOB, fevers, chills, and body aches, or any other complaints or symptoms. ED provider evaluating, plan of care ongoing.

## 2025-06-12 NOTE — ED ADULT NURSE NOTE - CHIEF COMPLAINT QUOTE
C/O abd pain and distention. Pt states she has an abscess on the livers. Skin is jaundiced. [place] [facility] [place].  +N/V. Hx of cirrhosis

## 2025-06-12 NOTE — ED ADULT TRIAGE NOTE - CHIEF COMPLAINT QUOTE
C/O abd pain and distention. Pt states she has an abscess on the livers. Skin is jaundiced. [place] [facility] [place].  +N/V. Hx of cirrhosis

## 2025-06-12 NOTE — ED PROVIDER NOTE - CLINICAL SUMMARY MEDICAL DECISION MAKING FREE TEXT BOX
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 Plan for labs, abx, pain control.

## 2025-06-12 NOTE — ED PROVIDER NOTE - OBJECTIVE STATEMENT
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 Patient states she was recently admitted here for abdominal pain, found to have multiple tiny abscesses, ID and nephro were following, PICC line placed and started on 500 meropenem for 6 weeks. Patient was dc yesterday with home care set up, patient states she was supposed to receive home abx today and the person delivering the abx got into a car accident. She states she also has recurrent abdominal pain, states it is the same pain but unable to control at home. Denies chest pain, SOB, N/V/D, diarrhea, blood in urine or stool, dysuria, fever, chills.

## 2025-06-12 NOTE — ED PROVIDER NOTE - NSICDXPASTMEDICALHX_GEN_ALL_CORE_FT
PAST MEDICAL HISTORY:  Bernard syndrome     CAD (coronary artery disease)     Chronic diastolic congestive heart failure     Cirrhosis     Diabetes     Esophagitis, erosive     ESRD on dialysis MWF at [facility]    Gastroparesis     HCV (hepatitis C virus)     HTN (hypertension)     IBS (irritable bowel syndrome)     Pancreatitis     Thyroid cancer

## 2025-06-12 NOTE — ED PROVIDER NOTE - ATTENDING CONTRIBUTION TO CARE
I, Frank Rinaldi, performed a face to face bedside interview with this patient regarding history of present illness, and completed an independent physical examination. I personally made/approved the management plan and take responsibility for the patient management. 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

## 2025-06-13 DIAGNOSIS — E11.9 TYPE 2 DIABETES MELLITUS WITHOUT COMPLICATIONS: ICD-10-CM

## 2025-06-13 DIAGNOSIS — I10 ESSENTIAL (PRIMARY) HYPERTENSION: ICD-10-CM

## 2025-06-13 DIAGNOSIS — D64.9 ANEMIA, UNSPECIFIED: ICD-10-CM

## 2025-06-13 DIAGNOSIS — N18.6 END STAGE RENAL DISEASE: ICD-10-CM

## 2025-06-13 PROBLEM — K31.84 GASTROPARESIS: Chronic | Status: ACTIVE | Noted: 2025-05-28

## 2025-06-13 PROBLEM — K58.9 IRRITABLE BOWEL SYNDROME, UNSPECIFIED: Chronic | Status: ACTIVE | Noted: 2025-05-28

## 2025-06-13 PROBLEM — B19.20 UNSPECIFIED VIRAL HEPATITIS C WITHOUT HEPATIC COMA: Chronic | Status: ACTIVE | Noted: 2025-05-28

## 2025-06-13 PROBLEM — I50.32 CHRONIC DIASTOLIC (CONGESTIVE) HEART FAILURE: Chronic | Status: ACTIVE | Noted: 2025-05-28

## 2025-06-13 PROBLEM — C73 MALIGNANT NEOPLASM OF THYROID GLAND: Chronic | Status: ACTIVE | Noted: 2025-05-28

## 2025-06-13 PROBLEM — I25.10 ATHEROSCLEROTIC HEART DISEASE OF NATIVE CORONARY ARTERY WITHOUT ANGINA PECTORIS: Chronic | Status: ACTIVE | Noted: 2025-05-28

## 2025-06-13 LAB
ALBUMIN SERPL ELPH-MCNC: 3.5 G/DL — SIGNIFICANT CHANGE UP (ref 3.3–5.2)
ALP SERPL-CCNC: 117 U/L — SIGNIFICANT CHANGE UP (ref 40–120)
ALT FLD-CCNC: 7 U/L — SIGNIFICANT CHANGE UP
ANION GAP SERPL CALC-SCNC: 16 MMOL/L — SIGNIFICANT CHANGE UP (ref 5–17)
AST SERPL-CCNC: 16 U/L — SIGNIFICANT CHANGE UP
BILIRUB SERPL-MCNC: 0.3 MG/DL — LOW (ref 0.4–2)
BUN SERPL-MCNC: 44.7 MG/DL — HIGH (ref 8–20)
CALCIUM SERPL-MCNC: 8.1 MG/DL — LOW (ref 8.4–10.5)
CHLORIDE SERPL-SCNC: 104 MMOL/L — SIGNIFICANT CHANGE UP (ref 96–108)
CO2 SERPL-SCNC: 24 MMOL/L — SIGNIFICANT CHANGE UP (ref 22–29)
CREAT SERPL-MCNC: 4.43 MG/DL — HIGH (ref 0.5–1.3)
EGFR: 10 ML/MIN/1.73M2 — LOW
EGFR: 10 ML/MIN/1.73M2 — LOW
GLUCOSE BLDC GLUCOMTR-MCNC: 126 MG/DL — HIGH (ref 70–99)
GLUCOSE BLDC GLUCOMTR-MCNC: 145 MG/DL — HIGH (ref 70–99)
GLUCOSE BLDC GLUCOMTR-MCNC: 148 MG/DL — HIGH (ref 70–99)
GLUCOSE BLDC GLUCOMTR-MCNC: 184 MG/DL — HIGH (ref 70–99)
GLUCOSE SERPL-MCNC: 138 MG/DL — HIGH (ref 70–99)
HCT VFR BLD CALC: 23.6 % — LOW (ref 34.5–45)
HGB BLD-MCNC: 7.3 G/DL — LOW (ref 11.5–15.5)
IMMATURE PLATELET FRACTION #: 3.5 K/UL — LOW (ref 4.7–11.1)
IMMATURE PLATELET FRACTION %: 4.3 % — SIGNIFICANT CHANGE UP (ref 1.6–4.9)
MCHC RBC-ENTMCNC: 29 PG — SIGNIFICANT CHANGE UP (ref 27–34)
MCHC RBC-ENTMCNC: 30.9 G/DL — LOW (ref 32–36)
MCV RBC AUTO: 93.7 FL — SIGNIFICANT CHANGE UP (ref 80–100)
MRSA PCR RESULT.: SIGNIFICANT CHANGE UP
NRBC # BLD AUTO: 0 K/UL — SIGNIFICANT CHANGE UP (ref 0–0)
NRBC # FLD: 0 K/UL — SIGNIFICANT CHANGE UP (ref 0–0)
NRBC BLD AUTO-RTO: 0 /100 WBCS — SIGNIFICANT CHANGE UP (ref 0–0)
PLATELET # BLD AUTO: 81 K/UL — LOW (ref 150–400)
PMV BLD: 11.8 FL — SIGNIFICANT CHANGE UP (ref 7–13)
POTASSIUM SERPL-MCNC: 4.8 MMOL/L — SIGNIFICANT CHANGE UP (ref 3.5–5.3)
POTASSIUM SERPL-SCNC: 4.8 MMOL/L — SIGNIFICANT CHANGE UP (ref 3.5–5.3)
PROT SERPL-MCNC: 6.1 G/DL — LOW (ref 6.6–8.7)
RBC # BLD: 2.52 M/UL — LOW (ref 3.8–5.2)
RBC # FLD: 14.7 % — HIGH (ref 10.3–14.5)
S AUREUS DNA NOSE QL NAA+PROBE: SIGNIFICANT CHANGE UP
SODIUM SERPL-SCNC: 143 MMOL/L — SIGNIFICANT CHANGE UP (ref 135–145)
WBC # BLD: 3.67 K/UL — LOW (ref 3.8–10.5)
WBC # FLD AUTO: 3.67 K/UL — LOW (ref 3.8–10.5)

## 2025-06-13 PROCEDURE — 99222 1ST HOSP IP/OBS MODERATE 55: CPT

## 2025-06-13 PROCEDURE — 74181 MRI ABDOMEN W/O CONTRAST: CPT | Mod: 26

## 2025-06-13 PROCEDURE — 99223 1ST HOSP IP/OBS HIGH 75: CPT

## 2025-06-13 PROCEDURE — 99233 SBSQ HOSP IP/OBS HIGH 50: CPT

## 2025-06-13 RX ORDER — SODIUM CHLORIDE 9 G/1000ML
1000 INJECTION, SOLUTION INTRAVENOUS
Refills: 0 | Status: DISCONTINUED | OUTPATIENT
Start: 2025-06-13 | End: 2025-06-16

## 2025-06-13 RX ORDER — LISINOPRIL 5 MG/1
10 TABLET ORAL DAILY
Refills: 0 | Status: DISCONTINUED | OUTPATIENT
Start: 2025-06-13 | End: 2025-06-16

## 2025-06-13 RX ORDER — MAGNESIUM, ALUMINUM HYDROXIDE 200-200 MG
30 TABLET,CHEWABLE ORAL EVERY 4 HOURS
Refills: 0 | Status: DISCONTINUED | OUTPATIENT
Start: 2025-06-13 | End: 2025-06-16

## 2025-06-13 RX ORDER — HYDROMORPHONE/SOD CHLOR,ISO/PF 2 MG/10 ML
1 SYRINGE (ML) INJECTION EVERY 4 HOURS
Refills: 0 | Status: DISCONTINUED | OUTPATIENT
Start: 2025-06-13 | End: 2025-06-15

## 2025-06-13 RX ORDER — TRAMADOL HYDROCHLORIDE 50 MG/1
50 TABLET, FILM COATED ORAL EVERY 8 HOURS
Refills: 0 | Status: DISCONTINUED | OUTPATIENT
Start: 2025-06-13 | End: 2025-06-15

## 2025-06-13 RX ORDER — ONDANSETRON HCL/PF 4 MG/2 ML
4 VIAL (ML) INJECTION EVERY 8 HOURS
Refills: 0 | Status: DISCONTINUED | OUTPATIENT
Start: 2025-06-13 | End: 2025-06-16

## 2025-06-13 RX ORDER — LIPASE/PROTEASE/AMYLASE 10K-37.5K
3000 CAPSULE,DELAYED RELEASE (ENTERIC COATED) ORAL
Refills: 0 | Status: DISCONTINUED | OUTPATIENT
Start: 2025-06-13 | End: 2025-06-13

## 2025-06-13 RX ORDER — MELATONIN 5 MG
3 TABLET ORAL AT BEDTIME
Refills: 0 | Status: DISCONTINUED | OUTPATIENT
Start: 2025-06-13 | End: 2025-06-16

## 2025-06-13 RX ORDER — SERTRALINE 100 MG/1
100 TABLET, FILM COATED ORAL DAILY
Refills: 0 | Status: DISCONTINUED | OUTPATIENT
Start: 2025-06-13 | End: 2025-06-16

## 2025-06-13 RX ORDER — EPOETIN ALFA 10000 [IU]/ML
10000 SOLUTION INTRAVENOUS; SUBCUTANEOUS
Refills: 0 | Status: DISCONTINUED | OUTPATIENT
Start: 2025-06-13 | End: 2025-06-16

## 2025-06-13 RX ORDER — HEPARIN SODIUM 1000 [USP'U]/ML
5000 INJECTION INTRAVENOUS; SUBCUTANEOUS EVERY 12 HOURS
Refills: 0 | Status: DISCONTINUED | OUTPATIENT
Start: 2025-06-13 | End: 2025-06-16

## 2025-06-13 RX ORDER — ASPIRIN 325 MG
81 TABLET ORAL DAILY
Refills: 0 | Status: DISCONTINUED | OUTPATIENT
Start: 2025-06-13 | End: 2025-06-16

## 2025-06-13 RX ORDER — CARVEDILOL 3.12 MG/1
25 TABLET, FILM COATED ORAL EVERY 12 HOURS
Refills: 0 | Status: DISCONTINUED | OUTPATIENT
Start: 2025-06-13 | End: 2025-06-16

## 2025-06-13 RX ORDER — GLUCAGON 3 MG/1
1 POWDER NASAL ONCE
Refills: 0 | Status: DISCONTINUED | OUTPATIENT
Start: 2025-06-13 | End: 2025-06-16

## 2025-06-13 RX ORDER — MEROPENEM 1 G/30ML
500 INJECTION INTRAVENOUS EVERY 24 HOURS
Refills: 0 | Status: DISCONTINUED | OUTPATIENT
Start: 2025-06-13 | End: 2025-06-13

## 2025-06-13 RX ORDER — EPOETIN ALFA 10000 [IU]/ML
10000 SOLUTION INTRAVENOUS; SUBCUTANEOUS
Refills: 0 | Status: DISCONTINUED | OUTPATIENT
Start: 2025-06-13 | End: 2025-06-13

## 2025-06-13 RX ORDER — INSULIN LISPRO 100 U/ML
INJECTION, SOLUTION INTRAVENOUS; SUBCUTANEOUS
Refills: 0 | Status: DISCONTINUED | OUTPATIENT
Start: 2025-06-13 | End: 2025-06-16

## 2025-06-13 RX ORDER — DEXTROSE 50 % IN WATER 50 %
15 SYRINGE (ML) INTRAVENOUS ONCE
Refills: 0 | Status: DISCONTINUED | OUTPATIENT
Start: 2025-06-13 | End: 2025-06-16

## 2025-06-13 RX ORDER — ACETAMINOPHEN 500 MG/5ML
650 LIQUID (ML) ORAL EVERY 6 HOURS
Refills: 0 | Status: DISCONTINUED | OUTPATIENT
Start: 2025-06-13 | End: 2025-06-16

## 2025-06-13 RX ORDER — DEXTROSE 50 % IN WATER 50 %
25 SYRINGE (ML) INTRAVENOUS ONCE
Refills: 0 | Status: DISCONTINUED | OUTPATIENT
Start: 2025-06-13 | End: 2025-06-16

## 2025-06-13 RX ORDER — MEROPENEM 1 G/30ML
500 INJECTION INTRAVENOUS EVERY 24 HOURS
Refills: 0 | Status: DISCONTINUED | OUTPATIENT
Start: 2025-06-13 | End: 2025-06-16

## 2025-06-13 RX ORDER — LEVOTHYROXINE SODIUM 300 MCG
200 TABLET ORAL DAILY
Refills: 0 | Status: DISCONTINUED | OUTPATIENT
Start: 2025-06-13 | End: 2025-06-16

## 2025-06-13 RX ORDER — TRAMADOL HYDROCHLORIDE 50 MG/1
50 TABLET, FILM COATED ORAL EVERY 8 HOURS
Refills: 0 | Status: DISCONTINUED | OUTPATIENT
Start: 2025-06-13 | End: 2025-06-13

## 2025-06-13 RX ADMIN — Medication 0.5 MILLIGRAM(S): at 02:00

## 2025-06-13 RX ADMIN — Medication 1 MILLIGRAM(S): at 02:49

## 2025-06-13 RX ADMIN — TRAMADOL HYDROCHLORIDE 50 MILLIGRAM(S): 50 TABLET, FILM COATED ORAL at 03:50

## 2025-06-13 RX ADMIN — CARVEDILOL 25 MILLIGRAM(S): 3.12 TABLET, FILM COATED ORAL at 18:26

## 2025-06-13 RX ADMIN — Medication 667 MILLIGRAM(S): at 18:27

## 2025-06-13 RX ADMIN — TRAMADOL HYDROCHLORIDE 50 MILLIGRAM(S): 50 TABLET, FILM COATED ORAL at 20:45

## 2025-06-13 RX ADMIN — Medication 200 MICROGRAM(S): at 05:48

## 2025-06-13 RX ADMIN — TRAMADOL HYDROCHLORIDE 50 MILLIGRAM(S): 50 TABLET, FILM COATED ORAL at 13:40

## 2025-06-13 RX ADMIN — SERTRALINE 100 MILLIGRAM(S): 100 TABLET, FILM COATED ORAL at 18:26

## 2025-06-13 RX ADMIN — Medication 2 MILLIGRAM(S): at 06:48

## 2025-06-13 RX ADMIN — TRAMADOL HYDROCHLORIDE 50 MILLIGRAM(S): 50 TABLET, FILM COATED ORAL at 03:49

## 2025-06-13 RX ADMIN — Medication 1 MILLIGRAM(S): at 10:34

## 2025-06-13 RX ADMIN — EPOETIN ALFA 10000 UNIT(S): 10000 SOLUTION INTRAVENOUS; SUBCUTANEOUS at 11:37

## 2025-06-13 RX ADMIN — Medication 667 MILLIGRAM(S): at 05:48

## 2025-06-13 RX ADMIN — Medication 1 MILLIGRAM(S): at 15:56

## 2025-06-13 RX ADMIN — Medication 1 MILLIGRAM(S): at 22:27

## 2025-06-13 RX ADMIN — Medication 81 MILLIGRAM(S): at 18:26

## 2025-06-13 RX ADMIN — Medication 1 MILLIGRAM(S): at 03:52

## 2025-06-13 RX ADMIN — Medication 25 MILLIGRAM(S): at 23:09

## 2025-06-13 RX ADMIN — CARVEDILOL 25 MILLIGRAM(S): 3.12 TABLET, FILM COATED ORAL at 05:48

## 2025-06-13 RX ADMIN — LISINOPRIL 10 MILLIGRAM(S): 5 TABLET ORAL at 05:47

## 2025-06-13 RX ADMIN — Medication 25 MILLIGRAM(S): at 05:48

## 2025-06-13 RX ADMIN — HEPARIN SODIUM 5000 UNIT(S): 1000 INJECTION INTRAVENOUS; SUBCUTANEOUS at 18:26

## 2025-06-13 RX ADMIN — Medication 0.5 MILLIGRAM(S): at 00:41

## 2025-06-13 RX ADMIN — HEPARIN SODIUM 5000 UNIT(S): 1000 INJECTION INTRAVENOUS; SUBCUTANEOUS at 05:48

## 2025-06-13 RX ADMIN — TRAMADOL HYDROCHLORIDE 50 MILLIGRAM(S): 50 TABLET, FILM COATED ORAL at 12:40

## 2025-06-13 RX ADMIN — MEROPENEM 500 MILLIGRAM(S): 1 INJECTION INTRAVENOUS at 18:26

## 2025-06-13 NOTE — H&P ADULT - NSHPLABSRESULTS_GEN_ALL_CORE
7.3    4.09  )-----------( 86       ( 12 Jun 2025 17:00 )             23.1     140  |  99  |  41.2[H]  ----------------------------<  254[H]     06-12  4.6   |  25.0  |  4.20[H]    Ca    8.3[L]      12 Jun 2025 17:00    TPro  6.5[L]  /  Alb  3.7  /  TBili  0.2[L]  /  DBili  x   /  AST  18  /  ALT  10  /  AlkPhos  125[H]  06-12      LOWER CHEST: Mild bibasilar atelectasis. Trace right pleural fluid. Mitral valve annulus calcification.    LIVER: Cirrhosis. There is a subtle 2.7 cm hypodensity in the left lobe at the site of the previously seen liver abscess on 6/3/2025. Likely representing resolving abscess. Continued follow-up is recommended.  BILE DUCTS: Intra and extrahepatic biliary ductal dilatation unchanged likely secondary to postcholecystectomy state.  GALLBLADDER: Cholecystectomy.  SPLEEN: Splenomegaly.  PANCREAS: Subcentimeter cyst and a 2 mm calcification in the uncinate process of the pancreas are unchanged. There is mild dilatation of the pancreatic duct in the head of the pancreas measuring up to 6 mm.  ADRENALS: Within normal limits.  KIDNEYS/URETERS: Small bilateral renal cysts and other cortical hypodensities too small to characterize. 2, 2 mm nonobstructing left renal calcifications. No hydronephrosis.    BLADDER: Within normal limits.  REPRODUCTIVE ORGANS: Uterus and adnexa within normal limits.    BOWEL: Mild to moderate colonic stool burden. Sigmoid diverticulosis without diverticulitis. No bowel obstruction. Appendix is normal.  PERITONEUM/RETROPERITONEUM: Trace simple free fluid in the pelvis, unchanged.  VESSELS: Atherosclerotic changes.  LYMPH NODES: No lymphadenopathy.  ABDOMINAL WALL: Within normal limits.  BONES: Degenerative changes of the spine. No aggressive osseous lesions.Mild, stable retrolisthesis of L2 in relation to L3.    IMPRESSION:    Sclerosis. Previously seen liver abscess on 6/3/2025 is resolving with a subtle area of low hypoattenuation remaining. Continued follow-up is recommended.    Dilated intra and extrahepatic biliary ducts increased since the previous exam. This could be secondary to postcholecystectomy state. If there is concern for biliary pathology. MRI with contrast and MRCP can be obtained.    Subcentimeter cyst containing calcification in the pancreas uncinate process. A 6 mm dilated pancreatic duct in the head of the pancreas. Nonemergent MRI abdomen with MRCP is recommended.

## 2025-06-13 NOTE — H&P ADULT - PROBLEM SELECTOR PLAN 6
H/H: 7.3/23.1  Most likely 2/2 underlying ESRD   - Monitor H/H  - Transfuse as per protocol for Hgb<7

## 2025-06-13 NOTE — H&P ADULT - HISTORY OF PRESENT ILLNESS
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 During last admission, patient was found to have a liver abscess and was discharged on Menopenem via PICC line (for 6 weeks - ends 7/15). Today patient did not received her meropenem dose as the person who was supposed to adminiter the antibotics got into a car accident. Endorses worsening abdominal control, minimal releif with tramadol doses at home which promted ED visit. Denies fever, chills, nausea, vomiting, changes in bowel, chest pain, palpitation SOB, dysuria or any other acute complaints. In the ED patient received multiple doses of Dilaudid, endorses pain is still present with minimal relief. Repeat CT-abd showed improving of the liver abcess but increased dilated intra and extrahepatic biliary ducts. Labs remarkble H/H:7.3/23.1, BUN/Cr: 41.2/4.2. 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 During last admission, patient was found to have a liver abscess and was discharged on Meropenem via PICC line (for 6 weeks - ends 7/15). Today patient did not received her meropenem dose as the person who was supposed to administer the antibiotic got into a car accident. Endorses worsening abdominal control, minimal relief with tramadol doses at home which prompted ED visit. Denies fever, chills, nausea, vomiting, changes in bowel, chest pain, palpitation SOB, dysuria or any other acute complaints. In the ED patient received multiple doses of Dilaudid, endorses pain is still present with minimal relief. Repeat CT-abd showed improving of the liver abscess but increased dilated intra and extrahepatic biliary ducts. Labs remarkable H/H:7.3/23.1, BUN/Cr: 41.2/4.2. Vitals - BP:173/77, HR:89, Temp:99.3

## 2025-06-13 NOTE — H&P ADULT - PROBLEM SELECTOR PLAN 1
Worsening abdominal pain   CT-Abd: Dilated intra and extrahepatic biliary ducts increased since the previous exam. This could be secondary to postcholecystectomy state.   - Pain control   - PPI   - GI consult  - DVT prophylaxis

## 2025-06-13 NOTE — CONSULT NOTE ADULT - ASSESSMENT
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 CT abdomem showed improving of the liver abscess but increased dilated intra and extrahepatic biliary ducts. Patient is currently planned for MRCP for further evaluation.   Nephrology was consulted for HD needs.     ESRD on HD at [facility] MWF  HD access LUE AVF, functional   Fluid status euvolemic   HTN BP uncontrolled   Anemia of CKD - Hb is low   CKD MDB Hyperphosphatemia   Secondary hyperparathyroidism   Liver abscess currently on meropenem until 7/15/25  Abdominal pain; history of pain-seeking behaviour   Dilated extra and intrahepatic bile ducts    Plan   HD today 3 hrs  ml/min 2K bath Goal UF 2.5L as tolerated   Dose medications for HD   LASHELL with HD   Labs on HD days   Avoid MRI with iv contrast unless absolutely necessary   Continue meropenem 500 mg daily   Pain control as per primary team   Rest of management as per primary team   Will follow   Thank you

## 2025-06-13 NOTE — CHART NOTE - NSCHARTNOTEFT_GEN_A_CORE
69-year-old female here for Abdominal pain  PMH: HTN, DM, CAD s/p PCI, ESRD (HD-MWF), gastroparesis, IBS, chronic pancreatitis, erosive esophagitis, cirrhosis (HCV, treated), anxiety, depression, thyroid cancer s/p thyroidectomy  Reason for Admission: Worsening abdominal pain    Problem/Plan List  1. Abdominal Pain  Assessment: Ongoing, worsening abdominal pain.    Imaging: CT Abdomen shows increased dilation of intra- and extrahepatic biliary ducts since prior exam, possibly post-cholecystectomy.    Change to Plan:  Order MRCP to further evaluate intra- and extrahepatic biliary ductal dilation.  Coordinate MRCP timing with dialysis schedule.    Other Interventions:  Pain control  PPI  GI consult  DVT prophylaxis    2. Liver Abscess (resolving)  Assessment: CT shows sclerosis; previously seen abscess (6/3/25) is resolving, with subtle hypoattenuation remaining.    Plan:  PICC line in place  Continue Meropenem 500mg IV QD (on HD days, post-HD)    3. ESRD on Hemodialysis  Assessment: ESRD, HD-MWF; received IV contrast in ED  Plan:  Dialyze today (coordinate with MRCP timing)  Nephrology consulted    4. Diabetes Mellitus  Assessment: Most recent A1C 7.3 (5/22/25)    Plan:  Insulin sliding scale with hypoglycemia protocol    5. Hypertension  Assessment: Hypertensive in ED (), likely pain-related  Plan:  Continue home meds: Coreg 25mg BID, Lisinopril 10mg QD, Hydralazine 25mg TID  Monitor BP    6. Anemia  Assessment: H/H 7.3/23.1, likely secondary to ESRD  Plan:  Monitor H/H  Transfuse per protocol if Hgb <7    Summary:  Patient admitted today for abdominal pain. Only change from nocturnist’s plan is to obtain MRCP for evaluation of biliary ductal dilation and to coordinate timing with dialysis. No further deviations from prior plan.

## 2025-06-13 NOTE — H&P ADULT - NSHPPHYSICALEXAM_GEN_ALL_CORE
GENERAL: NAD, comfortable in bed   LUNG: Clear to auscultation bilaterally; No wheezes, rales or rhonchi  HEART: Regular rate and rhythm; No murmurs, rubs, or gallops  ABDOMEN: +BS, Soft, Diffused tenderness, distended. No guarding   EXTREMITIES:  2+ Peripheral Pulses, No clubbing, cyanosis, or edema  PSYCH: normal mood and affect  NEUROLOGY: AAOx3, non-focal   SKIN: No rashes or lesions

## 2025-06-13 NOTE — H&P ADULT - ASSESSMENT
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

## 2025-06-13 NOTE — CONSULT NOTE ADULT - NS ATTEST RISK PROBLEM GEN_ALL_CORE FT
Pt with known hx of liver abscess , currently getting antibiotics   now with worsing abdominal pain  CT - showed improvement of abscess, but dilated CBD. wbc normal 3.7. LFT normal  cmp ordered for tomorrow  MRCP ordered

## 2025-06-13 NOTE — PATIENT PROFILE ADULT - FALL HARM RISK - HARM RISK INTERVENTIONS
Benefits, risks, and possible complications of procedure explained to patient/caregiver who verbalized understanding and gave verbal consent. Assistance with ambulation/Assistance OOB with selected safe patient handling equipment/Communicate Risk of Fall with Harm to all staff/Discuss with provider need for PT consult/Monitor for mental status changes/Monitor gait and stability/Provide patient with walking aids - walker, cane, crutches/Reinforce activity limits and safety measures with patient and family/Reorient to person, place and time as needed/Review medications for side effects contributing to fall risk/Sit up slowly, dangle for a short time, stand at bedside before walking/Tailored Fall Risk Interventions/Toileting schedule using arm’s reach rule for commode and bathroom/Use of alarms - bed, chair and/or voice tab/Visual Cue: Yellow wristband and red socks/Bed in lowest position, wheels locked, appropriate side rails in place/Call bell, personal items and telephone in reach/Instruct patient to call for assistance before getting out of bed or chair/Non-slip footwear when patient is out of bed/[place] to call system/Physically safe environment - no spills, clutter or unnecessary equipment/Purposeful Proactive Rounding/Room/bathroom lighting operational, light cord in reach

## 2025-06-13 NOTE — CONSULT NOTE ADULT - SUBJECTIVE AND OBJECTIVE BOX
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 CT abdomem showed improving of the liver abscess but increased dilated intra and extrahepatic biliary ducts. Patient is currently planned for MRCP for further evaluation.   Nephrology was consulted for HD needs.     PAST HISTORY  --------------------------------------------------------------------------------  PAST MEDICAL & SURGICAL HISTORY:  Diabetes      Bernard syndrome      Pancreatitis      Cirrhosis      ESRD on dialysis  MWF at [facility]      HTN (hypertension)      CAD (coronary artery disease)      Chronic diastolic congestive heart failure      IBS (irritable bowel syndrome)      Gastroparesis      Thyroid cancer      Esophagitis, erosive      HCV (hepatitis C virus)      History of liver biopsy      H/O total thyroidectomy      History of cholecystectomy  1990s      S/P         FAMILY HISTORY:  FH: breast cancer (Mother)    FH: brain cancer (Father)      PAST SOCIAL HISTORY:    ALLERGIES & MEDICATIONS  --------------------------------------------------------------------------------  Allergies    Augmentin (Hives)  ceftriaxone (Pruritus)    Intolerances      Standing Inpatient Medications  aspirin enteric coated 81 milliGRAM(s) Oral daily  calcium acetate 667 milliGRAM(s) Oral every 12 hours  carvedilol 25 milliGRAM(s) Oral every 12 hours  chlorhexidine 2% Cloths 1 Application(s) Topical <User Schedule>  dextrose 5%. 1000 milliLiter(s) IV Continuous <Continuous>  dextrose 50% Injectable 25 Gram(s) IV Push once  epoetin day-epbx (RETACRIT) Injectable 42484 Unit(s) IV Push <User Schedule>  glucagon  Injectable 1 milliGRAM(s) IntraMuscular once  heparin   Injectable 5000 Unit(s) SubCutaneous every 12 hours  hydrALAZINE 25 milliGRAM(s) Oral every 8 hours  insulin lispro (ADMELOG) corrective regimen sliding scale   SubCutaneous three times a day before meals  levothyroxine 200 MICROGram(s) Oral daily  lisinopril 10 milliGRAM(s) Oral daily  meropenem Injectable 500 milliGRAM(s) IV Push every 24 hours  pancrelipase (CREON) DR 3000 Unit(s) 1 Capsule(s) Oral three times a day with meals  sertraline 100 milliGRAM(s) Oral daily    PRN Inpatient Medications  acetaminophen     Tablet .. 650 milliGRAM(s) Oral every 6 hours PRN  aluminum hydroxide/magnesium hydroxide/simethicone Suspension 30 milliLiter(s) Oral every 4 hours PRN  dextrose Oral Gel 15 Gram(s) Oral once PRN  HYDROmorphone  Injectable 1 milliGRAM(s) IV Push every 4 hours PRN  melatonin 3 milliGRAM(s) Oral at bedtime PRN  ondansetron Injectable 4 milliGRAM(s) IV Push every 8 hours PRN  traMADol 50 milliGRAM(s) Oral every 8 hours PRN      REVIEW OF SYSTEMS  --------------------------------------------------------------------------------  Gen: No weight changes, fatigue, fevers/chills, weakness  Skin: No rashes  Head/Eyes/Ears/Mouth: No headache; Normal hearing; Normal vision w/o blurriness; No sinus pain/discomfort, sore throat  Respiratory: No dyspnea, cough, wheezing, hemoptysis  CV: No chest pain, PND, orthopnea  GI: No abdominal pain, diarrhea, constipation, nausea, vomiting, melena, hematochezia  : No increased frequency, dysuria, hematuria, nocturia  MSK: No joint pain/swelling; no back pain; no edema  Neuro: No dizziness/lightheadedness, weakness, seizures, numbness, tingling  Heme: No easy bruising or bleeding      All other systems were reviewed and are negative, except as noted.    VITALS/PHYSICAL EXAM  --------------------------------------------------------------------------------  T(C): 36.7 (25 @ 16:04), Max: 36.7 (25 @ 19:54)  HR: 77 (25 @ 16:04) (76 - 88)  BP: 153/78 (25 @ 16:04) (137/70 - 184/68)  RR: 17 (25 @ 16:04) (17 - 18)  SpO2: 99% (25 @ 16:04) (95% - 100%)  Wt(kg): --  Height (cm): 165.1 (25 @ 16:15)  Weight (kg): 66.9 (25 @ 16:15)  BMI (kg/m2): 24.5 (25 @ 16:15)  BSA (m2): 1.74 (25 @ 16:15)      Physical Exam:  Gen: NAD, well-appearing  Pulm: CTA B/L  CV: RRR, S1S2; no peripheral edema  Abd: +BS, soft, nontender/nondistended  Neuro: A and Ox3  MSK no deformities    LABS/STUDIES  --------------------------------------------------------------------------------              7.3    3.67  >-----------<  81       [25 @ 03:44]              23.6     143  |  104  |  44.7  ----------------------------<  138      [25 @ 03:44]  4.8   |  24.0  |  4.43        Ca     8.1     [25 @ 03:44]    TPro  6.1  /  Alb  3.5  /  TBili  0.3  /  DBili  x   /  AST  16  /  ALT  7   /  AlkPhos  117  [25 @ 03:44]

## 2025-06-13 NOTE — H&P ADULT - NSICDXPASTMEDICALHX_GEN_ALL_CORE_FT
PAST MEDICAL HISTORY:  Bernard syndrome     CAD (coronary artery disease)     Chronic diastolic congestive heart failure     Cirrhosis     Diabetes     Esophagitis, erosive     ESRD on dialysis MWF at [facility]    Gastroparesis     HCV (hepatitis C virus)     HTN (hypertension)     IBS (irritable bowel syndrome)     Pancreatitis     Thyroid cancer

## 2025-06-13 NOTE — CONSULT NOTE ADULT - ASSESSMENT
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     Abdominal pain  Hx of liver abscess    CT abd shows Dilated intra and extrahepatic biliary ducts increased since the previous exam. This could be secondary to postcholecystectomy state. If there is concern for biliary pathology. MRI with contrast and MRCP can be obtained. Subcentimeter cyst containing calcification in the pancreas uncinate process. A 6 mm dilated pancreatic ductin the head of the pancreas.     - Cont antibiotics, on Meropenum  -Cont pain medications as needed  -Diet as tolerating  -MRCP for further evaluation  -Cont Zofran as needed  - Further care per primary team

## 2025-06-13 NOTE — CONSULT NOTE ADULT - SUBJECTIVE AND OBJECTIVE BOX
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 During last admission, patient was found to have a liver abscess and was discharged on Meropenem via PICC line (for 6 weeks - ends 7/15). Today patient did not received her meropenem dose as the person who was supposed to administer the antibiotic got into a car accident. Endorses worsening abdominal control, minimal relief with tramadol doses at home which prompted ED visit. Denies fever, chills, nausea, vomiting, changes in bowel, chest pain, palpitation SOB, dysuria or any other acute complaints. In the ED patient received multiple doses of Dilaudid, endorses pain is still present with minimal relief. Repeat CT-abd showed improving of the liver abscess but increased dilated intra and extrahepatic biliary ducts. Labs remarkable H/H:7.3/23.1, BUN/Cr: 41.2/4.2. Vitals - BP:173/77, HR:89, Temp:99.3  (-2025).   GI consulted after radiology finding of dilated CBD. Patient reported "20/10" abdominal pain. Reported nausea and low grade fever at 100F. Denies vomiting.     Review of Systems:  . Constitutional: No fever, chills  . HEENT: Negative  · Respiratory and Thorax: No shortness of breath, no cough  · Cardiovascular: No chest pain, palpitation, no dizziness  · Gastrointestinal: see above  · Genitourinary: No hematuria  · Musculoskeletal: Negative  · Neurological: negative  · Psychiatric: no agitation, no anxiety      PAST MEDICAL/SURGICAL HISTORY:  Diabetes    Bernard syndrome    Pancreatitis    Cirrhosis    ESRD on dialysis  MWF at [facility]    HTN (hypertension)    CAD (coronary artery disease)    Chronic diastolic congestive heart failure    IBS (irritable bowel syndrome)    Gastroparesis    Thyroid cancer    Esophagitis, erosive    HCV (hepatitis C virus)    History of liver biopsy    H/O total thyroidectomy    History of cholecystectomy  1990s    S/P       SOCIAL HISTORY:  - TOBACCO: Denies  - ALCOHOL: Denies  - ILLICIT DRUG USE: Denies    FAMILY HISTORY:  No known history of gastrointestinal or liver disease;  FH: breast cancer (Mother)    FH: brain cancer (Father)        HOME MEDICATIONS:  aspirin 81 mg oral delayed release tablet: 1 tab(s) orally once a day (2025 01:09)  HumaLOG KwikPen 100 units/mL injectable solution: injectable 3 times a day (2025 01:09)  levothyroxine 200 mcg (0.2 mg) oral tablet: 1 tab(s) orally once a day (2025 01:09)  sertraline 100 mg oral tablet: 2 tab(s) orally once a day (2025 01:09)    INPATIENT MEDICATIONS:  MEDICATIONS  (STANDING):  aspirin enteric coated 81 milliGRAM(s) Oral daily  calcium acetate 667 milliGRAM(s) Oral every 12 hours  carvedilol 25 milliGRAM(s) Oral every 12 hours  chlorhexidine 2% Cloths 1 Application(s) Topical <User Schedule>  dextrose 5%. 1000 milliLiter(s) (50 mL/Hr) IV Continuous <Continuous>  dextrose 50% Injectable 25 Gram(s) IV Push once  epoetin day-epbx (RETACRIT) Injectable 54860 Unit(s) IV Push <User Schedule>  glucagon  Injectable 1 milliGRAM(s) IntraMuscular once  heparin   Injectable 5000 Unit(s) SubCutaneous every 12 hours  hydrALAZINE 25 milliGRAM(s) Oral every 8 hours  insulin lispro (ADMELOG) corrective regimen sliding scale   SubCutaneous three times a day before meals  levothyroxine 200 MICROGram(s) Oral daily  lisinopril 10 milliGRAM(s) Oral daily  meropenem Injectable 500 milliGRAM(s) IV Push every 24 hours  pancrelipase (CREON) DR 3000 Unit(s) 1 Capsule(s) Oral three times a day with meals  sertraline 100 milliGRAM(s) Oral daily    MEDICATIONS  (PRN):  acetaminophen     Tablet .. 650 milliGRAM(s) Oral every 6 hours PRN Temp greater or equal to 38C (100.4F), Mild Pain (1 - 3)  aluminum hydroxide/magnesium hydroxide/simethicone Suspension 30 milliLiter(s) Oral every 4 hours PRN Dyspepsia  dextrose Oral Gel 15 Gram(s) Oral once PRN Blood Glucose LESS THAN 70 milliGRAM(s)/deciliter  HYDROmorphone  Injectable 1 milliGRAM(s) IV Push every 4 hours PRN Moderate Pain (4 - 6)  melatonin 3 milliGRAM(s) Oral at bedtime PRN Insomnia  ondansetron Injectable 4 milliGRAM(s) IV Push every 8 hours PRN Nausea and/or Vomiting  traMADol 50 milliGRAM(s) Oral every 8 hours PRN Severe Pain (7 - 10)    ALLERGIES:  Augmentin (Hives)  ceftriaxone (Pruritus)    T(C): 36.7 (25 @ 16:04), Max: 36.7 (25 @ 19:54)  HR: 77 (25 @ 16:04) (76 - 88)  BP: 153/78 (25 @ 16:04) (137/70 - 184/68)  RR: 17 (25 @ 16:04) (17 - 18)  SpO2: 99% (25 @ 16:04) (95% - 100%)      PHYSICAL EXAM:    Constitutional: No acute distress noted   Eye: anicteric   Neurological: alert, awake  Respiratory: No increased effort  Cardiovascular: S1S2  Gastrointestinal: Soft, nontender, non distended, +BS  Extremities: No edema  Skin: no jaundice  Psychiatric: Calm, cooperative      LABS:             7.3    3.67  )-----------( 81       (  @ 03:44 )             23.6                7.3    4.09  )-----------( 86       (  @ 17:00 )             23.1                7.2    4.06  )-----------( 75       (  @ 09:15 )             23.0             143  |  104  |  44.7[H]  ----------------------------<  138[H]  4.8   |  24.0  |  4.43[H]    Ca    8.1[L]      2025 03:44    TPro  6.1[L]  /  Alb  3.5  /  TBili  0.3[L]  /  DBili  x   /  AST  16  /  ALT  7   /  AlkPhos  117  13    LIVER FUNCTIONS - ( 2025 03:44 )  Alb: 3.5 g/dL / Pro: 6.1 g/dL / ALK PHOS: 117 U/L / ALT: 7 U/L / AST: 16 U/L / GGT: x             Lipase: 70 U/L (25 @ 17:00)      Urinalysis Basic - ( 2025 03:44 )    Color: x / Appearance: x / SG: x / pH: x  Gluc: 138 mg/dL / Ketone: x  / Bili: x / Urobili: x   Blood: x / Protein: x / Nitrite: x   Leuk Esterase: x / RBC: x / WBC x   Sq Epi: x / Non Sq Epi: x / Bacteria: x    < from: CT Abdomen and Pelvis w/ IV Cont (25 @ 22:37) >  FINDINGS:  LOWER CHEST: Mild bibasilar atelectasis. Trace right pleural fluid.   Mitral valve annulus calcification.    LIVER: Cirrhosis. There is a subtle 2.7 cm hypodensity in the left lobe   at the site of the previously seen liver abscess on 6/3/2025. Likely   representing resolving abscess. Continued follow-up is recommended.  BILE DUCTS: Intra and extrahepatic biliary ductal dilatation unchanged   likely secondary to postcholecystectomy state.  GALLBLADDER: Cholecystectomy.  SPLEEN: Splenomegaly.  PANCREAS: Subcentimeter cyst and a 2 mm calcification in the uncinate   process of the pancreas are unchanged. There is mild dilatation of the   pancreatic duct in the head of the pancreas measuring up to 6 mm.  ADRENALS: Within normal limits.  KIDNEYS/URETERS: Small bilateral renal cysts and other cortical   hypodensities too small to characterize. 2, 2 mm nonobstructing left   renal calcifications. No hydronephrosis.    BLADDER: Within normal limits.  REPRODUCTIVE ORGANS: Uterus and adnexa within normal limits.    BOWEL: Mild to moderate colonic stool burden. Sigmoid diverticulosis   without diverticulitis. No bowel obstruction. Appendix is normal.  PERITONEUM/RETROPERITONEUM: Trace simple free fluid in the pelvis,   unchanged.  VESSELS: Atherosclerotic changes.  LYMPH NODES: No lymphadenopathy.  ABDOMINAL WALL: Within normal limits.  BONES: Degenerative changes of the spine. No aggressive osseous   lesions.Mild, stable retrolisthesis of L2 in relation to L3.    IMPRESSION:    Sclerosis. Previously seen liver abscess on 6/3/2025 is resolving with a   subtle area of low hypoattenuation remaining. Continued follow-up is   recommended.    Dilated intra and extrahepatic biliary ducts increased since the previous   exam. This could be secondary to postcholecystectomy state. If there is   concern for biliary pathology. MRI with contrast and MRCP can be obtained.    Subcentimeter cyst containing calcification in the pancreas uncinate   process. A 6 mm dilated pancreatic ductin the head of the pancreas.   Nonemergent MRI abdomen with MRCP is recommended.    Other stable findings as detailed above.    < end of copied text >

## 2025-06-13 NOTE — H&P ADULT - PROBLEM SELECTOR PLAN 5
Hypertensive in the ED with [id number], could be 2/2 to pain   Continue home meds   - Coreg 25mg BID  - Lisinopril 10mg QD  - Hydralazine 25mg TID   - Monitor BP

## 2025-06-13 NOTE — H&P ADULT - PROBLEM SELECTOR PLAN 2
CT-abd: Sclerosis. Previously seen liver abscess on 6/3/2025 is resolving with a subtle area of low hypoattenuation remaining.   - PICC line in place   - Meropenem 500mg IV QD (on HD days to be given post HD)

## 2025-06-14 LAB
ALBUMIN SERPL ELPH-MCNC: 3.5 G/DL — SIGNIFICANT CHANGE UP (ref 3.3–5.2)
ALP SERPL-CCNC: 131 U/L — HIGH (ref 40–120)
ALT FLD-CCNC: 8 U/L — SIGNIFICANT CHANGE UP
ANION GAP SERPL CALC-SCNC: 13 MMOL/L — SIGNIFICANT CHANGE UP (ref 5–17)
AST SERPL-CCNC: 15 U/L — SIGNIFICANT CHANGE UP
BASOPHILS # BLD AUTO: 0.04 K/UL — SIGNIFICANT CHANGE UP (ref 0–0.2)
BASOPHILS NFR BLD AUTO: 1.2 % — SIGNIFICANT CHANGE UP (ref 0–2)
BILIRUB SERPL-MCNC: 0.2 MG/DL — LOW (ref 0.4–2)
BUN SERPL-MCNC: 23.3 MG/DL — HIGH (ref 8–20)
CALCIUM SERPL-MCNC: 8.3 MG/DL — LOW (ref 8.4–10.5)
CHLORIDE SERPL-SCNC: 94 MMOL/L — LOW (ref 96–108)
CO2 SERPL-SCNC: 28 MMOL/L — SIGNIFICANT CHANGE UP (ref 22–29)
CREAT SERPL-MCNC: 3.44 MG/DL — HIGH (ref 0.5–1.3)
EGFR: 14 ML/MIN/1.73M2 — LOW
EGFR: 14 ML/MIN/1.73M2 — LOW
EOSINOPHIL # BLD AUTO: 0.22 K/UL — SIGNIFICANT CHANGE UP (ref 0–0.5)
EOSINOPHIL NFR BLD AUTO: 6.4 % — HIGH (ref 0–6)
GLUCOSE BLDC GLUCOMTR-MCNC: 128 MG/DL — HIGH (ref 70–99)
GLUCOSE BLDC GLUCOMTR-MCNC: 128 MG/DL — HIGH (ref 70–99)
GLUCOSE BLDC GLUCOMTR-MCNC: 162 MG/DL — HIGH (ref 70–99)
GLUCOSE BLDC GLUCOMTR-MCNC: 187 MG/DL — HIGH (ref 70–99)
GLUCOSE SERPL-MCNC: 178 MG/DL — HIGH (ref 70–99)
HCT VFR BLD CALC: 23.3 % — LOW (ref 34.5–45)
HGB BLD-MCNC: 7.4 G/DL — LOW (ref 11.5–15.5)
IMM GRANULOCYTES # BLD AUTO: 0 K/UL — SIGNIFICANT CHANGE UP (ref 0–0.07)
IMM GRANULOCYTES NFR BLD AUTO: 0 % — SIGNIFICANT CHANGE UP (ref 0–0.9)
IMMATURE PLATELET FRACTION #: 4.6 K/UL — LOW (ref 4.7–11.1)
IMMATURE PLATELET FRACTION %: 5.3 % — HIGH (ref 1.6–4.9)
LYMPHOCYTES # BLD AUTO: 0.92 K/UL — LOW (ref 1–3.3)
LYMPHOCYTES NFR BLD AUTO: 26.8 % — SIGNIFICANT CHANGE UP (ref 13–44)
MCHC RBC-ENTMCNC: 29.4 PG — SIGNIFICANT CHANGE UP (ref 27–34)
MCHC RBC-ENTMCNC: 31.8 G/DL — LOW (ref 32–36)
MCV RBC AUTO: 92.5 FL — SIGNIFICANT CHANGE UP (ref 80–100)
MONOCYTES # BLD AUTO: 0.43 K/UL — SIGNIFICANT CHANGE UP (ref 0–0.9)
MONOCYTES NFR BLD AUTO: 12.5 % — SIGNIFICANT CHANGE UP (ref 2–14)
NEUTROPHILS # BLD AUTO: 1.82 K/UL — SIGNIFICANT CHANGE UP (ref 1.8–7.4)
NEUTROPHILS NFR BLD AUTO: 53.1 % — SIGNIFICANT CHANGE UP (ref 43–77)
NRBC # BLD AUTO: 0 K/UL — SIGNIFICANT CHANGE UP (ref 0–0)
NRBC # FLD: 0 K/UL — SIGNIFICANT CHANGE UP (ref 0–0)
NRBC BLD AUTO-RTO: 0 /100 WBCS — SIGNIFICANT CHANGE UP (ref 0–0)
PLATELET # BLD AUTO: 86 K/UL — LOW (ref 150–400)
PMV BLD: 12.1 FL — SIGNIFICANT CHANGE UP (ref 7–13)
POTASSIUM SERPL-MCNC: 4.3 MMOL/L — SIGNIFICANT CHANGE UP (ref 3.5–5.3)
POTASSIUM SERPL-SCNC: 4.3 MMOL/L — SIGNIFICANT CHANGE UP (ref 3.5–5.3)
PROT SERPL-MCNC: 6.2 G/DL — LOW (ref 6.6–8.7)
RBC # BLD: 2.52 M/UL — LOW (ref 3.8–5.2)
RBC # FLD: 14.6 % — HIGH (ref 10.3–14.5)
SODIUM SERPL-SCNC: 135 MMOL/L — SIGNIFICANT CHANGE UP (ref 135–145)
WBC # BLD: 3.43 K/UL — LOW (ref 3.8–10.5)
WBC # FLD AUTO: 3.43 K/UL — LOW (ref 3.8–10.5)

## 2025-06-14 PROCEDURE — 99233 SBSQ HOSP IP/OBS HIGH 50: CPT

## 2025-06-14 PROCEDURE — 99232 SBSQ HOSP IP/OBS MODERATE 35: CPT

## 2025-06-14 RX ORDER — ACETAMINOPHEN 500 MG/5ML
1000 LIQUID (ML) ORAL ONCE
Refills: 0 | Status: COMPLETED | OUTPATIENT
Start: 2025-06-14 | End: 2025-06-14

## 2025-06-14 RX ORDER — HYDROMORPHONE/SOD CHLOR,ISO/PF 2 MG/10 ML
0.5 SYRINGE (ML) INJECTION ONCE
Refills: 0 | Status: DISCONTINUED | OUTPATIENT
Start: 2025-06-14 | End: 2025-06-14

## 2025-06-14 RX ADMIN — Medication 4 MILLIGRAM(S): at 05:47

## 2025-06-14 RX ADMIN — Medication 1000 MILLIGRAM(S): at 01:56

## 2025-06-14 RX ADMIN — Medication 667 MILLIGRAM(S): at 18:14

## 2025-06-14 RX ADMIN — Medication 1 MILLIGRAM(S): at 21:25

## 2025-06-14 RX ADMIN — SERTRALINE 100 MILLIGRAM(S): 100 TABLET, FILM COATED ORAL at 08:53

## 2025-06-14 RX ADMIN — INSULIN LISPRO 2: 100 INJECTION, SOLUTION INTRAVENOUS; SUBCUTANEOUS at 12:20

## 2025-06-14 RX ADMIN — Medication 25 MILLIGRAM(S): at 05:48

## 2025-06-14 RX ADMIN — Medication 81 MILLIGRAM(S): at 08:53

## 2025-06-14 RX ADMIN — Medication 1 MILLIGRAM(S): at 07:41

## 2025-06-14 RX ADMIN — MEROPENEM 500 MILLIGRAM(S): 1 INJECTION INTRAVENOUS at 18:14

## 2025-06-14 RX ADMIN — Medication 1 MILLIGRAM(S): at 16:30

## 2025-06-14 RX ADMIN — Medication 4 MILLIGRAM(S): at 20:49

## 2025-06-14 RX ADMIN — CARVEDILOL 25 MILLIGRAM(S): 3.12 TABLET, FILM COATED ORAL at 05:47

## 2025-06-14 RX ADMIN — TRAMADOL HYDROCHLORIDE 50 MILLIGRAM(S): 50 TABLET, FILM COATED ORAL at 13:08

## 2025-06-14 RX ADMIN — Medication 25 MILLIGRAM(S): at 21:29

## 2025-06-14 RX ADMIN — TRAMADOL HYDROCHLORIDE 50 MILLIGRAM(S): 50 TABLET, FILM COATED ORAL at 01:52

## 2025-06-14 RX ADMIN — Medication 1 MILLIGRAM(S): at 20:56

## 2025-06-14 RX ADMIN — LISINOPRIL 10 MILLIGRAM(S): 5 TABLET ORAL at 05:47

## 2025-06-14 RX ADMIN — TRAMADOL HYDROCHLORIDE 50 MILLIGRAM(S): 50 TABLET, FILM COATED ORAL at 14:00

## 2025-06-14 RX ADMIN — Medication 0.5 MILLIGRAM(S): at 21:35

## 2025-06-14 RX ADMIN — Medication 400 MILLIGRAM(S): at 01:05

## 2025-06-14 RX ADMIN — Medication 0.5 MILLIGRAM(S): at 01:05

## 2025-06-14 RX ADMIN — Medication 1 MILLIGRAM(S): at 09:55

## 2025-06-14 RX ADMIN — Medication 25 MILLIGRAM(S): at 13:07

## 2025-06-14 RX ADMIN — Medication 1 MILLIGRAM(S): at 01:52

## 2025-06-14 RX ADMIN — HEPARIN SODIUM 5000 UNIT(S): 1000 INJECTION INTRAVENOUS; SUBCUTANEOUS at 18:15

## 2025-06-14 RX ADMIN — Medication 1 MILLIGRAM(S): at 05:46

## 2025-06-14 RX ADMIN — Medication 667 MILLIGRAM(S): at 05:48

## 2025-06-14 RX ADMIN — Medication 1 APPLICATION(S): at 06:03

## 2025-06-14 RX ADMIN — Medication 1 MILLIGRAM(S): at 10:40

## 2025-06-14 RX ADMIN — CARVEDILOL 25 MILLIGRAM(S): 3.12 TABLET, FILM COATED ORAL at 18:14

## 2025-06-14 RX ADMIN — Medication 200 MICROGRAM(S): at 05:46

## 2025-06-14 RX ADMIN — HEPARIN SODIUM 5000 UNIT(S): 1000 INJECTION INTRAVENOUS; SUBCUTANEOUS at 05:47

## 2025-06-14 RX ADMIN — Medication 0.5 MILLIGRAM(S): at 22:00

## 2025-06-14 RX ADMIN — Medication 0.5 MILLIGRAM(S): at 01:54

## 2025-06-14 NOTE — PROGRESS NOTE ADULT - PROBLEM SELECTOR PLAN 2
CT-abd: Sclerosis. Previously seen liver abscess on 6/3/2025 is resolving with a subtle area of low hypoattenuation remaining.   - PICC line in place cont Meropenem 500mg IV QD (on HD days to be given post HD)

## 2025-06-14 NOTE — PROGRESS NOTE ADULT - ASSESSMENT
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     Abdominal pain  Hx of liver abscess  Dilated CBD  Pancreatic cyst  CT abd shows Dilated intra and extrahepatic biliary ducts increased since the previous exam. This could be secondary to postcholecystectomy state. If there is concern for biliary pathology. MRI with contrast and MRCP can be obtained. Subcentimeter cyst containing calcification in the pancreas uncinate process. A 6 mm dilated pancreatic duct in the head of the pancreas.     - Cont antibiotics per ID, currently on Meropenum,   -Diet as tolerating  -MRCP performed, official report pending, no evidence of obstruction on imaging, Liver chemistry normal, no evidence of cholangitis  -Cont Zofran as needed  -Follow up as outpatient for small pancreatic cyst and PD measuring 6 mm. Repeat imaging in 6 months a outpatient   -Cont pain medications as needed, saw pain management in the past. If continue to have worsening pain despite optimum regimen, may need Celiac plexus block. Require further discussion pending clinical course     - Further care per primary team  Plan d/w the patient and her  via phone

## 2025-06-14 NOTE — PROGRESS NOTE ADULT - PROBLEM SELECTOR PLAN 6
H/H: 7.3/23.1  Most likely 2/2 underlying ESRD   - Monitor H/H  - Transfuse as per protocol for Hgb<7 with HD on Monday

## 2025-06-14 NOTE — PROGRESS NOTE ADULT - SUBJECTIVE AND OBJECTIVE BOX
[facility] DIVISION OF KIDNEY DISEASES AND HYPERTENSION -- PROGRESS NOTE    24 hour events/subjective:  Seen today   Still complaining of abdominal pain and distention    MEDICATIONS    Standing Inpatient Medications  aspirin enteric coated 81 milliGRAM(s) Oral daily  calcium acetate 667 milliGRAM(s) Oral every 12 hours  carvedilol 25 milliGRAM(s) Oral every 12 hours  chlorhexidine 2% Cloths 1 Application(s) Topical <User Schedule>  dextrose 5%. 1000 milliLiter(s) IV Continuous <Continuous>  dextrose 50% Injectable 25 Gram(s) IV Push once  epoetin day-epbx (RETACRIT) Injectable 30725 Unit(s) IV Push <User Schedule>  glucagon  Injectable 1 milliGRAM(s) IntraMuscular once  heparin   Injectable 5000 Unit(s) SubCutaneous every 12 hours  hydrALAZINE 25 milliGRAM(s) Oral every 8 hours  insulin lispro (ADMELOG) corrective regimen sliding scale   SubCutaneous three times a day before meals  levothyroxine 200 MICROGram(s) Oral daily  lisinopril 10 milliGRAM(s) Oral daily  meropenem Injectable 500 milliGRAM(s) IV Push every 24 hours  pancrelipase (CREON) DR 3000 Unit(s) 1 Capsule(s) Oral three times a day with meals  sertraline 100 milliGRAM(s) Oral daily    PRN Inpatient Medications  acetaminophen     Tablet .. 650 milliGRAM(s) Oral every 6 hours PRN  aluminum hydroxide/magnesium hydroxide/simethicone Suspension 30 milliLiter(s) Oral every 4 hours PRN  dextrose Oral Gel 15 Gram(s) Oral once PRN  HYDROmorphone  Injectable 1 milliGRAM(s) IV Push every 4 hours PRN  melatonin 3 milliGRAM(s) Oral at bedtime PRN  ondansetron Injectable 4 milliGRAM(s) IV Push every 8 hours PRN  traMADol 50 milliGRAM(s) Oral every 8 hours PRN      VITALS/PHYSICAL EXAM  --------------------------------------------------------------------------------  T(C): 36.9 (06-14-25 @ 15:53), Max: 36.9 (06-14-25 @ 15:53)  HR: 78 (06-14-25 @ 15:53) (76 - 81)  BP: 131/78 (06-14-25 @ 15:53) (131/78 - 172/72)  RR: 18 (06-14-25 @ 15:53) (16 - 18)  SpO2: 99% (06-14-25 @ 15:53) (94% - 99%)  Wt(kg): --        Physical Exam:  Gen: NAD, well-appearing  HEENT: normal  Pulm: CTA B/L  CV: normal S1S2; no rub, no edema  Abd: soft, non-tender  MSK no deformities   Neuro: Alert and oriented x3     LABS/STUDIES  --------------------------------------------------------------------------------  135  |  94  |  23.3  ----------------------------<  178      [06-14-25 @ 04:55]  4.3   |  28.0  |  3.44        Ca     8.3     [06-14-25 @ 04:55]    TPro  6.2  /  Alb  3.5  /  TBili  0.2  /  DBili  x   /  AST  15  /  ALT  8   /  AlkPhos  131  [06-14-25 @ 04:55]          Creatinine Trend:  SCr 3.44 [06-14 @ 04:55]  SCr 4.43 [06-13 @ 03:44]  SCr 4.20 [06-12 @ 17:00]  SCr 4.08 [06-10 @ 05:30]  SCr 5.68 [06-09 @ 05:45]

## 2025-06-14 NOTE — CHART NOTE - NSCHARTNOTEFT_GEN_A_CORE
Pt has CT Abd/Pelvis with IV Contrast.  Pt has ESRD and is a HD Patient.  Pt HD Schedule is M/W/F.  Will have AM Team arrange HD within 24 Hrs.

## 2025-06-14 NOTE — PROGRESS NOTE ADULT - SUBJECTIVE AND OBJECTIVE BOX
Patient is a 69y old  Female who presents with a chief complaint of Abdominal pain (13 Jun 2025 18:27)      Patient seen and examined at bedside this am   she is c/o r upper abd pain " when I am going to have the procedure "       MR completed result pending     ALLERGIES:  Augmentin (Hives)  ceftriaxone (Pruritus)    MEDICATIONS  (STANDING):  aspirin enteric coated 81 milliGRAM(s) Oral daily  calcium acetate 667 milliGRAM(s) Oral every 12 hours  carvedilol 25 milliGRAM(s) Oral every 12 hours  chlorhexidine 2% Cloths 1 Application(s) Topical <User Schedule>  dextrose 5%. 1000 milliLiter(s) (50 mL/Hr) IV Continuous <Continuous>  dextrose 50% Injectable 25 Gram(s) IV Push once  epoetin day-epbx (RETACRIT) Injectable 87333 Unit(s) IV Push <User Schedule>  glucagon  Injectable 1 milliGRAM(s) IntraMuscular once  heparin   Injectable 5000 Unit(s) SubCutaneous every 12 hours  hydrALAZINE 25 milliGRAM(s) Oral every 8 hours  insulin lispro (ADMELOG) corrective regimen sliding scale   SubCutaneous three times a day before meals  levothyroxine 200 MICROGram(s) Oral daily  lisinopril 10 milliGRAM(s) Oral daily  meropenem Injectable 500 milliGRAM(s) IV Push every 24 hours  pancrelipase (CREON) DR 3000 Unit(s) 1 Capsule(s) Oral three times a day with meals  sertraline 100 milliGRAM(s) Oral daily    MEDICATIONS  (PRN):  acetaminophen     Tablet .. 650 milliGRAM(s) Oral every 6 hours PRN Temp greater or equal to 38C (100.4F), Mild Pain (1 - 3)  aluminum hydroxide/magnesium hydroxide/simethicone Suspension 30 milliLiter(s) Oral every 4 hours PRN Dyspepsia  dextrose Oral Gel 15 Gram(s) Oral once PRN Blood Glucose LESS THAN 70 milliGRAM(s)/deciliter  HYDROmorphone  Injectable 1 milliGRAM(s) IV Push every 4 hours PRN Moderate Pain (4 - 6)  melatonin 3 milliGRAM(s) Oral at bedtime PRN Insomnia  ondansetron Injectable 4 milliGRAM(s) IV Push every 8 hours PRN Nausea and/or Vomiting  traMADol 50 milliGRAM(s) Oral every 8 hours PRN Severe Pain (7 - 10)    Vital Signs Last 24 Hrs  T(F): 98.1 (14 Jun 2025 07:13), Max: 98.2 (13 Jun 2025 19:48)  HR: 81 (14 Jun 2025 07:13) (76 - 84)  BP: 166/79 (14 Jun 2025 07:13) (153/78 - 172/72)  RR: 16 (14 Jun 2025 07:13) (16 - 18)  SpO2: 99% (14 Jun 2025 07:13) (94% - 100%)  I&O's Summary      PHYSICAL EXAM:  General:   ENT:   Neck:   Lungs:   Cardio: RRR, S1/S2, No murmur  Abdomen: Soft, Nontender, Nondistended; Bowel sounds present  Extremities: No calf tenderness, No pitting edema    LABS:                        7.4    3.43  )-----------( 86       ( 14 Jun 2025 04:55 )             23.3     06-14    135  |  94  |  23.3  ----------------------------<  178  4.3   |  28.0  |  3.44    Ca    8.3      14 Jun 2025 04:55    TPro  6.2  /  Alb  3.5  /  TBili  0.2  /  DBili  x   /  AST  15  /  ALT  8   /  AlkPhos  131  06-14      Lipase: 70 U/L (06-12-25 @ 17:00)                              POCT Blood Glucose.: 128 mg/dL (14 Jun 2025 08:17)  POCT Blood Glucose.: 184 mg/dL (13 Jun 2025 22:40)  POCT Blood Glucose.: 148 mg/dL (13 Jun 2025 17:33)  POCT Blood Glucose.: 126 mg/dL (13 Jun 2025 11:52)      Urinalysis Basic - ( 14 Jun 2025 04:55 )    Color: x / Appearance: x / SG: x / pH: x  Gluc: 178 mg/dL / Ketone: x  / Bili: x / Urobili: x   Blood: x / Protein: x / Nitrite: x   Leuk Esterase: x / RBC: x / WBC x   Sq Epi: x / Non Sq Epi: x / Bacteria: x          RADIOLOGY & ADDITIONAL TESTS:       Patient is a 69y old  Female who presents with a chief complaint of Abdominal pain (13 Jun 2025 18:27)      Patient seen and examined at bedside this am   she is c/o r upper abd pain " when I am going to have the procedure "       MR completed result pending     ALLERGIES:  Augmentin (Hives)  ceftriaxone (Pruritus)    MEDICATIONS  (STANDING):  aspirin enteric coated 81 milliGRAM(s) Oral daily  calcium acetate 667 milliGRAM(s) Oral every 12 hours  carvedilol 25 milliGRAM(s) Oral every 12 hours  chlorhexidine 2% Cloths 1 Application(s) Topical <User Schedule>  dextrose 5%. 1000 milliLiter(s) (50 mL/Hr) IV Continuous <Continuous>  dextrose 50% Injectable 25 Gram(s) IV Push once  epoetin day-epbx (RETACRIT) Injectable 03205 Unit(s) IV Push <User Schedule>  glucagon  Injectable 1 milliGRAM(s) IntraMuscular once  heparin   Injectable 5000 Unit(s) SubCutaneous every 12 hours  hydrALAZINE 25 milliGRAM(s) Oral every 8 hours  insulin lispro (ADMELOG) corrective regimen sliding scale   SubCutaneous three times a day before meals  levothyroxine 200 MICROGram(s) Oral daily  lisinopril 10 milliGRAM(s) Oral daily  meropenem Injectable 500 milliGRAM(s) IV Push every 24 hours  pancrelipase (CREON) DR 3000 Unit(s) 1 Capsule(s) Oral three times a day with meals  sertraline 100 milliGRAM(s) Oral daily    MEDICATIONS  (PRN):  acetaminophen     Tablet .. 650 milliGRAM(s) Oral every 6 hours PRN Temp greater or equal to 38C (100.4F), Mild Pain (1 - 3)  aluminum hydroxide/magnesium hydroxide/simethicone Suspension 30 milliLiter(s) Oral every 4 hours PRN Dyspepsia  dextrose Oral Gel 15 Gram(s) Oral once PRN Blood Glucose LESS THAN 70 milliGRAM(s)/deciliter  HYDROmorphone  Injectable 1 milliGRAM(s) IV Push every 4 hours PRN Moderate Pain (4 - 6)  melatonin 3 milliGRAM(s) Oral at bedtime PRN Insomnia  ondansetron Injectable 4 milliGRAM(s) IV Push every 8 hours PRN Nausea and/or Vomiting  traMADol 50 milliGRAM(s) Oral every 8 hours PRN Severe Pain (7 - 10)    Vital Signs Last 24 Hrs  T(F): 98.1 (14 Jun 2025 07:13), Max: 98.2 (13 Jun 2025 19:48)  HR: 81 (14 Jun 2025 07:13) (76 - 84)  BP: 166/79 (14 Jun 2025 07:13) (153/78 - 172/72)  RR: 16 (14 Jun 2025 07:13) (16 - 18)  SpO2: 99% (14 Jun 2025 07:13) (94% - 100%)  I&O's Summary      PHYSICAL EXAM:  General: awake alert   Neck: supple   Lungs: CTA bilateral   Cardio: RRR, S1/S2, No murmur  Abdomen: Soft, R upper quadrant fullness , pain on palpation , Nondistended; Bowel sounds present  Extremities: No calf tenderness, No pitting edema    LABS:                        7.4    3.43  )-----------( 86       ( 14 Jun 2025 04:55 )             23.3     06-14    135  |  94  |  23.3  ----------------------------<  178  4.3   |  28.0  |  3.44    Ca    8.3      14 Jun 2025 04:55    TPro  6.2  /  Alb  3.5  /  TBili  0.2  /  DBili  x   /  AST  15  /  ALT  8   /  AlkPhos  131  06-14      Lipase: 70 U/L (06-12-25 @ 17:00)                              POCT Blood Glucose.: 128 mg/dL (14 Jun 2025 08:17)  POCT Blood Glucose.: 184 mg/dL (13 Jun 2025 22:40)  POCT Blood Glucose.: 148 mg/dL (13 Jun 2025 17:33)  POCT Blood Glucose.: 126 mg/dL (13 Jun 2025 11:52)      Urinalysis Basic - ( 14 Jun 2025 04:55 )    Color: x / Appearance: x / SG: x / pH: x  Gluc: 178 mg/dL / Ketone: x  / Bili: x / Urobili: x   Blood: x / Protein: x / Nitrite: x   Leuk Esterase: x / RBC: x / WBC x   Sq Epi: x / Non Sq Epi: x / Bacteria: x

## 2025-06-14 NOTE — PROGRESS NOTE ADULT - SUBJECTIVE AND OBJECTIVE BOX
Chief Complaint: This is a 69y old woman patient being seen in follow-up consultation for abdominal pain, Hx of liver abscess, Dilated CBD    Interval HPI / 24H events:  Patient seen and examined at the bedside, reports upper abdominal pain. Denies nausea, vomiting fever, chills. MRCP performed, official report pending, imaging shows no obstruction, Liver chemistry normal. No leucocytosis. Remains afebrile.        Review of symptoms:   Constitutional: Denies fever, chills  Respiratory: Denies cough, shortness of breath  Cardiovascular: No chest pain, palpitation, SILVERIO  Gastrointestinal: See HPI  Genitourinary: No hematuria  Neurological: Negative  Skin: Negative  Musculoskeletal: Negative  Psychiatric: Negative      PAST MEDICAL/SURGICAL HISTORY:  Diabetes    Bernard syndrome    Pancreatitis    Cirrhosis    ESRD on dialysis  MWF at [facility]    HTN (hypertension)    CAD (coronary artery disease)    Chronic diastolic congestive heart failure    IBS (irritable bowel syndrome)    Gastroparesis    Thyroid cancer    Esophagitis, erosive    HCV (hepatitis C virus)    History of liver biopsy    H/O total thyroidectomy    History of cholecystectomy  1990s    S/P       MEDICATIONS  (STANDING):  aspirin enteric coated 81 milliGRAM(s) Oral daily  calcium acetate 667 milliGRAM(s) Oral every 12 hours  carvedilol 25 milliGRAM(s) Oral every 12 hours  chlorhexidine 2% Cloths 1 Application(s) Topical <User Schedule>  dextrose 5%. 1000 milliLiter(s) (50 mL/Hr) IV Continuous <Continuous>  dextrose 50% Injectable 25 Gram(s) IV Push once  epoetin day-epbx (RETACRIT) Injectable 15486 Unit(s) IV Push <User Schedule>  glucagon  Injectable 1 milliGRAM(s) IntraMuscular once  heparin   Injectable 5000 Unit(s) SubCutaneous every 12 hours  hydrALAZINE 25 milliGRAM(s) Oral every 8 hours  insulin lispro (ADMELOG) corrective regimen sliding scale   SubCutaneous three times a day before meals  levothyroxine 200 MICROGram(s) Oral daily  lisinopril 10 milliGRAM(s) Oral daily  meropenem Injectable 500 milliGRAM(s) IV Push every 24 hours  pancrelipase (CREON) DR 3000 Unit(s) 1 Capsule(s) Oral three times a day with meals  sertraline 100 milliGRAM(s) Oral daily    MEDICATIONS  (PRN):  acetaminophen     Tablet .. 650 milliGRAM(s) Oral every 6 hours PRN Temp greater or equal to 38C (100.4F), Mild Pain (1 - 3)  aluminum hydroxide/magnesium hydroxide/simethicone Suspension 30 milliLiter(s) Oral every 4 hours PRN Dyspepsia  dextrose Oral Gel 15 Gram(s) Oral once PRN Blood Glucose LESS THAN 70 milliGRAM(s)/deciliter  HYDROmorphone  Injectable 1 milliGRAM(s) IV Push every 4 hours PRN Moderate Pain (4 - 6)  melatonin 3 milliGRAM(s) Oral at bedtime PRN Insomnia  ondansetron Injectable 4 milliGRAM(s) IV Push every 8 hours PRN Nausea and/or Vomiting  traMADol 50 milliGRAM(s) Oral every 8 hours PRN Severe Pain (7 - 10)    Augmentin (Hives)  ceftriaxone (Pruritus)    T(C): 36.4 (25 @ 11:04), Max: 36.8 (25 @ 19:48)  HR: 77 (25 @ 11:04) (76 - 81)  BP: 168/76 (25 @ 11:04) (153/78 - 172/72)  RR: 18 (25 @ 11:04) (16 - 18)  SpO2: 96% (25 @ 11:04) (94% - 99%)      PHYSICAL EXAM:    Constitutional: No acute distress  HEENT: anicteric sclera   Respiratory: No increased effort  Cardiovascular: S1S2, regular rate  Gastrointestinal: Soft, nontender, +bowel sounds. No rigidity, guarding  Extremities: No edema  Neurological: Alert, oriented  Skin: No jaundice  Psychiatric: Calm, cooperative       LABS:               7.4    3.43  )-----------( 86       (  @ 04:55 )             23.3                7.3    3.67  )-----------( 81       (  @ 03:44 )             23.6                7.3    4.09  )-----------( 86       ( 12 @ 17:00 )             23.1           135  |  94[L]  |  23.3[H]  ----------------------------<  178[H]  4.3   |  28.0  |  3.44[H]    Ca    8.3[L]      2025 04:55    TPro  6.2[L]  /  Alb  3.5  /  TBili  0.2[L]  /  DBili  x   /  AST  15  /  ALT  8   /  AlkPhos  131[H]  06-14    LIVER FUNCTIONS - ( 2025 04:55 )  Alb: 3.5 g/dL / Pro: 6.2 g/dL / ALK PHOS: 131 U/L / ALT: 8 U/L / AST: 15 U/L / GGT: x             Lipase: 70 U/L (25 @ 17:00)    < from: CT Abdomen and Pelvis w/ IV Cont (25 @ 22:37) >    PROCEDURE:  CT of the Abdomen and Pelvis was performed.  Sagittal and coronal reformats were performed.    FINDINGS:  LOWER CHEST: Mild bibasilar atelectasis. Trace right pleural fluid.   Mitral valve annulus calcification.    LIVER: Cirrhosis. There is a subtle 2.7 cm hypodensity in the left lobe   at the site of the previously seen liver abscess on 6/3/2025. Likely   representing resolving abscess. Continued follow-up is recommended.  BILE DUCTS: Intra and extrahepatic biliary ductal dilatation unchanged   likely secondary to postcholecystectomy state.  GALLBLADDER: Cholecystectomy.  SPLEEN: Splenomegaly.  PANCREAS: Subcentimeter cyst and a 2 mm calcification in the uncinate   process of the pancreas are unchanged. There is mild dilatation of the   pancreatic duct in the head of the pancreas measuring up to 6 mm.  ADRENALS: Within normal limits.  KIDNEYS/URETERS: Small bilateral renal cysts and other cortical   hypodensities too small to characterize. 2, 2 mm nonobstructing left   renal calcifications. No hydronephrosis.    BLADDER: Within normal limits.  REPRODUCTIVE ORGANS: Uterus and adnexa within normal limits.    BOWEL: Mild to moderate colonic stool burden. Sigmoid diverticulosis   without diverticulitis. No bowel obstruction. Appendix is normal.  PERITONEUM/RETROPERITONEUM: Trace simple free fluid in the pelvis,   unchanged.  VESSELS: Atherosclerotic changes.  LYMPH NODES: No lymphadenopathy.  ABDOMINAL WALL: Within normal limits.  BONES: Degenerative changes of the spine. No aggressive osseous   lesions.Mild, stable retrolisthesis of L2 in relation to L3.    IMPRESSION:    Sclerosis. Previously seen liver abscess on 6/3/2025 is resolving with a   subtle area of low hypoattenuation remaining. Continued follow-up is   recommended.    Dilated intra and extrahepatic biliary ducts increased since the previous   exam. This could be secondary to postcholecystectomy state. If there is   concern for biliary pathology. MRI with contrast and MRCP can be obtained.    Subcentimeter cyst containing calcification in the pancreas uncinate   process. A 6 mm dilated pancreatic ductin the head of the pancreas.   Nonemergent MRI abdomen with MRCP is recommended.    Other stable findings as detailed above.    < end of copied text >

## 2025-06-14 NOTE — PROGRESS NOTE ADULT - ASSESSMENT
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 CT abdomem showed improving of the liver abscess but increased dilated intra and extrahepatic biliary ducts. Patient is currently planned for MRCP for further evaluation.   Nephrology was consulted for HD needs.     ESRD on HD at [facility] MWF  HD access LUE AVF, functional   Fluid status euvolemic   HTN BP uncontrolled   Anemia of CKD - Hb is low   CKD MDB Hyperphosphatemia   Secondary hyperparathyroidism   Liver abscess currently on meropenem until 7/15/25  Abdominal pain; history of pain-seeking behaviour   Dilated extra and intrahepatic bile ducts    Plan   No indications for HD today   Dose medications for HD   LASHELL with HD   Labs on HD days   Continue meropenem 500 mg daily   Pain control as per primary team   Rest of management as per primary team   Will follow   Thank you

## 2025-06-15 DIAGNOSIS — Z01.810 ENCOUNTER FOR PREPROCEDURAL CARDIOVASCULAR EXAMINATION: ICD-10-CM

## 2025-06-15 LAB
ALBUMIN SERPL ELPH-MCNC: 3.7 G/DL — SIGNIFICANT CHANGE UP (ref 3.3–5.2)
ALP SERPL-CCNC: 131 U/L — HIGH (ref 40–120)
ALT FLD-CCNC: 8 U/L — SIGNIFICANT CHANGE UP
ANION GAP SERPL CALC-SCNC: 12 MMOL/L — SIGNIFICANT CHANGE UP (ref 5–17)
ANION GAP SERPL CALC-SCNC: 12 MMOL/L — SIGNIFICANT CHANGE UP (ref 5–17)
APTT BLD: 37.2 SEC — HIGH (ref 26.1–36.8)
AST SERPL-CCNC: 16 U/L — SIGNIFICANT CHANGE UP
BASOPHILS # BLD AUTO: 0.04 K/UL — SIGNIFICANT CHANGE UP (ref 0–0.2)
BASOPHILS NFR BLD AUTO: 1.4 % — SIGNIFICANT CHANGE UP (ref 0–2)
BILIRUB SERPL-MCNC: <0.2 MG/DL — LOW (ref 0.4–2)
BUN SERPL-MCNC: 34.7 MG/DL — HIGH (ref 8–20)
BUN SERPL-MCNC: 42.3 MG/DL — HIGH (ref 8–20)
CALCIUM SERPL-MCNC: 7.9 MG/DL — LOW (ref 8.4–10.5)
CALCIUM SERPL-MCNC: 8.2 MG/DL — LOW (ref 8.4–10.5)
CHLORIDE SERPL-SCNC: 96 MMOL/L — SIGNIFICANT CHANGE UP (ref 96–108)
CHLORIDE SERPL-SCNC: 96 MMOL/L — SIGNIFICANT CHANGE UP (ref 96–108)
CO2 SERPL-SCNC: 26 MMOL/L — SIGNIFICANT CHANGE UP (ref 22–29)
CO2 SERPL-SCNC: 29 MMOL/L — SIGNIFICANT CHANGE UP (ref 22–29)
CREAT SERPL-MCNC: 4.44 MG/DL — HIGH (ref 0.5–1.3)
CREAT SERPL-MCNC: 5.26 MG/DL — HIGH (ref 0.5–1.3)
EGFR: 10 ML/MIN/1.73M2 — LOW
EGFR: 10 ML/MIN/1.73M2 — LOW
EGFR: 8 ML/MIN/1.73M2 — LOW
EGFR: 8 ML/MIN/1.73M2 — LOW
EOSINOPHIL # BLD AUTO: 0.24 K/UL — SIGNIFICANT CHANGE UP (ref 0–0.5)
EOSINOPHIL NFR BLD AUTO: 8.6 % — HIGH (ref 0–6)
GLUCOSE BLDC GLUCOMTR-MCNC: 129 MG/DL — HIGH (ref 70–99)
GLUCOSE BLDC GLUCOMTR-MCNC: 143 MG/DL — HIGH (ref 70–99)
GLUCOSE BLDC GLUCOMTR-MCNC: 150 MG/DL — HIGH (ref 70–99)
GLUCOSE BLDC GLUCOMTR-MCNC: 152 MG/DL — HIGH (ref 70–99)
GLUCOSE SERPL-MCNC: 131 MG/DL — HIGH (ref 70–99)
GLUCOSE SERPL-MCNC: 142 MG/DL — HIGH (ref 70–99)
HCT VFR BLD CALC: 22.4 % — LOW (ref 34.5–45)
HCT VFR BLD CALC: 23.2 % — LOW (ref 34.5–45)
HGB BLD-MCNC: 7.1 G/DL — LOW (ref 11.5–15.5)
HGB BLD-MCNC: 7.3 G/DL — LOW (ref 11.5–15.5)
IMM GRANULOCYTES # BLD AUTO: 0.01 K/UL — SIGNIFICANT CHANGE UP (ref 0–0.07)
IMM GRANULOCYTES NFR BLD AUTO: 0.4 % — SIGNIFICANT CHANGE UP (ref 0–0.9)
IMMATURE PLATELET FRACTION #: 3.5 K/UL — LOW (ref 4.7–11.1)
IMMATURE PLATELET FRACTION #: 3.8 K/UL — LOW (ref 4.7–11.1)
IMMATURE PLATELET FRACTION %: 4.5 % — SIGNIFICANT CHANGE UP (ref 1.6–4.9)
IMMATURE PLATELET FRACTION %: 4.7 % — SIGNIFICANT CHANGE UP (ref 1.6–4.9)
INR BLD: 1.18 RATIO — HIGH (ref 0.85–1.16)
LACTATE SERPL-SCNC: 0.5 MMOL/L — SIGNIFICANT CHANGE UP (ref 0.5–2)
LIDOCAIN IGE QN: 36 U/L — SIGNIFICANT CHANGE UP (ref 22–51)
LYMPHOCYTES # BLD AUTO: 0.76 K/UL — LOW (ref 1–3.3)
LYMPHOCYTES NFR BLD AUTO: 27.1 % — SIGNIFICANT CHANGE UP (ref 13–44)
MAGNESIUM SERPL-MCNC: 2.5 MG/DL — SIGNIFICANT CHANGE UP (ref 1.6–2.6)
MCHC RBC-ENTMCNC: 28.9 PG — SIGNIFICANT CHANGE UP (ref 27–34)
MCHC RBC-ENTMCNC: 29 PG — SIGNIFICANT CHANGE UP (ref 27–34)
MCHC RBC-ENTMCNC: 31.5 G/DL — LOW (ref 32–36)
MCHC RBC-ENTMCNC: 31.7 G/DL — LOW (ref 32–36)
MCV RBC AUTO: 91.4 FL — SIGNIFICANT CHANGE UP (ref 80–100)
MCV RBC AUTO: 91.7 FL — SIGNIFICANT CHANGE UP (ref 80–100)
MONOCYTES # BLD AUTO: 0.42 K/UL — SIGNIFICANT CHANGE UP (ref 0–0.9)
MONOCYTES NFR BLD AUTO: 15 % — HIGH (ref 2–14)
NEUTROPHILS # BLD AUTO: 1.33 K/UL — LOW (ref 1.8–7.4)
NEUTROPHILS NFR BLD AUTO: 47.5 % — SIGNIFICANT CHANGE UP (ref 43–77)
NRBC # BLD AUTO: 0 K/UL — SIGNIFICANT CHANGE UP (ref 0–0)
NRBC # BLD AUTO: 0 K/UL — SIGNIFICANT CHANGE UP (ref 0–0)
NRBC # FLD: 0 K/UL — SIGNIFICANT CHANGE UP (ref 0–0)
NRBC # FLD: 0 K/UL — SIGNIFICANT CHANGE UP (ref 0–0)
NRBC BLD AUTO-RTO: 0 /100 WBCS — SIGNIFICANT CHANGE UP (ref 0–0)
NRBC BLD AUTO-RTO: 0 /100 WBCS — SIGNIFICANT CHANGE UP (ref 0–0)
PHOSPHATE SERPL-MCNC: 4.7 MG/DL — SIGNIFICANT CHANGE UP (ref 2.4–4.7)
PLATELET # BLD AUTO: 77 K/UL — LOW (ref 150–400)
PLATELET # BLD AUTO: 81 K/UL — LOW (ref 150–400)
PMV BLD: 11.8 FL — SIGNIFICANT CHANGE UP (ref 7–13)
PMV BLD: 12.1 FL — SIGNIFICANT CHANGE UP (ref 7–13)
POTASSIUM SERPL-MCNC: 4.8 MMOL/L — SIGNIFICANT CHANGE UP (ref 3.5–5.3)
POTASSIUM SERPL-MCNC: 5.4 MMOL/L — HIGH (ref 3.5–5.3)
POTASSIUM SERPL-SCNC: 4.8 MMOL/L — SIGNIFICANT CHANGE UP (ref 3.5–5.3)
POTASSIUM SERPL-SCNC: 5.4 MMOL/L — HIGH (ref 3.5–5.3)
PROT SERPL-MCNC: 6.3 G/DL — LOW (ref 6.6–8.7)
PROTHROM AB SERPL-ACNC: 13.7 SEC — HIGH (ref 9.9–13.4)
RBC # BLD: 2.45 M/UL — LOW (ref 3.8–5.2)
RBC # BLD: 2.53 M/UL — LOW (ref 3.8–5.2)
RBC # FLD: 14.7 % — HIGH (ref 10.3–14.5)
RBC # FLD: 14.7 % — HIGH (ref 10.3–14.5)
SODIUM SERPL-SCNC: 134 MMOL/L — LOW (ref 135–145)
SODIUM SERPL-SCNC: 137 MMOL/L — SIGNIFICANT CHANGE UP (ref 135–145)
WBC # BLD: 2.8 K/UL — LOW (ref 3.8–10.5)
WBC # BLD: 3.41 K/UL — LOW (ref 3.8–10.5)
WBC # FLD AUTO: 2.8 K/UL — LOW (ref 3.8–10.5)
WBC # FLD AUTO: 3.41 K/UL — LOW (ref 3.8–10.5)

## 2025-06-15 PROCEDURE — 93010 ELECTROCARDIOGRAM REPORT: CPT

## 2025-06-15 PROCEDURE — 74177 CT ABD & PELVIS W/CONTRAST: CPT | Mod: 26

## 2025-06-15 PROCEDURE — 74018 RADEX ABDOMEN 1 VIEW: CPT | Mod: 26

## 2025-06-15 PROCEDURE — 99232 SBSQ HOSP IP/OBS MODERATE 35: CPT

## 2025-06-15 RX ORDER — SODIUM CHLORIDE 9 G/1000ML
1000 INJECTION, SOLUTION INTRAVENOUS
Refills: 0 | Status: DISCONTINUED | OUTPATIENT
Start: 2025-06-15 | End: 2025-06-15

## 2025-06-15 RX ORDER — SODIUM ZIRCONIUM CYCLOSILICATE 5 G/5G
10 POWDER, FOR SUSPENSION ORAL ONCE
Refills: 0 | Status: COMPLETED | OUTPATIENT
Start: 2025-06-15 | End: 2025-06-15

## 2025-06-15 RX ORDER — TRAMADOL HYDROCHLORIDE 50 MG/1
50 TABLET, FILM COATED ORAL EVERY 4 HOURS
Refills: 0 | Status: DISCONTINUED | OUTPATIENT
Start: 2025-06-15 | End: 2025-06-16

## 2025-06-15 RX ORDER — LABETALOL HYDROCHLORIDE 200 MG/1
5 TABLET, FILM COATED ORAL ONCE
Refills: 0 | Status: COMPLETED | OUTPATIENT
Start: 2025-06-15 | End: 2025-06-15

## 2025-06-15 RX ORDER — HYDROMORPHONE/SOD CHLOR,ISO/PF 2 MG/10 ML
0.5 SYRINGE (ML) INJECTION EVERY 6 HOURS
Refills: 0 | Status: DISCONTINUED | OUTPATIENT
Start: 2025-06-15 | End: 2025-06-16

## 2025-06-15 RX ORDER — METHOCARBAMOL 500 MG/1
500 TABLET, FILM COATED ORAL THREE TIMES A DAY
Refills: 0 | Status: DISCONTINUED | OUTPATIENT
Start: 2025-06-15 | End: 2025-06-16

## 2025-06-15 RX ORDER — HYDROMORPHONE/SOD CHLOR,ISO/PF 2 MG/10 ML
1 SYRINGE (ML) INJECTION EVERY 6 HOURS
Refills: 0 | Status: DISCONTINUED | OUTPATIENT
Start: 2025-06-15 | End: 2025-06-15

## 2025-06-15 RX ADMIN — METHOCARBAMOL 500 MILLIGRAM(S): 500 TABLET, FILM COATED ORAL at 13:28

## 2025-06-15 RX ADMIN — SERTRALINE 100 MILLIGRAM(S): 100 TABLET, FILM COATED ORAL at 11:49

## 2025-06-15 RX ADMIN — TRAMADOL HYDROCHLORIDE 50 MILLIGRAM(S): 50 TABLET, FILM COATED ORAL at 22:03

## 2025-06-15 RX ADMIN — Medication 40 MILLIGRAM(S): at 11:49

## 2025-06-15 RX ADMIN — Medication 1 APPLICATION(S): at 06:20

## 2025-06-15 RX ADMIN — HEPARIN SODIUM 5000 UNIT(S): 1000 INJECTION INTRAVENOUS; SUBCUTANEOUS at 06:18

## 2025-06-15 RX ADMIN — Medication 25 MILLIGRAM(S): at 21:19

## 2025-06-15 RX ADMIN — MEROPENEM 500 MILLIGRAM(S): 1 INJECTION INTRAVENOUS at 17:36

## 2025-06-15 RX ADMIN — Medication 0.5 MILLIGRAM(S): at 21:38

## 2025-06-15 RX ADMIN — TRAMADOL HYDROCHLORIDE 50 MILLIGRAM(S): 50 TABLET, FILM COATED ORAL at 14:28

## 2025-06-15 RX ADMIN — CARVEDILOL 25 MILLIGRAM(S): 3.12 TABLET, FILM COATED ORAL at 17:01

## 2025-06-15 RX ADMIN — TRAMADOL HYDROCHLORIDE 50 MILLIGRAM(S): 50 TABLET, FILM COATED ORAL at 00:54

## 2025-06-15 RX ADMIN — Medication 200 MICROGRAM(S): at 05:04

## 2025-06-15 RX ADMIN — Medication 0.1 MILLIGRAM(S): at 03:42

## 2025-06-15 RX ADMIN — Medication 0.5 MILLIGRAM(S): at 20:46

## 2025-06-15 RX ADMIN — Medication 10 MILLIGRAM(S): at 17:47

## 2025-06-15 RX ADMIN — Medication 667 MILLIGRAM(S): at 08:54

## 2025-06-15 RX ADMIN — Medication 25 MILLIGRAM(S): at 08:54

## 2025-06-15 RX ADMIN — LABETALOL HYDROCHLORIDE 5 MILLIGRAM(S): 200 TABLET, FILM COATED ORAL at 20:17

## 2025-06-15 RX ADMIN — LISINOPRIL 10 MILLIGRAM(S): 5 TABLET ORAL at 08:54

## 2025-06-15 RX ADMIN — TRAMADOL HYDROCHLORIDE 50 MILLIGRAM(S): 50 TABLET, FILM COATED ORAL at 21:43

## 2025-06-15 RX ADMIN — METHOCARBAMOL 500 MILLIGRAM(S): 500 TABLET, FILM COATED ORAL at 21:19

## 2025-06-15 RX ADMIN — Medication 1 MILLIGRAM(S): at 15:32

## 2025-06-15 RX ADMIN — INSULIN LISPRO 2: 100 INJECTION, SOLUTION INTRAVENOUS; SUBCUTANEOUS at 16:55

## 2025-06-15 RX ADMIN — Medication 81 MILLIGRAM(S): at 11:49

## 2025-06-15 RX ADMIN — Medication 1 MILLIGRAM(S): at 09:58

## 2025-06-15 RX ADMIN — CARVEDILOL 25 MILLIGRAM(S): 3.12 TABLET, FILM COATED ORAL at 08:54

## 2025-06-15 RX ADMIN — TRAMADOL HYDROCHLORIDE 50 MILLIGRAM(S): 50 TABLET, FILM COATED ORAL at 13:28

## 2025-06-15 RX ADMIN — Medication 1 MILLIGRAM(S): at 10:28

## 2025-06-15 RX ADMIN — Medication 25 MILLIGRAM(S): at 13:28

## 2025-06-15 RX ADMIN — Medication 1 MILLIGRAM(S): at 15:02

## 2025-06-15 RX ADMIN — HEPARIN SODIUM 5000 UNIT(S): 1000 INJECTION INTRAVENOUS; SUBCUTANEOUS at 17:01

## 2025-06-15 RX ADMIN — SODIUM ZIRCONIUM CYCLOSILICATE 10 GRAM(S): 5 POWDER, FOR SUSPENSION ORAL at 19:05

## 2025-06-15 RX ADMIN — TRAMADOL HYDROCHLORIDE 50 MILLIGRAM(S): 50 TABLET, FILM COATED ORAL at 01:50

## 2025-06-15 RX ADMIN — Medication 10 MILLIGRAM(S): at 06:11

## 2025-06-15 NOTE — DIETITIAN INITIAL EVALUATION ADULT - ADD RECOMMEND
Rx: Nephro-Diamond daily. Continue Creon with meals. Continue phoslo PRN. Encourage po intake, monitor diet tolerance, and provide assistance at meals as needed. Obtain daily weights to monitor trends.

## 2025-06-15 NOTE — DIETITIAN INITIAL EVALUATION ADULT - PERTINENT LABORATORY DATA
06-14    137  |  96  |  34.7[H]  ----------------------------<  142[H]  4.8   |  29.0  |  4.44[H]    Ca    8.2[L]      14 Jun 2025 23:53    TPro  6.3[L]  /  Alb  3.7  /  TBili  <0.2[L]  /  DBili  x   /  AST  16  /  ALT  8   /  AlkPhos  131[H]  06-14  POCT Blood Glucose.: 143 mg/dL (06-15-25 @ 12:47)  A1C with Estimated Average Glucose Result: 7.3 % (05-22-25 @ 06:07)  A1C with Estimated Average Glucose Result: 7.7 % (08-01-24 @ 06:16)

## 2025-06-15 NOTE — DIETITIAN INITIAL EVALUATION ADULT - OTHER INFO
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

## 2025-06-15 NOTE — DIETITIAN INITIAL EVALUATION ADULT - NSICDXPASTMEDICALHX_GEN_ALL_CORE_FT
PAST MEDICAL HISTORY:  Bernard syndrome     CAD (coronary artery disease)     Chronic diastolic congestive heart failure     Cirrhosis     Diabetes     Esophagitis, erosive     ESRD on dialysis MWF at [facility]    Gastroparesis     HCV (hepatitis C virus)     HTN (hypertension)     IBS (irritable bowel syndrome)     Pancreatitis     Thyroid cancer

## 2025-06-15 NOTE — CONSULT NOTE ADULT - PROBLEM SELECTOR RECOMMENDATION 9
- Pre-op risk stratification for exp lap w/ small bowel resection   - pt is poor historian. has history of CAD w/ stents. does not remember when she had stents, does not remember what medications she takes, and does not remember any cardiac workup within the last 5 years including echo or stress.   - she denies symptoms, but has abd pain which can be anginal equivalent in female pt.   - given she now has evidence of extracardiac atherosclerosis. she has evidence of aorta calcifications, Significant atherosclerotic plaque throughout. there are some calcifications of SMA, JEANNIE, Celiac axis, and renal arteries though less significant than descending aorta.   - coronary arteries and ascending aorta are not visualized on abd only films.   - Cannot exclude underlying CAD  - will need echo and NST prior to any risk stratification   - check echo tonight and NST in AM   - if patient's surgery is deemed emergent, our workup should not hinder the patient's life saving emergent surgery.   - we will follow. a risk profile will be provided after echo/NST are performed and read.
Pt with known hx of liver abscess , currently getting antibiotics   now with worsing abdominal pain  CT - showed improvement of abscess, but dilated CBD. wbc normal 3.7. LFT normal  cmp ordered for tomorrow  MRCP ordered

## 2025-06-15 NOTE — DIETITIAN INITIAL EVALUATION ADULT - PROBLEM SELECTOR PLAN 1
Worsening abdominal pain   CT-Abd: Dilated intra and extrahepatic biliary ducts increased since the previous exam. This could be secondary to postcholecystectomy state.   - Pain control   - PPI   - GI consult  - DVT prophylaxis Worsening abdominal pain   CT-Abd: Dilated intra and extrahepatic biliary ducts increased since the previous exam. This could be secondary to postcholecystectomy state.

## 2025-06-15 NOTE — DIETITIAN INITIAL EVALUATION ADULT - PROBLEM SELECTOR PLAN 3
HD-MWF  Patient received IV contrast in the ED   - Needs to be dialyzed today   - Nephro consulted [facility]

## 2025-06-15 NOTE — CONSULT NOTE ADULT - SUBJECTIVE AND OBJECTIVE BOX
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 During last admission, patient was found to have a liver abscess and was discharged on Meropenem via PICC line (for 6 weeks - ends 7/15). Today patient did not received her meropenem dose as the person who was supposed to administer the antibiotic got into a car accident. Endorses worsening abdominal control, minimal relief with tramadol doses at home which prompted ED visit. Denies fever, chills, nausea, vomiting, changes in bowel, chest pain, palpitation SOB, dysuria or any other acute complaints. In the ED patient received multiple doses of Dilaudid, endorses pain is still present with minimal relief. Repeat CT-abd showed improving of the liver abscess but increased dilated intra and extrahepatic biliary ducts. Labs remarkable H/H:7.3/23.1, BUN/Cr: 41.2/4.2. Vitals - BP:173/77, HR:89, Temp:99.3"    CT scan performed 6/15 demonstrated mesenteric swirling for which General Surgery was called. Patient and  state patient's pain began 2-3 years ago, though acutely worsened in the past few weeks. Recently discharged from a 2-week admission for abdominal pain w/imaging showing hepatic abscess, came back because of abdominal pain. Patient had nausea/vomiting when she came in but has not vomited since. Last BM  AM, formed; normally ranges from multiple BMs a day to constipation for 10 days. Passing gas as of today. States she gets bloated, that she was not particularly bloated at the time of our interview.    ROS: 10-system review is otherwise negative except HPI above.      PAST MEDICAL & SURGICAL HISTORY:  Diabetes      Bernard syndrome      Pancreatitis      Cirrhosis      ESRD on dialysis  MWF at [facility]      HTN (hypertension)      CAD (coronary artery disease)      Chronic diastolic congestive heart failure      IBS (irritable bowel syndrome)      Gastroparesis      Thyroid cancer      Esophagitis, erosive      HCV (hepatitis C virus)      History of liver biopsy      H/O total thyroidectomy      History of cholecystectomy  1990s      S/P         SOCIAL HISTORY:  Denies any toxic habits    ALLERGIES: Augmentin (Hives)  ceftriaxone (Pruritus)      HOME MEDICATIONS:  Home Medications:  aspirin 81 mg oral delayed release tablet: 1 tab(s) orally once a day (:09)  HumaLOG KwikPen 100 units/mL injectable solution: injectable 3 times a day (:)  levothyroxine 200 mcg (0.2 mg) oral tablet: 1 tab(s) orally once a day (:09)  sertraline 100 mg oral tablet: 2 tab(s) orally once a day (:)      --------------------------------------------------------------------------------------------  VITALS:  T(C): 36.6 (06-15-25 @ 11:27), Max: 36.8 (25 @ 20:56)  HR: 80 (06-15-25 @ 11:27) (75 - 82)  BP: 176/80 (06-15-25 @ 14:55) (151/75 - 194/85)  RR: 18 (06-15-25 @ 11:27) (18 - 18)  SpO2: 96% (06-15-25 @ 11:27) (96% - 100%)      PHYSICAL EXAM:   General: NAD, lying in bed comfortably  Neuro: A+Ox3  HEENT: extraocular eye movements grossly intact, MMM  Cardio: RRR  Resp: Non labored breathing on RA  GI/Abd: softly distended, tender in R flank, no rebound/guarding, no masses palpated  --------------------------------------------------------------------------------------------    LABS                 7.3    2.80   )----------(  81        ( 2025 23:53 )               23.2      137    |  96     |  34.7   ----------------------------<  142        ( 2025 23:53 )  4.8     |  29.0   |  4.44     Ca    8.2        ( 2025 23:53 )    TPro  6.3    /  Alb  3.7    /  TBili  <0.2   /  DBili  x      /  AST  16     /  ALT  8      /  AlkPhos  131    ( 2025 23:53 )    LIVER FUNCTIONS - ( 2025 23:53 )  Alb: 3.7 g/dL / Pro: 6.3 g/dL / ALK PHOS: 131 U/L / ALT: 8 U/L / AST: 16 U/L / GGT: x           PT/INR -  13.7 sec / 1.18 ratio   ( 2025 23:53 )  PTT -  37.2 sec   ( 2025 23:53 )    --------------------------------------------------------------------------------------------  IMAGING  EXAM: CT ABDOMEN AND PELVIS IC ORDERED BY: MARIANNE BANUELOS    PROCEDURE DATE: 06/15/2025    INTERPRETATION: CLINICAL INFORMATION: Worsening abdominal pain with abdominal distention.    COMPARISON: 2025. And MRCP 2025    CONTRAST/COMPLICATIONS:  IV Contrast: Omnipaque 350 80 cc administered  Oral Contrast: NONE.    PROCEDURE:  CT of the Abdomen and Pelvis was performed.  Sagittal and coronal reformats were performed.    FINDINGS:  LOWER CHEST: Minimal dependent atelectatic changes. Mitral valve calcifications. Trace pericardial fluid increased since prior study 2025    LIVER: Cirrhotic liver morphology. Stable left hepatic lobe lesion when compared to prior CT and MRI measuring 2.2 x 2.8 cm in the location of previous hepatic abscess.  BILE DUCTS: No interval change since prior MRI of minimal intrahepatic and extrahepatic bile duct prominence.  GALLBLADDER: Cholecystectomy.  SPLEEN: Within normal limits.  PANCREAS: Stable calcific focus in the pancreatic head. Mild pancreatic atrophy. No pancreatic duct dilatation. No peripancreatic edema or fluid.  ADRENALS: Within normal limits.  KIDNEYS/URETERS: No hydronephrosis. Multiple hypodensities, some subcentimeter are too small to characterize on CT and cysts on MRI.    BLADDER: Bladder is not distended and contains contrast from prior contrast administrations.  REPRODUCTIVE ORGANS: Uterus and adnexa within normal limits.    BOWEL: Since prior study 2025 there is new swirling of the mesentery without compression of the vasculature. There is gaseous distention of several small bowel loops which are not abnormally dilated. There is no obvious bowel wall thickening. No transition point is evident. No pneumatosis and no associated mesenteric edema. No bowel obstruction. Appendix is normal. Sigmoid colonic diverticulosis. No obvious diverticulitis.  PERITONEUM/RETROPERITONEUM: Small amount of ascites in the pelvis has not significantly changed since prior study 2025. No pneumoperitoneum. No loculated fluid collections. Nonspecific swirling of the mesentery without bowel obstruction. No mesenteric edema or infiltration.  VESSELS: Normal aortic diameter. Significant atherosclerotic calcifications. Celiac axis, SMA and JEANNIE are patent. Hepatic veins, portal vein, mesenteric vein, and splenic vein are patent. No portal venous gas or mesenteric gas.  LYMPH NODES: No lymphadenopathy.  ABDOMINAL WALL: Within normal limits.  BONES: Degenerative changes of the spine, most prominent at L2-3 and L4 through S1.    IMPRESSION:  Liver cirrhosis with portal hypertension including portal splenomegaly and small volume pelvic ascites that has not changed since 2025.    Compared to prior study there is new swirling of the mesenteric root without evidence of bowel obstruction, mesenteric edema, bowel wall edema or pneumatosis. This finding is nonspecific. Close clinical observation is recommended and surgical consult can be considered. If there is persistent clinical concern, repeat CT abdomen pelvis preferably with IV and oral contrast can be considered. Correlate also with serum lactate levels.    No pneumoperitoneum.    I concur with below preliminary report.    Preliminary: Cirrhosis with evidence of portal hypertension. Unchanged left hepatic lobe lesion when compared to CT from 2025. Mild intrahepatic and extra hepatic biliary ductal dilatation in the setting of a cholecystectomy, unchanged. Swirling of the mesenteric vessels at the mesenteric root which is new compared to prior exam from 2025. No small bowel obstruction or mesenteric edema. Appendix is normal. Colonic diverticulosis without evidence of diverticulitis. Small volume pelvic free fluid. Given the patient's clinical pain and new swirling of the mesenteric vessels of the mesenteric root, surgery consultation should be considered    ASSESSMENT: Patient is a 69y female with multiple medical comorbidities, presented 3 days ago w/abdominal pain. CT A/P from 6/15 w/new swirling of mesenteric root as well as constipation.    PLAN:    - Will monitor for now, plan for possible OR tomorrow - diagnostic lap possible ex-lap possible small bowel resection  - F/u repeat labs including lactate  - Recommend enemas and suppositories  - NPO  - Please document medical and cardiac clearances for OR  - Care as per primary team    Patient and plan discussed with attending, Dr. Jon Metz MD

## 2025-06-15 NOTE — DIETITIAN INITIAL EVALUATION ADULT - ORAL INTAKE PTA/DIET HISTORY
Pt provided limited information during interview, states appetite/po intake prior to admission was "fine"- pt was recently admitted ~1.5 weeks ago and met criteria for malnutrition. Weight at that time was 151 lbs, current weight 147 lbs; pt with a ~20 lb wt loss over a few weeks. Pt not interested in providing further information.

## 2025-06-15 NOTE — DIETITIAN INITIAL EVALUATION ADULT - PROBLEM SELECTOR PLAN 2
CT-abd: Sclerosis. Previously seen liver abscess on 6/3/2025 is resolving with a subtle area of low hypoattenuation remaining.   - PICC line in place   - Meropenem 500mg IV QD (on HD days to be given post HD) CT-abd: Sclerosis. Previously seen liver abscess on 6/3/2025 is resolving with a subtle area of low hypoattenuation remaining.

## 2025-06-15 NOTE — DIETITIAN INITIAL EVALUATION ADULT - PROBLEM SELECTOR PLAN 6
H/H: 7.3/23.1  Most likely 2/2 underlying ESRD   - Monitor H/H  - Transfuse as per protocol for Hgb<7 H/H: 7.3/23.1

## 2025-06-15 NOTE — DIETITIAN INITIAL EVALUATION ADULT - PERTINENT MEDS FT
MEDICATIONS  (STANDING):  calcium acetate 667 milliGRAM(s) Oral every 12 hours  carvedilol 25 milliGRAM(s) Oral every 12 hours  dextrose 5%. 1000 milliLiter(s) (50 mL/Hr) IV Continuous <Continuous>  epoetin day-epbx (RETACRIT) Injectable 00173 Unit(s) IV Push <User Schedule>  hydrALAZINE 25 milliGRAM(s) Oral every 8 hours  insulin lispro (ADMELOG) corrective regimen sliding scale   SubCutaneous three times a day before meals  levothyroxine 200 MICROGram(s) Oral daily  lisinopril 10 milliGRAM(s) Oral daily  meropenem Injectable 500 milliGRAM(s) IV Push every 24 hours  methocarbamol 500 milliGRAM(s) Oral three times a day  pancrelipase (CREON) DR 3000 Unit(s) 1 Capsule(s) Oral three times a day with meals  pantoprazole  Injectable 40 milliGRAM(s) IV Push daily  sertraline 100 milliGRAM(s) Oral daily    MEDICATIONS  (PRN):  HYDROmorphone  Injectable 1 milliGRAM(s) IV Push every 6 hours PRN Severe Pain (7 - 10)  ondansetron Injectable 4 milliGRAM(s) IV Push every 8 hours PRN Nausea and/or Vomiting  traMADol 50 milliGRAM(s) Oral every 4 hours PRN Moderate Pain (4 - 6)   with patient

## 2025-06-15 NOTE — CONSULT NOTE ADULT - ASSESSMENT
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 presented 3 days ago w/abdominal pain. CT A/P from 6/15 w/new swirling of mesenteric root as well as constipation. Surgery planning potential Small bowel resection and called cardiology for risk stratification.

## 2025-06-15 NOTE — PROGRESS NOTE ADULT - SUBJECTIVE AND OBJECTIVE BOX
Patient is a 69y old  Female who presents with a chief complaint of Abdominal pain (14 Jun 2025 17:08)      Patient seen and examined at bedside this am   she is with severe R upper abd pain   no overnight events       ALLERGIES:  Augmentin (Hives)  ceftriaxone (Pruritus)    MEDICATIONS  (STANDING):  aspirin enteric coated 81 milliGRAM(s) Oral daily  calcium acetate 667 milliGRAM(s) Oral every 12 hours  carvedilol 25 milliGRAM(s) Oral every 12 hours  chlorhexidine 2% Cloths 1 Application(s) Topical <User Schedule>  dextrose 5%. 1000 milliLiter(s) (50 mL/Hr) IV Continuous <Continuous>  dextrose 50% Injectable 25 Gram(s) IV Push once  epoetin day-epbx (RETACRIT) Injectable 64365 Unit(s) IV Push <User Schedule>  glucagon  Injectable 1 milliGRAM(s) IntraMuscular once  heparin   Injectable 5000 Unit(s) SubCutaneous every 12 hours  hydrALAZINE 25 milliGRAM(s) Oral every 8 hours  insulin lispro (ADMELOG) corrective regimen sliding scale   SubCutaneous three times a day before meals  levothyroxine 200 MICROGram(s) Oral daily  lisinopril 10 milliGRAM(s) Oral daily  meropenem Injectable 500 milliGRAM(s) IV Push every 24 hours  pancrelipase (CREON) DR 3000 Unit(s) 1 Capsule(s) Oral three times a day with meals  pantoprazole  Injectable 40 milliGRAM(s) IV Push daily  sertraline 100 milliGRAM(s) Oral daily    MEDICATIONS  (PRN):  acetaminophen     Tablet .. 650 milliGRAM(s) Oral every 6 hours PRN Temp greater or equal to 38C (100.4F), Mild Pain (1 - 3)  aluminum hydroxide/magnesium hydroxide/simethicone Suspension 30 milliLiter(s) Oral every 4 hours PRN Dyspepsia  dextrose Oral Gel 15 Gram(s) Oral once PRN Blood Glucose LESS THAN 70 milliGRAM(s)/deciliter  HYDROmorphone  Injectable 1 milliGRAM(s) IV Push every 4 hours PRN Moderate Pain (4 - 6)  melatonin 3 milliGRAM(s) Oral at bedtime PRN Insomnia  ondansetron Injectable 4 milliGRAM(s) IV Push every 8 hours PRN Nausea and/or Vomiting  traMADol 50 milliGRAM(s) Oral every 8 hours PRN Severe Pain (7 - 10)    Vital Signs Last 24 Hrs  T(F): 97.8 (15 Nish 2025 11:27), Max: 98.4 (14 Jun 2025 15:53)  HR: 80 (15 Nish 2025 11:27) (75 - 82)  BP: 170/78 (15 Nish 2025 11:27) (131/78 - 194/85)  RR: 18 (15 Nish 2025 11:27) (18 - 18)  SpO2: 96% (15 Nish 2025 11:27) (96% - 100%)  I&O's Summary      PHYSICAL EXAM:  General: awake no distress  Neck: supple   Lungs: CTA   Cardio: RRR, S1/S2, No murmur  Abdomen: Soft, RUQ tenderness , + fullness , Nondistended; Bowel sounds present  Extremities: No calf tenderness, No pitting edema  skin warm color     LABS:                        7.3    2.80  )-----------( 81       ( 14 Jun 2025 23:53 )             23.2     06-14    137  |  96  |  34.7  ----------------------------<  142  4.8   |  29.0  |  4.44    Ca    8.2      14 Jun 2025 23:53    TPro  6.3  /  Alb  3.7  /  TBili  <0.2  /  DBili  x   /  AST  16  /  ALT  8   /  AlkPhos  131  06-14      Lipase: 36 U/L (06-14-25 @ 23:53)  Lipase: 70 U/L (06-12-25 @ 17:00)      PT/INR - ( 14 Jun 2025 23:53 )   PT: 13.7 sec;   INR: 1.18 ratio         PTT - ( 14 Jun 2025 23:53 )  PTT:37.2 sec                        POCT Blood Glucose.: 129 mg/dL (15 Nish 2025 08:29)  POCT Blood Glucose.: 187 mg/dL (14 Jun 2025 22:37)  POCT Blood Glucose.: 128 mg/dL (14 Jun 2025 17:59)      Urinalysis Basic - ( 14 Jun 2025 23:53 )    Color: x / Appearance: x / SG: x / pH: x  Gluc: 142 mg/dL / Ketone: x  / Bili: x / Urobili: x   Blood: x / Protein: x / Nitrite: x   Leuk Esterase: x / RBC: x / WBC x   Sq Epi: x / Non Sq Epi: x / Bacteria: x          RADIOLOGY & ADDITIONAL TESTS:

## 2025-06-15 NOTE — DIETITIAN NUTRITION RISK NOTIFICATION - ADDITIONAL COMMENTS/DIETITIAN RECOMMENDATIONS
Change diet to consistent carbohydrate (evening snack); renal replacement pts:no protein restr,no conc K & phos, low sodium  Rx: Nephro-Diamond daily. Continue Creon with meals. Continue phoslo PRN. Encourage po intake, monitor diet tolerance, and provide assistance at meals as needed. Obtain daily weights to monitor trends.

## 2025-06-15 NOTE — PROGRESS NOTE ADULT - ASSESSMENT
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         1- Abdominal pain.   acute on cronic   adjust pain meds   will call pain managament possible celiac plexus block     2- Liver abscess infection   cont Iv merrem per ID rec from previous admission   abnormal CT of abd findings   MR of abd  no CBD obst LFTs are normal   cont PPI   GI follow up appreciated     merrem 500mg IV QD (on HD days to be given post HD).    3-ESRD on dialysis.   HD-M W F  - Nephro consult appreciated.     4- Anemia acute on cronic   CBC , type screen in am   if HB < 8 will tx 1 units with HD tomorrow     5-: Diabetes mellitus. type 2   - ISS with hypoglycemia protocol.    6-Hypertension.   - Coreg 25mg BID  - Lisinopril 10mg QD  - Hydralazine 25mg TID.      Dc home in 1-2 days likely

## 2025-06-15 NOTE — CONSULT NOTE ADULT - NS ATTEND AMEND GEN_ALL_CORE FT
Pt with known hx of liver abscess , currently getting antibiotics   now with worsing abdominal pain  CT - showed improvement of abscess, but dilated CBD. wbc normal 3.7. LFT normal  cmp ordered for tomorrow  MRCP ordered    A- +BS. soft, diffusely tender on palpation
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 presented 3 days ago w/abdominal pain. CT A/P from 6/15 w/new swirling of mesenteric root as well as constipation. Surgery planning potential Small bowel resection and called cardiology for risk stratification.       2d TTE   if emergent surgery then no further cardiovascular testing  if non-emergent recommend NST if possible

## 2025-06-15 NOTE — DIETITIAN INITIAL EVALUATION ADULT - ETIOLOGY
related to inability to meet sufficient protein-energy in setting of ESRD on HD, gastroparesis, IBS, chronic pancreatitis, cirrhosis, lack of appetite

## 2025-06-15 NOTE — DIETITIAN INITIAL EVALUATION ADULT - PROBLEM SELECTOR PLAN 5
Hypertensive in the ED with [id number], could be 2/2 to pain   Continue home meds   - Coreg 25mg BID  - Lisinopril 10mg QD  - Hydralazine 25mg TID   - Monitor BP Hypertensive in the ED with [id number], could be 2/2 to pain

## 2025-06-15 NOTE — CONSULT NOTE ADULT - SUBJECTIVE AND OBJECTIVE BOX
[facility]                                              CARDIOLOGY AT [address], [place] [address]                                             Telephone: [phone]. Fax:[phone]                                                       CARDIOLOGY CONSULTATION NOTE                                                                                             History obtained by: Patient and medical record   Community Cardiologist: Dr. 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 During last admission, patient was found to have a liver abscess and was discharged on Meropenem via PICC line (for 6 weeks - ends 7/15). Today patient did not received her meropenem dose as the person who was supposed to administer the antibiotic got into a car accident. Endorses worsening abdominal control, minimal relief with tramadol doses at home which prompted ED visit. Denies fever, chills, nausea, vomiting, changes in bowel, chest pain, palpitation SOB, dysuria or any other acute complaints. In the ED patient received multiple doses of Dilaudid, endorses pain is still present with minimal relief. Repeat CT-abd showed improving of the liver abscess but increased dilated intra and extrahepatic biliary ducts. Labs remarkable H/H:7.3/23.1, BUN/Cr: 41.2/4.2. Vitals - BP:173/77, HR:89, Temp:99.3 (2025 04:47)      Review of symptoms:   Cardiac:  No chest pain. No dyspnea. No palpitations.  Respiratory: no cough. No dyspnea  Gastrointestinal: No diarrhea. No abdominal pain. No bleeding.   Neuro: No focal neuro complaints.  All other ROS negative unless otherwise listed above    PHYSICAL EXAM:  Appearance: Comfortable. No acute distress  HEENT:  Atraumatic. Normocephalic.  Normal oral mucosa  Neurologic: A & O x 3, no gross focal deficits.  Cardiovascular: RRR S1 S2, No murmur, no rubs/gallops. No JVD  Respiratory: Lungs clear to auscultation, unlabored   Gastrointestinal:  Soft, Non-tender, + BS  Lower Extremities: 2+ Peripheral Pulses, No clubbing, cyanosis, or edema  Psychiatry: Patient is calm. No agitation.   Skin: warm and dry.    PAST MEDICAL HISTORY  Diabetes    Bernard syndrome    Pancreatitis    Gastroptosis    Liver disease    Cirrhosis    ESRD on dialysis    HTN (hypertension)    CAD (coronary artery disease)    Chronic diastolic congestive heart failure    IBS (irritable bowel syndrome)    Gastroparesis    Thyroid cancer    Esophagitis, erosive    HCV (hepatitis C virus)        PAST SURGICAL HISTORY  History of liver biopsy    H/O total thyroidectomy    History of cholecystectomy    S/P         SUBSTANCE USE HISTORY  Denies current and previous substance use [  ]   CIGARETTES -   ALCOHOL -   DRUGS -     FAMILY HISTORY:  FH: breast cancer (Mother)    FH: brain cancer (Father)        CARDIAC SPECIFIC FAMILY HX   No KNOWN family history of Cardiovascular disease, CAD, or sudden death in first degree relatives unless specified below  Family History of Cardiovascular Disease:  [  ]   Coronary Artery Disease in first degree relative:  [  ]   Sudden Cardiac Death in First degree relative: [  ]    HOME MEDICATIONS:  aspirin 81 mg oral delayed release tablet: 1 tab(s) orally once a day (2025 01:09)  HumaLOG KwikPen 100 units/mL injectable solution: injectable 3 times a day (2025 01:09)  levothyroxine 200 mcg (0.2 mg) oral tablet: 1 tab(s) orally once a day (2025 01:09)  sertraline 100 mg oral tablet: 2 tab(s) orally once a day (2025 01:09)      CURRENT CARDIAC MEDICATIONS:  carvedilol 25 milliGRAM(s) Oral every 12 hours  hydrALAZINE 25 milliGRAM(s) Oral every 8 hours  lisinopril 10 milliGRAM(s) Oral daily      CURRENT OTHER MEDICATIONS:  acetaminophen     Tablet .. 650 milliGRAM(s) Oral every 6 hours PRN Temp greater or equal to 38C (100.4F), Mild Pain (1 - 3)  HYDROmorphone  Injectable 0.5 milliGRAM(s) IV Push every 6 hours PRN Severe Pain (7 - 10)  melatonin 3 milliGRAM(s) Oral at bedtime PRN Insomnia  methocarbamol 500 milliGRAM(s) Oral three times a day  ondansetron Injectable 4 milliGRAM(s) IV Push every 8 hours PRN Nausea and/or Vomiting  sertraline 100 milliGRAM(s) Oral daily  traMADol 50 milliGRAM(s) Oral every 4 hours PRN Moderate Pain (4 - 6)  aluminum hydroxide/magnesium hydroxide/simethicone Suspension 30 milliLiter(s) Oral every 4 hours PRN Dyspepsia  pantoprazole  Injectable 40 milliGRAM(s) IV Push daily  aspirin enteric coated 81 milliGRAM(s) Oral daily  calcium acetate 667 milliGRAM(s) Oral every 12 hours  chlorhexidine 2% Cloths 1 Application(s) Topical <User Schedule>  dextrose 5%. 1000 milliLiter(s) (50 mL/Hr) IV Continuous <Continuous>  dextrose 50% Injectable 25 Gram(s) IV Push once, Stop order after: 1 Doses  dextrose Oral Gel 15 Gram(s) Oral once, Stop order after: 1 Doses PRN Blood Glucose LESS THAN 70 milliGRAM(s)/deciliter  epoetin day-epbx (RETACRIT) Injectable 72458 Unit(s) IV Push <User Schedule>  glucagon  Injectable 1 milliGRAM(s) IntraMuscular once, Stop order after: 1 Doses  heparin   Injectable 5000 Unit(s) SubCutaneous every 12 hours  insulin lispro (ADMELOG) corrective regimen sliding scale   SubCutaneous three times a day before meals  lactated ringers. 1000 milliLiter(s) (100 mL/Hr) IV Continuous <Continuous>  levothyroxine 200 MICROGram(s) Oral daily  meropenem Injectable 500 milliGRAM(s) IV Push every 24 hours, Stop order after: 7 Days  pancrelipase (CREON) DR 3000 Unit(s) 1 Capsule(s) Oral three times a day with meals  sodium zirconium cyclosilicate 10 Gram(s) Oral once, Stop order after: 1 Doses      ALLERGIES:   Augmentin (Hives)  ceftriaxone (Pruritus)      VITAL SIGNS:  T(C): 36.5 (06-15-25 @ 17:05), Max: 36.8 (25 @ 20:56)  T(F): 97.7 (06-15-25 @ 17:05), Max: 98.3 (25 @ 20:56)  HR: 77 (06-15-25 @ 17:05) (75 - 82)  BP: 177/76 (06-15-25 @ 17:05) (151/75 - 194/85)  RR: 18 (06-15-25 @ 17:05) (18 - 18)  SpO2: 98% (06-15-25 @ 17:05) (96% - 100%)    INTAKE AND OUTPUT:       LABS:                            7.1    3.41  )-----------( 77       ( 15 Nish 2025 16:19 )             22.4     -15    134[L]  |  96  |  42.3[H]  ----------------------------<  131[H]  5.4[H]   |  26.0  |  5.26[H]    Ca    7.9[L]      15 Nish 2025 16:19  Phos  4.7     -15  Mg     2.5     -15    TPro  6.3[L]  /  Alb  3.7  /  TBili  <0.2[L]  /  DBili  x   /  AST  16  /  ALT  8   /  AlkPhos  131[H]  06-14    PT/INR - ( 2025 23:53 )   PT: 13.7 sec;   INR: 1.18 ratio         PTT - ( 2025 23:53 )  PTT:37.2 sec  Urinalysis Basic - ( 15 Nish 2025 16:19 )    Color: x / Appearance: x / SG: x / pH: x  Gluc: 131 mg/dL / Ketone: x  / Bili: x / Urobili: x   Blood: x / Protein: x / Nitrite: x   Leuk Esterase: x / RBC: x / WBC x   Sq Epi: x / Non Sq Epi: x / Bacteria: x              RADIOLOGY IMAGING:

## 2025-06-15 NOTE — DIETITIAN INITIAL EVALUATION ADULT - ENERGY INTAKE
Pt states appetite/po intake remains "fine", lunch tray observed at bedside <25% consumed per plate waste. Pt not interested in supplements or diet education at this time, only requesting to receive different drinks on tray besides diet ginger ale.

## 2025-06-15 NOTE — CHART NOTE - NSCHARTNOTEFT_GEN_A_CORE
PA NOTE-MEDICINE  (LATE ENTRY)     Called by RN due to Pt c/o worsening Abd Pain with + Distention x earlier yesterday. Pt States Pain is Sharp intermittent mostly to Right Upper Quad 10/10.   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     MRCP performed,  +Dilated CBD imaging shows no obstruction, Liver chemistry normal. No leucocytosis. Remains afebrile.       Vitals:   BP: 174/76  HR: 75  RR: 18  O2 Sat: 98 Ra   T: 97.9 po      General: WDWN F Lying in Bed in discomfort   Cardiac: S1S2 + RRR  Lungs: CTA No R/R/W or Crackles B/L A-B  Abd: + Distention/Firm + Pain on Palp RUQ No Rigidity or rebound + BS x 4 Q   Integument: No Pallor Warm/Dry  Ext: No C/C/E x 4 Ext     A/P Eval Pt stating increase in Abd Pain/Distention   CT Abd/Pelvis w/IV Contrast Urgent (asked CT Tech to Enter onto Rad Chat for Stat Read) No Results yet as of 5:46 AM   Pt will need HD session scheduled today due to CT IV Contrast-Will sign out to AM Team to arrange.    Labs:  CBC w/Diff  CMP  Lipase 36 downtrending from 70     LABS:                        7.3    2.80  )-----------( 81       ( 14 Jun 2025 23:53 )             23.2     06-14    137  |  96  |  34.7[H]  ----------------------------<  142[H]  4.8   |  29.0  |  4.44[H]    Ca    8.2[L]      14 Jun 2025 23:53    TPro  6.3[L]  /  Alb  3.7  /  TBili  <0.2[L]  /  DBili  x   /  AST  16  /  ALT  8   /  AlkPhos  131[H]  06-14  PT/INR - ( 14 Jun 2025 23:53 )   PT: 13.7 sec;   INR: 1.18 ratio       PTT - ( 14 Jun 2025 23:53 )  PTT:37.2 sec    LIVER FUNCTIONS - ( 14 Jun 2025 23:53 )  Alb: 3.7 g/dL / Pro: 6.3 g/dL / ALK PHOS: 131 U/L / ALT: 8 U/L / AST: 16 U/L / GGT: x           Administered additional dosing of Dilaudid with some relief-Pt resting NAD   Awaiting CT Read   Continue to Monitor Pt  Recall PA for any changes in t Status   Will sign out to AM Team to Follow Pt and CT Results and Arrange HD session 2/2 Receiving CT IV Contrast                       · PA NOTE-MEDICINE  (LATE ENTRY)     Called by RN due to Pt c/o worsening Abd Pain with + Distention x earlier yesterday. Pt States Pain is Sharp intermittent mostly to Right Upper Quad 10/10.   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 Remains Afebrile, No Leukocytosis      MR MRCP ORDERED BY: ORLANDO KNOWLES  PROCEDURE DATE: 06/13/2025  INTERPRETATION: CLINICAL INFORMATION: Dilated intrahepatic ducts seen and CT abdomen and pelvis, followed  COMPARISON: CT abdomen and pelvis 6/12/2025 CT abdomen pelvis 6/3/2025  IMPRESSION:  No evidence of choledocholithiasis or ampullary mass.  Mild intrahepatic and extrahepatic biliary duct dilatation up to 1 cm (CBD). Dilatation may be on the basis of this age and postcholecystectomy state.  Two pancreatic cysts as described up to 8mm communicate with the main pancreatic duct with mild main pancreatic ductal dilatation. Follow up can be obtained with MRI/MRCP in 1 year to document stability.  Liver cirrhosis. Left hepatic lobe lesion is not well evaluated in this study without contrast but corresponds to the site of prior hepatic abscess and is smaller than on prior CT 6/3/2025, likely an involuting residue. Correlate with clinical parameters.     Vitals:   BP: 174/76  HR: 75  RR: 18  O2 Sat: 98 Ra   T: 97.9 po      General: WDWN F Lying in Bed in discomfort   Cardiac: S1S2 + RRR  Lungs: CTA No R/R/W or Crackles B/L A-B  Abd: + Distention/Firm + Pain on Palp RUQ No Rigidity or rebound + BS x 4 Q   Integument: No Pallor Warm/Dry  Ext: No C/C/E x 4 Ext     A/P Eval Pt stating increase in Abd Pain/Distention   CT Abd/Pelvis w/IV Contrast Urgent (asked CT Tech to Enter onto Rad Chat for Stat Read) No Results yet as of 5:46 AM   Pt will need HD session scheduled today due to CT IV Contrast-Will sign out to AM Team to arrange.    Labs:  CBC w/Diff  CMP  Lipase 36 downtrending from 70     LABS:                        7.3    2.80  )-----------( 81       ( 14 Jun 2025 23:53 )             23.2     06-14    137  |  96  |  34.7[H]  ----------------------------<  142[H]  4.8   |  29.0  |  4.44[H]    Ca    8.2[L]      14 Jun 2025 23:53    TPro  6.3[L]  /  Alb  3.7  /  TBili  <0.2[L]  /  DBili  x   /  AST  16  /  ALT  8   /  AlkPhos  131[H]  06-14  PT/INR - ( 14 Jun 2025 23:53 )   PT: 13.7 sec;   INR: 1.18 ratio       PTT - ( 14 Jun 2025 23:53 )  PTT:37.2 sec    LIVER FUNCTIONS - ( 14 Jun 2025 23:53 )  Alb: 3.7 g/dL / Pro: 6.3 g/dL / ALK PHOS: 131 U/L / ALT: 8 U/L / AST: 16 U/L / GGT: x           Administered additional dosing of Dilaudid with some relief-Pt resting NAD   Awaiting CT Read   Continue to Monitor Pt  Recall PA for any changes in t Status   Will sign out to AM Team to Follow Pt and CT Results and Arrange HD session 2/2 Receiving CT IV Contrast                       ·

## 2025-06-16 ENCOUNTER — TRANSCRIPTION ENCOUNTER (OUTPATIENT)
Age: 70
End: 2025-06-16

## 2025-06-16 ENCOUNTER — RESULT REVIEW (OUTPATIENT)
Age: 70
End: 2025-06-16

## 2025-06-16 LAB
ALBUMIN SERPL ELPH-MCNC: 3.4 G/DL — SIGNIFICANT CHANGE UP (ref 3.3–5.2)
ALP SERPL-CCNC: 125 U/L — HIGH (ref 40–120)
ALT FLD-CCNC: 8 U/L — SIGNIFICANT CHANGE UP
ANION GAP SERPL CALC-SCNC: 14 MMOL/L — SIGNIFICANT CHANGE UP (ref 5–17)
AST SERPL-CCNC: 15 U/L — SIGNIFICANT CHANGE UP
BILIRUB SERPL-MCNC: <0.2 MG/DL — LOW (ref 0.4–2)
BLD GP AB SCN SERPL QL: SIGNIFICANT CHANGE UP
BUN SERPL-MCNC: 46.1 MG/DL — HIGH (ref 8–20)
CALCIUM SERPL-MCNC: 7.9 MG/DL — LOW (ref 8.4–10.5)
CHLORIDE SERPL-SCNC: 96 MMOL/L — SIGNIFICANT CHANGE UP (ref 96–108)
CO2 SERPL-SCNC: 25 MMOL/L — SIGNIFICANT CHANGE UP (ref 22–29)
CREAT SERPL-MCNC: 5.42 MG/DL — HIGH (ref 0.5–1.3)
EGFR: 8 ML/MIN/1.73M2 — LOW
EGFR: 8 ML/MIN/1.73M2 — LOW
GLUCOSE BLDC GLUCOMTR-MCNC: 131 MG/DL — HIGH (ref 70–99)
GLUCOSE BLDC GLUCOMTR-MCNC: 141 MG/DL — HIGH (ref 70–99)
GLUCOSE BLDC GLUCOMTR-MCNC: 148 MG/DL — HIGH (ref 70–99)
GLUCOSE BLDC GLUCOMTR-MCNC: 158 MG/DL — HIGH (ref 70–99)
GLUCOSE SERPL-MCNC: 125 MG/DL — HIGH (ref 70–99)
HCT VFR BLD CALC: 22.9 % — LOW (ref 34.5–45)
HGB BLD-MCNC: 7.4 G/DL — LOW (ref 11.5–15.5)
IMMATURE PLATELET FRACTION #: 2.7 K/UL — LOW (ref 4.7–11.1)
IMMATURE PLATELET FRACTION %: 3.7 % — SIGNIFICANT CHANGE UP (ref 1.6–4.9)
LACTATE SERPL-SCNC: 0.6 MMOL/L — SIGNIFICANT CHANGE UP (ref 0.5–2)
MCHC RBC-ENTMCNC: 29.7 PG — SIGNIFICANT CHANGE UP (ref 27–34)
MCHC RBC-ENTMCNC: 32.3 G/DL — SIGNIFICANT CHANGE UP (ref 32–36)
MCV RBC AUTO: 92 FL — SIGNIFICANT CHANGE UP (ref 80–100)
NRBC # BLD AUTO: 0 K/UL — SIGNIFICANT CHANGE UP (ref 0–0)
NRBC # FLD: 0 K/UL — SIGNIFICANT CHANGE UP (ref 0–0)
NRBC BLD AUTO-RTO: 0 /100 WBCS — SIGNIFICANT CHANGE UP (ref 0–0)
PLATELET # BLD AUTO: 74 K/UL — LOW (ref 150–400)
PMV BLD: 12.3 FL — SIGNIFICANT CHANGE UP (ref 7–13)
POTASSIUM SERPL-MCNC: 4.9 MMOL/L — SIGNIFICANT CHANGE UP (ref 3.5–5.3)
POTASSIUM SERPL-SCNC: 4.9 MMOL/L — SIGNIFICANT CHANGE UP (ref 3.5–5.3)
PROT SERPL-MCNC: 5.9 G/DL — LOW (ref 6.6–8.7)
RBC # BLD: 2.49 M/UL — LOW (ref 3.8–5.2)
RBC # FLD: 14.8 % — HIGH (ref 10.3–14.5)
SODIUM SERPL-SCNC: 135 MMOL/L — SIGNIFICANT CHANGE UP (ref 135–145)
WBC # BLD: 3.76 K/UL — LOW (ref 3.8–10.5)
WBC # FLD AUTO: 3.76 K/UL — LOW (ref 3.8–10.5)

## 2025-06-16 PROCEDURE — 99233 SBSQ HOSP IP/OBS HIGH 50: CPT

## 2025-06-16 PROCEDURE — 93016 CV STRESS TEST SUPVJ ONLY: CPT

## 2025-06-16 PROCEDURE — ZZZZZ: CPT

## 2025-06-16 PROCEDURE — 93018 CV STRESS TEST I&R ONLY: CPT

## 2025-06-16 PROCEDURE — 78452 HT MUSCLE IMAGE SPECT MULT: CPT | Mod: 26

## 2025-06-16 PROCEDURE — 93306 TTE W/DOPPLER COMPLETE: CPT | Mod: 26

## 2025-06-16 DEVICE — STAPLER COVIDIEN TRI-STAPLE CURVED 45MM TAN RELOAD: Type: IMPLANTABLE DEVICE | Status: FUNCTIONAL

## 2025-06-16 DEVICE — KIT A-LINE 1LUM 20G X 12CM SAFE KIT: Type: IMPLANTABLE DEVICE | Status: FUNCTIONAL

## 2025-06-16 RX ORDER — HYDROMORPHONE/SOD CHLOR,ISO/PF 2 MG/10 ML
0.5 SYRINGE (ML) INJECTION
Refills: 0 | Status: DISCONTINUED | OUTPATIENT
Start: 2025-06-16 | End: 2025-06-16

## 2025-06-16 RX ORDER — HYDROMORPHONE/SOD CHLOR,ISO/PF 2 MG/10 ML
0.5 SYRINGE (ML) INJECTION ONCE
Refills: 0 | Status: DISCONTINUED | OUTPATIENT
Start: 2025-06-16 | End: 2025-06-16

## 2025-06-16 RX ORDER — LISINOPRIL 5 MG/1
10 TABLET ORAL DAILY
Refills: 0 | Status: DISCONTINUED | OUTPATIENT
Start: 2025-06-16 | End: 2025-06-19

## 2025-06-16 RX ORDER — MAGNESIUM, ALUMINUM HYDROXIDE 200-200 MG
30 TABLET,CHEWABLE ORAL EVERY 4 HOURS
Refills: 0 | Status: DISCONTINUED | OUTPATIENT
Start: 2025-06-16 | End: 2025-06-20

## 2025-06-16 RX ORDER — ONDANSETRON HCL/PF 4 MG/2 ML
4 VIAL (ML) INJECTION ONCE
Refills: 0 | Status: DISCONTINUED | OUTPATIENT
Start: 2025-06-16 | End: 2025-06-16

## 2025-06-16 RX ORDER — DESMOPRESSIN ACETATE 4 UG/ML
20 INJECTION INTRAVENOUS ONCE
Refills: 0 | Status: COMPLETED | OUTPATIENT
Start: 2025-06-16 | End: 2025-06-16

## 2025-06-16 RX ORDER — ASPIRIN 325 MG
81 TABLET ORAL DAILY
Refills: 0 | Status: DISCONTINUED | OUTPATIENT
Start: 2025-06-16 | End: 2025-06-20

## 2025-06-16 RX ORDER — INSULIN LISPRO 100 U/ML
INJECTION, SOLUTION INTRAVENOUS; SUBCUTANEOUS
Refills: 0 | Status: DISCONTINUED | OUTPATIENT
Start: 2025-06-16 | End: 2025-06-20

## 2025-06-16 RX ORDER — SERTRALINE 100 MG/1
100 TABLET, FILM COATED ORAL DAILY
Refills: 0 | Status: DISCONTINUED | OUTPATIENT
Start: 2025-06-16 | End: 2025-06-20

## 2025-06-16 RX ORDER — EPOETIN ALFA 10000 [IU]/ML
10000 SOLUTION INTRAVENOUS; SUBCUTANEOUS
Refills: 0 | Status: DISCONTINUED | OUTPATIENT
Start: 2025-06-16 | End: 2025-06-17

## 2025-06-16 RX ORDER — SODIUM CHLORIDE 9 G/1000ML
1000 INJECTION, SOLUTION INTRAVENOUS
Refills: 0 | Status: DISCONTINUED | OUTPATIENT
Start: 2025-06-16 | End: 2025-06-16

## 2025-06-16 RX ORDER — ACETAMINOPHEN 500 MG/5ML
650 LIQUID (ML) ORAL EVERY 6 HOURS
Refills: 0 | Status: DISCONTINUED | OUTPATIENT
Start: 2025-06-16 | End: 2025-06-20

## 2025-06-16 RX ORDER — METHOCARBAMOL 500 MG/1
500 TABLET, FILM COATED ORAL THREE TIMES A DAY
Refills: 0 | Status: DISCONTINUED | OUTPATIENT
Start: 2025-06-16 | End: 2025-06-20

## 2025-06-16 RX ORDER — MEROPENEM 1 G/30ML
500 INJECTION INTRAVENOUS EVERY 24 HOURS
Refills: 0 | Status: DISCONTINUED | OUTPATIENT
Start: 2025-06-16 | End: 2025-06-20

## 2025-06-16 RX ORDER — DEXTROSE 50 % IN WATER 50 %
15 SYRINGE (ML) INTRAVENOUS ONCE
Refills: 0 | Status: DISCONTINUED | OUTPATIENT
Start: 2025-06-16 | End: 2025-06-20

## 2025-06-16 RX ORDER — KETOROLAC TROMETHAMINE 30 MG/ML
15 INJECTION, SOLUTION INTRAMUSCULAR; INTRAVENOUS ONCE
Refills: 0 | Status: DISCONTINUED | OUTPATIENT
Start: 2025-06-16 | End: 2025-06-17

## 2025-06-16 RX ORDER — DEXTROSE 50 % IN WATER 50 %
25 SYRINGE (ML) INTRAVENOUS ONCE
Refills: 0 | Status: DISCONTINUED | OUTPATIENT
Start: 2025-06-16 | End: 2025-06-20

## 2025-06-16 RX ORDER — ONDANSETRON HCL/PF 4 MG/2 ML
4 VIAL (ML) INJECTION EVERY 8 HOURS
Refills: 0 | Status: DISCONTINUED | OUTPATIENT
Start: 2025-06-16 | End: 2025-06-20

## 2025-06-16 RX ORDER — LEVOTHYROXINE SODIUM 300 MCG
200 TABLET ORAL DAILY
Refills: 0 | Status: DISCONTINUED | OUTPATIENT
Start: 2025-06-16 | End: 2025-06-20

## 2025-06-16 RX ORDER — HEPARIN SODIUM 1000 [USP'U]/ML
5000 INJECTION INTRAVENOUS; SUBCUTANEOUS EVERY 8 HOURS
Refills: 0 | Status: DISCONTINUED | OUTPATIENT
Start: 2025-06-16 | End: 2025-06-20

## 2025-06-16 RX ORDER — DESMOPRESSIN ACETATE 4 UG/ML
20 INJECTION INTRAVENOUS ONCE
Refills: 0 | Status: DISCONTINUED | OUTPATIENT
Start: 2025-06-16 | End: 2025-06-16

## 2025-06-16 RX ORDER — HYDROMORPHONE/SOD CHLOR,ISO/PF 2 MG/10 ML
1 SYRINGE (ML) INJECTION
Refills: 0 | Status: DISCONTINUED | OUTPATIENT
Start: 2025-06-16 | End: 2025-06-16

## 2025-06-16 RX ORDER — GLUCAGON 3 MG/1
1 POWDER NASAL ONCE
Refills: 0 | Status: DISCONTINUED | OUTPATIENT
Start: 2025-06-16 | End: 2025-06-20

## 2025-06-16 RX ORDER — SODIUM CHLORIDE 9 G/1000ML
1000 INJECTION, SOLUTION INTRAVENOUS
Refills: 0 | Status: DISCONTINUED | OUTPATIENT
Start: 2025-06-16 | End: 2025-06-20

## 2025-06-16 RX ORDER — CARVEDILOL 3.12 MG/1
25 TABLET, FILM COATED ORAL EVERY 12 HOURS
Refills: 0 | Status: DISCONTINUED | OUTPATIENT
Start: 2025-06-16 | End: 2025-06-20

## 2025-06-16 RX ORDER — MELATONIN 5 MG
3 TABLET ORAL AT BEDTIME
Refills: 0 | Status: DISCONTINUED | OUTPATIENT
Start: 2025-06-16 | End: 2025-06-20

## 2025-06-16 RX ORDER — TRAMADOL HYDROCHLORIDE 50 MG/1
50 TABLET, FILM COATED ORAL EVERY 4 HOURS
Refills: 0 | Status: DISCONTINUED | OUTPATIENT
Start: 2025-06-16 | End: 2025-06-20

## 2025-06-16 RX ORDER — HYDROMORPHONE/SOD CHLOR,ISO/PF 2 MG/10 ML
0.5 SYRINGE (ML) INJECTION EVERY 6 HOURS
Refills: 0 | Status: DISCONTINUED | OUTPATIENT
Start: 2025-06-16 | End: 2025-06-18

## 2025-06-16 RX ORDER — CEFAZOLIN SODIUM IN 0.9 % NACL 3 G/100 ML
2000 INTRAVENOUS SOLUTION, PIGGYBACK (ML) INTRAVENOUS ONCE
Refills: 0 | Status: DISCONTINUED | OUTPATIENT
Start: 2025-06-16 | End: 2025-06-16

## 2025-06-16 RX ADMIN — TRAMADOL HYDROCHLORIDE 50 MILLIGRAM(S): 50 TABLET, FILM COATED ORAL at 04:45

## 2025-06-16 RX ADMIN — TRAMADOL HYDROCHLORIDE 50 MILLIGRAM(S): 50 TABLET, FILM COATED ORAL at 04:53

## 2025-06-16 RX ADMIN — Medication 0.5 MILLIGRAM(S): at 01:19

## 2025-06-16 RX ADMIN — Medication 0.5 MILLIGRAM(S): at 01:52

## 2025-06-16 RX ADMIN — INSULIN LISPRO 2: 100 INJECTION, SOLUTION INTRAVENOUS; SUBCUTANEOUS at 15:49

## 2025-06-16 RX ADMIN — Medication 25 MILLIGRAM(S): at 22:09

## 2025-06-16 RX ADMIN — Medication 1 APPLICATION(S): at 07:55

## 2025-06-16 RX ADMIN — CARVEDILOL 25 MILLIGRAM(S): 3.12 TABLET, FILM COATED ORAL at 19:50

## 2025-06-16 RX ADMIN — METHOCARBAMOL 500 MILLIGRAM(S): 500 TABLET, FILM COATED ORAL at 22:10

## 2025-06-16 RX ADMIN — METHOCARBAMOL 500 MILLIGRAM(S): 500 TABLET, FILM COATED ORAL at 05:36

## 2025-06-16 RX ADMIN — TRAMADOL HYDROCHLORIDE 50 MILLIGRAM(S): 50 TABLET, FILM COATED ORAL at 19:53

## 2025-06-16 RX ADMIN — Medication 40 MILLIGRAM(S): at 11:52

## 2025-06-16 RX ADMIN — HEPARIN SODIUM 5000 UNIT(S): 1000 INJECTION INTRAVENOUS; SUBCUTANEOUS at 22:10

## 2025-06-16 RX ADMIN — Medication 1 MILLIGRAM(S): at 15:45

## 2025-06-16 RX ADMIN — Medication 40 MILLIGRAM(S): at 16:03

## 2025-06-16 RX ADMIN — TRAMADOL HYDROCHLORIDE 50 MILLIGRAM(S): 50 TABLET, FILM COATED ORAL at 20:53

## 2025-06-16 RX ADMIN — Medication 81 MILLIGRAM(S): at 12:27

## 2025-06-16 RX ADMIN — Medication 10 MILLIGRAM(S): at 01:43

## 2025-06-16 RX ADMIN — Medication 25 MILLIGRAM(S): at 08:09

## 2025-06-16 RX ADMIN — DESMOPRESSIN ACETATE 220 MICROGRAM(S): 4 INJECTION INTRAVENOUS at 15:56

## 2025-06-16 RX ADMIN — Medication 0.5 MILLIGRAM(S): at 07:59

## 2025-06-16 RX ADMIN — Medication 200 MICROGRAM(S): at 05:36

## 2025-06-16 RX ADMIN — HEPARIN SODIUM 5000 UNIT(S): 1000 INJECTION INTRAVENOUS; SUBCUTANEOUS at 05:36

## 2025-06-16 RX ADMIN — Medication 1 MILLIGRAM(S): at 16:15

## 2025-06-16 RX ADMIN — Medication 667 MILLIGRAM(S): at 05:36

## 2025-06-16 RX ADMIN — SERTRALINE 100 MILLIGRAM(S): 100 TABLET, FILM COATED ORAL at 11:51

## 2025-06-16 RX ADMIN — METHOCARBAMOL 500 MILLIGRAM(S): 500 TABLET, FILM COATED ORAL at 17:07

## 2025-06-16 RX ADMIN — TRAMADOL HYDROCHLORIDE 50 MILLIGRAM(S): 50 TABLET, FILM COATED ORAL at 11:52

## 2025-06-16 NOTE — CONSULT NOTE ADULT - SUBJECTIVE AND OBJECTIVE BOX
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 During last admission, patient was found to have a liver abscess and was discharged on Meropenem via PICC line (for 6 weeks - ends 7/15). Today patient did not received her meropenem dose as the person who was supposed to administer the antibiotic got into a car accident. Endorses worsening abdominal control, minimal relief with tramadol doses at home which prompted ED visit. Denies fever, chills, nausea, vomiting, changes in bowel, chest pain, palpitation SOB, dysuria or any other acute complaints. In the ED patient received multiple doses of Dilaudid, endorses pain is still present with minimal relief. Repeat CT-abd showed improving of the liver abscess but increased dilated intra and extrahepatic biliary ducts.     Patient seen and examined this AM.       PAST MEDICAL & SURGICAL HISTORY:  Diabetes      Bernard syndrome      Pancreatitis      Cirrhosis      ESRD on dialysis  MWF at [facility]      HTN (hypertension)      CAD (coronary artery disease)      Chronic diastolic congestive heart failure      IBS (irritable bowel syndrome)      Gastroparesis      Thyroid cancer      Esophagitis, erosive      HCV (hepatitis C virus)      History of liver biopsy      H/O total thyroidectomy      History of cholecystectomy  1990s      S/P           MEDICATIONS  (STANDING):  aspirin enteric coated 81 milliGRAM(s) Oral daily  calcium acetate 667 milliGRAM(s) Oral every 12 hours  carvedilol 25 milliGRAM(s) Oral every 12 hours  chlorhexidine 2% Cloths 1 Application(s) Topical <User Schedule>  dextrose 5%. 1000 milliLiter(s) (50 mL/Hr) IV Continuous <Continuous>  dextrose 50% Injectable 25 Gram(s) IV Push once  epoetin day-epbx (RETACRIT) Injectable 56370 Unit(s) IV Push <User Schedule>  glucagon  Injectable 1 milliGRAM(s) IntraMuscular once  heparin   Injectable 5000 Unit(s) SubCutaneous every 12 hours  hydrALAZINE 50 milliGRAM(s) Oral three times a day  hydrALAZINE 25 milliGRAM(s) Oral once  insulin lispro (ADMELOG) corrective regimen sliding scale   SubCutaneous three times a day before meals  levothyroxine 200 MICROGram(s) Oral daily  lisinopril 10 milliGRAM(s) Oral daily  meropenem Injectable 500 milliGRAM(s) IV Push every 24 hours  methocarbamol 500 milliGRAM(s) Oral three times a day  pancrelipase (CREON) DR 3000 Unit(s) 1 Capsule(s) Oral three times a day with meals  pantoprazole  Injectable 40 milliGRAM(s) IV Push daily  sertraline 100 milliGRAM(s) Oral daily    MEDICATIONS  (PRN):  acetaminophen     Tablet .. 650 milliGRAM(s) Oral every 6 hours PRN Temp greater or equal to 38C (100.4F), Mild Pain (1 - 3)  aluminum hydroxide/magnesium hydroxide/simethicone Suspension 30 milliLiter(s) Oral every 4 hours PRN Dyspepsia  dextrose Oral Gel 15 Gram(s) Oral once PRN Blood Glucose LESS THAN 70 milliGRAM(s)/deciliter  HYDROmorphone  Injectable 0.5 milliGRAM(s) IV Push every 6 hours PRN Severe Pain (7 - 10)  melatonin 3 milliGRAM(s) Oral at bedtime PRN Insomnia  ondansetron Injectable 4 milliGRAM(s) IV Push every 8 hours PRN Nausea and/or Vomiting  traMADol 50 milliGRAM(s) Oral every 4 hours PRN Moderate Pain (4 - 6)      Vital Signs Last 24 Hrs  T(C): 36.8 (2025 07:15), Max: 36.8 (15 Nish 2025 19:59)  T(F): 98.2 (2025 07:15), Max: 98.2 (15 Nish 2025 19:59)  HR: 76 (2025 07:15) (73 - 96)  BP: 191/84 (2025 07:15) (165/80 - 191/84)  BP(mean): 120 (2025 07:15) (120 - 120)  RR: 16 (2025 07:15) (16 - 18)  SpO2: 95% (2025 07:15) (95% - 98%)    Parameters below as of 2025 07:15  Patient On (Oxygen Delivery Method): room air                              7.4    3.76  )-----------( 74       ( 2025 03:55 )             22.9       LIVER FUNCTIONS - ( 2025 03:55 )  Alb: 3.4 g/dL / Pro: 5.9 g/dL / ALK PHOS: 125 U/L / ALT: 8 U/L / AST: 15 U/L / GGT: x             PT/INR - ( 2025 23:53 )   PT: 13.7 sec;   INR: 1.18 ratio         PTT - ( 2025 23:53 )  PTT:37.2 sec    Pain Service   [phone]

## 2025-06-16 NOTE — PROGRESS NOTE ADULT - ASSESSMENT
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  Noted slight increase in dilation of intra and extrahepatic biliary ducts however less than 1 cm suspect likely due to age and history of cholecystectomy.  There was also evidence on CT of a small subcentimeter cyst in the pancreatic uncinate process and a dilated pancreatic duct in the head of the pancreas.No procedure per GI indicated , sdue to ongoing pain severity repeat CT oaf abd 6/15 with swirling of mesentric root , surgery consulted re possible exp laparotomy , preop cardiac evaluation in progress       1- Abdominal pain.   acute on cronic   pain managament called   CT of abd repeated with mesenteric artery swirling , possible EXp lap by surgery team  cardiac preop evaluation in progress     2- Liver abscess infection   cont Iv merrem per ID rec from previous admission   GI follow up appreciated   cont merrem 500mg IV QD (on HD days to be given post HD).    3-ESRD on dialysis.   HD-M [facility]  nephro is following      4- Anemia acute on cronic   tomas tx 1 units PRBC with HD today   optimize for possible OR      5- h/o CAD with PCI   preop cardiac work up TTE and NST pending   cont asa   cnt statin   cardiology input appreciated     6-: Diabetes mellitus. type 2   - ISS with hypoglycemia protocol.    7--Hypertension.   - Coreg 25mg BID  - Lisinopril 10mg QD  - Hydralazine dose increased fir better control       acute due to severe abd pain , anemia , possible OR for exp laparotomy , cardiac work UP

## 2025-06-16 NOTE — PROGRESS NOTE ADULT - NS ATTEND AMEND GEN_ALL_CORE FT
Ms. Carroll is a 69 year old woman with significant past medical history of chronic pancreatitis, gastroparesis, end-stage renal disease on hemodialysis, CAD status post PCI, cirrhosis secondary to HCV, anxiety/depression who initially presented with acute on chronic right upper quadrant abdominal pain.  Patient has known history of liver abscess.  Images and updated labs interpreted.  Liver abscess appears to be slightly improved.  Noted slight increase in dilation of intra and extrahepatic biliary ducts however less than 1 cm suspect likely due to age and history of cholecystectomy.  There was also evidence on CT of a small subcentimeter cyst in the pancreatic uncinate process and a dilated pancreatic duct in the head of the pancreas.  In terms of liver abscess, infectious disease following, recommendations appreciated.  No acute endoscopic intervention planned at this time.  In terms of subcentimeter pancreatic cyst and dilation in main pancreatic duct can consider outpatient endoscopic ultrasound for evaluation.  Additionally, would consider celiac plexus block for chronic abdominal pain in the setting of chronic pancreatitis.  Although suspect acute readmissions likely in the setting of liver abscess given presentation and location of pain may benefit from celiac plexus block reduction in some pain/modality and likely subsequent improvement in p.o. intake/nutrition.  Ultimately recommended patient follow-up with outpatient pain management regarding significant issues revolving around her chronic ailments.  Discussed with patient in detail at bedside in the emergency department as well as with her  (Rey) over the phone.  Patient's  reports that he was given a referral to the office for evaluation of celiac plexus block previously by one of their former gastroenterologist.  Patient underwent MRI/MRCP, official report pending.  Ultimately, no urgent or emergent endoscopic intervention planned at this time.  Would continue to trend hemoglobin, anemia likely multifactorial, end-stage renal disease on hemodialysis, next scheduled for Monday.  Follow-up with infectious disease regarding recommendations for continuation of IV therapy.  In terms of discussing potential modalities for management of chronic pancreatitis associated abdominal pain can follow-up with gastroenterology outpatient.  Continue supportive care.  We will continue to follow along with you.
Normal stress test . no further cardiac work up is needed. Proceed for surgery as indicated.

## 2025-06-16 NOTE — PROGRESS NOTE ADULT - NSPROGADDITIONALINFOA_GEN_ALL_CORE
addendum:    NST/TTE completed    Normal myocardial perfusion scan, with no evidence of infarction or inducible ischemia.  EF  DDI, moderate MR    Pre-operative cardiovascular risk evaluation and management. No cardiac symptoms. Non-ischemic ECG. TTE. Ischemic w/u as above  No further cardiac work up is needed.     Further cardiac work up will not change risk of the patient.   Proceed for surgery as indicated.    multiple non modifiable comorbidities- Pt is elevated risk without modifiable cardiac risk factors.    Thank you for allowing me to participate in care of your patient.   Please call as needed.

## 2025-06-16 NOTE — PROGRESS NOTE ADULT - SUBJECTIVE AND OBJECTIVE BOX
[facility]                                                         CARDIOLOGY AT [facility]                                                                  [address] [address]                                                         Telephone: [phone]. Fax:[phone]                                                                             PROGRESS NOTE    Reason for follow up: Pre operative cardiac risk stratification   Last 24h Telemetry: not on telemetry   Overall Plan: NST/TTE today    Review of symptoms:   Cardiac:  No chest pain. No dyspnea. No palpitations.  Respiratory: no cough. No dyspnea  Gastrointestinal: No diarrhea. No abdominal pain. No bleeding.   Neuro: No focal neuro complaints.        Vitals:  T(C): 36.8 (06-16-25 @ 07:15), Max: 36.8 (06-15-25 @ 19:59)  HR: 76 (06-16-25 @ 07:15) (73 - 96)  BP: 191/84 (06-16-25 @ 07:15) (165/80 - 191/84)  RR: 16 (06-16-25 @ 07:15) (16 - 18)  SpO2: 95% (06-16-25 @ 07:15) (95% - 98%)        I&O's Summary    15 Nish 2025 07:01  -  16 Jun 2025 07:00  --------------------------------------------------------  IN: 350 mL / OUT: 200 mL / NET: 150 mL        Weight (kg): 66.9 (06-12 @ 16:15)        PHYSICAL EXAM:  Appearance: Comfortable. No acute distress  HEENT:  Atraumatic. Normocephalic.  Normal oral mucosa  Neurologic: A & O x 3, no gross focal deficits.  Cardiovascular: RRR S1 S2, No murmur, no rubs/gallops.  Respiratory: Lungs clear to auscultation, unlabored   Gastrointestinal:  Soft, Non-tender, + BS  Lower Extremities: No  edema  Psychiatry: Patient is calm. No agitation.   Skin: warm and dry.        CURRENT CARDIAC MEDICATIONS:  carvedilol 25 milliGRAM(s) Oral every 12 hours  hydrALAZINE 50 milliGRAM(s) Oral three times a day  lisinopril 10 milliGRAM(s) Oral daily      CURRENT OTHER MEDICATIONS:  meropenem Injectable 500 milliGRAM(s) IV Push every 24 hours  acetaminophen     Tablet .. 650 milliGRAM(s) Oral every 6 hours PRN Temp greater or equal to 38C (100.4F), Mild Pain (1 - 3)  HYDROmorphone  Injectable 0.5 milliGRAM(s) IV Push every 6 hours PRN Severe Pain (7 - 10)  melatonin 3 milliGRAM(s) Oral at bedtime PRN Insomnia  methocarbamol 500 milliGRAM(s) Oral three times a day  ondansetron Injectable 4 milliGRAM(s) IV Push every 8 hours PRN Nausea and/or Vomiting  sertraline 100 milliGRAM(s) Oral daily  traMADol 50 milliGRAM(s) Oral every 4 hours PRN Moderate Pain (4 - 6)  aluminum hydroxide/magnesium hydroxide/simethicone Suspension 30 milliLiter(s) Oral every 4 hours PRN Dyspepsia  pantoprazole  Injectable 40 milliGRAM(s) IV Push daily  insulin lispro (ADMELOG) corrective regimen sliding scale   SubCutaneous three times a day before meals  levothyroxine 200 MICROGram(s) Oral daily  aspirin enteric coated 81 milliGRAM(s) Oral daily  calcium acetate 667 milliGRAM(s) Oral every 12 hours  epoetin day-epbx (RETACRIT) Injectable 80595 Unit(s) IV Push <User Schedule>  heparin   Injectable 5000 Unit(s) SubCutaneous every 12 hours          LABS:	 	                       7.4    3.76  )-----------( 74       ( 16 Jun 2025 03:55 )             22.9     06-16    135  |  96  |  46.1[H]  ----------------------------<  125[H]  4.9   |  25.0  |  5.42[H]    Ca    7.9[L]      16 Jun 2025 03:55  Phos  4.7     06-15  Mg     2.5     06-15    TPro  5.9[L]  /  Alb  3.4  /  TBili  <0.2[L]  /  DBili  x   /  AST  15  /  ALT  8   /  AlkPhos  125[H]  06-16    PT/INR/PTT ( 14 Jun 2025 23:53 )                       :                       :      13.7         :       37.2                  .        .                   .              .           .       1.18        .                                         TELEMETRY: not on telemetry                                                                [facility]                                                         CARDIOLOGY AT [facility]                                                                  [address] [address]                                                         Telephone: [phone]. Fax:[phone]                                                                             PROGRESS NOTE    Reason for follow up: Pre operative cardiac risk stratification   Last 24h Telemetry: not on telemetry   Overall Plan: NST/TTE today    Review of symptoms:   Cardiac:  No chest pain. No dyspnea. No palpitations.  Respiratory: no cough. No dyspnea  Gastrointestinal: No diarrhea. No abdominal pain. No bleeding.   Neuro: No focal neuro complaints.        Vitals:  T(C): 36.8 (06-16-25 @ 07:15), Max: 36.8 (06-15-25 @ 19:59)  HR: 76 (06-16-25 @ 07:15) (73 - 96)  BP: 191/84 (06-16-25 @ 07:15) (165/80 - 191/84)  RR: 16 (06-16-25 @ 07:15) (16 - 18)  SpO2: 95% (06-16-25 @ 07:15) (95% - 98%)        I&O's Summary    15 Nish 2025 07:01  -  16 Jun 2025 07:00  --------------------------------------------------------  IN: 350 mL / OUT: 200 mL / NET: 150 mL        Weight (kg): 66.9 (06-12 @ 16:15)        PHYSICAL EXAM:  Appearance: Comfortable. No acute distress  HEENT:  Atraumatic. Normocephalic.  Normal oral mucosa  Neurologic: A & O x 3, no gross focal deficits.  Cardiovascular: RRR S1 S2, No murmur, no rubs/gallops.  Respiratory: Lungs clear to auscultation, unlabored   Gastrointestinal:  Soft, Non-tender, + BS  Lower Extremities: No  edema  Psychiatry: Patient is calm. No agitation.   Skin: warm and dry.        CURRENT CARDIAC MEDICATIONS:  carvedilol 25 milliGRAM(s) Oral every 12 hours  hydrALAZINE 50 milliGRAM(s) Oral three times a day  lisinopril 10 milliGRAM(s) Oral daily      CURRENT OTHER MEDICATIONS:  meropenem Injectable 500 milliGRAM(s) IV Push every 24 hours  acetaminophen     Tablet .. 650 milliGRAM(s) Oral every 6 hours PRN Temp greater or equal to 38C (100.4F), Mild Pain (1 - 3)  HYDROmorphone  Injectable 0.5 milliGRAM(s) IV Push every 6 hours PRN Severe Pain (7 - 10)  melatonin 3 milliGRAM(s) Oral at bedtime PRN Insomnia  methocarbamol 500 milliGRAM(s) Oral three times a day  ondansetron Injectable 4 milliGRAM(s) IV Push every 8 hours PRN Nausea and/or Vomiting  sertraline 100 milliGRAM(s) Oral daily  traMADol 50 milliGRAM(s) Oral every 4 hours PRN Moderate Pain (4 - 6)  aluminum hydroxide/magnesium hydroxide/simethicone Suspension 30 milliLiter(s) Oral every 4 hours PRN Dyspepsia  pantoprazole  Injectable 40 milliGRAM(s) IV Push daily  insulin lispro (ADMELOG) corrective regimen sliding scale   SubCutaneous three times a day before meals  levothyroxine 200 MICROGram(s) Oral daily  aspirin enteric coated 81 milliGRAM(s) Oral daily  calcium acetate 667 milliGRAM(s) Oral every 12 hours  epoetin day-epbx (RETACRIT) Injectable 58225 Unit(s) IV Push <User Schedule>  heparin   Injectable 5000 Unit(s) SubCutaneous every 12 hours          LABS:	 	                       7.4    3.76  )-----------( 74       ( 16 Jun 2025 03:55 )             22.9     06-16    135  |  96  |  46.1[H]  ----------------------------<  125[H]  4.9   |  25.0  |  5.42[H]    Ca    7.9[L]      16 Jun 2025 03:55  Phos  4.7     06-15  Mg     2.5     06-15    TPro  5.9[L]  /  Alb  3.4  /  TBili  <0.2[L]  /  DBili  x   /  AST  15  /  ALT  8   /  AlkPhos  125[H]  06-16    PT/INR/PTT ( 14 Jun 2025 23:53 )                       :                       :      13.7         :       37.2                  .        .                   .              .           .       1.18        .                                         TELEMETRY: not on telemetry    < from: TTE W or WO Ultrasound Enhancing Agent (06.16.25 @ 08:23) >  CONCLUSIONS:      1. Mild to moderate left ventricular hypertrophy.   2. Left ventricular cavity is normal in size. Left ventricular systolic function is normal with an ejection fraction visually estimated at 60 to 65 %.   3. There is moderate (grade 2) left ventricular diastolic dysfunction.   4. Normal right ventricular systolic function.   5. Left atrium is moderately dilated.   6. Fibrocalcific aortic valve sclerosis without stenosis.   7. Trace aortic regurgitation.   8. Mild mitral valve leafletcalcification.   9. Mitral valve leaflets are diffusely calcified.  10. There is moderate calcification of the mitral valve annulus.  11. Moderate mitral regurgitation.  12. Mild to moderate tricuspid regurgitation.  13. Estimated pulmonary artery systolic pressure is 39 mmHg, borderline pulmonary hypertension.  14. No pericardial effusion seen.    < end of copied text >    < from: Nuclear Stress Test-Pharmacologic.. (06.16.25 @ 09:47) >  Conclusions:   1. NORMAL STUDY   2. Inability to exercise due to decrease exercise capacity.   3. The patient underwent stress testing using pharmacological IV regadenoson .   4. No cardiac symptoms. No ischemic ECG changes.   5. Normal myocardial perfusion scan, with no evidence of infarction or inducible ischemia.   6. The left ventricle is normal in function. The post stress left ventricular EF is 70 %.    < end of copied text >     no concerns

## 2025-06-16 NOTE — PROGRESS NOTE ADULT - SUBJECTIVE AND OBJECTIVE BOX
Patient is a 69y old  Female who presents with a chief complaint of Abdominal pain      Patient seen and examined at bedside this am   c/o pain in the abdomen epigastric upper abd   cardiology , pain managament input appreciated       ALLERGIES:  Augmentin (Hives)  ceftriaxone (Pruritus)    MEDICATIONS  (STANDING):  aspirin enteric coated 81 milliGRAM(s) Oral daily  calcium acetate 667 milliGRAM(s) Oral every 12 hours  carvedilol 25 milliGRAM(s) Oral every 12 hours  chlorhexidine 2% Cloths 1 Application(s) Topical <User Schedule>  dextrose 5%. 1000 milliLiter(s) (50 mL/Hr) IV Continuous <Continuous>  dextrose 50% Injectable 25 Gram(s) IV Push once  epoetin day-epbx (RETACRIT) Injectable 45212 Unit(s) IV Push <User Schedule>  glucagon  Injectable 1 milliGRAM(s) IntraMuscular once  heparin   Injectable 5000 Unit(s) SubCutaneous every 12 hours  hydrALAZINE 25 milliGRAM(s) Oral every 8 hours  insulin lispro (ADMELOG) corrective regimen sliding scale   SubCutaneous three times a day before meals  levothyroxine 200 MICROGram(s) Oral daily  lisinopril 10 milliGRAM(s) Oral daily  meropenem Injectable 500 milliGRAM(s) IV Push every 24 hours  pancrelipase (CREON) DR 3000 Unit(s) 1 Capsule(s) Oral three times a day with meals  pantoprazole  Injectable 40 milliGRAM(s) IV Push daily  sertraline 100 milliGRAM(s) Oral daily    MEDICATIONS  (PRN):  acetaminophen     Tablet .. 650 milliGRAM(s) Oral every 6 hours PRN Temp greater or equal to 38C (100.4F), Mild Pain (1 - 3)  aluminum hydroxide/magnesium hydroxide/simethicone Suspension 30 milliLiter(s) Oral every 4 hours PRN Dyspepsia  dextrose Oral Gel 15 Gram(s) Oral once PRN Blood Glucose LESS THAN 70 milliGRAM(s)/deciliter  HYDROmorphone  Injectable 1 milliGRAM(s) IV Push every 4 hours PRN Moderate Pain (4 - 6)  melatonin 3 milliGRAM(s) Oral at bedtime PRN Insomnia  ondansetron Injectable 4 milliGRAM(s) IV Push every 8 hours PRN Nausea and/or Vomiting  traMADol 50 milliGRAM(s) Oral every 8 hours PRN Severe Pain (7 - 10)    Vital Signs Last 24 Hrs  T(C): 36.8 (16 Jun 2025 07:15), Max: 36.8 (15 Nish 2025 19:59)  T(F): 98.2 (16 Jun 2025 07:15), Max: 98.2 (15 Nish 2025 19:59)  HR: 76 (16 Jun 2025 07:15) (73 - 96)  BP: 191/84 (16 Jun 2025 07:15) (170/78 - 191/84)  BP(mean): 120 (16 Jun 2025 07:15) (120 - 120)  RR: 16 (16 Jun 2025 07:15) (16 - 18)  SpO2: 95% (16 Jun 2025 07:15) (95% - 98%)    Parameters below as of 16 Jun 2025 07:15  Patient On (Oxygen Delivery Method): room air          PHYSICAL EXAM:  General: awake no distress  Neck: supple   Lungs: CTA   Cardio: RRR, S1/S2, No murmur  Abdomen: Soft, RUQ tenderness , + fullness , Nondistended; Bowel sounds present  Extremities: No calf tenderness, No pitting edema  skin warm color                           7.4    3.76  )-----------( 74       ( 16 Jun 2025 03:55 )             22.9   06-16    135  |  96  |  46.1[H]  ----------------------------<  125[H]  4.9   |  25.0  |  5.42[H]    Ca    7.9[L]      16 Jun 2025 03:55  Phos  4.7     06-15  Mg     2.5     06-15    TPro  5.9[L]  /  Alb  3.4  /  TBili  <0.2[L]  /  DBili  x   /  AST  15  /  ALT  8   /  AlkPhos  125[H]  06-16    Lipase: 36 U/L (06-14-25 @ 23:53)  Lipase: 70 U/L (06-12-25 @ 17:00)      PT/INR - ( 14 Jun 2025 23:53 )   PT: 13.7 sec;   INR: 1.18 ratio         PTT - ( 14 Jun 2025 23:53 )  PTT:37.2 sec              CAPILLARY BLOOD GLUCOSE      POCT Blood Glucose.: 150 mg/dL (15 Nish 2025 23:19)  POCT Blood Glucose.: 152 mg/dL (15 Nish 2025 16:43)  POCT Blood Glucose.: 143 mg/dL (15 Nish 2025 12:47)      Urinalysis Basic - ( 14 Jun 2025 23:53 )    Color: x / Appearance: x / SG: x / pH: x  Gluc: 142 mg/dL / Ketone: x  / Bili: x / Urobili: x   Blood: x / Protein: x / Nitrite: x   Leuk Esterase: x / RBC: x / WBC x   Sq Epi: x / Non Sq Epi: x / Bacteria: x        c< from: CT Abdomen and Pelvis w/ IV Cont (06.15.25 @ 02:00) >  IMPRESSION:  Liver cirrhosis with portal hypertension including portal splenomegaly   and small volume pelvic ascites that has not changed since 6/12/2025.    Compared toprior study there is new swirling of the mesenteric root   without evidence of bowel obstruction, mesenteric edema, bowel wall edema   or pneumatosis. This finding is nonspecific. Close clinical observation   is recommended and surgical consult can be considered. If there is   persistent clinical concern, repeat CT abdomen pelvis preferably with IV   and oral contrast can be considered. Correlate also with serum lactate   levels.    No pneumoperitoneum.    I concur with below preliminary report.    Preliminary: Cirrhosis with evidence of portal hypertension. Unchanged   left hepatic lobe lesion when compared to CT from 6/12/2025. Mild   intrahepatic and extra hepatic biliary ductal dilatation in the setting   of a cholecystectomy, unchanged. Swirling of the mesenteric vessels at   the mesenteric root which is new compared to prior exam from 6/12/2025.   No small bowel obstruction or mesenteric edema. Appendix is normal.   Colonic diverticulosis without evidence of diverticulitis. Small volume   pelvic free fluid. Given the patient's clinical pain and new swirling of   the mesenteric vessels of the mesenteric root, surgery consultation   should be considered      < end of copied text >

## 2025-06-16 NOTE — CHART NOTE - NSCHARTNOTEFT_GEN_A_CORE
Post-op check:      Patient resting in bed. patient states she has some abdominal soreness but no aching pain.     Vitals:    Vital Signs Last 24 Hrs  T(C): 36.6 (16 Jun 2025 19:15), Max: 36.8 (16 Jun 2025 07:15)  T(F): 97.9 (16 Jun 2025 19:15), Max: 98.2 (16 Jun 2025 07:15)  HR: 86 (16 Jun 2025 22:00) (68 - 86)  BP: 188/75 (16 Jun 2025 22:00) (128/88 - 198/-)  BP(mean): 105 (16 Jun 2025 18:37) (76 - 120)  RR: 17 (16 Jun 2025 19:15) (10 - 18)  SpO2: 94% (16 Jun 2025 19:15) (92% - 100%)    Parameters below as of 16 Jun 2025 18:37  Patient On (Oxygen Delivery Method): room air        Physical exam:  General: alert, patient resting in bed comfortably, in NAD  Resp: equal chest rise bilaterally, nonlabored breathing  Cardio: RRR  Abdomen: cirrhotic abdomen, 3 incisions soreness, no rebound or guarding.   steri-strips C/D/I       69 year old female with complex medical hx including recurrent pancreatitis and liver cirrhosis presented with abdominal pain s/p diagnostic laparoscopy without evidence of any infection, injury or source of her pain. HD well.       Plan:   -continue diet as tolerated  -pain control PRN  -encourage OOB, ambulation  -no further surgical intervention indicated at this time as diagnosit lpapracroscpy was negative  - remainder of care per primary team

## 2025-06-16 NOTE — BRIEF OPERATIVE NOTE - OPERATION/FINDINGS
Cirrhotic liver. Bowel run from TI to LOT without evidence of threatened bowel, or source of abdominal pain.

## 2025-06-16 NOTE — PROGRESS NOTE ADULT - PROBLEM SELECTOR PLAN 1
.  - Pre-op risk stratification for exp lap w/ small bowel resection   - CAD PCI 2-3 years ago, "3 stents"   - TTE/NST today   - if patient's surgery is deemed emergent, our workup should not hinder the patient's life saving emergent surgery.   - No cardiac symptoms.   - RCRI (revised cardiac risk index score) < 1%.   - If TTE/NST normal, without ischemia/infarct then proceed with planned surgical procedure. No further cardiac work up is needed if cardiac testing WNL.
acute on cronic   likely due to liver abscess infection   CT-Abd: Dilated intra and extrahepatic biliary ducts increased since the previous exam. This could be secondary to postcholecystectomy state.   MR of abd done result P   cont PPI   - GI consulted   LFTs are normal

## 2025-06-16 NOTE — PROGRESS NOTE ADULT - NS ATTEND OPT1 GEN_ALL_CORE
I attest my time as attending is greater than 50% of the total combined time spent on qualifying patient care activities by the PA/NP and attending.
I independently performed the documented:
PAST SURGICAL HISTORY:  H/O colonoscopy     H/O endoscopy     H/O of hemilaminectomy

## 2025-06-16 NOTE — PROGRESS NOTE ADULT - SUBJECTIVE AND OBJECTIVE BOX
Pt still having pain   afebrile abd slight distention rt sided tenderness nom rebound  no guardin ? fullness  scans reviewed ? pelvic fullness  pt to go for diagnostic lap possible laparotomy  bowel resedction  pt explained the procedure benfits risk and alternitives ubn tiannaswtantyler abnd agrees

## 2025-06-17 DIAGNOSIS — I50.32 CHRONIC DIASTOLIC (CONGESTIVE) HEART FAILURE: ICD-10-CM

## 2025-06-17 LAB
ALBUMIN SERPL ELPH-MCNC: 3.6 G/DL — SIGNIFICANT CHANGE UP (ref 3.3–5.2)
ALP SERPL-CCNC: 135 U/L — HIGH (ref 40–120)
ALT FLD-CCNC: 8 U/L — SIGNIFICANT CHANGE UP
ANION GAP SERPL CALC-SCNC: 15 MMOL/L — SIGNIFICANT CHANGE UP (ref 5–17)
AST SERPL-CCNC: 17 U/L — SIGNIFICANT CHANGE UP
BASOPHILS # BLD AUTO: 0.04 K/UL — SIGNIFICANT CHANGE UP (ref 0–0.2)
BASOPHILS NFR BLD AUTO: 0.9 % — SIGNIFICANT CHANGE UP (ref 0–2)
BILIRUB SERPL-MCNC: 0.2 MG/DL — LOW (ref 0.4–2)
BUN SERPL-MCNC: 54.3 MG/DL — HIGH (ref 8–20)
CALCIUM SERPL-MCNC: 7.6 MG/DL — LOW (ref 8.4–10.5)
CHLORIDE SERPL-SCNC: 98 MMOL/L — SIGNIFICANT CHANGE UP (ref 96–108)
CO2 SERPL-SCNC: 23 MMOL/L — SIGNIFICANT CHANGE UP (ref 22–29)
CREAT SERPL-MCNC: 6.47 MG/DL — HIGH (ref 0.5–1.3)
D DIMER BLD IA.RAPID-MCNC: 534 NG/ML DDU — HIGH
EGFR: 7 ML/MIN/1.73M2 — LOW
EGFR: 7 ML/MIN/1.73M2 — LOW
EOSINOPHIL # BLD AUTO: 0.08 K/UL — SIGNIFICANT CHANGE UP (ref 0–0.5)
EOSINOPHIL NFR BLD AUTO: 1.8 % — SIGNIFICANT CHANGE UP (ref 0–6)
GLUCOSE BLDC GLUCOMTR-MCNC: 102 MG/DL — HIGH (ref 70–99)
GLUCOSE BLDC GLUCOMTR-MCNC: 207 MG/DL — HIGH (ref 70–99)
GLUCOSE BLDC GLUCOMTR-MCNC: 225 MG/DL — HIGH (ref 70–99)
GLUCOSE SERPL-MCNC: 198 MG/DL — HIGH (ref 70–99)
HCT VFR BLD CALC: 25.5 % — LOW (ref 34.5–45)
HGB BLD-MCNC: 8.2 G/DL — LOW (ref 11.5–15.5)
IMM GRANULOCYTES # BLD AUTO: 0.01 K/UL — SIGNIFICANT CHANGE UP (ref 0–0.07)
IMM GRANULOCYTES NFR BLD AUTO: 0.2 % — SIGNIFICANT CHANGE UP (ref 0–0.9)
IMMATURE PLATELET FRACTION #: 3 K/UL — LOW (ref 4.7–11.1)
IMMATURE PLATELET FRACTION %: 3.4 % — SIGNIFICANT CHANGE UP (ref 1.6–4.9)
LYMPHOCYTES # BLD AUTO: 0.6 K/UL — LOW (ref 1–3.3)
LYMPHOCYTES NFR BLD AUTO: 13.6 % — SIGNIFICANT CHANGE UP (ref 13–44)
MAGNESIUM SERPL-MCNC: 2.5 MG/DL — SIGNIFICANT CHANGE UP (ref 1.6–2.6)
MCHC RBC-ENTMCNC: 29.3 PG — SIGNIFICANT CHANGE UP (ref 27–34)
MCHC RBC-ENTMCNC: 32.2 G/DL — SIGNIFICANT CHANGE UP (ref 32–36)
MCV RBC AUTO: 91.1 FL — SIGNIFICANT CHANGE UP (ref 80–100)
MONOCYTES # BLD AUTO: 0.43 K/UL — SIGNIFICANT CHANGE UP (ref 0–0.9)
MONOCYTES NFR BLD AUTO: 9.8 % — SIGNIFICANT CHANGE UP (ref 2–14)
NEUTROPHILS # BLD AUTO: 3.25 K/UL — SIGNIFICANT CHANGE UP (ref 1.8–7.4)
NEUTROPHILS NFR BLD AUTO: 73.7 % — SIGNIFICANT CHANGE UP (ref 43–77)
NRBC # BLD AUTO: 0 K/UL — SIGNIFICANT CHANGE UP (ref 0–0)
NRBC # FLD: 0 K/UL — SIGNIFICANT CHANGE UP (ref 0–0)
NRBC BLD AUTO-RTO: 0 /100 WBCS — SIGNIFICANT CHANGE UP (ref 0–0)
PHOSPHATE SERPL-MCNC: 5.2 MG/DL — HIGH (ref 2.4–4.7)
PLATELET # BLD AUTO: 88 K/UL — LOW (ref 150–400)
PMV BLD: 11.8 FL — SIGNIFICANT CHANGE UP (ref 7–13)
POTASSIUM SERPL-MCNC: 5.3 MMOL/L — SIGNIFICANT CHANGE UP (ref 3.5–5.3)
POTASSIUM SERPL-SCNC: 5.3 MMOL/L — SIGNIFICANT CHANGE UP (ref 3.5–5.3)
PROT SERPL-MCNC: 6.6 G/DL — SIGNIFICANT CHANGE UP (ref 6.6–8.7)
RBC # BLD: 2.8 M/UL — LOW (ref 3.8–5.2)
RBC # FLD: 15 % — HIGH (ref 10.3–14.5)
SODIUM SERPL-SCNC: 136 MMOL/L — SIGNIFICANT CHANGE UP (ref 135–145)
WBC # BLD: 4.41 K/UL — SIGNIFICANT CHANGE UP (ref 3.8–10.5)
WBC # FLD AUTO: 4.41 K/UL — SIGNIFICANT CHANGE UP (ref 3.8–10.5)

## 2025-06-17 PROCEDURE — 90935 HEMODIALYSIS ONE EVALUATION: CPT

## 2025-06-17 PROCEDURE — 99233 SBSQ HOSP IP/OBS HIGH 50: CPT

## 2025-06-17 PROCEDURE — 93970 EXTREMITY STUDY: CPT | Mod: 26

## 2025-06-17 PROCEDURE — 71045 X-RAY EXAM CHEST 1 VIEW: CPT | Mod: 26

## 2025-06-17 RX ORDER — HYDROMORPHONE/SOD CHLOR,ISO/PF 2 MG/10 ML
2 SYRINGE (ML) INJECTION EVERY 6 HOURS
Refills: 0 | Status: DISCONTINUED | OUTPATIENT
Start: 2025-06-17 | End: 2025-06-20

## 2025-06-17 RX ORDER — EPOETIN ALFA 10000 [IU]/ML
10000 SOLUTION INTRAVENOUS; SUBCUTANEOUS
Refills: 0 | Status: DISCONTINUED | OUTPATIENT
Start: 2025-06-17 | End: 2025-06-20

## 2025-06-17 RX ORDER — EPOETIN ALFA 10000 [IU]/ML
10000 SOLUTION INTRAVENOUS; SUBCUTANEOUS ONCE
Refills: 0 | Status: COMPLETED | OUTPATIENT
Start: 2025-06-17 | End: 2025-06-17

## 2025-06-17 RX ORDER — ACETAMINOPHEN 500 MG/5ML
1000 LIQUID (ML) ORAL ONCE
Refills: 0 | Status: DISCONTINUED | OUTPATIENT
Start: 2025-06-17 | End: 2025-06-19

## 2025-06-17 RX ORDER — HYDROMORPHONE/SOD CHLOR,ISO/PF 2 MG/10 ML
0.5 SYRINGE (ML) INJECTION ONCE
Refills: 0 | Status: DISCONTINUED | OUTPATIENT
Start: 2025-06-17 | End: 2025-06-17

## 2025-06-17 RX ORDER — HYDROMORPHONE/SOD CHLOR,ISO/PF 2 MG/10 ML
1 SYRINGE (ML) INJECTION EVERY 6 HOURS
Refills: 0 | Status: DISCONTINUED | OUTPATIENT
Start: 2025-06-17 | End: 2025-06-20

## 2025-06-17 RX ADMIN — Medication 667 MILLIGRAM(S): at 18:56

## 2025-06-17 RX ADMIN — Medication 0.5 MILLIGRAM(S): at 10:57

## 2025-06-17 RX ADMIN — METHOCARBAMOL 500 MILLIGRAM(S): 500 TABLET, FILM COATED ORAL at 06:42

## 2025-06-17 RX ADMIN — SERTRALINE 100 MILLIGRAM(S): 100 TABLET, FILM COATED ORAL at 11:16

## 2025-06-17 RX ADMIN — Medication 81 MILLIGRAM(S): at 11:16

## 2025-06-17 RX ADMIN — Medication 667 MILLIGRAM(S): at 06:42

## 2025-06-17 RX ADMIN — Medication 4 MILLIGRAM(S): at 08:32

## 2025-06-17 RX ADMIN — HEPARIN SODIUM 5000 UNIT(S): 1000 INJECTION INTRAVENOUS; SUBCUTANEOUS at 06:41

## 2025-06-17 RX ADMIN — INSULIN LISPRO 4: 100 INJECTION, SOLUTION INTRAVENOUS; SUBCUTANEOUS at 18:56

## 2025-06-17 RX ADMIN — Medication 200 MICROGRAM(S): at 05:37

## 2025-06-17 RX ADMIN — Medication 0.5 MILLIGRAM(S): at 09:19

## 2025-06-17 RX ADMIN — Medication 0.5 MILLIGRAM(S): at 21:41

## 2025-06-17 RX ADMIN — Medication 2 MILLIGRAM(S): at 19:56

## 2025-06-17 RX ADMIN — Medication 25 MILLIGRAM(S): at 06:42

## 2025-06-17 RX ADMIN — HEPARIN SODIUM 5000 UNIT(S): 1000 INJECTION INTRAVENOUS; SUBCUTANEOUS at 16:25

## 2025-06-17 RX ADMIN — KETOROLAC TROMETHAMINE 15 MILLIGRAM(S): 30 INJECTION, SOLUTION INTRAMUSCULAR; INTRAVENOUS at 00:15

## 2025-06-17 RX ADMIN — METHOCARBAMOL 500 MILLIGRAM(S): 500 TABLET, FILM COATED ORAL at 16:24

## 2025-06-17 RX ADMIN — Medication 2 MILLIGRAM(S): at 18:56

## 2025-06-17 RX ADMIN — KETOROLAC TROMETHAMINE 15 MILLIGRAM(S): 30 INJECTION, SOLUTION INTRAMUSCULAR; INTRAVENOUS at 01:15

## 2025-06-17 RX ADMIN — Medication 0.5 MILLIGRAM(S): at 08:19

## 2025-06-17 RX ADMIN — MEROPENEM 500 MILLIGRAM(S): 1 INJECTION INTRAVENOUS at 00:15

## 2025-06-17 RX ADMIN — Medication 1 APPLICATION(S): at 06:58

## 2025-06-17 RX ADMIN — EPOETIN ALFA 10000 UNIT(S): 10000 SOLUTION INTRAVENOUS; SUBCUTANEOUS at 13:08

## 2025-06-17 RX ADMIN — Medication 0.5 MILLIGRAM(S): at 05:38

## 2025-06-17 RX ADMIN — Medication 10 MILLIGRAM(S): at 08:16

## 2025-06-17 RX ADMIN — Medication 40 MILLIGRAM(S): at 11:16

## 2025-06-17 RX ADMIN — CARVEDILOL 25 MILLIGRAM(S): 3.12 TABLET, FILM COATED ORAL at 10:59

## 2025-06-17 RX ADMIN — Medication 50 MILLIGRAM(S): at 15:15

## 2025-06-17 RX ADMIN — Medication 0.5 MILLIGRAM(S): at 04:38

## 2025-06-17 RX ADMIN — INSULIN LISPRO 4: 100 INJECTION, SOLUTION INTRAVENOUS; SUBCUTANEOUS at 08:35

## 2025-06-17 RX ADMIN — Medication 0.5 MILLIGRAM(S): at 20:41

## 2025-06-17 RX ADMIN — Medication 0.5 MILLIGRAM(S): at 11:57

## 2025-06-17 RX ADMIN — LISINOPRIL 10 MILLIGRAM(S): 5 TABLET ORAL at 06:43

## 2025-06-17 NOTE — PROGRESS NOTE ADULT - ASSESSMENT
69 year old female with Hypertension, Diabetes, CAD s/p PCI, ESRD on HD for 2.5 years (M,W,F), Gastroparesis, IBS, Chronic Pancreatitis, Erosive Esophagitis, 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  Evaluated by GI and MRCP performed due to dilated ducts; no obstruction. She was noted to have 'swirling of mesenteric root' on CT and underwent exploratory laparotomy which is reported normal. Pre-operative stress test also normal.    # Abdominal pain; unclear etiology. It appears that pain may be exaggerated.  # Hepatic abscess; improved on follow up imaging  # Pancreatic head duct with mild ductal dilatation  Afebrile with normal WBC. Normal transaminases. Culture negative  - Analgesics as per pain management  - IV antibiotics post HD  - ID consult    # ESRD  # Hyperphosphatemia  # AoCKD  - HD per Nephrology today  - Phosphate binder  - LASHELL with HD    # Gastroparesis  # Chronic Pancreatitis, currently with normal Lipase. CT findings as noted  # Erosive Esophagitis history  # Cirrhosis, prior HCV  - Pantoprazole  - Tramadol PRN  - Ondansetron PRN  - Pancreatic enzymes supplements  - Sucralfate    # Hypertension  # History of CAD  # History of CHF, diastolic  - Home antihypertensives to be continued  - ASA to be continued  - Fluid balance monitoring and daily weight    # Diabetes  # Hypothyroidism, history of thyroidectomy for cancer  - Blood glucose monitoring with sliding scale Lispro  - Levothyroxine    # Anxiety  # Depression  - Sertraline to be continued    VTE prophylaxis - Intermittent pneumatic compression devices      Disposition - Medically active. Unclear pain etiology; needs control
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
## 2025-06-17 NOTE — PROGRESS NOTE ADULT - SUBJECTIVE AND OBJECTIVE BOX
[facility] DIVISION OF KIDNEY DISEASES AND HYPERTENSION -- DIALYSIS NOTE    Patient seen and examined during dialysis  Patient tolerating the procedure well.     VITALS  --------------------------------------------------------------------------------  T(C): 36.9 (06-17-25 @ 12:37), Max: 36.9 (06-17-25 @ 08:29)  HR: 81 (06-17-25 @ 12:37) (68 - 86)  BP: 160/72 (06-17-25 @ 12:37) (128/88 - 213/108)  RR: 18 (06-17-25 @ 12:37) (10 - 24)  SpO2: 92% (06-17-25 @ 12:37) (86% - 100%)  Wt(kg): --     Will continue the current medical management. Next hemodialysis as scheduled.       Rapid response called this AM  appears well now  off oxygen  try 2 L UF

## 2025-06-17 NOTE — RAPID RESPONSE TEAM SUMMARY - NSADDTLFINDINGSRRT_GEN_ALL_CORE
RRT called for SOB; pt desatted to 85% on RA. VS: /108, HR 83, RR 24, Temp 97.6 F oral, , A&O x 4. Pt given 0.5 Dilaudid IVP x 1 and hydralazine 10mg IVP x1. Dw Nephrology and pt will got for HD today. If hgb <8, will transfuse with 1 U PRBC. CXR, CBC, CMP, Mag, Phos STAT. US b/l LE dopplers to r/o DVT. BP medications adjusted. Pt planned for HD this AM.

## 2025-06-17 NOTE — RAPID RESPONSE TEAM SUMMARY - NSOTHERINTERVENTIONSRRT_GEN_ALL_CORE
d/w nephrology , for HD today   stat CXR , labs , US of leg ordered   d.w nurses and PA at the bedside

## 2025-06-17 NOTE — RAPID RESPONSE TEAM SUMMARY - NSSITUATIONBACKGROUNDRRT_GEN_ALL_CORE
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 Pt with abdominal pain s/p diagnostic laparoscopy without evidence of any infection, injury or source of her pain. HD well.  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 Pt with abdominal pain s/p diagnostic laparoscopy without evidence of ischemia , perforation orany infection. HD well.   This am pt is with c/o abdominal pain and dyspnea , desaturating so RRT called , she is asking for pain meds , she is also c/o dyspnea , as below , BP is high she looks comfortable on resp distress, applied oxygen increased to 4 liter sat 94 % currently

## 2025-06-17 NOTE — CHART NOTE - NSCHARTNOTEFT_GEN_A_CORE
2 Hour RRT Follow Up:    Pt currently at Ultrasound and planned for HD at 11AM. Per RN, SOB has improved and no other acute complaints. Doppler negative. Labs reviewed. Pain control. RN advised to notify ACP for any further changes.

## 2025-06-17 NOTE — PROGRESS NOTE ADULT - SUBJECTIVE AND OBJECTIVE BOX
Subjective: Patient seen and examined at bedside, complaint of continued abdominal pain similar to her pre-op pain. Tolerating liquids, ordered for CC diet      MEDICATIONS  (STANDING):  aspirin enteric coated 81 milliGRAM(s) Oral daily  calcium acetate 667 milliGRAM(s) Oral every 12 hours  carvedilol 25 milliGRAM(s) Oral every 12 hours  chlorhexidine 2% Cloths 1 Application(s) Topical <User Schedule>  dextrose 5%. 1000 milliLiter(s) (50 mL/Hr) IV Continuous <Continuous>  dextrose 50% Injectable 25 Gram(s) IV Push once  epoetin day (PROCRIT) Injectable 45372 Unit(s) IV Push <User Schedule>  glucagon  Injectable 1 milliGRAM(s) IntraMuscular once  heparin   Injectable 5000 Unit(s) SubCutaneous every 8 hours  hydrALAZINE 25 milliGRAM(s) Oral every 8 hours  insulin lispro (ADMELOG) corrective regimen sliding scale   SubCutaneous three times a day before meals  levothyroxine 200 MICROGram(s) Oral daily  lisinopril 10 milliGRAM(s) Oral daily  meropenem Injectable 500 milliGRAM(s) IV Push every 24 hours  methocarbamol 500 milliGRAM(s) Oral three times a day  pantoprazole  Injectable 40 milliGRAM(s) IV Push daily  sertraline 100 milliGRAM(s) Oral daily    MEDICATIONS  (PRN):  acetaminophen     Tablet .. 650 milliGRAM(s) Oral every 6 hours PRN Temp greater or equal to 38C (100.4F), Mild Pain (1 - 3)  aluminum hydroxide/magnesium hydroxide/simethicone Suspension 30 milliLiter(s) Oral every 4 hours PRN Dyspepsia  dextrose Oral Gel 15 Gram(s) Oral once PRN Blood Glucose LESS THAN 70 milliGRAM(s)/deciliter  HYDROmorphone  Injectable 0.5 milliGRAM(s) IV Push every 6 hours PRN Severe Pain (7 - 10)  melatonin 3 milliGRAM(s) Oral at bedtime PRN Insomnia  ondansetron Injectable 4 milliGRAM(s) IV Push every 8 hours PRN Nausea and/or Vomiting  traMADol 50 milliGRAM(s) Oral every 4 hours PRN Moderate Pain (4 - 6)      Vital Signs Last 24 Hrs  T(C): 36.6 (17 Jun 2025 04:36), Max: 36.8 (16 Jun 2025 07:15)  T(F): 97.8 (17 Jun 2025 04:36), Max: 98.2 (16 Jun 2025 07:15)  HR: 83 (17 Jun 2025 04:36) (68 - 86)  BP: 168/67 (17 Jun 2025 04:36) (128/88 - 198/-)  BP(mean): 105 (16 Jun 2025 18:37) (76 - 120)  RR: 16 (17 Jun 2025 04:36) (10 - 17)  SpO2: 97% (17 Jun 2025 04:36) (92% - 100%)    Parameters below as of 17 Jun 2025 04:36  Patient On (Oxygen Delivery Method): room air        Physical Exam:    Constitutional: NAD  HEENT: PERRL, EOMI  Respiratory: Respirations non-labored, no accessory muscle use  Gastrointestinal: Softly distended, tender on left side, incisions c/d/i with steris in place  Neurological: A&O x 3      LABS:                        7.4    3.76  )-----------( 74       ( 16 Jun 2025 03:55 )             22.9     06-16    135  |  96  |  46.1[H]  ----------------------------<  125[H]  4.9   |  25.0  |  5.42[H]    Ca    7.9[L]      16 Jun 2025 03:55  Phos  4.7     06-15  Mg     2.5     06-15    TPro  5.9[L]  /  Alb  3.4  /  TBili  <0.2[L]  /  DBili  x   /  AST  15  /  ALT  8   /  AlkPhos  125[H]  06-16      Urinalysis Basic - ( 16 Jun 2025 03:55 )    Color: x / Appearance: x / SG: x / pH: x  Gluc: 125 mg/dL / Ketone: x  / Bili: x / Urobili: x   Blood: x / Protein: x / Nitrite: x   Leuk Esterase: x / RBC: x / WBC x   Sq Epi: x / Non Sq Epi: x / Bacteria: x        A: 69F s/p diagnostic lap for expected bowel obstruction vs ischemia, lap negative    P:  Pain control  Anti-emetics PRN  OK for cc diet  No farther surgical intervention  Plan per primary team Subjective: Patient seen and examined at bedside, complaint of continued abdominal pain similar to her pre-op pain. Tolerating liquids, ordered for CC diet      MEDICATIONS  (STANDING):  aspirin enteric coated 81 milliGRAM(s) Oral daily  calcium acetate 667 milliGRAM(s) Oral every 12 hours  carvedilol 25 milliGRAM(s) Oral every 12 hours  chlorhexidine 2% Cloths 1 Application(s) Topical <User Schedule>  dextrose 5%. 1000 milliLiter(s) (50 mL/Hr) IV Continuous <Continuous>  dextrose 50% Injectable 25 Gram(s) IV Push once  epoetin day (PROCRIT) Injectable 89443 Unit(s) IV Push <User Schedule>  glucagon  Injectable 1 milliGRAM(s) IntraMuscular once  heparin   Injectable 5000 Unit(s) SubCutaneous every 8 hours  hydrALAZINE 25 milliGRAM(s) Oral every 8 hours  insulin lispro (ADMELOG) corrective regimen sliding scale   SubCutaneous three times a day before meals  levothyroxine 200 MICROGram(s) Oral daily  lisinopril 10 milliGRAM(s) Oral daily  meropenem Injectable 500 milliGRAM(s) IV Push every 24 hours  methocarbamol 500 milliGRAM(s) Oral three times a day  pantoprazole  Injectable 40 milliGRAM(s) IV Push daily  sertraline 100 milliGRAM(s) Oral daily    MEDICATIONS  (PRN):  acetaminophen     Tablet .. 650 milliGRAM(s) Oral every 6 hours PRN Temp greater or equal to 38C (100.4F), Mild Pain (1 - 3)  aluminum hydroxide/magnesium hydroxide/simethicone Suspension 30 milliLiter(s) Oral every 4 hours PRN Dyspepsia  dextrose Oral Gel 15 Gram(s) Oral once PRN Blood Glucose LESS THAN 70 milliGRAM(s)/deciliter  HYDROmorphone  Injectable 0.5 milliGRAM(s) IV Push every 6 hours PRN Severe Pain (7 - 10)  melatonin 3 milliGRAM(s) Oral at bedtime PRN Insomnia  ondansetron Injectable 4 milliGRAM(s) IV Push every 8 hours PRN Nausea and/or Vomiting  traMADol 50 milliGRAM(s) Oral every 4 hours PRN Moderate Pain (4 - 6)      Vital Signs Last 24 Hrs  T(C): 36.6 (17 Jun 2025 04:36), Max: 36.8 (16 Jun 2025 07:15)  T(F): 97.8 (17 Jun 2025 04:36), Max: 98.2 (16 Jun 2025 07:15)  HR: 83 (17 Jun 2025 04:36) (68 - 86)  BP: 168/67 (17 Jun 2025 04:36) (128/88 - 198/-)  BP(mean): 105 (16 Jun 2025 18:37) (76 - 120)  RR: 16 (17 Jun 2025 04:36) (10 - 17)  SpO2: 97% (17 Jun 2025 04:36) (92% - 100%)    Parameters below as of 17 Jun 2025 04:36  Patient On (Oxygen Delivery Method): room air        Physical Exam:    Constitutional: NAD  HEENT: PERRL, EOMI  Respiratory: Respirations non-labored, no accessory muscle use  Gastrointestinal: Softly distended, tender on left side, incisions c/d/i with steris in place  Neurological: A&O x 3      LABS:                        7.4    3.76  )-----------( 74       ( 16 Jun 2025 03:55 )             22.9     06-16    135  |  96  |  46.1[H]  ----------------------------<  125[H]  4.9   |  25.0  |  5.42[H]    Ca    7.9[L]      16 Jun 2025 03:55  Phos  4.7     06-15  Mg     2.5     06-15    TPro  5.9[L]  /  Alb  3.4  /  TBili  <0.2[L]  /  DBili  x   /  AST  15  /  ALT  8   /  AlkPhos  125[H]  06-16      Urinalysis Basic - ( 16 Jun 2025 03:55 )    Color: x / Appearance: x / SG: x / pH: x  Gluc: 125 mg/dL / Ketone: x  / Bili: x / Urobili: x   Blood: x / Protein: x / Nitrite: x   Leuk Esterase: x / RBC: x / WBC x   Sq Epi: x / Non Sq Epi: x / Bacteria: x        A: 69F s/p diagnostic lap for expected bowel obstruction vs ischemia, lap negative    P:  Pain control  Anti-emetics PRN  OK for cc diet  No farther surgical intervention - surgery to s.o  Plan per primary team

## 2025-06-17 NOTE — PROGRESS NOTE ADULT - TIME BILLING
Review of prior chart and current chart including investigations, orders, medications, procedures and consultant recommendations  Patient interview and examination in HD suite, discussion with specialists, RN and documentation. Order placement

## 2025-06-17 NOTE — CONSULT NOTE ADULT - ASSESSMENT
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 During last admission, patient was found to have a liver abscess and was discharged on Meropenem via PICC line (for 6 weeks - ends 7/15)  s/p diag lap for abdominal pain without acute findings    med recs:  start dilaudid po 1mg/2mg q6h prn mod/sev pain  change dilaudid ivp 0.5mg q6h prn to prn breakthrough pain x 24 hrs  change acetaminophen     Tablet .. 650 milliGRAM(s) Oral every 6 hours PRN to standing x 7 days  start lidocaine   Patch 12 hours on, 12 hours off. Max 3 patches on at one time   cont roibaxin HOLD for sedation     can offer tap block for incisional pain - however will need ID clearance given pt on meropenem

## 2025-06-17 NOTE — PROGRESS NOTE ADULT - SUBJECTIVE AND OBJECTIVE BOX
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  Evaluated by GI and MRCP performed due to dilated ducts; no obstruction. She was noted to have 'swirling of mesenteric root' and underwent exploratory laparotomy after NST both of which were normal.         Patient seen and examined at bedside abdominal pain, hepatic abscess, ESRD      OBJECTIVE:  Vital Signs Last 24 Hrs  T(C): 36.9 (17 Jun 2025 12:37), Max: 36.9 (17 Jun 2025 08:29)  T(F): 98.4 (17 Jun 2025 12:37), Max: 98.4 (17 Jun 2025 08:29)  HR: 81 (17 Jun 2025 12:37) (68 - 86)  BP: 160/72 (17 Jun 2025 12:37) (128/88 - 213/108)  BP(mean): 105 (16 Jun 2025 18:37) (76 - 105)  RR: 18 (17 Jun 2025 12:37) (10 - 24)  SpO2: 92% (17 Jun 2025 12:37) (86% - 100%)    Parameters below as of 17 Jun 2025 12:37  Patient On (Oxygen Delivery Method): room air    PHYSICAL EXAMINATION  General: Elderly female, chronically ill appearing, lying on stretcher, fatigued  HEENT:  Anicteric sclera, extraocular movements intact   NECK:  Supple, prominent neck veins  CVS: regular rate and rhythm S1 S2  RESP:  Fair air entry with bibasilar fine crackles  GI:  Soft, somewhat distended with right sided tenderness more so in RUQ  : No suprapubic tenderness  MSK:  No edema. Moves all extremities  CNS:  Awake, alert, oriented. Fluent speech and intact cognition, Follows commands  INTEG:  Warm skin  PSYCH:  Fair mood, though fatigued      MONITOR:  CAPILLARY BLOOD GLUCOSE      POCT Blood Glucose.: 102 mg/dL (17 Jun 2025 12:20)  POCT Blood Glucose.: 225 mg/dL (17 Jun 2025 08:06)  POCT Blood Glucose.: 158 mg/dL (16 Jun 2025 15:38)  POCT Blood Glucose.: 148 mg/dL (16 Jun 2025 14:37)      A1C with Estimated Average Glucose Result: 7.3 % (05.22.25 @ 06:07)   I&O's Summary    16 Jun 2025 07:01  -  17 Jun 2025 07:00  --------------------------------------------------------  IN: 300 mL / OUT: 0 mL / NET: 300 mL                            8.2    4.41  )-----------( 88       ( 17 Jun 2025 08:15 )             25.5       06-17    136  |  98  |  54.3[H]  ----------------------------<  198[H]  5.3   |  23.0  |  6.47[H]    Ca    7.6[L]      17 Jun 2025 08:15  Phos  5.2     06-17  Mg     2.5     06-17    TPro  6.6  /  Alb  3.6  /  TBili  0.2[L]  /  DBili  x   /  AST  17  /  ALT  8   /  AlkPhos  135[H]  06-17        Urinalysis Basic - ( 17 Jun 2025 08:15 )    Color: x / Appearance: x / SG: x / pH: x  Gluc: 198 mg/dL / Ketone: x  / Bili: x / Urobili: x   Blood: x / Protein: x / Nitrite: x   Leuk Esterase: x / RBC: x / WBC x   Sq Epi: x / Non Sq Epi: x / Bacteria: x            TTE:    RADIOLOGY    < from: CT Abdomen and Pelvis w/ IV Cont (06.12.25 @ 22:37) >  IMPRESSION:    Sclerosis. Previously seen liver abscess on 6/3/2025 is resolving with a   subtle area of low hypoattenuation remaining. Continued follow-up is   recommended.    Dilated intra and extrahepatic biliary ducts increased since the previous   exam. This could be secondary to postcholecystectomy state. If there is   concern for biliary pathology. MRI with contrast and MRCP can be obtained.    Subcentimeter cyst containing calcification in the pancreas uncinate   process. A 6 mm dilated pancreatic ductin the head of the pancreas.   Nonemergent MRI abdomen with MRCP is recommended.    Other stable findings as detailed above.    < end of copied text >    < from: MR MRCP No Cont (06.13.25 @ 22:07) >  IMPRESSION:  No evidence of choledocholithiasis or ampullary mass.    Mild intrahepatic and extrahepatic biliary duct dilatation up to 1 cm   (CBD). Dilatation may be on the basis of this age and postcholecystectomy   state.    Two pancreatic cysts as described up to 8mm communicate with the main   pancreatic duct with mild main pancreatic ductal dilatation. Follow up   can be obtained with MRI/MRCP in 1 year to document stability.    Liver cirrhosis. Left hepatic lobe lesion is not well evaluated in this   study without contrast but corresponds to the site of prior hepatic   abscess and is smaller than on prior CT 6/3/2025, likely an involuting   residue. Correlate with clinical parameters.    --- End of Report ---    < end of copied text >    < from: CT Abdomen and Pelvis w/ IV Cont (06.15.25 @ 02:00) >    IMPRESSION:  Liver cirrhosis with portal hypertension including portal splenomegaly   and small volume pelvic ascites that has not changed since 6/12/2025.    Compared toprior study there is new swirling of the mesenteric root   without evidence of bowel obstruction, mesenteric edema, bowel wall edema   or pneumatosis. This finding is nonspecific. Close clinical observation   is recommended and surgical consult can be considered. If there is   persistent clinical concern, repeat CT abdomen pelvis preferably with IV   and oral contrast can be considered. Correlate also with serum lactate   levels.    No pneumoperitoneum.    I concur with below preliminary report.    Preliminary: Cirrhosis with evidence of portal hypertension. Unchanged   left hepatic lobe lesion when compared to CT from 6/12/2025. Mild   intrahepatic and extra hepatic biliary ductal dilatation in the setting   of a cholecystectomy, unchanged. Swirling of the mesenteric vessels at   the mesenteric root which is new compared to prior exam from 6/12/2025.   No small bowel obstruction or mesenteric edema. Appendix is normal.   Colonic diverticulosis without evidence of diverticulitis. Small volume   pelvic free fluid. Given the patient's clinical pain and new swirling of   the mesenteric vessels of the mesenteric root, surgery consultation   should be considered    --- End of Report ---    < end of copied text >      < from: Xray Kidney Ureter Bladder (06.15.25 @ 09:47) >    IMPRESSION:    No acute radiographic intra-abdominal findings.  Please review same day abdominal pelvic CT scan report.    < end of copied text >    < from: US Duplex Venous Lower Ext Complete, Bilateral (06.17.25 @ 09:56) >    IMPRESSION:  No evidence of deep venous thrombosis in either lower extremity.    < end of copied text >          MEDICATIONS  (STANDING):  acetaminophen   IVPB .. 1000 milliGRAM(s) IV Intermittent once  aspirin enteric coated 81 milliGRAM(s) Oral daily  calcium acetate 667 milliGRAM(s) Oral every 12 hours  carvedilol 25 milliGRAM(s) Oral every 12 hours  chlorhexidine 2% Cloths 1 Application(s) Topical <User Schedule>  dextrose 5%. 1000 milliLiter(s) (50 mL/Hr) IV Continuous <Continuous>  dextrose 50% Injectable 25 Gram(s) IV Push once  epoetin day-epbx (RETACRIT) Injectable 91704 Unit(s) IV Push <User Schedule>  glucagon  Injectable 1 milliGRAM(s) IntraMuscular once  heparin   Injectable 5000 Unit(s) SubCutaneous every 8 hours  hydrALAZINE 50 milliGRAM(s) Oral every 8 hours  insulin lispro (ADMELOG) corrective regimen sliding scale   SubCutaneous three times a day before meals  levothyroxine 200 MICROGram(s) Oral daily  lisinopril 10 milliGRAM(s) Oral daily  meropenem Injectable 500 milliGRAM(s) IV Push every 24 hours  methocarbamol 500 milliGRAM(s) Oral three times a day  pantoprazole  Injectable 40 milliGRAM(s) IV Push daily  sertraline 100 milliGRAM(s) Oral daily      MEDICATIONS  (PRN):  acetaminophen     Tablet .. 650 milliGRAM(s) Oral every 6 hours PRN Temp greater or equal to 38C (100.4F), Mild Pain (1 - 3)  aluminum hydroxide/magnesium hydroxide/simethicone Suspension 30 milliLiter(s) Oral every 4 hours PRN Dyspepsia  dextrose Oral Gel 15 Gram(s) Oral once PRN Blood Glucose LESS THAN 70 milliGRAM(s)/deciliter  HYDROmorphone  Injectable 0.5 milliGRAM(s) IV Push every 6 hours PRN Severe Pain (7 - 10)  melatonin 3 milliGRAM(s) Oral at bedtime PRN Insomnia  ondansetron Injectable 4 milliGRAM(s) IV Push every 8 hours PRN Nausea and/or Vomiting  traMADol 50 milliGRAM(s) Oral every 4 hours PRN Moderate Pain (4 - 6)     HOSPITALIST PROGRESS NOTE    HAYLEY CRUMPWENDYISAURO  [id number]  69yFemale    Patient is a 69y old  Female who presents with a chief complaint of Abdominal pain (17 Jun 2025 12:53)      SUBJECTIVE:   Chart reviewed since hospitalization      69 year old female with Hypertension, Diabetes, CAD s/p PCI, ESRD on HD for 2.5 years (M,W,F), Gastroparesis, IBS, Chronic Pancreatitis, Erosive Esophagitis, 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  Evaluated by GI and MRCP performed due to dilated ducts; no obstruction. She was noted to have 'swirling of mesenteric root' and underwent exploratory laparotomy after NST both of which were normal.         Patient seen and examined in HD suite for abdominal pain, hepatic abscess, ESRD.  Dyspnea earlier now resolved.  Still complaining of abdominal pain 9/10 (whilst sitting calmly)      OBJECTIVE:  Vital Signs Last 24 Hrs  T(C): 36.9 (17 Jun 2025 12:37), Max: 36.9 (17 Jun 2025 08:29)  T(F): 98.4 (17 Jun 2025 12:37), Max: 98.4 (17 Jun 2025 08:29)  HR: 81 (17 Jun 2025 12:37) (68 - 86)  BP: 160/72 (17 Jun 2025 12:37) (128/88 - 213/108)  BP(mean): 105 (16 Jun 2025 18:37) (76 - 105)  RR: 18 (17 Jun 2025 12:37) (10 - 24)  SpO2: 92% (17 Jun 2025 12:37) (86% - 100%)    Parameters below as of 17 Jun 2025 12:37  Patient On (Oxygen Delivery Method): room air    PHYSICAL EXAMINATION  General: Elderly female, chronically ill appearing, lying on stretcher, no distress  HEENT:  Anicteric sclera, extraocular movements intact   NECK:  Supple   CVS: regular rate and rhythm S1 S2  RESP:  Fair air entry without  any additional sounds  GI:  Soft, somewhat distended with right sided tenderness more so in RUQ BS+  : No suprapubic tenderness  MSK:  No edema. Moves all extremities  CNS:  Awake, alert, oriented. Fluent speech and intact cognition, Follows commands  INTEG:  Warm skin  PSYCH:  Fair mood       MONITOR:  CAPILLARY BLOOD GLUCOSE      POCT Blood Glucose.: 102 mg/dL (17 Jun 2025 12:20)  POCT Blood Glucose.: 225 mg/dL (17 Jun 2025 08:06)  POCT Blood Glucose.: 158 mg/dL (16 Jun 2025 15:38)  POCT Blood Glucose.: 148 mg/dL (16 Jun 2025 14:37)      A1C with Estimated Average Glucose Result: 7.3 % (05.22.25 @ 06:07)   I&O's Summary    16 Jun 2025 07:01  -  17 Jun 2025 07:00  --------------------------------------------------------  IN: 300 mL / OUT: 0 mL / NET: 300 mL                            8.2    4.41  )-----------( 88       ( 17 Jun 2025 08:15 )             25.5       06-17    136  |  98  |  54.3[H]  ----------------------------<  198[H]  5.3   |  23.0  |  6.47[H]    Ca    7.6[L]      17 Jun 2025 08:15  Phos  5.2     06-17  Mg     2.5     06-17    TPro  6.6  /  Alb  3.6  /  TBili  0.2[L]  /  DBili  x   /  AST  17  /  ALT  8   /  AlkPhos  135[H]  06-17        Urinalysis Basic - ( 17 Jun 2025 08:15 )    Color: x / Appearance: x / SG: x / pH: x  Gluc: 198 mg/dL / Ketone: x  / Bili: x / Urobili: x   Blood: x / Protein: x / Nitrite: x   Leuk Esterase: x / RBC: x / WBC x   Sq Epi: x / Non Sq Epi: x / Bacteria: x            TTE:    RADIOLOGY    < from: CT Abdomen and Pelvis w/ IV Cont (06.12.25 @ 22:37) >  IMPRESSION:    Sclerosis. Previously seen liver abscess on 6/3/2025 is resolving with a   subtle area of low hypoattenuation remaining. Continued follow-up is   recommended.    Dilated intra and extrahepatic biliary ducts increased since the previous   exam. This could be secondary to postcholecystectomy state. If there is   concern for biliary pathology. MRI with contrast and MRCP can be obtained.    Subcentimeter cyst containing calcification in the pancreas uncinate   process. A 6 mm dilated pancreatic ductin the head of the pancreas.   Nonemergent MRI abdomen with MRCP is recommended.    Other stable findings as detailed above.    < end of copied text >    < from: MR MRCP No Cont (06.13.25 @ 22:07) >  IMPRESSION:  No evidence of choledocholithiasis or ampullary mass.    Mild intrahepatic and extrahepatic biliary duct dilatation up to 1 cm   (CBD). Dilatation may be on the basis of this age and postcholecystectomy   state.    Two pancreatic cysts as described up to 8mm communicate with the main   pancreatic duct with mild main pancreatic ductal dilatation. Follow up   can be obtained with MRI/MRCP in 1 year to document stability.    Liver cirrhosis. Left hepatic lobe lesion is not well evaluated in this   study without contrast but corresponds to the site of prior hepatic   abscess and is smaller than on prior CT 6/3/2025, likely an involuting   residue. Correlate with clinical parameters.    --- End of Report ---    < end of copied text >    < from: CT Abdomen and Pelvis w/ IV Cont (06.15.25 @ 02:00) >    IMPRESSION:  Liver cirrhosis with portal hypertension including portal splenomegaly   and small volume pelvic ascites that has not changed since 6/12/2025.    Compared toprior study there is new swirling of the mesenteric root   without evidence of bowel obstruction, mesenteric edema, bowel wall edema   or pneumatosis. This finding is nonspecific. Close clinical observation   is recommended and surgical consult can be considered. If there is   persistent clinical concern, repeat CT abdomen pelvis preferably with IV   and oral contrast can be considered. Correlate also with serum lactate   levels.    No pneumoperitoneum.    I concur with below preliminary report.    Preliminary: Cirrhosis with evidence of portal hypertension. Unchanged   left hepatic lobe lesion when compared to CT from 6/12/2025. Mild   intrahepatic and extra hepatic biliary ductal dilatation in the setting   of a cholecystectomy, unchanged. Swirling of the mesenteric vessels at   the mesenteric root which is new compared to prior exam from 6/12/2025.   No small bowel obstruction or mesenteric edema. Appendix is normal.   Colonic diverticulosis without evidence of diverticulitis. Small volume   pelvic free fluid. Given the patient's clinical pain and new swirling of   the mesenteric vessels of the mesenteric root, surgery consultation   should be considered    --- End of Report ---    < end of copied text >      < from: Xray Kidney Ureter Bladder (06.15.25 @ 09:47) >    IMPRESSION:    No acute radiographic intra-abdominal findings.  Please review same day abdominal pelvic CT scan report.    < end of copied text >    < from: US Duplex Venous Lower Ext Complete, Bilateral (06.17.25 @ 09:56) >    IMPRESSION:  No evidence of deep venous thrombosis in either lower extremity.    < end of copied text >          MEDICATIONS  (STANDING):  acetaminophen   IVPB .. 1000 milliGRAM(s) IV Intermittent once  aspirin enteric coated 81 milliGRAM(s) Oral daily  calcium acetate 667 milliGRAM(s) Oral every 12 hours  carvedilol 25 milliGRAM(s) Oral every 12 hours  chlorhexidine 2% Cloths 1 Application(s) Topical <User Schedule>  dextrose 5%. 1000 milliLiter(s) (50 mL/Hr) IV Continuous <Continuous>  dextrose 50% Injectable 25 Gram(s) IV Push once  epoetin day-epbx (RETACRIT) Injectable 41530 Unit(s) IV Push <User Schedule>  glucagon  Injectable 1 milliGRAM(s) IntraMuscular once  heparin   Injectable 5000 Unit(s) SubCutaneous every 8 hours  hydrALAZINE 50 milliGRAM(s) Oral every 8 hours  insulin lispro (ADMELOG) corrective regimen sliding scale   SubCutaneous three times a day before meals  levothyroxine 200 MICROGram(s) Oral daily  lisinopril 10 milliGRAM(s) Oral daily  meropenem Injectable 500 milliGRAM(s) IV Push every 24 hours  methocarbamol 500 milliGRAM(s) Oral three times a day  pantoprazole  Injectable 40 milliGRAM(s) IV Push daily  sertraline 100 milliGRAM(s) Oral daily      MEDICATIONS  (PRN):  acetaminophen     Tablet .. 650 milliGRAM(s) Oral every 6 hours PRN Temp greater or equal to 38C (100.4F), Mild Pain (1 - 3)  aluminum hydroxide/magnesium hydroxide/simethicone Suspension 30 milliLiter(s) Oral every 4 hours PRN Dyspepsia  dextrose Oral Gel 15 Gram(s) Oral once PRN Blood Glucose LESS THAN 70 milliGRAM(s)/deciliter  HYDROmorphone  Injectable 0.5 milliGRAM(s) IV Push every 6 hours PRN Severe Pain (7 - 10)  melatonin 3 milliGRAM(s) Oral at bedtime PRN Insomnia  ondansetron Injectable 4 milliGRAM(s) IV Push every 8 hours PRN Nausea and/or Vomiting  traMADol 50 milliGRAM(s) Oral every 4 hours PRN Moderate Pain (4 - 6)

## 2025-06-18 LAB
GLUCOSE BLDC GLUCOMTR-MCNC: 158 MG/DL — HIGH (ref 70–99)
GLUCOSE BLDC GLUCOMTR-MCNC: 170 MG/DL — HIGH (ref 70–99)
GLUCOSE BLDC GLUCOMTR-MCNC: 195 MG/DL — HIGH (ref 70–99)

## 2025-06-18 PROCEDURE — G0545: CPT

## 2025-06-18 PROCEDURE — 99232 SBSQ HOSP IP/OBS MODERATE 35: CPT

## 2025-06-18 PROCEDURE — 99222 1ST HOSP IP/OBS MODERATE 55: CPT

## 2025-06-18 RX ORDER — LIPASE/PROTEASE/AMYLASE 10K-37.5K
1 CAPSULE,DELAYED RELEASE (ENTERIC COATED) ORAL
Refills: 0 | Status: DISCONTINUED | OUTPATIENT
Start: 2025-06-18 | End: 2025-06-20

## 2025-06-18 RX ORDER — HYDROMORPHONE/SOD CHLOR,ISO/PF 2 MG/10 ML
0.5 SYRINGE (ML) INJECTION EVERY 6 HOURS
Refills: 0 | Status: DISCONTINUED | OUTPATIENT
Start: 2025-06-18 | End: 2025-06-20

## 2025-06-18 RX ADMIN — HEPARIN SODIUM 5000 UNIT(S): 1000 INJECTION INTRAVENOUS; SUBCUTANEOUS at 05:05

## 2025-06-18 RX ADMIN — HEPARIN SODIUM 5000 UNIT(S): 1000 INJECTION INTRAVENOUS; SUBCUTANEOUS at 13:55

## 2025-06-18 RX ADMIN — INSULIN LISPRO 2: 100 INJECTION, SOLUTION INTRAVENOUS; SUBCUTANEOUS at 08:09

## 2025-06-18 RX ADMIN — Medication 2 MILLIGRAM(S): at 18:45

## 2025-06-18 RX ADMIN — Medication 40 MILLIGRAM(S): at 13:56

## 2025-06-18 RX ADMIN — METHOCARBAMOL 500 MILLIGRAM(S): 500 TABLET, FILM COATED ORAL at 21:30

## 2025-06-18 RX ADMIN — Medication 100 MILLIGRAM(S): at 15:08

## 2025-06-18 RX ADMIN — Medication 2 MILLIGRAM(S): at 09:56

## 2025-06-18 RX ADMIN — HEPARIN SODIUM 5000 UNIT(S): 1000 INJECTION INTRAVENOUS; SUBCUTANEOUS at 21:29

## 2025-06-18 RX ADMIN — Medication 0.5 MILLIGRAM(S): at 03:44

## 2025-06-18 RX ADMIN — Medication 1 APPLICATION(S): at 05:09

## 2025-06-18 RX ADMIN — LISINOPRIL 10 MILLIGRAM(S): 5 TABLET ORAL at 05:05

## 2025-06-18 RX ADMIN — Medication 81 MILLIGRAM(S): at 13:56

## 2025-06-18 RX ADMIN — Medication 1 CAPSULE(S): at 13:56

## 2025-06-18 RX ADMIN — Medication 667 MILLIGRAM(S): at 17:45

## 2025-06-18 RX ADMIN — Medication 0.5 MILLIGRAM(S): at 12:02

## 2025-06-18 RX ADMIN — CARVEDILOL 25 MILLIGRAM(S): 3.12 TABLET, FILM COATED ORAL at 17:45

## 2025-06-18 RX ADMIN — Medication 1 CAPSULE(S): at 17:47

## 2025-06-18 RX ADMIN — Medication 0.5 MILLIGRAM(S): at 20:36

## 2025-06-18 RX ADMIN — Medication 50 MILLIGRAM(S): at 00:35

## 2025-06-18 RX ADMIN — HEPARIN SODIUM 5000 UNIT(S): 1000 INJECTION INTRAVENOUS; SUBCUTANEOUS at 00:37

## 2025-06-18 RX ADMIN — INSULIN LISPRO 2: 100 INJECTION, SOLUTION INTRAVENOUS; SUBCUTANEOUS at 17:45

## 2025-06-18 RX ADMIN — MEROPENEM 500 MILLIGRAM(S): 1 INJECTION INTRAVENOUS at 00:35

## 2025-06-18 RX ADMIN — CARVEDILOL 25 MILLIGRAM(S): 3.12 TABLET, FILM COATED ORAL at 00:35

## 2025-06-18 RX ADMIN — Medication 200 MICROGRAM(S): at 05:05

## 2025-06-18 RX ADMIN — SERTRALINE 100 MILLIGRAM(S): 100 TABLET, FILM COATED ORAL at 13:55

## 2025-06-18 RX ADMIN — Medication 0.5 MILLIGRAM(S): at 11:02

## 2025-06-18 RX ADMIN — Medication 667 MILLIGRAM(S): at 05:05

## 2025-06-18 RX ADMIN — Medication 4 MILLIGRAM(S): at 20:36

## 2025-06-18 RX ADMIN — Medication 2 MILLIGRAM(S): at 17:45

## 2025-06-18 RX ADMIN — Medication 50 MILLIGRAM(S): at 05:05

## 2025-06-18 RX ADMIN — METHOCARBAMOL 500 MILLIGRAM(S): 500 TABLET, FILM COATED ORAL at 13:56

## 2025-06-18 RX ADMIN — Medication 100 MILLIGRAM(S): at 21:29

## 2025-06-18 RX ADMIN — INSULIN LISPRO 2: 100 INJECTION, SOLUTION INTRAVENOUS; SUBCUTANEOUS at 12:35

## 2025-06-18 RX ADMIN — Medication 2 MILLIGRAM(S): at 10:56

## 2025-06-18 RX ADMIN — Medication 0.5 MILLIGRAM(S): at 02:44

## 2025-06-18 RX ADMIN — Medication 0.5 MILLIGRAM(S): at 21:30

## 2025-06-18 RX ADMIN — EPOETIN ALFA 10000 UNIT(S): 10000 SOLUTION INTRAVENOUS; SUBCUTANEOUS at 13:55

## 2025-06-18 NOTE — CONSULT NOTE ADULT - CONSULT REQUESTED DATE/TIME
13-Jun-2025 17:25
15-Nish-2025 18:22
16-Jun-2025 09:50
17-Jun-2025 12:50
15-Nish-2025 16:44
13-Jun-2025 18:28
18-Jun-2025 11:45

## 2025-06-18 NOTE — PROGRESS NOTE ADULT - SUBJECTIVE AND OBJECTIVE BOX
Patient is a 69y old  Female who presents with a chief complaint of Abdominal pain (2025 13:29)      HPI:  69 year old female with a PMH of HTN, DM, CAD s/p PCI, ESRD (HD-MWF), gastroparesis, IBS, Chronic Pancreatitis, Erosive Esophagitis, Cirrhosis secondary to HCV  (Treated 8 years ago), Anxiety, Depression, Thyroid cancer s/p thyroidectomy presents to the ED for abdominal pain. During last admission, patient was found to have a liver abscess and was discharged on Meropenem via PICC line (for 6 weeks - ends 7/15). Today patient did not received her meropenem dose as the person who was supposed to administer the antibiotic got into a car accident. Endorses worsening abdominal control, minimal relief with tramadol doses at home which prompted ED visit. Denies fever, chills, nausea, vomiting, changes in bowel, chest pain, palpitation SOB, dysuria or any other acute complaints. In the ED patient received multiple doses of Dilaudid, endorses pain is still present with minimal relief. Repeat CT-abd showed improving of the liver abscess but increased dilated intra and extrahepatic biliary ducts. Labs remarkable H/H:7.3/23.1, BUN/Cr: 41.2/4.2. Vitals - BP:173/77, HR:89, Temp:99.3 (2025 04:47)    Interval Events:  Patient seen at bedside, endorsed her abdominal pain is deep and has only minimal pain from the incision sites.       PAST MEDICAL & SURGICAL HISTORY:  Diabetes      Bernard syndrome      Pancreatitis      Cirrhosis      ESRD on dialysis  MWF at [facility]      HTN (hypertension)      CAD (coronary artery disease)      Chronic diastolic congestive heart failure      IBS (irritable bowel syndrome)      Gastroparesis      Thyroid cancer      Esophagitis, erosive      HCV (hepatitis C virus)      History of liver biopsy      H/O total thyroidectomy      History of cholecystectomy  1990s      S/P           MEDICATIONS  (STANDING):  acetaminophen   IVPB .. 1000 milliGRAM(s) IV Intermittent once  aspirin enteric coated 81 milliGRAM(s) Oral daily  calcium acetate 667 milliGRAM(s) Oral every 12 hours  carvedilol 25 milliGRAM(s) Oral every 12 hours  chlorhexidine 2% Cloths 1 Application(s) Topical <User Schedule>  dextrose 5%. 1000 milliLiter(s) (50 mL/Hr) IV Continuous <Continuous>  dextrose 50% Injectable 25 Gram(s) IV Push once  epoetin day-epbx (RETACRIT) Injectable 67876 Unit(s) IV Push <User Schedule>  glucagon  Injectable 1 milliGRAM(s) IntraMuscular once  heparin   Injectable 5000 Unit(s) SubCutaneous every 8 hours  hydrALAZINE 100 milliGRAM(s) Oral every 8 hours  insulin lispro (ADMELOG) corrective regimen sliding scale   SubCutaneous three times a day before meals  levothyroxine 200 MICROGram(s) Oral daily  lisinopril 10 milliGRAM(s) Oral daily  meropenem Injectable 500 milliGRAM(s) IV Push every 24 hours  methocarbamol 500 milliGRAM(s) Oral three times a day  pancrelipase  (CREON  6,000 Lipase Units) 1 Capsule(s) Oral three times a day with meals  pantoprazole  Injectable 40 milliGRAM(s) IV Push daily  sertraline 100 milliGRAM(s) Oral daily    MEDICATIONS  (PRN):  acetaminophen     Tablet .. 650 milliGRAM(s) Oral every 6 hours PRN Temp greater or equal to 38C (100.4F), Mild Pain (1 - 3)  aluminum hydroxide/magnesium hydroxide/simethicone Suspension 30 milliLiter(s) Oral every 4 hours PRN Dyspepsia  dextrose Oral Gel 15 Gram(s) Oral once PRN Blood Glucose LESS THAN 70 milliGRAM(s)/deciliter  HYDROmorphone   Tablet 1 milliGRAM(s) Oral every 6 hours PRN Moderate Pain (4 - 6)  HYDROmorphone   Tablet 2 milliGRAM(s) Oral every 6 hours PRN Severe Pain (7 - 10)  HYDROmorphone  Injectable 0.5 milliGRAM(s) IV Push every 6 hours PRN Severe Pain (7 - 10)  melatonin 3 milliGRAM(s) Oral at bedtime PRN Insomnia  ondansetron Injectable 4 milliGRAM(s) IV Push every 8 hours PRN Nausea and/or Vomiting  traMADol 50 milliGRAM(s) Oral every 4 hours PRN Moderate Pain (4 - 6)      Vital Signs Last 24 Hrs  T(C): 37.1 (2025 08:05), Max: 37.1 (2025 00:26)  T(F): 98.7 (2025 08:05), Max: 98.7 (2025 00:26)  HR: 79 (2025 08:05) (66 - 83)  BP: 151/61 (2025 08:05) (132/76 - 174/92)  BP(mean): --  RR: 18 (2025 08:05) (18 - 18)  SpO2: 95% (2025 08:05) (92% - 97%)    Parameters below as of 2025 08:05  Patient On (Oxygen Delivery Method): room air                              8.2    4.41  )-----------( 88       ( 2025 08:15 )             25.5       LIVER FUNCTIONS - ( 2025 08:15 )  Alb: 3.6 g/dL / Pro: 6.6 g/dL / ALK PHOS: 135 U/L / ALT: 8 U/L / AST: 17 U/L / GGT: x                 Pain Service   [phone]

## 2025-06-18 NOTE — PROGRESS NOTE ADULT - SUBJECTIVE AND OBJECTIVE BOX
HOSPITALIST PROGRESS NOTE    HAYLEY LARA  [id number]  69yFemale    Patient is a 69y old  Female who presents with a chief complaint of Abdominal pain (18 Jun 2025 11:45)      SUBJECTIVE:   Chart reviewed since last visit.   Patient seen and examined at bedside for abdominal pain, hepatic abscess, ESRD.  Continues to complain of intermittent abdominal pain.      OBJECTIVE:  Vital Signs Last 24 Hrs  T(C): 37.1 (18 Jun 2025 08:05), Max: 37.1 (18 Jun 2025 00:26)  T(F): 98.7 (18 Jun 2025 08:05), Max: 98.7 (18 Jun 2025 00:26)  HR: 79 (18 Jun 2025 08:05) (66 - 83)  BP: 151/61 (18 Jun 2025 08:05) (132/76 - 174/92)   RR: 18 (18 Jun 2025 08:05) (18 - 18)  SpO2: 95% (18 Jun 2025 08:05) (92% - 97%)    Parameters below as of 18 Jun 2025 08:05  Patient On (Oxygen Delivery Method): room air      PHYSICAL EXAMINATION  General: Elderly female, chronically ill appearing, sitting up on edge of bed; no distress  HEENT:  Anicteric sclera, extraocular movements intact   NECK:  Supple   CVS: regular rate and rhythm S1 S2  RESP:  Fair air entry without  any additional sounds  GI:  Soft, somewhat distended with right sided tenderness more so in RUQ BS+  : No suprapubic tenderness  MSK:  No edema. Moves all extremities  CNS:  Awake, alert, oriented. Fluent speech and intact cognition, Follows commands  INTEG:  Warm skin  PSYCH:  Fair mood     MONITOR:  CAPILLARY BLOOD GLUCOSE      POCT Blood Glucose.: 170 mg/dL (18 Jun 2025 12:33)  POCT Blood Glucose.: 158 mg/dL (18 Jun 2025 08:07)  POCT Blood Glucose.: 207 mg/dL (17 Jun 2025 18:55)        I&O's Summary    17 Jun 2025 07:01  -  18 Jun 2025 07:00  --------------------------------------------------------  IN: 0 mL / OUT: 2000 mL / NET: -2000 mL                            8.2    4.41  )-----------( 88       ( 17 Jun 2025 08:15 )             25.5       06-17    136  |  98  |  54.3[H]  ----------------------------<  198[H]  5.3   |  23.0  |  6.47[H]    Ca    7.6[L]      17 Jun 2025 08:15  Phos  5.2     06-17  Mg     2.5     06-17    TPro  6.6  /  Alb  3.6  /  TBili  0.2[L]  /  DBili  x   /  AST  17  /  ALT  8   /  AlkPhos  135[H]  06-17        Urinalysis Basic - ( 17 Jun 2025 08:15 )    Color: x / Appearance: x / SG: x / pH: x  Gluc: 198 mg/dL / Ketone: x  / Bili: x / Urobili: x   Blood: x / Protein: x / Nitrite: x   Leuk Esterase: x / RBC: x / WBC x   Sq Epi: x / Non Sq Epi: x / Bacteria: x            TTE:    RADIOLOGY        MEDICATIONS  (STANDING):  acetaminophen   IVPB .. 1000 milliGRAM(s) IV Intermittent once  aspirin enteric coated 81 milliGRAM(s) Oral daily  calcium acetate 667 milliGRAM(s) Oral every 12 hours  carvedilol 25 milliGRAM(s) Oral every 12 hours  chlorhexidine 2% Cloths 1 Application(s) Topical <User Schedule>  dextrose 5%. 1000 milliLiter(s) (50 mL/Hr) IV Continuous <Continuous>  dextrose 50% Injectable 25 Gram(s) IV Push once  epoetin day-epbx (RETACRIT) Injectable 06290 Unit(s) IV Push <User Schedule>  glucagon  Injectable 1 milliGRAM(s) IntraMuscular once  heparin   Injectable 5000 Unit(s) SubCutaneous every 8 hours  hydrALAZINE 100 milliGRAM(s) Oral every 8 hours  insulin lispro (ADMELOG) corrective regimen sliding scale   SubCutaneous three times a day before meals  levothyroxine 200 MICROGram(s) Oral daily  lisinopril 10 milliGRAM(s) Oral daily  meropenem Injectable 500 milliGRAM(s) IV Push every 24 hours  methocarbamol 500 milliGRAM(s) Oral three times a day  pancrelipase  (CREON  6,000 Lipase Units) 1 Capsule(s) Oral three times a day with meals  pantoprazole  Injectable 40 milliGRAM(s) IV Push daily  sertraline 100 milliGRAM(s) Oral daily      MEDICATIONS  (PRN):  acetaminophen     Tablet .. 650 milliGRAM(s) Oral every 6 hours PRN Temp greater or equal to 38C (100.4F), Mild Pain (1 - 3)  aluminum hydroxide/magnesium hydroxide/simethicone Suspension 30 milliLiter(s) Oral every 4 hours PRN Dyspepsia  dextrose Oral Gel 15 Gram(s) Oral once PRN Blood Glucose LESS THAN 70 milliGRAM(s)/deciliter  HYDROmorphone   Tablet 1 milliGRAM(s) Oral every 6 hours PRN Moderate Pain (4 - 6)  HYDROmorphone   Tablet 2 milliGRAM(s) Oral every 6 hours PRN Severe Pain (7 - 10)  HYDROmorphone  Injectable 0.5 milliGRAM(s) IV Push every 6 hours PRN Severe Pain (7 - 10)  melatonin 3 milliGRAM(s) Oral at bedtime PRN Insomnia  ondansetron Injectable 4 milliGRAM(s) IV Push every 8 hours PRN Nausea and/or Vomiting  traMADol 50 milliGRAM(s) Oral every 4 hours PRN Moderate Pain (4 - 6)

## 2025-06-18 NOTE — CONSULT NOTE ADULT - ASSESSMENT
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    Abdominal pain  CT with resolving hepatic abscess, dilated biliary ducts    MRCP negative for choledocholithiasis  Repeat CT on June 15th showed swirling of the mesenteric vessels at the mesenteric root  S/p diagnostic laparoscopy with Surgery on June 16th which was negative  ID was consulted for as Pain Management requested ID input prior to nerve block    Suggest;  Patient is on Meropenem IV and CT showed resolving hepatic abscesses. She is also afebrile and without leukocytosis.   Discussed with Dr Mcadams - nerve block is not anatomically near liver  No absolute contraindications from ID standpoint to nerve block, keeping in mind inherent risks of procedure itself remain  Monitor fever curve  Trend WBC count  Please note that the ID progress note from 6/16 is not an ID note.     Continue Meropenem 500mg IV Q24h (on HD days give post-HD) through July 15th, 2025 as originally planned.   Weekly CBC and CMP to be faxed to .     Recall with questions or concerns.  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    Abdominal pain  CT with resolving hepatic abscess, dilated biliary ducts    MRCP negative for choledocholithiasis  Repeat CT on June 15th showed swirling of the mesenteric vessels at the mesenteric root  S/p diagnostic laparoscopy with Surgery on June 16th which was negative  ID was consulted for as Pain Management requested ID input prior to nerve block    Suggest;  Patient is on Meropenem IV and CT showed resolving hepatic abscesses. She is also afebrile and without leukocytosis.   Discussed with Dr Mcadams - nerve block is not anatomically near liver  No absolute contraindications from ID standpoint to nerve block, keeping in mind inherent risks of procedure itself remain  Abd pain management as per Medicine / Pain Management - could this be opioid induced hyperalgesia ?   Monitor fever curve  Trend WBC count  Please note that the ID progress note from 6/16 is not an ID note.     Continue Meropenem 500mg IV Q24h (on HD days give post-HD) through July 15th, 2025 as originally planned.   Weekly CBC and CMP to be faxed to .     Recall with questions or concerns.  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    Abdominal pain  CT with resolving hepatic abscess, dilated biliary ducts    MRCP negative for choledocholithiasis  Repeat CT on June 15th showed swirling of the mesenteric vessels at the mesenteric root  S/p diagnostic laparoscopy with Surgery on June 16th which was negative  ID was consulted as Pain Management requested ID input prior to nerve block    Suggest;  Patient is on Meropenem IV and CT showed resolving hepatic abscesses. She is also afebrile and without leukocytosis.   Discussed with Dr Mcadams - nerve block is not anatomically near liver  No absolute contraindications from ID standpoint to nerve block, keeping in mind inherent risks of procedure itself remain  Abd pain management as per Medicine / Pain Management - could this be opioid induced hyperalgesia ?   Monitor fever curve  Trend WBC count  Please note that the ID progress note from 6/16 is not an ID note.     Continue Meropenem 500mg IV Q24h (on HD days give post-HD) through July 15th, 2025 as originally planned.   Weekly CBC and CMP to be faxed to .     Recall with questions or concerns.

## 2025-06-18 NOTE — PROGRESS NOTE ADULT - ASSESSMENT
69 year old female with Hypertension, Diabetes, CAD s/p PCI, ESRD on HD for 2.5 years (M,W,F), Gastroparesis, IBS, Chronic Pancreatitis, Erosive Esophagitis, 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  Evaluated by GI and MRCP performed due to dilated ducts; no obstruction. She was noted to have 'swirling of mesenteric root' on CT and underwent exploratory laparotomy which is reported normal. Pre-operative stress test also normal.    # Abdominal pain; unclear etiology. It appears that pain may be exaggerated.  # Hepatic abscess; improved on follow up imaging  # Pancreatic head duct with mild ductal dilatation  Afebrile with normal WBC. Normal transaminases. Culture negative  - Analgesics as per pain management; possible intervention today  - IV antibiotics post HD  - ID consulted  - Post-discharge follow up with GI for pacreatic duct    # ESRD  # Hyperphosphatemia  # AoCKD  - HD per Nephrology   - Phosphate binder  - LASHELL with HD    # Gastroparesis  # Chronic Pancreatitis, currently with normal Lipase. CT findings as noted  # Erosive Esophagitis history  # Cirrhosis, prior HCV  - Pantoprazole  - Tramadol PRN,   - Ondansetron PRN  - Pancreatic enzymes supplements added    # Hypertension  # History of CAD  # History of CHF, diastolic  - Home antihypertensives to be continued  - ASA to be continued  - Fluid balance monitoring and daily weight    # Diabetes  # Hypothyroidism, history of thyroidectomy for cancer  - Blood glucose monitoring with sliding scale Lispro  - May need basal/Bolus Insulins if BG>180  - Levothyroxine    # Anxiety  # Depression  - Sertraline to be continued    VTE prophylaxis - Intermittent pneumatic compression devices      Disposition - Medically active. Unclear pain etiology; needs control

## 2025-06-18 NOTE — CONSULT NOTE ADULT - SUBJECTIVE AND OBJECTIVE BOX
[facility]  INFECTIOUS DISEASES at [place] / [place] / [place]  =======================================================                              Kai Kenyon MD                              Professor Emeritus:  Dr Billy Bolaños MD            Diplomates American Board of Internal Medicine & Infectious Diseases                                   Tel  [phone] Fax [phone]                                  Hospital Consult line:  [phone]  =======================================================    Patient is a 69 Female who presents with a chief complaint of abdominal pain     HPI:  69 F PMH HTN, DM (A1c 7.3), CAD, ESRD on HD, gastroparesis, IBS, chronic pancreatitis, 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     Initial CT showed resolving hepatic abscess, however also showed dilated biliary ducts. Pt underwent MRCP which was negative for choledocholithiasis. Patient underwent repeat CT on  which showed swirling of the mesenteric vessels at the mesenteric root. Underwent diagnostic laparoscopy with Surgery on  which was negative. ID was consulted for as Pain Management requested ID input prior to nerve block.      REVIEW OF SYSTEMS  Constitutional: No fevers, no chills   Skin: No rash   Eyes: No discharge	  ENMT: No sore throat  Respiratory: No cough, no SOB  Cardiovascular: No chest pain  Gastrointestinal: Abd pain, chronic 	  Genitourinary: No flank pain  Neurological: No AMS     prior hospital charts reviewed [V]  primary team notes reviewed [V]  other consultant notes reviewed [V]    PAST MEDICAL & SURGICAL HISTORY:  Diabetes    Bernard syndrome    Pancreatitis    Cirrhosis    ESRD on dialysis  MWF at [facility]    HTN (hypertension)    CAD (coronary artery disease)    Chronic diastolic congestive heart failure    IBS (irritable bowel syndrome)    Gastroparesis    Thyroid cancer    Esophagitis, erosive    HCV (hepatitis C virus)    History of liver biopsy    H/O total thyroidectomy    History of cholecystectomy  1990s    S/P     SOCIAL HISTORY:  No sick contacts     FAMILY HISTORY:  FH: breast cancer (Mother)    FH: brain cancer (Father)    Allergies  Augmentin (Hives)  ceftriaxone (Pruritus)    ANTIMICROBIALS:  meropenem Injectable 500 every 24 hours    ANTIMICROBIALS (past 90 days):  MEDICATIONS  (STANDING):    meropenem Injectable   500 milliGRAM(s) IV Push (06-15-25 @ 17:36)   500 milliGRAM(s) IV Push (25 @ 18:14)   500 milliGRAM(s) IV Push (25 @ 18:26)    meropenem Injectable   500 milliGRAM(s) IV Push (25 @ 18:55)    meropenem Injectable   500 milliGRAM(s) IV Push (25 @ 00:35)   500 milliGRAM(s) IV Push (25 @ 00:15)    OTHER MEDS:   MEDICATIONS  (STANDING):  acetaminophen     Tablet .. 650 every 6 hours PRN  acetaminophen   IVPB .. 1000 once  aluminum hydroxide/magnesium hydroxide/simethicone Suspension 30 every 4 hours PRN  aspirin enteric coated 81 daily  carvedilol 25 every 12 hours  dextrose 50% Injectable 25 once  dextrose Oral Gel 15 once PRN  epoetin day-epbx (RETACRIT) Injectable 99724 <User Schedule>  glucagon  Injectable 1 once  heparin   Injectable 5000 every 8 hours  hydrALAZINE 100 every 8 hours  HYDROmorphone   Tablet 1 every 6 hours PRN  HYDROmorphone   Tablet 2 every 6 hours PRN  HYDROmorphone  Injectable 0.5 every 6 hours PRN  insulin lispro (ADMELOG) corrective regimen sliding scale  three times a day before meals  levothyroxine 200 daily  lisinopril 10 daily  melatonin 3 at bedtime PRN  methocarbamol 500 three times a day  ondansetron Injectable 4 every 8 hours PRN  pancrelipase  (CREON  6,000 Lipase Units) 1 three times a day with meals  pantoprazole  Injectable 40 daily  sertraline 100 daily  traMADol 50 every 4 hours PRN    VITALS:  Vital Signs Last 24 Hrs  T(F): 98.7 (25 @ 08:05), Max: 99.3 (25 @ 16:15)    Vital Signs Last 24 Hrs  HR: 79 (25 @ 08:05) (66 - 83)  BP: 151/61 (25 @ 08:05) (132/76 - 174/92)  RR: 18 (25 @ 08:05)  SpO2: 95% (25 @ 08:05) (92% - 97%)  Wt(kg): --    EXAM:  General: NAD    HEENT: NCAT   CV: S1+S2   Lungs: No respiratory distress, on room air   Abd: Soft, no guarding   Ext: No cyanosis  Neuro: Alert and oriented, no focal deficits  Skin: No rash     Labs:                        8.2    4.41  )-----------( 88       ( 2025 08:15 )             25.5         136  |  98  |  54.3[H]  ----------------------------<  198[H]  5.3   |  23.0  |  6.47[H]    Ca    7.6[L]      2025 08:15  Phos  5.2       Mg     2.5         TPro  6.6  /  Alb  3.6  /  TBili  0.2[L]  /  DBili  x   /  AST  17  /  ALT  8   /  AlkPhos  135[H]      WBC Trend:  WBC Count: 4.41 (25 @ 08:15)  WBC Count: 3.76 (25 @ 03:55)  WBC Count: 3.41 (06-15-25 @ 16:19)  WBC Count: 2.80 (25 @ 23:53)    Auto Neutrophil #: 3.25 K/uL (25 @ 08:15)  Auto Neutrophil #: 1.33 K/uL (25 @ 23:53)  Auto Neutrophil #: 1.82 K/uL (25 @ 04:55)  Auto Neutrophil #: 2.53 K/uL (25 @ 17:00)  Auto Neutrophil #: 2.42 K/uL (25 @ 05:45)    Creatine Trend:  Creatinine: 6.47 (-)  Creatinine: 5.42 (-16)  Creatinine: 5.26 (-15)  Creatinine: 4.44 (-14)    Liver Biochemical Testing Trend:  Alanine Aminotransferase (ALT/SGPT): 8 (-17)  Alanine Aminotransferase (ALT/SGPT): 8 (-16)  Alanine Aminotransferase (ALT/SGPT): 8 ()  Alanine Aminotransferase (ALT/SGPT): 8 (-14)  Alanine Aminotransferase (ALT/SGPT): 7 ()  Aspartate Aminotransferase (AST/SGOT): 17 (25 @ 08:15)  Aspartate Aminotransferase (AST/SGOT): 15 (25 @ 03:55)  Aspartate Aminotransferase (AST/SGOT): 16 (25 @ 23:53)  Aspartate Aminotransferase (AST/SGOT): 15 (25 @ 04:55)  Aspartate Aminotransferase (AST/SGOT): 16 (25 @ 03:44)  Bilirubin Total: 0.2 (-17)  Bilirubin Total: <0.2 (-16)  Bilirubin Total: <0.2 (-)  Bilirubin Total: 0.2 (-)  Bilirubin Total: 0.3 (-)    Auto Eosinophil %: 1.8 % (25 @ 08:15)    Urinalysis Basic - ( 2025 08:15 )    Color: x / Appearance: x / SG: x / pH: x  Gluc: 198 mg/dL / Ketone: x  / Bili: x / Urobili: x   Blood: x / Protein: x / Nitrite: x   Leuk Esterase: x / RBC: x / WBC x   Sq Epi: x / Non Sq Epi: x / Bacteria: x    MICROBIOLOGY:  Vancomycin Level, Random: 18.6 ( @ 06:09)    MRSA PCR Result.: NotDetec (25 @ 13:07)  MRSA PCR Result.: NotDetec (25 @ 21:23)  MRSA PCR Result.: NotDetec (24 @ 08:11)    Culture - Blood (collected 2025 07:25)  Source: Blood Blood-Peripheral  Final Report:    No growth at 5 days    Culture - Blood (collected 2025 07:17)  Source: Blood Blood-Peripheral  Final Report:    No growth at 5 days    Culture - CSF with Gram Stain (collected 30 May 2025 15:30)  Source: CSF CSF  Final Report:    No growth at 5 days    Culture - Fungal, CSF (collected 30 May 2025 15:30)  Source: CSF CSF  Preliminary Report:    No fungus isolated at 2 weeks.    Culture - Acid Fast - CSF (collected 30 May 2025 15:30)  Source: CSF CSF  Preliminary Report:    No acid-fast bacilli isolated at 2 weeks.    Culture - Blood (collected 29 May 2025 15:33)  Source: Blood Blood  Final Report:    No growth at 5 days    Culture - Blood (collected 29 May 2025 15:30)  Source: Blood Blood  Final Report:    No growth at 5 days    Culture - Urine (collected 2024 15:10)  Source: Clean Catch Clean Catch (Midstream)  Final Report:    >100,000 CFU/ml Coag Negative Staphylococcus "Susceptibilities not    performed"    <10,000 CFU/ml Normal Urogenital christina present    Culture - Urine (collected 2024 02:00)  Source: Catheterized Catheterized  Final Report:    <10,000 CFU/mL Normal Urogenital Christina    D-Dimer Assay, Quantitative: 534 (06-17)    Lactate, Blood: 0.6 (16 @ 07:49)  Lactate, Blood: 0.5 (06-15 @ 16:19)    A1C with Estimated Average Glucose Result: 7.3 % (25 @ 06:07)    RADIOLOGY:  imaging below personally reviewed    < from: CT Abdomen and Pelvis w/ IV Cont (25 @ 22:37) >  IMPRESSION:    Sclerosis. Previously seen liver abscess on 6/3/2025 is resolving with a   subtle area of low hypoattenuation remaining. Continued follow-up is   recommended.    Dilated intra and extrahepatic biliary ducts increased since the previous   exam. This could be secondary to postcholecystectomy state. If there is   concern for biliary pathology. MRI with contrast and MRCP can be obtained.    Subcentimeter cyst containing calcification in the pancreas uncinate   process. A 6 mm dilated pancreatic ductin the head of the pancreas.   Nonemergent MRI abdomen with MRCP is recommended.    < end of copied text >    < from: MR MRCP No Cont (25 @ 22:07) >  IMPRESSION:  No evidence of choledocholithiasis or ampullary mass.    < end of copied text > [facility]  INFECTIOUS DISEASES at [place] / [place] / [place]  =======================================================                              Kai Kenyon MD                              Professor Emeritus:  Dr Billy Bolaños MD            Diplomates American Board of Internal Medicine & Infectious Diseases                                   Tel  [phone] Fax [phone]                                  Hospital Consult line:  [phone]  =======================================================    Patient is a 69 Female who presents with a chief complaint of abdominal pain     HPI:  69 F PMH HTN, DM (A1c 7.3), CAD, ESRD on HD, gastroparesis, IBS, chronic pancreatitis, 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     Initial CT showed resolving hepatic abscess, however also showed dilated biliary ducts. Pt underwent MRCP which was negative for choledocholithiasis. Patient underwent repeat CT on  which showed swirling of the mesenteric vessels at the mesenteric root. Underwent diagnostic laparoscopy with Surgery on  which was negative. ID was consulted as Pain Management requested ID input prior to nerve block.      REVIEW OF SYSTEMS  Constitutional: No fevers, no chills   Skin: No rash   Eyes: No discharge	  ENMT: No sore throat  Respiratory: No cough, no SOB  Cardiovascular: No chest pain  Gastrointestinal: Abd pain, chronic 	  Genitourinary: No flank pain  Neurological: No AMS     prior hospital charts reviewed [V]  primary team notes reviewed [V]  other consultant notes reviewed [V]    PAST MEDICAL & SURGICAL HISTORY:  Diabetes    Bernard syndrome    Pancreatitis    Cirrhosis    ESRD on dialysis  MWF at [facility]    HTN (hypertension)    CAD (coronary artery disease)    Chronic diastolic congestive heart failure    IBS (irritable bowel syndrome)    Gastroparesis    Thyroid cancer    Esophagitis, erosive    HCV (hepatitis C virus)    History of liver biopsy    H/O total thyroidectomy    History of cholecystectomy  1990s    S/P     SOCIAL HISTORY:  No sick contacts     FAMILY HISTORY:  FH: breast cancer (Mother)    FH: brain cancer (Father)    Allergies  Augmentin (Hives)  ceftriaxone (Pruritus)    ANTIMICROBIALS:  meropenem Injectable 500 every 24 hours    ANTIMICROBIALS (past 90 days):  MEDICATIONS  (STANDING):    meropenem Injectable   500 milliGRAM(s) IV Push (06-15-25 @ 17:36)   500 milliGRAM(s) IV Push (25 @ 18:14)   500 milliGRAM(s) IV Push (25 @ 18:26)    meropenem Injectable   500 milliGRAM(s) IV Push (25 @ 18:55)    meropenem Injectable   500 milliGRAM(s) IV Push (25 @ 00:35)   500 milliGRAM(s) IV Push (25 @ 00:15)    OTHER MEDS:   MEDICATIONS  (STANDING):  acetaminophen     Tablet .. 650 every 6 hours PRN  acetaminophen   IVPB .. 1000 once  aluminum hydroxide/magnesium hydroxide/simethicone Suspension 30 every 4 hours PRN  aspirin enteric coated 81 daily  carvedilol 25 every 12 hours  dextrose 50% Injectable 25 once  dextrose Oral Gel 15 once PRN  epoetin day-epbx (RETACRIT) Injectable 24697 <User Schedule>  glucagon  Injectable 1 once  heparin   Injectable 5000 every 8 hours  hydrALAZINE 100 every 8 hours  HYDROmorphone   Tablet 1 every 6 hours PRN  HYDROmorphone   Tablet 2 every 6 hours PRN  HYDROmorphone  Injectable 0.5 every 6 hours PRN  insulin lispro (ADMELOG) corrective regimen sliding scale  three times a day before meals  levothyroxine 200 daily  lisinopril 10 daily  melatonin 3 at bedtime PRN  methocarbamol 500 three times a day  ondansetron Injectable 4 every 8 hours PRN  pancrelipase  (CREON  6,000 Lipase Units) 1 three times a day with meals  pantoprazole  Injectable 40 daily  sertraline 100 daily  traMADol 50 every 4 hours PRN    VITALS:  Vital Signs Last 24 Hrs  T(F): 98.7 (25 @ 08:05), Max: 99.3 (25 @ 16:15)    Vital Signs Last 24 Hrs  HR: 79 (25 @ 08:05) (66 - 83)  BP: 151/61 (25 @ 08:05) (132/76 - 174/92)  RR: 18 (25 @ 08:05)  SpO2: 95% (25 @ 08:05) (92% - 97%)  Wt(kg): --    EXAM:  General: NAD    HEENT: NCAT   CV: S1+S2   Lungs: No respiratory distress, on room air   Abd: Soft, no guarding   Ext: No cyanosis  Neuro: Alert and oriented, no focal deficits  Skin: No rash     Labs:                        8.2    4.41  )-----------( 88       ( 2025 08:15 )             25.5         136  |  98  |  54.3[H]  ----------------------------<  198[H]  5.3   |  23.0  |  6.47[H]    Ca    7.6[L]      2025 08:15  Phos  5.2       Mg     2.5         TPro  6.6  /  Alb  3.6  /  TBili  0.2[L]  /  DBili  x   /  AST  17  /  ALT  8   /  AlkPhos  135[H]      WBC Trend:  WBC Count: 4.41 (25 @ 08:15)  WBC Count: 3.76 (25 @ 03:55)  WBC Count: 3.41 (06-15-25 @ 16:19)  WBC Count: 2.80 (25 @ 23:53)    Auto Neutrophil #: 3.25 K/uL (25 @ 08:15)  Auto Neutrophil #: 1.33 K/uL (25 @ 23:53)  Auto Neutrophil #: 1.82 K/uL (25 @ 04:55)  Auto Neutrophil #: 2.53 K/uL (25 @ 17:00)  Auto Neutrophil #: 2.42 K/uL (25 @ 05:45)    Creatine Trend:  Creatinine: 6.47 (-)  Creatinine: 5.42 (-16)  Creatinine: 5.26 (-15)  Creatinine: 4.44 (-14)    Liver Biochemical Testing Trend:  Alanine Aminotransferase (ALT/SGPT): 8 (-17)  Alanine Aminotransferase (ALT/SGPT): 8 (-16)  Alanine Aminotransferase (ALT/SGPT): 8 ()  Alanine Aminotransferase (ALT/SGPT): 8 (-14)  Alanine Aminotransferase (ALT/SGPT): 7 ()  Aspartate Aminotransferase (AST/SGOT): 17 (25 @ 08:15)  Aspartate Aminotransferase (AST/SGOT): 15 (25 @ 03:55)  Aspartate Aminotransferase (AST/SGOT): 16 (25 @ 23:53)  Aspartate Aminotransferase (AST/SGOT): 15 (25 @ 04:55)  Aspartate Aminotransferase (AST/SGOT): 16 (25 @ 03:44)  Bilirubin Total: 0.2 (-17)  Bilirubin Total: <0.2 (-16)  Bilirubin Total: <0.2 (-)  Bilirubin Total: 0.2 (-)  Bilirubin Total: 0.3 (-)    Auto Eosinophil %: 1.8 % (25 @ 08:15)    Urinalysis Basic - ( 2025 08:15 )    Color: x / Appearance: x / SG: x / pH: x  Gluc: 198 mg/dL / Ketone: x  / Bili: x / Urobili: x   Blood: x / Protein: x / Nitrite: x   Leuk Esterase: x / RBC: x / WBC x   Sq Epi: x / Non Sq Epi: x / Bacteria: x    MICROBIOLOGY:  Vancomycin Level, Random: 18.6 ( @ 06:09)    MRSA PCR Result.: NotDetec (25 @ 13:07)  MRSA PCR Result.: NotDetec (25 @ 21:23)  MRSA PCR Result.: NotDetec (24 @ 08:11)    Culture - Blood (collected 2025 07:25)  Source: Blood Blood-Peripheral  Final Report:    No growth at 5 days    Culture - Blood (collected 2025 07:17)  Source: Blood Blood-Peripheral  Final Report:    No growth at 5 days    Culture - CSF with Gram Stain (collected 30 May 2025 15:30)  Source: CSF CSF  Final Report:    No growth at 5 days    Culture - Fungal, CSF (collected 30 May 2025 15:30)  Source: CSF CSF  Preliminary Report:    No fungus isolated at 2 weeks.    Culture - Acid Fast - CSF (collected 30 May 2025 15:30)  Source: CSF CSF  Preliminary Report:    No acid-fast bacilli isolated at 2 weeks.    Culture - Blood (collected 29 May 2025 15:33)  Source: Blood Blood  Final Report:    No growth at 5 days    Culture - Blood (collected 29 May 2025 15:30)  Source: Blood Blood  Final Report:    No growth at 5 days    Culture - Urine (collected 2024 15:10)  Source: Clean Catch Clean Catch (Midstream)  Final Report:    >100,000 CFU/ml Coag Negative Staphylococcus "Susceptibilities not    performed"    <10,000 CFU/ml Normal Urogenital christina present    Culture - Urine (collected 2024 02:00)  Source: Catheterized Catheterized  Final Report:    <10,000 CFU/mL Normal Urogenital Christina    D-Dimer Assay, Quantitative: 534 (-17)    Lactate, Blood: 0.6 ( @ 07:49)  Lactate, Blood: 0.5 (06-15 @ 16:19)    A1C with Estimated Average Glucose Result: 7.3 % (25 @ 06:07)    RADIOLOGY:  imaging below personally reviewed    < from: CT Abdomen and Pelvis w/ IV Cont (25 @ 22:37) >  IMPRESSION:    Sclerosis. Previously seen liver abscess on 6/3/2025 is resolving with a   subtle area of low hypoattenuation remaining. Continued follow-up is   recommended.    Dilated intra and extrahepatic biliary ducts increased since the previous   exam. This could be secondary to postcholecystectomy state. If there is   concern for biliary pathology. MRI with contrast and MRCP can be obtained.    Subcentimeter cyst containing calcification in the pancreas uncinate   process. A 6 mm dilated pancreatic ductin the head of the pancreas.   Nonemergent MRI abdomen with MRCP is recommended.    < end of copied text >    < from: MR MRCP No Cont (25 @ 22:07) >  IMPRESSION:  No evidence of choledocholithiasis or ampullary mass.    < end of copied text >

## 2025-06-18 NOTE — CONSULT NOTE ADULT - CONSULT REASON
ID input requested prior to procedure
Mesenteric Swirl
abdominal pain
Pain Management
abdominal pain, Hx of liver abscess, Dilated CBD
Pre-op risk stratification
ESRD on HD

## 2025-06-18 NOTE — PROGRESS NOTE ADULT - ASSESSMENT
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 During last admission, patient was found to have a liver abscess and was discharged on Meropenem via PICC line (for 6 weeks - ends 7/15). Today patient did not received her meropenem dose as the person who was supposed to administer the antibiotic got into a car accident. Endorses worsening abdominal control, minimal relief with tramadol doses at home which prompted ED visit. Denies fever, chills, nausea, vomiting, changes in bowel, chest pain, palpitation SOB, dysuria or any other acute complaints. In the ED patient received multiple doses of Dilaudid, endorses pain is still present with minimal relief. Repeat CT-abd showed improving of the liver abscess but increased dilated intra and extrahepatic biliary ducts. Now S/P diagnostic lap which is reported normal    Plan:  -Pain controlled  -Pain service will sign off  -Please reconsult as needed

## 2025-06-19 ENCOUNTER — TRANSCRIPTION ENCOUNTER (OUTPATIENT)
Age: 70
End: 2025-06-19

## 2025-06-19 LAB
ANION GAP SERPL CALC-SCNC: 15 MMOL/L — SIGNIFICANT CHANGE UP (ref 5–17)
BUN SERPL-MCNC: 50.3 MG/DL — HIGH (ref 8–20)
CALCIUM SERPL-MCNC: 8 MG/DL — LOW (ref 8.4–10.5)
CHLORIDE SERPL-SCNC: 100 MMOL/L — SIGNIFICANT CHANGE UP (ref 96–108)
CO2 SERPL-SCNC: 24 MMOL/L — SIGNIFICANT CHANGE UP (ref 22–29)
CREAT SERPL-MCNC: 5.43 MG/DL — HIGH (ref 0.5–1.3)
EGFR: 8 ML/MIN/1.73M2 — LOW
EGFR: 8 ML/MIN/1.73M2 — LOW
GLUCOSE BLDC GLUCOMTR-MCNC: 122 MG/DL — HIGH (ref 70–99)
GLUCOSE BLDC GLUCOMTR-MCNC: 152 MG/DL — HIGH (ref 70–99)
GLUCOSE BLDC GLUCOMTR-MCNC: 154 MG/DL — HIGH (ref 70–99)
GLUCOSE BLDC GLUCOMTR-MCNC: 199 MG/DL — HIGH (ref 70–99)
GLUCOSE BLDC GLUCOMTR-MCNC: 223 MG/DL — HIGH (ref 70–99)
GLUCOSE BLDC GLUCOMTR-MCNC: 251 MG/DL — HIGH (ref 70–99)
GLUCOSE SERPL-MCNC: 136 MG/DL — HIGH (ref 70–99)
HCT VFR BLD CALC: 23.5 % — LOW (ref 34.5–45)
HGB BLD-MCNC: 7.4 G/DL — LOW (ref 11.5–15.5)
IMMATURE PLATELET FRACTION #: 2 K/UL — LOW (ref 4.7–11.1)
IMMATURE PLATELET FRACTION %: 3.4 % — SIGNIFICANT CHANGE UP (ref 1.6–4.9)
MCHC RBC-ENTMCNC: 28.6 PG — SIGNIFICANT CHANGE UP (ref 27–34)
MCHC RBC-ENTMCNC: 31.5 G/DL — LOW (ref 32–36)
MCV RBC AUTO: 90.7 FL — SIGNIFICANT CHANGE UP (ref 80–100)
NRBC # BLD AUTO: 0 K/UL — SIGNIFICANT CHANGE UP (ref 0–0)
NRBC # FLD: 0 K/UL — SIGNIFICANT CHANGE UP (ref 0–0)
NRBC BLD AUTO-RTO: 0 /100 WBCS — SIGNIFICANT CHANGE UP (ref 0–0)
PLATELET # BLD AUTO: 60 K/UL — LOW (ref 150–400)
PMV BLD: 11.1 FL — SIGNIFICANT CHANGE UP (ref 7–13)
POTASSIUM SERPL-MCNC: 5.2 MMOL/L — SIGNIFICANT CHANGE UP (ref 3.5–5.3)
POTASSIUM SERPL-SCNC: 5.2 MMOL/L — SIGNIFICANT CHANGE UP (ref 3.5–5.3)
RBC # BLD: 2.59 M/UL — LOW (ref 3.8–5.2)
RBC # FLD: 14.7 % — HIGH (ref 10.3–14.5)
SODIUM SERPL-SCNC: 138 MMOL/L — SIGNIFICANT CHANGE UP (ref 135–145)
WBC # BLD: 2.75 K/UL — LOW (ref 3.8–10.5)
WBC # FLD AUTO: 2.75 K/UL — LOW (ref 3.8–10.5)

## 2025-06-19 PROCEDURE — 90935 HEMODIALYSIS ONE EVALUATION: CPT

## 2025-06-19 PROCEDURE — 99232 SBSQ HOSP IP/OBS MODERATE 35: CPT

## 2025-06-19 RX ORDER — LISINOPRIL 5 MG/1
20 TABLET ORAL DAILY
Refills: 0 | Status: DISCONTINUED | OUTPATIENT
Start: 2025-06-19 | End: 2025-06-20

## 2025-06-19 RX ORDER — LABETALOL HYDROCHLORIDE 200 MG/1
10 TABLET, FILM COATED ORAL EVERY 4 HOURS
Refills: 0 | Status: DISCONTINUED | OUTPATIENT
Start: 2025-06-19 | End: 2025-06-20

## 2025-06-19 RX ADMIN — Medication 2 MILLIGRAM(S): at 22:39

## 2025-06-19 RX ADMIN — INSULIN LISPRO 4: 100 INJECTION, SOLUTION INTRAVENOUS; SUBCUTANEOUS at 16:58

## 2025-06-19 RX ADMIN — Medication 200 MICROGRAM(S): at 04:27

## 2025-06-19 RX ADMIN — Medication 1 APPLICATION(S): at 05:56

## 2025-06-19 RX ADMIN — SERTRALINE 100 MILLIGRAM(S): 100 TABLET, FILM COATED ORAL at 12:35

## 2025-06-19 RX ADMIN — METHOCARBAMOL 500 MILLIGRAM(S): 500 TABLET, FILM COATED ORAL at 21:39

## 2025-06-19 RX ADMIN — Medication 1 CAPSULE(S): at 12:37

## 2025-06-19 RX ADMIN — HEPARIN SODIUM 5000 UNIT(S): 1000 INJECTION INTRAVENOUS; SUBCUTANEOUS at 21:39

## 2025-06-19 RX ADMIN — CARVEDILOL 25 MILLIGRAM(S): 3.12 TABLET, FILM COATED ORAL at 21:39

## 2025-06-19 RX ADMIN — HEPARIN SODIUM 5000 UNIT(S): 1000 INJECTION INTRAVENOUS; SUBCUTANEOUS at 05:57

## 2025-06-19 RX ADMIN — INSULIN LISPRO 2: 100 INJECTION, SOLUTION INTRAVENOUS; SUBCUTANEOUS at 12:32

## 2025-06-19 RX ADMIN — MEROPENEM 500 MILLIGRAM(S): 1 INJECTION INTRAVENOUS at 00:58

## 2025-06-19 RX ADMIN — LISINOPRIL 10 MILLIGRAM(S): 5 TABLET ORAL at 09:16

## 2025-06-19 RX ADMIN — Medication 2 MILLIGRAM(S): at 21:39

## 2025-06-19 RX ADMIN — Medication 2 MILLIGRAM(S): at 04:31

## 2025-06-19 RX ADMIN — Medication 0.5 MILLIGRAM(S): at 15:55

## 2025-06-19 RX ADMIN — Medication 2 MILLIGRAM(S): at 05:30

## 2025-06-19 RX ADMIN — Medication 0.5 MILLIGRAM(S): at 16:14

## 2025-06-19 RX ADMIN — METHOCARBAMOL 500 MILLIGRAM(S): 500 TABLET, FILM COATED ORAL at 12:34

## 2025-06-19 RX ADMIN — CARVEDILOL 25 MILLIGRAM(S): 3.12 TABLET, FILM COATED ORAL at 11:03

## 2025-06-19 RX ADMIN — Medication 667 MILLIGRAM(S): at 16:59

## 2025-06-19 RX ADMIN — Medication 1 CAPSULE(S): at 17:02

## 2025-06-19 RX ADMIN — Medication 100 MILLIGRAM(S): at 09:16

## 2025-06-19 RX ADMIN — Medication 667 MILLIGRAM(S): at 05:57

## 2025-06-19 RX ADMIN — METHOCARBAMOL 500 MILLIGRAM(S): 500 TABLET, FILM COATED ORAL at 05:57

## 2025-06-19 RX ADMIN — HEPARIN SODIUM 5000 UNIT(S): 1000 INJECTION INTRAVENOUS; SUBCUTANEOUS at 12:34

## 2025-06-19 RX ADMIN — Medication 81 MILLIGRAM(S): at 12:34

## 2025-06-19 RX ADMIN — INSULIN LISPRO 2: 100 INJECTION, SOLUTION INTRAVENOUS; SUBCUTANEOUS at 07:56

## 2025-06-19 RX ADMIN — MEROPENEM 500 MILLIGRAM(S): 1 INJECTION INTRAVENOUS at 23:11

## 2025-06-19 RX ADMIN — Medication 100 MILLIGRAM(S): at 21:38

## 2025-06-19 RX ADMIN — Medication 0.5 MILLIGRAM(S): at 23:12

## 2025-06-19 NOTE — DISCHARGE NOTE NURSING/CASE MANAGEMENT/SOCIAL WORK - NSDCFUADDAPPT_GEN_ALL_CORE_FT
OP HD resumed at [address] [address], [phone] for 6/20/25. MWF 3:15pm.  OP HD resumed at [address] [address], [phone] for 6/23/25. MWF 3:15pm.

## 2025-06-19 NOTE — PROGRESS NOTE ADULT - PROBLEM/PLAN-1
Problem: Adult Inpatient Plan of Care  Goal: Plan of Care Review  Outcome: Progressing  Goal: Patient-Specific Goal (Individualized)  Outcome: Progressing  Goal: Absence of Hospital-Acquired Illness or Injury  Outcome: Progressing  Goal: Optimal Comfort and Wellbeing  Outcome: Progressing  Goal: Readiness for Transition of Care  Outcome: Progressing     Problem: Bariatric Environmental Safety  Goal: Safety Maintained with Care  Outcome: Progressing     Problem: Fall Injury Risk  Goal: Absence of Fall and Fall-Related Injury  Outcome: Progressing     Problem: Wound  Goal: Optimal Coping  Outcome: Progressing  Goal: Optimal Functional Ability  Outcome: Progressing  Goal: Absence of Infection Signs and Symptoms  Outcome: Progressing  Goal: Improved Oral Intake  Outcome: Progressing  Goal: Optimal Pain Control and Function  Outcome: Progressing  Goal: Skin Health and Integrity  Outcome: Progressing  Goal: Optimal Wound Healing  Outcome: Progressing     
DISPLAY PLAN FREE TEXT

## 2025-06-19 NOTE — PROGRESS NOTE ADULT - SUBJECTIVE AND OBJECTIVE BOX
[facility] DIVISION OF KIDNEY DISEASES AND HYPERTENSION -- DIALYSIS NOTE    Patient seen and examined during dialysis  Patient tolerating the procedure well.     VITALS  --------------------------------------------------------------------------------  T(C): 36.8 (06-19-25 @ 08:10), Max: 37.1 (06-19-25 @ 07:45)  HR: 82 (06-19-25 @ 08:10) (74 - 85)  BP: 190/88 (06-19-25 @ 08:10) (129/69 - 202/77)  RR: 18 (06-19-25 @ 08:10) (15 - 19)  SpO2: 95% (06-19-25 @ 08:10) (94% - 97%)  Wt(kg): --     Will continue the current medical management. Next hemodialysis as scheduled.     BP high- pain contributing  try 2 L UF  Hb low- procrit with HD

## 2025-06-19 NOTE — DISCHARGE NOTE NURSING/CASE MANAGEMENT/SOCIAL WORK - NSDCPEFALRISK_GEN_ALL_CORE
For information on Fall & Injury Prevention, visit: [email or web address] OR  [email or web address] OR  https://www.cdc.gov/steadi/patient.html

## 2025-06-19 NOTE — DISCHARGE NOTE NURSING/CASE MANAGEMENT/SOCIAL WORK - FINANCIAL ASSISTANCE
[facility] provides services at a reduced cost to those who are determined to be eligible through [facility]’s financial assistance program. Information regarding [facility]’s financial assistance program can be found by going to [email or web address] or by calling [phone].

## 2025-06-19 NOTE — PROGRESS NOTE ADULT - ASSESSMENT
69 year old female with Hypertension, Diabetes, CAD s/p PCI, ESRD on HD for 2.5 years (M,W,F), Gastroparesis, IBS, Chronic Pancreatitis, Erosive Esophagitis, 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  Evaluated by GI and MRCP performed due to dilated ducts; no obstruction. She was noted to have 'swirling of mesenteric root' on CT and underwent exploratory laparotomy which is reported normal. Pre-operative stress test also normal.    # Abdominal pain; Likely secondary to  # Hepatic abscess; improved on follow up imaging  # Pancreatic head duct with mild ductal dilatation  Afebrile with normal WBC. Normal transaminases. Culture negative  - Analgesics as per pain management; refused pain intervention  - IV antibiotics post HD  - ID consulted  - Post-discharge follow up with GI for pacreatic duct    # ESRD  # Hyperphosphatemia  # AoCKD  - HD per Nephrology   - Phosphate binder  - LASHELL with HD    # Gastroparesis  # Chronic Pancreatitis, currently with normal Lipase. CT findings as noted  # Erosive Esophagitis history  # Cirrhosis, prior HCV  - Pantoprazole  - Tramadol PRN,   - Ondansetron PRN  - Pancreatic enzymes supplements added    # Hypertension; uncontrolled  # History of CAD  # History of CHF, diastolic  - Home antihypertensives to be continued; Hydralazine and Lisinopril increased  - ASA to be continued  - Fluid balance monitoring and daily weight    # Diabetes  # Hypothyroidism, history of thyroidectomy for cancer  - Blood glucose monitoring with sliding scale Lispro  - May need basal/Bolus Insulins if BG>180  - Levothyroxine    # Anxiety  # Depression  - Sertraline to be continued    VTE prophylaxis - Intermittent pneumatic compression devices      Disposition - Medically active as uncontrolled BP. Anticipate to be stable in next 24-48 hours.    Discussed with YOLANDA Quiroz; needs IV antibiotics to be set up  Discussed with Nephrology Dr Crawley; may get HD tomorrow

## 2025-06-19 NOTE — PROGRESS NOTE ADULT - SUBJECTIVE AND OBJECTIVE BOX
HOSPITALIST PROGRESS NOTE    HAYLEY LARA  [id number]  69yFemale    Patient is a 69y old  Female who presents with a chief complaint of Abdominal pain (19 Jun 2025 08:54)      SUBJECTIVE:   Chart reviewed since last visit.     Refused pain intervention yesterday    Patient seen and examined in HD suite earlier today for abdominal pain, liver abscess  Pain intermittent. No fever or chills      OBJECTIVE:  Vital Signs Last 24 Hrs  T(C): 36.7 (19 Jun 2025 14:07), Max: 37.1 (19 Jun 2025 07:45)  T(F): 98 (19 Jun 2025 14:07), Max: 98.7 (19 Jun 2025 07:45)  HR: 77 (19 Jun 2025 14:07) (74 - 85)  BP: 132/84 (19 Jun 2025 14:07) (132/84 - 202/77)   RR: 18 (19 Jun 2025 14:07) (15 - 18)  SpO2: 96% (19 Jun 2025 14:07) (94% - 98%)    Parameters below as of 19 Jun 2025 14:07  Patient On (Oxygen Delivery Method): room air      PHYSICAL EXAMINATION  General: Elderly female, chronically ill appearing, lying in HD suite; no distress  HEENT:  Anicteric sclera, extraocular movements intact   NECK:  Supple   CVS: regular rate and rhythm S1 S2  RESP:  Fair air entry without  any additional sounds  GI:  Soft, minimally tender RUQ BS+  : No suprapubic tenderness  MSK:  No edema. Moves all extremities  CNS:  Awake, alert, oriented. Fluent speech and intact cognition, Follows commands  INTEG:  Warm skin  PSYCH:  Fair mood     MONITOR:  CAPILLARY BLOOD GLUCOSE      POCT Blood Glucose.: 223 mg/dL (19 Jun 2025 16:03)  POCT Blood Glucose.: 152 mg/dL (19 Jun 2025 11:58)  POCT Blood Glucose.: 122 mg/dL (19 Jun 2025 11:05)  POCT Blood Glucose.: 154 mg/dL (19 Jun 2025 07:52)  POCT Blood Glucose.: 199 mg/dL (19 Jun 2025 00:12)  POCT Blood Glucose.: 195 mg/dL (18 Jun 2025 17:41)        I&O's Summary    18 Jun 2025 07:01  -  19 Jun 2025 07:00  --------------------------------------------------------  IN: 300 mL / OUT: 0 mL / NET: 300 mL    19 Jun 2025 07:01  -  19 Jun 2025 16:37  --------------------------------------------------------  IN: 0 mL / OUT: 2000 mL / NET: -2000 mL                            7.4    2.75  )-----------( 60       ( 19 Jun 2025 05:43 )             23.5       06-19    138  |  100  |  50.3[H]  ----------------------------<  136[H]  5.2   |  24.0  |  5.43[H]    Ca    8.0[L]      19 Jun 2025 05:43          Urinalysis Basic - ( 19 Jun 2025 05:43 )    Color: x / Appearance: x / SG: x / pH: x  Gluc: 136 mg/dL / Ketone: x  / Bili: x / Urobili: x   Blood: x / Protein: x / Nitrite: x   Leuk Esterase: x / RBC: x / WBC x   Sq Epi: x / Non Sq Epi: x / Bacteria: x            TTE:    RADIOLOGY        MEDICATIONS  (STANDING):  aspirin enteric coated 81 milliGRAM(s) Oral daily  calcium acetate 667 milliGRAM(s) Oral every 12 hours  carvedilol 25 milliGRAM(s) Oral every 12 hours  chlorhexidine 2% Cloths 1 Application(s) Topical <User Schedule>  dextrose 5%. 1000 milliLiter(s) (50 mL/Hr) IV Continuous <Continuous>  dextrose 50% Injectable 25 Gram(s) IV Push once  epoetin day-epbx (RETACRIT) Injectable 73781 Unit(s) IV Push <User Schedule>  glucagon  Injectable 1 milliGRAM(s) IntraMuscular once  heparin   Injectable 5000 Unit(s) SubCutaneous every 8 hours  hydrALAZINE 100 milliGRAM(s) Oral every 8 hours  insulin lispro (ADMELOG) corrective regimen sliding scale   SubCutaneous three times a day before meals  levothyroxine 200 MICROGram(s) Oral daily  lisinopril 20 milliGRAM(s) Oral daily  meropenem Injectable 500 milliGRAM(s) IV Push every 24 hours  methocarbamol 500 milliGRAM(s) Oral three times a day  pancrelipase  (CREON  6,000 Lipase Units) 1 Capsule(s) Oral three times a day with meals  pantoprazole    Tablet 40 milliGRAM(s) Oral before breakfast  sertraline 100 milliGRAM(s) Oral daily      MEDICATIONS  (PRN):  acetaminophen     Tablet .. 650 milliGRAM(s) Oral every 6 hours PRN Temp greater or equal to 38C (100.4F), Mild Pain (1 - 3)  aluminum hydroxide/magnesium hydroxide/simethicone Suspension 30 milliLiter(s) Oral every 4 hours PRN Dyspepsia  dextrose Oral Gel 15 Gram(s) Oral once PRN Blood Glucose LESS THAN 70 milliGRAM(s)/deciliter  HYDROmorphone   Tablet 1 milliGRAM(s) Oral every 6 hours PRN Moderate Pain (4 - 6)  HYDROmorphone   Tablet 2 milliGRAM(s) Oral every 6 hours PRN Severe Pain (7 - 10)  HYDROmorphone  Injectable 0.5 milliGRAM(s) IV Push every 6 hours PRN BTP; Severe Pain (7 - 10)  labetalol Injectable 10 milliGRAM(s) IV Push every 4 hours PRN Systolic/Diastolic >160/100  melatonin 3 milliGRAM(s) Oral at bedtime PRN Insomnia  ondansetron Injectable 4 milliGRAM(s) IV Push every 8 hours PRN Nausea and/or Vomiting  traMADol 50 milliGRAM(s) Oral every 4 hours PRN Moderate Pain (4 - 6)

## 2025-06-19 NOTE — DISCHARGE NOTE NURSING/CASE MANAGEMENT/SOCIAL WORK - PATIENT PORTAL LINK FT
You can access the FollowMyHealth Patient Portal offered by [facility] by registering at the following website: [email or web address]. By joining [facility]’s FollowMyHealth portal, you will also be able to view your health information using other applications (apps) compatible with our system.

## 2025-06-20 ENCOUNTER — TRANSCRIPTION ENCOUNTER (OUTPATIENT)
Age: 70
End: 2025-06-20

## 2025-06-20 VITALS
SYSTOLIC BLOOD PRESSURE: 163 MMHG | HEART RATE: 82 BPM | DIASTOLIC BLOOD PRESSURE: 72 MMHG | OXYGEN SATURATION: 97 % | TEMPERATURE: 98 F | RESPIRATION RATE: 18 BRPM

## 2025-06-20 LAB
GLUCOSE BLDC GLUCOMTR-MCNC: 216 MG/DL — HIGH (ref 70–99)
HCT VFR BLD CALC: 25.1 % — LOW (ref 34.5–45)
HGB BLD-MCNC: 7.9 G/DL — LOW (ref 11.5–15.5)
IMMATURE PLATELET FRACTION #: 4.2 K/UL — LOW (ref 4.7–11.1)
IMMATURE PLATELET FRACTION %: 6.2 % — HIGH (ref 1.6–4.9)
MCHC RBC-ENTMCNC: 28.6 PG — SIGNIFICANT CHANGE UP (ref 27–34)
MCHC RBC-ENTMCNC: 31.5 G/DL — LOW (ref 32–36)
MCV RBC AUTO: 90.9 FL — SIGNIFICANT CHANGE UP (ref 80–100)
NRBC # BLD AUTO: 0 K/UL — SIGNIFICANT CHANGE UP (ref 0–0)
NRBC # FLD: 0 K/UL — SIGNIFICANT CHANGE UP (ref 0–0)
NRBC BLD AUTO-RTO: 0 /100 WBCS — SIGNIFICANT CHANGE UP (ref 0–0)
PLATELET # BLD AUTO: 68 K/UL — LOW (ref 150–400)
PMV BLD: 12.9 FL — SIGNIFICANT CHANGE UP (ref 7–13)
RBC # BLD: 2.76 M/UL — LOW (ref 3.8–5.2)
RBC # FLD: 14.6 % — HIGH (ref 10.3–14.5)
WBC # BLD: 2.54 K/UL — LOW (ref 3.8–10.5)
WBC # FLD AUTO: 2.54 K/UL — LOW (ref 3.8–10.5)

## 2025-06-20 PROCEDURE — 87641 MR-STAPH DNA AMP PROBE: CPT

## 2025-06-20 PROCEDURE — 93306 TTE W/DOPPLER COMPLETE: CPT

## 2025-06-20 PROCEDURE — 74181 MRI ABDOMEN W/O CONTRAST: CPT

## 2025-06-20 PROCEDURE — 71045 X-RAY EXAM CHEST 1 VIEW: CPT

## 2025-06-20 PROCEDURE — C9399: CPT

## 2025-06-20 PROCEDURE — 96375 TX/PRO/DX INJ NEW DRUG ADDON: CPT

## 2025-06-20 PROCEDURE — 36430 TRANSFUSION BLD/BLD COMPNT: CPT

## 2025-06-20 PROCEDURE — 86901 BLOOD TYPING SEROLOGIC RH(D): CPT

## 2025-06-20 PROCEDURE — 99261: CPT

## 2025-06-20 PROCEDURE — 82962 GLUCOSE BLOOD TEST: CPT

## 2025-06-20 PROCEDURE — 93017 CV STRESS TEST TRACING ONLY: CPT

## 2025-06-20 PROCEDURE — 99239 HOSP IP/OBS DSCHRG MGMT >30: CPT

## 2025-06-20 PROCEDURE — A9500: CPT

## 2025-06-20 PROCEDURE — C1769: CPT

## 2025-06-20 PROCEDURE — 85610 PROTHROMBIN TIME: CPT

## 2025-06-20 PROCEDURE — 93970 EXTREMITY STUDY: CPT

## 2025-06-20 PROCEDURE — 86850 RBC ANTIBODY SCREEN: CPT

## 2025-06-20 PROCEDURE — 86900 BLOOD TYPING SEROLOGIC ABO: CPT

## 2025-06-20 PROCEDURE — P9016: CPT

## 2025-06-20 PROCEDURE — 84100 ASSAY OF PHOSPHORUS: CPT

## 2025-06-20 PROCEDURE — 96376 TX/PRO/DX INJ SAME DRUG ADON: CPT

## 2025-06-20 PROCEDURE — 85730 THROMBOPLASTIN TIME PARTIAL: CPT

## 2025-06-20 PROCEDURE — 80048 BASIC METABOLIC PNL TOTAL CA: CPT

## 2025-06-20 PROCEDURE — 78452 HT MUSCLE IMAGE SPECT MULT: CPT

## 2025-06-20 PROCEDURE — 85025 COMPLETE CBC W/AUTO DIFF WBC: CPT

## 2025-06-20 PROCEDURE — 83735 ASSAY OF MAGNESIUM: CPT

## 2025-06-20 PROCEDURE — 83690 ASSAY OF LIPASE: CPT

## 2025-06-20 PROCEDURE — 80053 COMPREHEN METABOLIC PANEL: CPT

## 2025-06-20 PROCEDURE — 93005 ELECTROCARDIOGRAM TRACING: CPT

## 2025-06-20 PROCEDURE — 99285 EMERGENCY DEPT VISIT HI MDM: CPT

## 2025-06-20 PROCEDURE — 85379 FIBRIN DEGRADATION QUANT: CPT

## 2025-06-20 PROCEDURE — 87640 STAPH A DNA AMP PROBE: CPT

## 2025-06-20 PROCEDURE — 85027 COMPLETE CBC AUTOMATED: CPT

## 2025-06-20 PROCEDURE — 74177 CT ABD & PELVIS W/CONTRAST: CPT

## 2025-06-20 PROCEDURE — 90935 HEMODIALYSIS ONE EVALUATION: CPT

## 2025-06-20 PROCEDURE — 96374 THER/PROPH/DIAG INJ IV PUSH: CPT

## 2025-06-20 PROCEDURE — 36415 COLL VENOUS BLD VENIPUNCTURE: CPT

## 2025-06-20 PROCEDURE — 83605 ASSAY OF LACTIC ACID: CPT

## 2025-06-20 PROCEDURE — 74018 RADEX ABDOMEN 1 VIEW: CPT

## 2025-06-20 PROCEDURE — 86923 COMPATIBILITY TEST ELECTRIC: CPT

## 2025-06-20 PROCEDURE — P9073: CPT

## 2025-06-20 RX ORDER — MEROPENEM 1 G/30ML
500 INJECTION INTRAVENOUS
Qty: 10 | Refills: 0
Start: 2025-06-20

## 2025-06-20 RX ORDER — METHOCARBAMOL 500 MG/1
1 TABLET, FILM COATED ORAL
Qty: 90 | Refills: 0
Start: 2025-06-20 | End: 2025-07-19

## 2025-06-20 RX ORDER — CARVEDILOL 3.12 MG/1
1 TABLET, FILM COATED ORAL
Qty: 0 | Refills: 0 | DISCHARGE
Start: 2025-06-20

## 2025-06-20 RX ORDER — LISINOPRIL 5 MG/1
1 TABLET ORAL
Qty: 30 | Refills: 0
Start: 2025-06-20 | End: 2025-07-19

## 2025-06-20 RX ADMIN — HEPARIN SODIUM 5000 UNIT(S): 1000 INJECTION INTRAVENOUS; SUBCUTANEOUS at 14:40

## 2025-06-20 RX ADMIN — Medication 1 APPLICATION(S): at 05:27

## 2025-06-20 RX ADMIN — Medication 100 MILLIGRAM(S): at 14:40

## 2025-06-20 RX ADMIN — Medication 1 CAPSULE(S): at 14:41

## 2025-06-20 RX ADMIN — SERTRALINE 100 MILLIGRAM(S): 100 TABLET, FILM COATED ORAL at 14:40

## 2025-06-20 RX ADMIN — METHOCARBAMOL 500 MILLIGRAM(S): 500 TABLET, FILM COATED ORAL at 05:27

## 2025-06-20 RX ADMIN — EPOETIN ALFA 10000 UNIT(S): 10000 SOLUTION INTRAVENOUS; SUBCUTANEOUS at 12:50

## 2025-06-20 RX ADMIN — HEPARIN SODIUM 5000 UNIT(S): 1000 INJECTION INTRAVENOUS; SUBCUTANEOUS at 05:27

## 2025-06-20 RX ADMIN — Medication 2 MILLIGRAM(S): at 05:26

## 2025-06-20 RX ADMIN — Medication 81 MILLIGRAM(S): at 14:42

## 2025-06-20 RX ADMIN — Medication 1 CAPSULE(S): at 10:05

## 2025-06-20 RX ADMIN — Medication 667 MILLIGRAM(S): at 05:26

## 2025-06-20 RX ADMIN — MEROPENEM 500 MILLIGRAM(S): 1 INJECTION INTRAVENOUS at 14:42

## 2025-06-20 RX ADMIN — INSULIN LISPRO 4: 100 INJECTION, SOLUTION INTRAVENOUS; SUBCUTANEOUS at 10:01

## 2025-06-20 RX ADMIN — LISINOPRIL 20 MILLIGRAM(S): 5 TABLET ORAL at 05:36

## 2025-06-20 RX ADMIN — Medication 2 MILLIGRAM(S): at 06:26

## 2025-06-20 RX ADMIN — Medication 0.5 MILLIGRAM(S): at 00:12

## 2025-06-20 RX ADMIN — Medication 200 MICROGRAM(S): at 03:40

## 2025-06-20 RX ADMIN — CARVEDILOL 25 MILLIGRAM(S): 3.12 TABLET, FILM COATED ORAL at 10:06

## 2025-06-20 RX ADMIN — METHOCARBAMOL 500 MILLIGRAM(S): 500 TABLET, FILM COATED ORAL at 14:41

## 2025-06-20 RX ADMIN — Medication 40 MILLIGRAM(S): at 05:26

## 2025-06-20 RX ADMIN — Medication 100 MILLIGRAM(S): at 05:35

## 2025-06-20 NOTE — PROGRESS NOTE ADULT - NUTRITIONAL ASSESSMENT
This patient has been assessed with a concern for Malnutrition and has been determined to have a diagnosis/diagnoses of Severe protein-calorie malnutrition.    This patient is being managed with:   Diet Consistent Carbohydrate w/Evening Snack-  Entered: Jun 16 2025  3:19PM  
This patient has been assessed with a concern for Malnutrition and has been determined to have a diagnosis/diagnoses of Severe protein-calorie malnutrition.    This patient is being managed with:   Diet NPO after Midnight-     NPO Start Date: 15-Nish-2025   NPO Start Time: 23:59  Entered: Nish 15 2025  6:17PM    Diet NPO-  Except Medications  Entered: Nish 15 2025  3:17PM  
This patient has been assessed with a concern for Malnutrition and has been determined to have a diagnosis/diagnoses of Severe protein-calorie malnutrition.    This patient is being managed with:   Diet NPO after Midnight-     NPO Start Date: 15-Nish-2025   NPO Start Time: 23:59  Entered: Nish 15 2025  6:17PM    Diet NPO-  Except Medications  Entered: Nish 15 2025  3:17PM  
This patient has been assessed with a concern for Malnutrition and has been determined to have a diagnosis/diagnoses of Severe protein-calorie malnutrition.    This patient is being managed with:   Diet Consistent Carbohydrate w/Evening Snack-  Entered: Jun 16 2025  3:19PM  

## 2025-06-20 NOTE — PROGRESS NOTE ADULT - ASSESSMENT
69 F PMH HTN, DM (A1c 7.3), CAD, ESRD on HD, gastroparesis, IBS, chronic pancreatitis, cirrhosis 2/2 HCV (s/p treatment 8 years ago), thyroid cancer s/p thyroidectomy, recent admission for abdominal pain with course complicated by fevers, found to have hepatic abscesses, blood cultures were negative, abscesses too small to be drain by IR, discharged on Meropenem 500mg IV QD for 6-weeks through July 15th, 2025, who presented here with abdominal pain.CT with resolving hepatic abscess, dilated biliary ducts    MRCP negative for choledocholithiasis. Repeat CT on June 15th showed swirling of the mesenteric vessels at the mesenteric root  S/p diagnostic laparoscopy with Surgery on June 16th which was negative.Continue Meropenem 500mg IV Q24h (on HD days give post-HD) through July 15th, 2025 as originally planned per ID. Seen by pain management.       # Abdominal pain; Likely secondary to  # Hepatic abscess; improved on follow up imaging  # Pancreatic head duct with mild ductal dilatation    -Continue Meropenem 500mg IV Q24h (on HD days give post-HD) through July 15th, 2025 as originally planned per ID.   - normal WBC. Normal transaminases. Culture negative  - Analgesics as per pain management; refused pain intervention  - Post-discharge follow up with GI for pacreatic duct    # ESRD  # Hyperphosphatemia  # AoCKD  - HD per Nephrology   - Phosphate binder  - LASHELL with HD    # Gastroparesis  # Chronic Pancreatitis, currently with normal Lipase. CT findings as noted  # Erosive Esophagitis history  # Cirrhosis, prior HCV  - Pantoprazole  - Tramadol PRN,   - Ondansetron PRN  - Pancreatic enzymes supplements added    # Hypertension; uncontrolled  # History of CAD  # History of CHF, diastolic  -adjusted meds; Hydralazine and Lisinopril increased  - ASA to be continued  - Fluid balance monitoring and daily weight    # Diabetes  # Hypothyroidism, history of thyroidectomy for cancer  - Blood glucose monitoring with sliding scale Lispro  - May need basal/Bolus Insulins if BG>180  - Levothyroxine  -f/u endocrine out pt    # Anxiety  # Depression  - Sertraline    VTE prophylaxis - Intermittent pneumatic compression devices      Disposition - dc plan 69 F PMH HTN, DM (A1c 7.3), CAD, ESRD on HD, gastroparesis, IBS, chronic pancreatitis, cirrhosis 2/2 HCV (s/p treatment 8 years ago), thyroid cancer s/p thyroidectomy, recent admission for abdominal pain with course complicated by fevers, found to have hepatic abscesses, blood cultures were negative, abscesses too small to be drain by IR, discharged on Meropenem 500mg IV QD for 6-weeks through July 15th, 2025, who presented here with abdominal pain.CT with resolving hepatic abscess, dilated biliary ducts    MRCP negative for choledocholithiasis. Repeat CT on June 15th showed swirling of the mesenteric vessels at the mesenteric root  S/p diagnostic laparoscopy with Surgery on June 16th which was negative.Continue Meropenem 500mg IV Q24h (on HD days give post-HD) through July 15th, 2025 as originally planned per ID. Seen by pain management.       # Abdominal pain; Likely secondary to  # Hepatic abscess; improved on follow up imaging  # Pancreatic head duct with mild ductal dilatation    -Continue Meropenem 500mg IV Q24h (on HD days give post-HD) through July 15th, 2025 as originally planned per ID.   - normal WBC. Normal transaminases. Culture negative  - Analgesics as per pain management; refused pain intervention  - Post-discharge follow up with GI for pacreatic duct    # ESRD  # Hyperphosphatemia  # AoCKD  - HD per Nephrology   - Phosphate binder  - LASHELL with HD    # Gastroparesis  # Chronic Pancreatitis, currently with normal Lipase. CT findings as noted  # Erosive Esophagitis history  # Cirrhosis, prior HCV  - Pantoprazole  - Tramadol PRN,   - Ondansetron PRN  - Pancreatic enzymes supplements added    # Hypertension; uncontrolled  # History of CAD  # History of CHF, diastolic  -adjusted meds; Hydralazine and Lisinopril increased  - ASA to be continued  - Fluid balance monitoring and daily weight    # Diabetes  # Hypothyroidism, history of thyroidectomy for cancer  - Blood glucose monitoring with sliding scale Lispro  - May need basal/Bolus Insulins if BG>180  - Levothyroxine  -f/u endocrine out pt    # Anxiety  # Depression  - Sertraline    VTE prophylaxis - Intermittent pneumatic compression devices      Disposition - dc plan home with iv abx  dw pt  cleared by Nephrology-out pt HD,  dw cw

## 2025-06-20 NOTE — DISCHARGE NOTE PROVIDER - CARE PROVIDERS DIRECT ADDRESSES
,[email or web address],DirectAddress_Unknown

## 2025-06-20 NOTE — PROGRESS NOTE ADULT - SUBJECTIVE AND OBJECTIVE BOX
Patient is a 69y old  Female who presents with a chief complaint of Abdominal pain (20 Jun 2025 07:58)      SUBJECTIVE / OVERNIGHT EVENTS:  REVIEW OF SYSTEMS: All systems are reviewed and found to be negative except above    MEDICATIONS  (STANDING):  aspirin enteric coated 81 milliGRAM(s) Oral daily  calcium acetate 667 milliGRAM(s) Oral every 12 hours  carvedilol 25 milliGRAM(s) Oral every 12 hours  chlorhexidine 2% Cloths 1 Application(s) Topical <User Schedule>  dextrose 5%. 1000 milliLiter(s) (50 mL/Hr) IV Continuous <Continuous>  dextrose 50% Injectable 25 Gram(s) IV Push once  epoetin day-epbx (RETACRIT) Injectable 09722 Unit(s) IV Push <User Schedule>  glucagon  Injectable 1 milliGRAM(s) IntraMuscular once  heparin   Injectable 5000 Unit(s) SubCutaneous every 8 hours  hydrALAZINE 100 milliGRAM(s) Oral every 8 hours  insulin lispro (ADMELOG) corrective regimen sliding scale   SubCutaneous three times a day before meals  levothyroxine 200 MICROGram(s) Oral daily  lisinopril 20 milliGRAM(s) Oral daily  meropenem Injectable 500 milliGRAM(s) IV Push every 24 hours  methocarbamol 500 milliGRAM(s) Oral three times a day  pancrelipase  (CREON  6,000 Lipase Units) 1 Capsule(s) Oral three times a day with meals  pantoprazole    Tablet 40 milliGRAM(s) Oral before breakfast  sertraline 100 milliGRAM(s) Oral daily    MEDICATIONS  (PRN):  acetaminophen     Tablet .. 650 milliGRAM(s) Oral every 6 hours PRN Temp greater or equal to 38C (100.4F), Mild Pain (1 - 3)  aluminum hydroxide/magnesium hydroxide/simethicone Suspension 30 milliLiter(s) Oral every 4 hours PRN Dyspepsia  dextrose Oral Gel 15 Gram(s) Oral once PRN Blood Glucose LESS THAN 70 milliGRAM(s)/deciliter  HYDROmorphone   Tablet 1 milliGRAM(s) Oral every 6 hours PRN Moderate Pain (4 - 6)  HYDROmorphone   Tablet 2 milliGRAM(s) Oral every 6 hours PRN Severe Pain (7 - 10)  HYDROmorphone  Injectable 0.5 milliGRAM(s) IV Push every 6 hours PRN BTP; Severe Pain (7 - 10)  labetalol Injectable 10 milliGRAM(s) IV Push every 4 hours PRN Systolic/Diastolic >160/100  melatonin 3 milliGRAM(s) Oral at bedtime PRN Insomnia  ondansetron Injectable 4 milliGRAM(s) IV Push every 8 hours PRN Nausea and/or Vomiting  traMADol 50 milliGRAM(s) Oral every 4 hours PRN Moderate Pain (4 - 6)      CAPILLARY BLOOD GLUCOSE      POCT Blood Glucose.: 251 mg/dL (19 Jun 2025 23:17)  POCT Blood Glucose.: 223 mg/dL (19 Jun 2025 16:03)  POCT Blood Glucose.: 152 mg/dL (19 Jun 2025 11:58)  POCT Blood Glucose.: 122 mg/dL (19 Jun 2025 11:05)    I&O's Summary    19 Jun 2025 07:01  -  20 Jun 2025 07:00  --------------------------------------------------------  IN: 0 mL / OUT: 2000 mL / NET: -2000 mL        PHYSICAL EXAM:  Vital Signs Last 24 Hrs  T(C): 37.1 (20 Jun 2025 04:37), Max: 37.1 (20 Jun 2025 04:37)  T(F): 98.7 (20 Jun 2025 04:37), Max: 98.7 (20 Jun 2025 04:37)  HR: 80 (20 Jun 2025 04:37) (77 - 82)  BP: 125/73 (20 Jun 2025 04:37) (125/73 - 190/88)  BP(mean): --  RR: 18 (20 Jun 2025 04:37) (18 - 18)  SpO2: 97% (20 Jun 2025 04:37) (95% - 98%)    Parameters below as of 20 Jun 2025 04:37  Patient On (Oxygen Delivery Method): room air        CONSTITUTIONAL: NAD,  EYES: PERRLA; conjunctiva and sclera clear  ENMT: Moist oral mucosa,   RESPIRATORY: Normal respiratory effort; lungs are clear to auscultation bilaterally  CARDIOVASCULAR: Regular rate and rhythm, normal S1 and S2, no murmur   EXTS: No lower extremity edema; Peripheral pulses are 2+ bilaterally  ABDOMEN: Nontender to palpation, normoactive bowel sounds, no rebound/guarding;   MUSCLOSKELETAL:   no clubbing or cyanosis of digits; no joint swelling or tenderness to palpation  PSYCH: affect appropriate  NEUROLOGY: A+O to person, place, and time; CN 2-12 are intact and symmetric; no gross sensory deficits;       LABS:                        7.9    2.54  )-----------( 68       ( 20 Jun 2025 05:15 )             25.1     06-19    138  |  100  |  50.3[H]  ----------------------------<  136[H]  5.2   |  24.0  |  5.43[H]    Ca    8.0[L]      19 Jun 2025 05:43            Urinalysis Basic - ( 19 Jun 2025 05:43 )    Color: x / Appearance: x / SG: x / pH: x  Gluc: 136 mg/dL / Ketone: x  / Bili: x / Urobili: x   Blood: x / Protein: x / Nitrite: x   Leuk Esterase: x / RBC: x / WBC x   Sq Epi: x / Non Sq Epi: x / Bacteria: x          RADIOLOGY & ADDITIONAL TESTS:  Results Reviewed:      Patient is a 69y old  Female who presents with a chief complaint of Abdominal pain (20 Jun 2025 07:58)      no acute issues  REVIEW OF SYSTEMS: All systems are reviewed and found to be negative except above    MEDICATIONS  (STANDING):  aspirin enteric coated 81 milliGRAM(s) Oral daily  calcium acetate 667 milliGRAM(s) Oral every 12 hours  carvedilol 25 milliGRAM(s) Oral every 12 hours  chlorhexidine 2% Cloths 1 Application(s) Topical <User Schedule>  dextrose 5%. 1000 milliLiter(s) (50 mL/Hr) IV Continuous <Continuous>  dextrose 50% Injectable 25 Gram(s) IV Push once  epoetin day-epbx (RETACRIT) Injectable 83592 Unit(s) IV Push <User Schedule>  glucagon  Injectable 1 milliGRAM(s) IntraMuscular once  heparin   Injectable 5000 Unit(s) SubCutaneous every 8 hours  hydrALAZINE 100 milliGRAM(s) Oral every 8 hours  insulin lispro (ADMELOG) corrective regimen sliding scale   SubCutaneous three times a day before meals  levothyroxine 200 MICROGram(s) Oral daily  lisinopril 20 milliGRAM(s) Oral daily  meropenem Injectable 500 milliGRAM(s) IV Push every 24 hours  methocarbamol 500 milliGRAM(s) Oral three times a day  pancrelipase  (CREON  6,000 Lipase Units) 1 Capsule(s) Oral three times a day with meals  pantoprazole    Tablet 40 milliGRAM(s) Oral before breakfast  sertraline 100 milliGRAM(s) Oral daily    MEDICATIONS  (PRN):  acetaminophen     Tablet .. 650 milliGRAM(s) Oral every 6 hours PRN Temp greater or equal to 38C (100.4F), Mild Pain (1 - 3)  aluminum hydroxide/magnesium hydroxide/simethicone Suspension 30 milliLiter(s) Oral every 4 hours PRN Dyspepsia  dextrose Oral Gel 15 Gram(s) Oral once PRN Blood Glucose LESS THAN 70 milliGRAM(s)/deciliter  HYDROmorphone   Tablet 1 milliGRAM(s) Oral every 6 hours PRN Moderate Pain (4 - 6)  HYDROmorphone   Tablet 2 milliGRAM(s) Oral every 6 hours PRN Severe Pain (7 - 10)  HYDROmorphone  Injectable 0.5 milliGRAM(s) IV Push every 6 hours PRN BTP; Severe Pain (7 - 10)  labetalol Injectable 10 milliGRAM(s) IV Push every 4 hours PRN Systolic/Diastolic >160/100  melatonin 3 milliGRAM(s) Oral at bedtime PRN Insomnia  ondansetron Injectable 4 milliGRAM(s) IV Push every 8 hours PRN Nausea and/or Vomiting  traMADol 50 milliGRAM(s) Oral every 4 hours PRN Moderate Pain (4 - 6)      CAPILLARY BLOOD GLUCOSE      POCT Blood Glucose.: 251 mg/dL (19 Jun 2025 23:17)  POCT Blood Glucose.: 223 mg/dL (19 Jun 2025 16:03)  POCT Blood Glucose.: 152 mg/dL (19 Jun 2025 11:58)  POCT Blood Glucose.: 122 mg/dL (19 Jun 2025 11:05)    I&O's Summary    19 Jun 2025 07:01  -  20 Jun 2025 07:00  --------------------------------------------------------  IN: 0 mL / OUT: 2000 mL / NET: -2000 mL        PHYSICAL EXAM:  Vital Signs Last 24 Hrs  T(C): 37.1 (20 Jun 2025 04:37), Max: 37.1 (20 Jun 2025 04:37)  T(F): 98.7 (20 Jun 2025 04:37), Max: 98.7 (20 Jun 2025 04:37)  HR: 80 (20 Jun 2025 04:37) (77 - 82)  BP: 125/73 (20 Jun 2025 04:37) (125/73 - 190/88)  BP(mean): --  RR: 18 (20 Jun 2025 04:37) (18 - 18)  SpO2: 97% (20 Jun 2025 04:37) (95% - 98%)    Parameters below as of 20 Jun 2025 04:37  Patient On (Oxygen Delivery Method): room air        CONSTITUTIONAL: NAD,  EYES: PERRLA; conjunctiva and sclera clear  ENMT: Moist oral mucosa,   RESPIRATORY: Normal respiratory effort; lungs are clear to auscultation bilaterally  CARDIOVASCULAR: Regular rate and rhythm, normal S1 and S2, no murmur   EXTS: No lower extremity edema; Peripheral pulses are 2+ bilaterally  ABDOMEN: Nontender to palpation, normoactive bowel sounds, no rebound/guarding;   MUSCLOSKELETAL: no joint swelling or tenderness to palpation  PSYCH:calm,coop  NEUROLOGY: A+O to person, place, and time; CN 2-12 are intact and symmetric; no gross sensory deficits;       LABS:                        7.9    2.54  )-----------( 68       ( 20 Jun 2025 05:15 )             25.1     06-19    138  |  100  |  50.3[H]  ----------------------------<  136[H]  5.2   |  24.0  |  5.43[H]    Ca    8.0[L]      19 Jun 2025 05:43            Urinalysis Basic - ( 19 Jun 2025 05:43 )    Color: x / Appearance: x / SG: x / pH: x  Gluc: 136 mg/dL / Ketone: x  / Bili: x / Urobili: x   Blood: x / Protein: x / Nitrite: x   Leuk Esterase: x / RBC: x / WBC x   Sq Epi: x / Non Sq Epi: x / Bacteria: x          RADIOLOGY & ADDITIONAL TESTS:  Results Reviewed:

## 2025-06-20 NOTE — DISCHARGE NOTE PROVIDER - HOSPITAL COURSE
69 F PMH HTN, DM (A1c 7.3), CAD, ESRD on HD, gastroparesis, IBS, chronic pancreatitis, cirrhosis 2/2 HCV (s/p treatment 8 years ago), thyroid cancer s/p thyroidectomy, recent admission for abdominal pain with course complicated by fevers, found to have hepatic abscesses, blood cultures were negative, abscesses too small to be drain by IR, discharged on Meropenem 500mg IV QD for 6-weeks through July 15th, 2025, who presented here with abdominal pain.CT with resolving hepatic abscess, dilated biliary ducts    MRCP negative for choledocholithiasisRepeat CT on June 15th showed swirling of the mesenteric vessels at the mesenteric root  S/p diagnostic laparoscopy with Surgery on June 16th which was negative.Continue Meropenem 500mg IV Q24h (on HD days give post-HD) through July 15th, 2025 as originally planned per ID. Seen by pain management. 69 F PMH HTN, DM (A1c 7.3), CAD, ESRD on HD, gastroparesis, IBS, chronic pancreatitis, cirrhosis 2/2 HCV (s/p treatment 8 years ago), thyroid cancer s/p thyroidectomy, recent admission for abdominal pain with course complicated by fevers, found to have hepatic abscesses, blood cultures were negative, abscesses too small to be drain by IR, discharged on Meropenem 500mg IV QD for 6-weeks through July 15th, 2025, who presented here with abdominal pain.CT with resolving hepatic abscess, dilated biliary ducts    MRCP negative for choledocholithiasisRepeat CT on June 15th showed swirling of the mesenteric vessels at the mesenteric root  S/p diagnostic laparoscopy with Surgery on June 16th which was negative.Continue Meropenem 500mg IV Q24h (on HD days give post-HD) through July 15th, 2025 as originally planned per ID. Seen by pain management.   HD Access infiltrated after 30 minutes into dialysis TODAY-UNABLE TO COMPLETE    Apply ice packs  will let it heal over the weekend. Spoke with nephrology,cleared to discharge home. next HD on monday as outpatient

## 2025-06-20 NOTE — DISCHARGE NOTE PROVIDER - NSDCFUSCHEDAPPT_GEN_ALL_CORE_FT
[facility]  INFDISEASE [address]  Scheduled Appointment: 07/02/2025

## 2025-06-20 NOTE — PROGRESS NOTE ADULT - PROVIDER SPECIALTY LIST ADULT
Nephrology
Nephrology
Hospitalist
Gastroenterology
Hospitalist
Nephrology
Nephrology
Surgery
Surgery
Hospitalist
Hospitalist
Infectious Disease
Pain Medicine
Cardiology
Hospitalist
Hospitalist

## 2025-06-20 NOTE — DISCHARGE NOTE PROVIDER - NSDCCPCAREPLAN_GEN_ALL_CORE_FT
PRINCIPAL DISCHARGE DIAGNOSIS  Diagnosis: Liver abscess  Assessment and Plan of Treatment:       SECONDARY DISCHARGE DIAGNOSES  Diagnosis: Diabetes mellitus  Assessment and Plan of Treatment:     Diagnosis: Hypertension  Assessment and Plan of Treatment:     Diagnosis: Chronic diastolic congestive heart failure  Assessment and Plan of Treatment:     Diagnosis: ESRD on hemodialysis  Assessment and Plan of Treatment:

## 2025-06-20 NOTE — DISCHARGE NOTE PROVIDER - CARE PROVIDER_API CALL
Klaus Nicholson  Gastroenterology  [address] [facility], [address]  [place] [address]  Phone: [phone]  Fax: [phone]  Follow Up Time: 2 weeks    Osmel Webb  Vascular Surgery  [address] [place] [address], [address]  [place] [address]  Phone: [phone]  Fax: [phone]  Follow Up Time: 1 week    Elif Cartagena  Endocrinology Diabetes and Metabolism  [address] [place] [address]  Phone: [phone]  Fax: [phone]  Follow Up Time: 2 weeks   Klaus Nicholson  Gastroenterology  [address] [facility], [address]  [place] [address]  Phone: [phone]  Fax: [phone]  Follow Up Time: 2 weeks    Osmel Webb  Vascular Surgery  [address] [place] [address], [address]  [place] [address]  Phone: [phone]  Fax: [phone]  Follow Up Time: 1 week    Elif Cartagena  Endocrinology Diabetes and Metabolism  [address] [place] [address], [address] [address]  Phone: [phone]  Fax: [phone]  Follow Up Time: 2 weeks    Your PCP,   Phone: (   )    -  Fax: (   )    -  Follow Up Time:

## 2025-06-20 NOTE — PROGRESS NOTE ADULT - SUBJECTIVE AND OBJECTIVE BOX
[facility] DIVISION OF KIDNEY DISEASES AND HYPERTENSION -- DIALYSIS NOTE    Patient seen and examined during dialysis    VITALS  --------------------------------------------------------------------------------  T(C): 36.9 (06-20-25 @ 11:45), Max: 37.1 (06-20-25 @ 04:37)  HR: 84 (06-20-25 @ 11:45) (77 - 84)  BP: 153/78 (06-20-25 @ 11:45) (116/65 - 181/77)  RR: 18 (06-20-25 @ 11:45) (18 - 18)  SpO2: 100% (06-20-25 @ 11:45) (94% - 100%)  Wt(kg): --     Will continue the current medical management.       Access infiltrated after 30 minutes into dialysis  stop treatment  will do ice packs  will let it heal over the weekend  next HD on monday as outpatient

## 2025-06-20 NOTE — DISCHARGE NOTE PROVIDER - DETAILS OF MALNUTRITION DIAGNOSIS/DIAGNOSES
This patient has been assessed with a concern for Malnutrition and was treated during this hospitalization for the following Nutrition diagnosis/diagnoses:     -  06/15/2025: Severe protein-calorie malnutrition

## 2025-06-20 NOTE — DISCHARGE NOTE PROVIDER - NSDCFUADDAPPT_GEN_ALL_CORE_FT
OP HD resumed at [address] [address], [phone] for 6/20/25. MWF 3:15pm.    APPTS ARE READY TO BE MADE: [X] YES    Best Family or Patient Contact (if needed):    Additional Information about above appointments (if needed):    1: f/u pcp 1 week  2:   3:     Other comments or requests:         OP HD resumed at [address] [address], [phone] for 6/20/25. MWF 3:15pm. Continue Meropenem 500mg IV Q24h (on HD days give post-HD) through July 15th, 2025 as originally planned per ID.   APPTS ARE READY TO BE MADE: [X] YES    Best Family or Patient Contact (if needed):    Additional Information about above appointments (if needed):    1: f/u pcp 1 week  2:   3:     Other comments or requests:         OP HD resumed at [address] [address], [phone] for 6/20/25. MWF 3:15pm. Continue Meropenem 500mg IV Q24h (on HD days give post-HD) through July 15th, 2025 as originally planned per ID.      Met with patient face to face and provided the patient with provider referral information, however patient prefers to schedule the appointments on their own.

## 2025-06-20 NOTE — DISCHARGE NOTE PROVIDER - PROVIDER TOKENS
PROVIDER:[TOKEN:[[id number]],FOLLOWUP:[2 weeks]],PROVIDER:[TOKEN:[[id number]],FOLLOWUP:[1 week]],PROVIDER:[TOKEN:[[id number]],FOLLOWUP:[2 weeks]] PROVIDER:[TOKEN:[[id number]],FOLLOWUP:[2 weeks]],PROVIDER:[TOKEN:[[id number]],FOLLOWUP:[1 week]],PROVIDER:[TOKEN:[[id number]],FOLLOWUP:[2 weeks]],FREE:[LAST:[Your PCP],PHONE:[(   )    -],FAX:[(   )    -]]

## 2025-06-20 NOTE — DISCHARGE NOTE PROVIDER - NSDCMRMEDTOKEN_GEN_ALL_CORE_FT
aspirin 81 mg oral delayed release tablet: 1 tab(s) orally once a day  calcium acetate 667 mg oral capsule: 3 cap(s) orally 2 times a day  carvedilol 25 mg oral tablet: 1 tab(s) orally every 12 hours  Creon 3000 units oral delayed release capsule: 1 cap(s) orally every 8 hours  HumaLOG KwikPen 100 units/mL injectable solution: injectable 3 times a day  hydrALAZINE 25 mg oral tablet: 1 tab(s) orally every 8 hours  levothyroxine 200 mcg (0.2 mg) oral tablet: 1 tab(s) orally once a day  lisinopril 10 mg oral tablet: 1 tab(s) orally once a day  meropenem 500 mg intravenous injection: 500 milligram(s) intravenously once a day through July 15th, 2025. On HD days take the dose post-HD. Weekly CBC and CMP to be faxed to .  sertraline 100 mg oral tablet: 2 tab(s) orally once a day  sucralfate 1 g/10 mL oral suspension: 10 milliliter(s) orally 3 times a day  traMADol 50 mg oral tablet: 1 tab(s) orally every 8 hours MDD: 3   calcium acetate 667 mg oral capsule: 3 cap(s) orally 2 times a day  carvedilol 25 mg oral tablet: 1 tab(s) orally every 12 hours  Creon 3000 units oral delayed release capsule: 1 cap(s) orally every 8 hours  HumaLOG KwikPen 100 units/mL injectable solution: injectable 3 times a day  hydrALAZINE 25 mg oral tablet: 1 tab(s) orally every 8 hours  levothyroxine 200 mcg (0.2 mg) oral tablet: 1 tab(s) orally once a day  lisinopril 20 mg oral tablet: 1 tab(s) orally once a day  meropenem 500 mg intravenous injection: 500 milligram(s) intravenously Monday, Wednesday, and Friday post HD TIL 07/15/25  methocarbamol 500 mg oral tablet: 1 tab(s) orally 3 times a day  pantoprazole 40 mg oral delayed release tablet: 1 tab(s) orally once a day (before a meal)  sertraline 100 mg oral tablet: 2 tab(s) orally once a day  sucralfate 1 g/10 mL oral suspension: 10 milliliter(s) orally 3 times a day  traMADol 50 mg oral tablet: 1 tab(s) orally every 8 hours MDD: 3   calcium acetate 667 mg oral capsule: 3 cap(s) orally 2 times a day  carvedilol 25 mg oral tablet: 1 tab(s) orally every 12 hours  Creon 3000 units oral delayed release capsule: 1 cap(s) orally every 8 hours  HumaLOG KwikPen 100 units/mL injectable solution: injectable 3 times a day  hydrALAZINE 25 mg oral tablet: 1 tab(s) orally every 8 hours  levothyroxine 200 mcg (0.2 mg) oral tablet: 1 tab(s) orally once a day  lisinopril 20 mg oral tablet: 1 tab(s) orally once a day  meropenem 500 mg intravenous injection: 500 milligram(s) intravenously once a day MWF give antibiotic post HD. continue antibiotic  TIL 07/15/25  methocarbamol 500 mg oral tablet: 1 tab(s) orally 3 times a day  pantoprazole 40 mg oral delayed release tablet: 1 tab(s) orally once a day (before a meal)  sertraline 100 mg oral tablet: 2 tab(s) orally once a day  sucralfate 1 g/10 mL oral suspension: 10 milliliter(s) orally 3 times a day  traMADol 50 mg oral tablet: 1 tab(s) orally every 8 hours MDD: 3

## 2025-06-28 ENCOUNTER — TRANSCRIPTION ENCOUNTER (OUTPATIENT)
Age: 70
End: 2025-06-28

## 2025-06-28 LAB
CULTURE RESULTS: SIGNIFICANT CHANGE UP
SPECIMEN SOURCE: SIGNIFICANT CHANGE UP

## 2025-06-30 ENCOUNTER — INPATIENT (INPATIENT)
Facility: HOSPITAL | Age: 70
LOS: 2 days | Discharge: HOME | DRG: 682 | End: 2025-07-03
Attending: FAMILY MEDICINE | Admitting: STUDENT IN AN ORGANIZED HEALTH CARE EDUCATION/TRAINING PROGRAM
Payer: COMMERCIAL

## 2025-06-30 VITALS
HEART RATE: 75 BPM | SYSTOLIC BLOOD PRESSURE: 200 MMHG | TEMPERATURE: 98 F | HEIGHT: 65 IN | RESPIRATION RATE: 20 BRPM | OXYGEN SATURATION: 100 % | DIASTOLIC BLOOD PRESSURE: 96 MMHG

## 2025-06-30 DIAGNOSIS — Z98.891 HISTORY OF UTERINE SCAR FROM PREVIOUS SURGERY: Chronic | ICD-10-CM

## 2025-06-30 DIAGNOSIS — Z98.890 OTHER SPECIFIED POSTPROCEDURAL STATES: Chronic | ICD-10-CM

## 2025-06-30 DIAGNOSIS — Z90.49 ACQUIRED ABSENCE OF OTHER SPECIFIED PARTS OF DIGESTIVE TRACT: Chronic | ICD-10-CM

## 2025-06-30 DIAGNOSIS — E89.0 POSTPROCEDURAL HYPOTHYROIDISM: Chronic | ICD-10-CM

## 2025-06-30 LAB
ALBUMIN SERPL ELPH-MCNC: 3.6 G/DL — SIGNIFICANT CHANGE UP (ref 3.3–5.2)
ALP SERPL-CCNC: 104 U/L — SIGNIFICANT CHANGE UP (ref 40–120)
ALT FLD-CCNC: 7 U/L — SIGNIFICANT CHANGE UP
ANION GAP SERPL CALC-SCNC: 16 MMOL/L — SIGNIFICANT CHANGE UP (ref 5–17)
AST SERPL-CCNC: 21 U/L — SIGNIFICANT CHANGE UP
BASOPHILS # BLD AUTO: 0.02 K/UL — SIGNIFICANT CHANGE UP (ref 0–0.2)
BASOPHILS # BLD MANUAL: 0.06 K/UL — SIGNIFICANT CHANGE UP (ref 0–0.2)
BASOPHILS NFR BLD AUTO: 0.5 % — SIGNIFICANT CHANGE UP (ref 0–2)
BASOPHILS NFR BLD MANUAL: 1.7 % — SIGNIFICANT CHANGE UP (ref 0–2)
BILIRUB SERPL-MCNC: 0.4 MG/DL — SIGNIFICANT CHANGE UP (ref 0.4–2)
BLD GP AB SCN SERPL QL: SIGNIFICANT CHANGE UP
BUN SERPL-MCNC: 56.8 MG/DL — HIGH (ref 8–20)
BURR CELLS BLD QL SMEAR: SLIGHT — SIGNIFICANT CHANGE UP
CALCIUM SERPL-MCNC: 8.2 MG/DL — LOW (ref 8.4–10.5)
CHLORIDE SERPL-SCNC: 104 MMOL/L — SIGNIFICANT CHANGE UP (ref 96–108)
CO2 SERPL-SCNC: 20 MMOL/L — LOW (ref 22–29)
CREAT SERPL-MCNC: 5.45 MG/DL — HIGH (ref 0.5–1.3)
DACRYOCYTES BLD QL SMEAR: SLIGHT — SIGNIFICANT CHANGE UP
EGFR: 8 ML/MIN/1.73M2 — LOW
EGFR: 8 ML/MIN/1.73M2 — LOW
EOSINOPHIL # BLD AUTO: 0.24 K/UL — SIGNIFICANT CHANGE UP (ref 0–0.5)
EOSINOPHIL # BLD MANUAL: 0.23 K/UL — SIGNIFICANT CHANGE UP (ref 0–0.5)
EOSINOPHIL NFR BLD AUTO: 6.4 % — HIGH (ref 0–6)
EOSINOPHIL NFR BLD MANUAL: 6.1 % — HIGH (ref 0–6)
GIANT PLATELETS BLD QL SMEAR: PRESENT
GLUCOSE SERPL-MCNC: 186 MG/DL — HIGH (ref 70–99)
HCT VFR BLD CALC: 20.9 % — CRITICAL LOW (ref 34.5–45)
HGB BLD-MCNC: 6.4 G/DL — CRITICAL LOW (ref 11.5–15.5)
IMM GRANULOCYTES # BLD AUTO: 0.01 K/UL — SIGNIFICANT CHANGE UP (ref 0–0.07)
IMM GRANULOCYTES NFR BLD AUTO: 0.3 % — SIGNIFICANT CHANGE UP (ref 0–0.9)
IMMATURE PLATELET FRACTION #: 2 K/UL — LOW (ref 4.7–11.1)
IMMATURE PLATELET FRACTION %: 3.3 % — SIGNIFICANT CHANGE UP (ref 1.6–4.9)
LIDOCAIN IGE QN: 61 U/L — HIGH (ref 22–51)
LYMPHOCYTES # BLD AUTO: 0.72 K/UL — LOW (ref 1–3.3)
LYMPHOCYTES # BLD MANUAL: 0.65 K/UL — LOW (ref 1–3.3)
LYMPHOCYTES NFR BLD AUTO: 19.3 % — SIGNIFICANT CHANGE UP (ref 13–44)
LYMPHOCYTES NFR BLD MANUAL: 17.4 % — SIGNIFICANT CHANGE UP (ref 13–44)
MCHC RBC-ENTMCNC: 29.1 PG — SIGNIFICANT CHANGE UP (ref 27–34)
MCHC RBC-ENTMCNC: 30.6 G/DL — LOW (ref 32–36)
MCV RBC AUTO: 95 FL — SIGNIFICANT CHANGE UP (ref 80–100)
MONOCYTES # BLD AUTO: 0.41 K/UL — SIGNIFICANT CHANGE UP (ref 0–0.9)
MONOCYTES # BLD MANUAL: 0.13 K/UL — SIGNIFICANT CHANGE UP (ref 0–0.9)
MONOCYTES NFR BLD AUTO: 11 % — SIGNIFICANT CHANGE UP (ref 2–14)
MONOCYTES NFR BLD MANUAL: 3.5 % — SIGNIFICANT CHANGE UP (ref 2–14)
NEUTROPHILS # BLD AUTO: 2.33 K/UL — SIGNIFICANT CHANGE UP (ref 1.8–7.4)
NEUTROPHILS # BLD MANUAL: 2.66 K/UL — SIGNIFICANT CHANGE UP (ref 1.8–7.4)
NEUTROPHILS NFR BLD AUTO: 62.5 % — SIGNIFICANT CHANGE UP (ref 43–77)
NEUTROPHILS NFR BLD MANUAL: 71.3 % — SIGNIFICANT CHANGE UP (ref 43–77)
NRBC # BLD AUTO: 0 K/UL — SIGNIFICANT CHANGE UP (ref 0–0)
NRBC # FLD: 0 K/UL — SIGNIFICANT CHANGE UP (ref 0–0)
NRBC BLD AUTO-RTO: 0 /100 WBCS — SIGNIFICANT CHANGE UP (ref 0–0)
PLAT MORPH BLD: NORMAL — SIGNIFICANT CHANGE UP
PLATELET # BLD AUTO: 61 K/UL — LOW (ref 150–400)
PMV BLD: 12.5 FL — SIGNIFICANT CHANGE UP (ref 7–13)
POIKILOCYTOSIS BLD QL AUTO: SLIGHT — SIGNIFICANT CHANGE UP
POTASSIUM SERPL-MCNC: 5.6 MMOL/L — HIGH (ref 3.5–5.3)
POTASSIUM SERPL-SCNC: 5.6 MMOL/L — HIGH (ref 3.5–5.3)
PROT SERPL-MCNC: 6.4 G/DL — LOW (ref 6.6–8.7)
RBC # BLD: 2.2 M/UL — LOW (ref 3.8–5.2)
RBC # FLD: 17.5 % — HIGH (ref 10.3–14.5)
RBC BLD AUTO: ABNORMAL
SCHISTOCYTES BLD QL AUTO: SLIGHT — SIGNIFICANT CHANGE UP
SODIUM SERPL-SCNC: 140 MMOL/L — SIGNIFICANT CHANGE UP (ref 135–145)
WBC # BLD: 3.73 K/UL — LOW (ref 3.8–10.5)
WBC # FLD AUTO: 3.73 K/UL — LOW (ref 3.8–10.5)

## 2025-06-30 PROCEDURE — 99291 CRITICAL CARE FIRST HOUR: CPT

## 2025-06-30 PROCEDURE — 71045 X-RAY EXAM CHEST 1 VIEW: CPT | Mod: 26

## 2025-06-30 RX ORDER — SODIUM ZIRCONIUM CYCLOSILICATE 5 G/5G
5 POWDER, FOR SUSPENSION ORAL ONCE
Refills: 0 | Status: COMPLETED | OUTPATIENT
Start: 2025-06-30 | End: 2025-06-30

## 2025-06-30 RX ORDER — ONDANSETRON HCL/PF 4 MG/2 ML
4 VIAL (ML) INJECTION ONCE
Refills: 0 | Status: COMPLETED | OUTPATIENT
Start: 2025-06-30 | End: 2025-06-30

## 2025-06-30 RX ORDER — MEROPENEM 1 G/30ML
500 INJECTION INTRAVENOUS ONCE
Refills: 0 | Status: COMPLETED | OUTPATIENT
Start: 2025-06-30 | End: 2025-06-30

## 2025-06-30 RX ORDER — HYDROMORPHONE/SOD CHLOR,ISO/PF 2 MG/10 ML
1 SYRINGE (ML) INJECTION ONCE
Refills: 0 | Status: DISCONTINUED | OUTPATIENT
Start: 2025-06-30 | End: 2025-06-30

## 2025-06-30 RX ORDER — HYDROMORPHONE/SOD CHLOR,ISO/PF 2 MG/10 ML
0.5 SYRINGE (ML) INJECTION ONCE
Refills: 0 | Status: DISCONTINUED | OUTPATIENT
Start: 2025-06-30 | End: 2025-06-30

## 2025-06-30 RX ORDER — SODIUM BICARBONATE 1 MEQ/ML
50 SYRINGE (ML) INTRAVENOUS ONCE
Refills: 0 | Status: COMPLETED | OUTPATIENT
Start: 2025-06-30 | End: 2025-06-30

## 2025-06-30 RX ORDER — MEROPENEM 1 G/30ML
500 INJECTION INTRAVENOUS ONCE
Refills: 0 | Status: DISCONTINUED | OUTPATIENT
Start: 2025-06-30 | End: 2025-06-30

## 2025-06-30 RX ADMIN — Medication 50 MILLIGRAM(S): at 23:42

## 2025-06-30 RX ADMIN — MEROPENEM 500 MILLIGRAM(S): 1 INJECTION INTRAVENOUS at 23:16

## 2025-06-30 RX ADMIN — Medication 1 MILLIGRAM(S): at 23:14

## 2025-06-30 RX ADMIN — Medication 1 MILLIGRAM(S): at 22:14

## 2025-06-30 RX ADMIN — SODIUM ZIRCONIUM CYCLOSILICATE 5 GRAM(S): 5 POWDER, FOR SUSPENSION ORAL at 21:56

## 2025-06-30 RX ADMIN — Medication 2.5 UNIT(S): at 21:56

## 2025-06-30 RX ADMIN — Medication 4 MILLIGRAM(S): at 19:28

## 2025-06-30 RX ADMIN — Medication 0.5 MILLIGRAM(S): at 20:28

## 2025-06-30 RX ADMIN — Medication 0.5 MILLIGRAM(S): at 19:28

## 2025-06-30 RX ADMIN — Medication 250 MILLILITER(S): at 21:57

## 2025-06-30 RX ADMIN — Medication 50 MILLIEQUIVALENT(S): at 21:59

## 2025-06-30 NOTE — ED PROVIDER NOTE - PROGRESS NOTE DETAILS
Sugar: patient refuses FOBT, states she has no blood in stool. Explained importance of screening test for microscopic blood, patient still refused. Sugar: Rpt vitals 177/91, HR 69.

## 2025-06-30 NOTE — ED ADULT NURSE NOTE - NS PRO AD PATIENT TYPE
Detail Level: Zone Render In Strict Bullet Format?: No Initiate Treatment: -clobetasol twice a day for 3 days then discontinue for 3 days then start cycle again Health Care Proxy (HCP)

## 2025-06-30 NOTE — ED ADULT NURSE NOTE - OBJECTIVE STATEMENT
Pt. c/o severe abdominal pain and nausea, was recently d/c from [facility] with same issue without improvement. Pt. states she is out of home PO dilaudid which usually helps with pain control. Pt. is on HD MWF but missed session today. Abdomen appears distended.

## 2025-06-30 NOTE — ED PROVIDER NOTE - NSICDXPASTMEDICALHX_GEN_ALL_CORE_FT
PAST MEDICAL HISTORY:  Bernard syndrome     CAD (coronary artery disease)     Chronic diastolic congestive heart failure     Cirrhosis     Diabetes     Esophagitis, erosive     ESRD on dialysis MWF at [facility]    Gastroparesis     HCV (hepatitis C virus)     HTN (hypertension)     IBS (irritable bowel syndrome)     Pancreatitis     Thyroid cancer

## 2025-06-30 NOTE — ED PROVIDER NOTE - ATTENDING CONTRIBUTION TO CARE
70 yo F hx liver cirrhosis secondary to hep C, ESRD on hemodialysis MWF status post recent prolonged hospitalization for reported infection of her liver for which she is currently on IV meropenem x 6 weeks presents to the ED for abdominal pain. Patient was recently admitted here for same abdominal pain and dialysis. Patient states when she was discharged on the 28th, the hydromorphone 4mg was not picked up from the pharmacy, so she has had no pain control for 2 days. States the abdominal pain is the same, describes it as sharp, localized to RUQ. Associated with soft stools and nausea. States she missed dialysis today due to the pain. Denies fever, chills, chest pain, SOB, vomiting, blood in stool or urine, dysuria, numbness or tingling.      CONSTITUTIONAL: In no apparent distress.  HEENMT: Airway patent,   EYES: Pupils equal, Extra-ocular movement intact, eyes are clear b/l  CARDIAC: Regular rate and rhythm, Heart sounds S1 S2 present  RESPIRATORY: No respiratory distress. No stridor, Lungs sounds clear with good aeration bilaterally.   GASTROINTESTINAL: Abdomen soft, non-tender and non-distended, no rebound, no guarding and no masses.   MUSCULOSKELETAL: Spine appears normal, movement of extremities grossly intact.  NEUROLOGICAL: Alert and interactive, no focal deficits, tone is normal, moving all extremities well,  SKIN: No cyanosis, no pallor, no jaundice, no rash      Due to the a high probability of clinically significant, life threatening deterioration, the patient required high level of preparedness to intervene emergently. I personally spent critical care time directly and personally managing the patient. This included (but not limited too reviewing  vitals, managing orders, and determining and adjusting plan depending on response to interventions.     I, Sandy Carpenter, personally saw the patient with the resident, and completed the key components of the history and physical exam. I then discussed the management plan with the resident.

## 2025-06-30 NOTE — ED ADULT TRIAGE NOTE - CHIEF COMPLAINT QUOTE
c/o right sided abd pain and distention starting 4 hour ago. pt has cirrhosis of liver. pt also receives dialysis, last dialysis Friday, missed this morning

## 2025-06-30 NOTE — ED ADULT TRIAGE NOTE - DIRECT TO ROOM CARE INITIATED:
Quality 226: Preventive Care And Screening: Tobacco Use: Screening And Cessation Intervention: Patient screened for tobacco use and is an ex/non-smoker
Detail Level: Detailed
30-Jun-2025 16:12

## 2025-06-30 NOTE — ED PROVIDER NOTE - CARE PLAN
1 Principal Discharge DX:	Acute hyperkalemia  Secondary Diagnosis:	Anemia of chronic disease  Secondary Diagnosis:	ESRD on hemodialysis

## 2025-06-30 NOTE — ED ADULT NURSE NOTE - NSICDXPASTMEDICALHX_GEN_ALL_CORE_FT
PAST MEDICAL HISTORY:  Bernard syndrome     CAD (coronary artery disease)     Chronic diastolic congestive heart failure     Cirrhosis     Diabetes     Esophagitis, erosive     ESRD on dialysis MWF at [facility]    Gastroparesis     HCV (hepatitis C virus)     HTN (hypertension)     IBS (irritable bowel syndrome)     Pancreatitis     Thyroid cancer

## 2025-06-30 NOTE — ED PROVIDER NOTE - OBJECTIVE STATEMENT
70 yo F hx liver cirrhosis secondary to hep C, ESRD on hemodialysis MWF status post recent prolonged hospitalization for reported infection of her liver for which she is currently on IV meropenem x 6 weeks presents to the ED for abdominal pain. Patient was recently admitted here for same abdominal pain and dialysis. Patient states when she was discharged on the 28th, the hydromorphone 4mg was not picked up from the pharmacy, so she has had no pain control for 2 days. States the abdominal pain is the same, describes it as sharp, localized to RUQ. Associated with soft stools and nausea. States she missed dialysis today due to the pain. Denies fever, chills, chest pain, SOB, vomiting, blood in stool or urine, dysuria, numbness or tingling.

## 2025-06-30 NOTE — ED PROVIDER NOTE - CLINICAL SUMMARY MEDICAL DECISION MAKING FREE TEXT BOX
68 yo F hx liver cirrhosis secondary to hep C, ESRD on hemodialysis MWF status post recent prolonged hospitalization for reported infection of her liver for which she is currently on IV meropenem x 6 weeks presents to the ED for abdominal pain. Plan for labs, pain control, nephro. 70 yo F hx liver cirrhosis secondary to hep C, ESRD on hemodialysis MWF status post recent prolonged hospitalization for reported infection of her liver for which she is currently on IV meropenem x 6 weeks presents to the ED for abdominal pain. Plan for labs, pain control, nephro. Labs show anemia, will transfuse 1u. Hyperkalemic, meds given. EKG nsr, left axis deviation. Will admit, patient willing to be admitted. BP initially elevated, ordered home dose.

## 2025-07-01 DIAGNOSIS — E87.5 HYPERKALEMIA: ICD-10-CM

## 2025-07-01 LAB
ALBUMIN SERPL ELPH-MCNC: 3.5 G/DL — SIGNIFICANT CHANGE UP (ref 3.3–5.2)
ALP SERPL-CCNC: 100 U/L — SIGNIFICANT CHANGE UP (ref 40–120)
ALT FLD-CCNC: 5 U/L — SIGNIFICANT CHANGE UP
ANION GAP SERPL CALC-SCNC: 12 MMOL/L — SIGNIFICANT CHANGE UP (ref 5–17)
AST SERPL-CCNC: 14 U/L — SIGNIFICANT CHANGE UP
BILIRUB SERPL-MCNC: 0.6 MG/DL — SIGNIFICANT CHANGE UP (ref 0.4–2)
BUN SERPL-MCNC: 20.2 MG/DL — HIGH (ref 8–20)
CALCIUM SERPL-MCNC: 8.4 MG/DL — SIGNIFICANT CHANGE UP (ref 8.4–10.5)
CHLORIDE SERPL-SCNC: 99 MMOL/L — SIGNIFICANT CHANGE UP (ref 96–108)
CK SERPL-CCNC: 43 U/L — SIGNIFICANT CHANGE UP (ref 25–170)
CO2 SERPL-SCNC: 26 MMOL/L — SIGNIFICANT CHANGE UP (ref 22–29)
CREAT SERPL-MCNC: 2.36 MG/DL — HIGH (ref 0.5–1.3)
EGFR: 22 ML/MIN/1.73M2 — LOW
EGFR: 22 ML/MIN/1.73M2 — LOW
GLUCOSE BLDC GLUCOMTR-MCNC: 119 MG/DL — HIGH (ref 70–99)
GLUCOSE BLDC GLUCOMTR-MCNC: 134 MG/DL — HIGH (ref 70–99)
GLUCOSE BLDC GLUCOMTR-MCNC: 142 MG/DL — HIGH (ref 70–99)
GLUCOSE BLDC GLUCOMTR-MCNC: 149 MG/DL — HIGH (ref 70–99)
GLUCOSE SERPL-MCNC: 101 MG/DL — HIGH (ref 70–99)
HCT VFR BLD CALC: 29.3 % — LOW (ref 34.5–45)
HGB BLD-MCNC: 9.5 G/DL — LOW (ref 11.5–15.5)
IMMATURE PLATELET FRACTION #: 3.8 K/UL — LOW (ref 4.7–11.1)
IMMATURE PLATELET FRACTION %: 4.7 % — SIGNIFICANT CHANGE UP (ref 1.6–4.9)
MAGNESIUM SERPL-MCNC: 1.9 MG/DL — SIGNIFICANT CHANGE UP (ref 1.6–2.6)
MCHC RBC-ENTMCNC: 29.2 PG — SIGNIFICANT CHANGE UP (ref 27–34)
MCHC RBC-ENTMCNC: 32.4 G/DL — SIGNIFICANT CHANGE UP (ref 32–36)
MCV RBC AUTO: 90.2 FL — SIGNIFICANT CHANGE UP (ref 80–100)
NRBC # BLD AUTO: 0 K/UL — SIGNIFICANT CHANGE UP (ref 0–0)
NRBC # FLD: 0 K/UL — SIGNIFICANT CHANGE UP (ref 0–0)
NRBC BLD AUTO-RTO: 0 /100 WBCS — SIGNIFICANT CHANGE UP (ref 0–0)
PHOSPHATE SERPL-MCNC: 2.2 MG/DL — LOW (ref 2.4–4.7)
PLATELET # BLD AUTO: 80 K/UL — LOW (ref 150–400)
PMV BLD: 11.4 FL — SIGNIFICANT CHANGE UP (ref 7–13)
POTASSIUM SERPL-MCNC: 3.4 MMOL/L — LOW (ref 3.5–5.3)
POTASSIUM SERPL-MCNC: 3.4 MMOL/L — LOW (ref 3.5–5.3)
POTASSIUM SERPL-MCNC: 4.3 MMOL/L — SIGNIFICANT CHANGE UP (ref 3.5–5.3)
POTASSIUM SERPL-SCNC: 3.4 MMOL/L — LOW (ref 3.5–5.3)
POTASSIUM SERPL-SCNC: 3.4 MMOL/L — LOW (ref 3.5–5.3)
POTASSIUM SERPL-SCNC: 4.3 MMOL/L — SIGNIFICANT CHANGE UP (ref 3.5–5.3)
PROT SERPL-MCNC: 6 G/DL — LOW (ref 6.6–8.7)
RBC # BLD: 3.25 M/UL — LOW (ref 3.8–5.2)
RBC # FLD: 18 % — HIGH (ref 10.3–14.5)
SODIUM SERPL-SCNC: 137 MMOL/L — SIGNIFICANT CHANGE UP (ref 135–145)
TROPONIN T, HIGH SENSITIVITY RESULT: 63 NG/L — HIGH (ref 0–51)
TROPONIN T, HIGH SENSITIVITY RESULT: 66 NG/L — HIGH (ref 0–51)
WBC # BLD: 4.28 K/UL — SIGNIFICANT CHANGE UP (ref 3.8–10.5)
WBC # FLD AUTO: 4.28 K/UL — SIGNIFICANT CHANGE UP (ref 3.8–10.5)

## 2025-07-01 PROCEDURE — 86923 COMPATIBILITY TEST ELECTRIC: CPT

## 2025-07-01 PROCEDURE — 84484 ASSAY OF TROPONIN QUANT: CPT

## 2025-07-01 PROCEDURE — 83735 ASSAY OF MAGNESIUM: CPT

## 2025-07-01 PROCEDURE — P9016: CPT

## 2025-07-01 PROCEDURE — 71045 X-RAY EXAM CHEST 1 VIEW: CPT

## 2025-07-01 PROCEDURE — 99222 1ST HOSP IP/OBS MODERATE 55: CPT

## 2025-07-01 PROCEDURE — 93010 ELECTROCARDIOGRAM REPORT: CPT

## 2025-07-01 PROCEDURE — 36415 COLL VENOUS BLD VENIPUNCTURE: CPT

## 2025-07-01 PROCEDURE — 83690 ASSAY OF LIPASE: CPT

## 2025-07-01 PROCEDURE — 84100 ASSAY OF PHOSPHORUS: CPT

## 2025-07-01 PROCEDURE — 86900 BLOOD TYPING SEROLOGIC ABO: CPT

## 2025-07-01 PROCEDURE — 82550 ASSAY OF CK (CPK): CPT

## 2025-07-01 PROCEDURE — 93010 ELECTROCARDIOGRAM REPORT: CPT | Mod: 77

## 2025-07-01 PROCEDURE — 36430 TRANSFUSION BLD/BLD COMPNT: CPT

## 2025-07-01 PROCEDURE — 85027 COMPLETE CBC AUTOMATED: CPT

## 2025-07-01 PROCEDURE — 80053 COMPREHEN METABOLIC PANEL: CPT

## 2025-07-01 PROCEDURE — 85025 COMPLETE CBC W/AUTO DIFF WBC: CPT

## 2025-07-01 PROCEDURE — 84132 ASSAY OF SERUM POTASSIUM: CPT

## 2025-07-01 PROCEDURE — 99223 1ST HOSP IP/OBS HIGH 75: CPT

## 2025-07-01 PROCEDURE — 93005 ELECTROCARDIOGRAM TRACING: CPT

## 2025-07-01 PROCEDURE — 86901 BLOOD TYPING SEROLOGIC RH(D): CPT

## 2025-07-01 PROCEDURE — 82962 GLUCOSE BLOOD TEST: CPT

## 2025-07-01 PROCEDURE — 86850 RBC ANTIBODY SCREEN: CPT

## 2025-07-01 RX ORDER — MAGNESIUM, ALUMINUM HYDROXIDE 200-200 MG
30 TABLET,CHEWABLE ORAL EVERY 4 HOURS
Refills: 0 | Status: DISCONTINUED | OUTPATIENT
Start: 2025-07-01 | End: 2025-07-03

## 2025-07-01 RX ORDER — ACETAMINOPHEN 500 MG/5ML
650 LIQUID (ML) ORAL EVERY 6 HOURS
Refills: 0 | Status: DISCONTINUED | OUTPATIENT
Start: 2025-07-01 | End: 2025-07-03

## 2025-07-01 RX ORDER — DEXTROSE 50 % IN WATER 50 %
15 SYRINGE (ML) INTRAVENOUS ONCE
Refills: 0 | Status: DISCONTINUED | OUTPATIENT
Start: 2025-07-01 | End: 2025-07-03

## 2025-07-01 RX ORDER — HYDROMORPHONE/SOD CHLOR,ISO/PF 2 MG/10 ML
4 SYRINGE (ML) INJECTION EVERY 6 HOURS
Refills: 0 | Status: DISCONTINUED | OUTPATIENT
Start: 2025-07-01 | End: 2025-07-03

## 2025-07-01 RX ORDER — TRAMADOL HYDROCHLORIDE 50 MG/1
50 TABLET, FILM COATED ORAL EVERY 8 HOURS
Refills: 0 | Status: DISCONTINUED | OUTPATIENT
Start: 2025-07-01 | End: 2025-07-03

## 2025-07-01 RX ORDER — METHOCARBAMOL 500 MG/1
500 TABLET, FILM COATED ORAL THREE TIMES A DAY
Refills: 0 | Status: DISCONTINUED | OUTPATIENT
Start: 2025-07-01 | End: 2025-07-03

## 2025-07-01 RX ORDER — SUCRALFATE 1 G
1 TABLET ORAL
Refills: 0 | Status: DISCONTINUED | OUTPATIENT
Start: 2025-07-01 | End: 2025-07-03

## 2025-07-01 RX ORDER — SODIUM CHLORIDE 9 G/1000ML
1000 INJECTION, SOLUTION INTRAVENOUS
Refills: 0 | Status: DISCONTINUED | OUTPATIENT
Start: 2025-07-01 | End: 2025-07-03

## 2025-07-01 RX ORDER — LISINOPRIL 5 MG/1
20 TABLET ORAL DAILY
Refills: 0 | Status: DISCONTINUED | OUTPATIENT
Start: 2025-07-01 | End: 2025-07-03

## 2025-07-01 RX ORDER — MEROPENEM 1 G/30ML
500 INJECTION INTRAVENOUS EVERY 24 HOURS
Refills: 0 | Status: DISCONTINUED | OUTPATIENT
Start: 2025-07-01 | End: 2025-07-03

## 2025-07-01 RX ORDER — MEROPENEM 1 G/30ML
500 INJECTION INTRAVENOUS EVERY 24 HOURS
Refills: 0 | Status: DISCONTINUED | OUTPATIENT
Start: 2025-07-01 | End: 2025-07-01

## 2025-07-01 RX ORDER — LEVOTHYROXINE SODIUM 300 MCG
200 TABLET ORAL DAILY
Refills: 0 | Status: DISCONTINUED | OUTPATIENT
Start: 2025-07-01 | End: 2025-07-03

## 2025-07-01 RX ORDER — DEXTROSE 50 % IN WATER 50 %
25 SYRINGE (ML) INTRAVENOUS ONCE
Refills: 0 | Status: DISCONTINUED | OUTPATIENT
Start: 2025-07-01 | End: 2025-07-03

## 2025-07-01 RX ORDER — ISOSORBIDE MONONITRATE 60 MG/1
30 TABLET, EXTENDED RELEASE ORAL DAILY
Refills: 0 | Status: DISCONTINUED | OUTPATIENT
Start: 2025-07-01 | End: 2025-07-02

## 2025-07-01 RX ORDER — CARVEDILOL 3.12 MG/1
25 TABLET, FILM COATED ORAL EVERY 12 HOURS
Refills: 0 | Status: DISCONTINUED | OUTPATIENT
Start: 2025-07-01 | End: 2025-07-03

## 2025-07-01 RX ORDER — GLUCAGON 3 MG/1
1 POWDER NASAL ONCE
Refills: 0 | Status: DISCONTINUED | OUTPATIENT
Start: 2025-07-01 | End: 2025-07-03

## 2025-07-01 RX ORDER — INSULIN LISPRO 100 U/ML
INJECTION, SOLUTION INTRAVENOUS; SUBCUTANEOUS
Refills: 0 | Status: DISCONTINUED | OUTPATIENT
Start: 2025-07-01 | End: 2025-07-03

## 2025-07-01 RX ORDER — SERTRALINE 100 MG/1
200 TABLET, FILM COATED ORAL DAILY
Refills: 0 | Status: DISCONTINUED | OUTPATIENT
Start: 2025-07-01 | End: 2025-07-03

## 2025-07-01 RX ORDER — DEXTROSE 50 % IN WATER 50 %
12.5 SYRINGE (ML) INTRAVENOUS ONCE
Refills: 0 | Status: DISCONTINUED | OUTPATIENT
Start: 2025-07-01 | End: 2025-07-03

## 2025-07-01 RX ORDER — ONDANSETRON HCL/PF 4 MG/2 ML
4 VIAL (ML) INJECTION EVERY 8 HOURS
Refills: 0 | Status: DISCONTINUED | OUTPATIENT
Start: 2025-07-01 | End: 2025-07-03

## 2025-07-01 RX ORDER — HEPARIN SODIUM 1000 [USP'U]/ML
5000 INJECTION INTRAVENOUS; SUBCUTANEOUS EVERY 12 HOURS
Refills: 0 | Status: DISCONTINUED | OUTPATIENT
Start: 2025-07-01 | End: 2025-07-03

## 2025-07-01 RX ORDER — LIPASE/PROTEASE/AMYLASE 10K-37.5K
3000 CAPSULE,DELAYED RELEASE (ENTERIC COATED) ORAL
Refills: 0 | Status: DISCONTINUED | OUTPATIENT
Start: 2025-07-01 | End: 2025-07-01

## 2025-07-01 RX ORDER — HYDROMORPHONE/SOD CHLOR,ISO/PF 2 MG/10 ML
1 SYRINGE (ML) INJECTION EVERY 6 HOURS
Refills: 0 | Status: DISCONTINUED | OUTPATIENT
Start: 2025-07-01 | End: 2025-07-03

## 2025-07-01 RX ORDER — MELATONIN 5 MG
3 TABLET ORAL AT BEDTIME
Refills: 0 | Status: DISCONTINUED | OUTPATIENT
Start: 2025-07-01 | End: 2025-07-03

## 2025-07-01 RX ADMIN — Medication 1 GRAM(S): at 17:06

## 2025-07-01 RX ADMIN — Medication 1 MILLIGRAM(S): at 15:00

## 2025-07-01 RX ADMIN — Medication 10 MILLIGRAM(S): at 12:01

## 2025-07-01 RX ADMIN — Medication 4 MILLIGRAM(S): at 09:36

## 2025-07-01 RX ADMIN — Medication 1 MILLIGRAM(S): at 09:15

## 2025-07-01 RX ADMIN — Medication 1 MILLIGRAM(S): at 22:15

## 2025-07-01 RX ADMIN — ISOSORBIDE MONONITRATE 30 MILLIGRAM(S): 60 TABLET, EXTENDED RELEASE ORAL at 17:05

## 2025-07-01 RX ADMIN — TRAMADOL HYDROCHLORIDE 50 MILLIGRAM(S): 50 TABLET, FILM COATED ORAL at 22:40

## 2025-07-01 RX ADMIN — Medication 1 MILLIGRAM(S): at 01:12

## 2025-07-01 RX ADMIN — MEROPENEM 500 MILLIGRAM(S): 1 INJECTION INTRAVENOUS at 22:08

## 2025-07-01 RX ADMIN — Medication 667 MILLIGRAM(S): at 08:49

## 2025-07-01 RX ADMIN — METHOCARBAMOL 500 MILLIGRAM(S): 500 TABLET, FILM COATED ORAL at 12:10

## 2025-07-01 RX ADMIN — TRAMADOL HYDROCHLORIDE 50 MILLIGRAM(S): 50 TABLET, FILM COATED ORAL at 22:07

## 2025-07-01 RX ADMIN — CARVEDILOL 25 MILLIGRAM(S): 3.12 TABLET, FILM COATED ORAL at 17:06

## 2025-07-01 RX ADMIN — Medication 25 MILLIGRAM(S): at 22:07

## 2025-07-01 RX ADMIN — HEPARIN SODIUM 5000 UNIT(S): 1000 INJECTION INTRAVENOUS; SUBCUTANEOUS at 17:06

## 2025-07-01 RX ADMIN — Medication 10 MILLIGRAM(S): at 08:54

## 2025-07-01 RX ADMIN — Medication 10 MILLIGRAM(S): at 03:59

## 2025-07-01 RX ADMIN — Medication 10 MILLIGRAM(S): at 20:42

## 2025-07-01 RX ADMIN — Medication 40 MILLIGRAM(S): at 05:18

## 2025-07-01 RX ADMIN — HEPARIN SODIUM 5000 UNIT(S): 1000 INJECTION INTRAVENOUS; SUBCUTANEOUS at 05:17

## 2025-07-01 RX ADMIN — TRAMADOL HYDROCHLORIDE 50 MILLIGRAM(S): 50 TABLET, FILM COATED ORAL at 05:17

## 2025-07-01 RX ADMIN — Medication 10 MILLIGRAM(S): at 16:25

## 2025-07-01 RX ADMIN — Medication 1 MILLIGRAM(S): at 22:40

## 2025-07-01 RX ADMIN — SERTRALINE 200 MILLIGRAM(S): 100 TABLET, FILM COATED ORAL at 16:42

## 2025-07-01 RX ADMIN — Medication 25 MILLIGRAM(S): at 05:17

## 2025-07-01 RX ADMIN — Medication 667 MILLIGRAM(S): at 16:42

## 2025-07-01 RX ADMIN — Medication 1 MILLIGRAM(S): at 08:54

## 2025-07-01 RX ADMIN — CARVEDILOL 25 MILLIGRAM(S): 3.12 TABLET, FILM COATED ORAL at 05:17

## 2025-07-01 RX ADMIN — Medication 667 MILLIGRAM(S): at 12:10

## 2025-07-01 RX ADMIN — Medication 200 MICROGRAM(S): at 05:16

## 2025-07-01 RX ADMIN — LISINOPRIL 20 MILLIGRAM(S): 5 TABLET ORAL at 05:17

## 2025-07-01 RX ADMIN — Medication 650 MILLIGRAM(S): at 12:02

## 2025-07-01 RX ADMIN — Medication 650 MILLIGRAM(S): at 12:20

## 2025-07-01 RX ADMIN — Medication 1 MILLIGRAM(S): at 16:42

## 2025-07-01 RX ADMIN — METHOCARBAMOL 500 MILLIGRAM(S): 500 TABLET, FILM COATED ORAL at 22:07

## 2025-07-01 RX ADMIN — Medication 1 GRAM(S): at 05:17

## 2025-07-01 NOTE — PATIENT PROFILE ADULT - PHONE #
Post procedural phone call placed to patient. Patient utilizing prescribed medication with adequate control of pain.  Patients follow up appointment verified and confirmed in chart. Time spent for patient questions. Patient to follow up with office as needed. Patient leaving for [place] and will not be returning until 11/18/24.   [phone]

## 2025-07-01 NOTE — PROVIDER CONTACT NOTE (OTHER) - SITUATION
Patient with missed dialysis, dialysis contacted and patient will not be taken until early afternoon. Patient with persistently high bp

## 2025-07-01 NOTE — CHART NOTE - NSCHARTNOTEFT_GEN_A_CORE
Called by RN for hypertension- /127.   Patient with c/o persistent pain and irritation at being in the hallway, but is without any other acute complaints.  Patient received AM antihypertensive medications this morning, is due for HD.  PRN Hydralazine ordered for SBP > 180.   PRN analgesia to be given as pain/irritation likely exacerbating hypertension.  RN communicating with HD suite, patient slotted for HD session early this afternoon.    Repeat BP s/p PRN Hydralazine and analgesia improved to 161/82.  Proceed with HD as scheduled.   RN to continue to monitor, to alert provider w/ any change in patient status.

## 2025-07-01 NOTE — H&P ADULT - HISTORY OF PRESENT ILLNESS
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 Patient has multiple admissions for the same presentation. Endorses 2 day history of worsening pain, since she did not have the chance to  her pain meds (Dilaudid 4mg PO). RUQ abd pain, describes as sharp, non-radiated, associated with nausea. Missed HD today due to pain. Denies fever, chills, vomiting, chest pain, SOB, palpitation, dysuria, blood in stool or any other acute symptoms. Was found to be hypertensive with BP:200/96 could be 2/2 pain & missed HD.  Labs remarkable for H/H:6.4/20.9, K:5.6, BUN/Cr:56.8/5.45, Lipase:61  6/27 CT-ABD: No acute abdominal findings to explain patient's pain. Redemonstrated cirrhosis with features of portal hypertension. New small bilateral pleural effusions with subjacent passive atelectasis.  Patient was diagnosed for  liver abscess and has been on Meropenem 500mg QD to be continued until 7/15/25.

## 2025-07-01 NOTE — H&P ADULT - ASSESSMENT
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 Patient has multiple admissions for the same presentation.     Acute on Chronic Anemia  H/H:6.4/20.9  Patient refused  FOBT   - 1PRBC  - Transfuse as per protocol to keep hgb>7    Hyperkalemia   - K:5.6  - s/p Humulin 5U  - Monitor K level correct accordingly   - ECG    Abdominal pain.  Continuous abdominal pain w/o any acute finding.   6/27 CT-ABD: No acute abdominal findings to explain patient's pain. Redemonstrated cirrhosis with features of portal hypertension. New small bilateral pleural effusions with subjacent passive atelectasis.  - Pain control  - PPI  - Pain control   - Pain management consult     Liver abscess.  6/2/25 CT-ABD remarkable for Liver abscess   - PICC line in place  - Meropenem 500mg IV QD (on HD days to be given post HD) - To be continued until 7/15    ESRD on dialysis.  HD-MWF  Missed Monday HD due to pain   - Needs to be dialyzed today  - Nephro consulted.  - Ca Acetate 667mg     Diabetes mellitus.  5/22 A1C = 7.3  - ISS with hypoglycemia protocol.    Hypertension.  Hypertensive upon ED arrival with BP: 200/96  Continue home meds  - Coreg 25mg BID  - Lisinopril 10mg QD  - Hydralazine 25mg TID  - Monitor BP.    Chronic Pancreatitis   - Creon    69 year old female with a PMH of HTN, DM, CAD s/p PCI, ESRD (HD-MWF), gastroparesis, IBS, Chronic Pancreatitis, Erosive Esophagitis, Cirrhosis secondary to HCV (Treated 8 years ago), Anxiety, Depression, Thyroid cancer s/p thyroidectomy presents to the ED for abdominal pain. Patient has multiple admissions for the same presentation.     Acute on Chronic Anemia  H/H:6.4/20.9  Patient refused  FOBT   - 1PRBC to be given with HD   - Transfuse as per protocol to keep hgb>7    Hyperkalemia   - K:5.6  - s/p Humulin 5U  - Monitor K level correct accordingly   - ECG    Abdominal pain.  Continuous abdominal pain w/o any acute finding.   6/27 CT-ABD: No acute abdominal findings to explain patient's pain. Redemonstrated cirrhosis with features of portal hypertension. New small bilateral pleural effusions with subjacent passive atelectasis.  - Pain control  - PPI  - Pain control   - Pain management consult     Liver abscess.  6/2/25 CT-ABD remarkable for Liver abscess   - PICC line in place  - Meropenem 500mg IV QD (on HD days to be given post HD) - To be continued until 7/15    ESRD on dialysis.  HD-MWF  Missed Monday HD due to pain   - Needs to be dialyzed today  - Nephro consulted.  - Ca Acetate 667mg     Diabetes mellitus.  5/22 A1C = 7.3  - ISS with hypoglycemia protocol.    Hypertension.  Hypertensive upon ED arrival with BP: 200/96  Continue home meds  - Coreg 25mg BID  - Lisinopril 10mg QD  - Hydralazine 25mg TID  - Monitor BP.    Chronic Pancreatitis   - Creon

## 2025-07-01 NOTE — CHART NOTE - NSCHARTNOTEFT_GEN_A_CORE
T(C): 36.6 (07-01-25 @ 04:38), Max: 36.8 (06-30-25 @ 16:11)  HR: 71 (07-01-25 @ 04:38) (69 - 75)  BP: 187/84 (07-01-25 @ 04:38) (177/79 - 200/96)  RR: 18 (07-01-25 @ 04:38) (14 - 20)  SpO2: 95% (07-01-25 @ 04:38) (95% - 100%)    GEN - NAD  HEENT - NCAT, EOMI, NILA, no JVD/bruit.  RESP - CTA BL, no wheeze/stridor/rhonchi/crackles. not on supplemental O2.  CARDIO - NS1S2, RRR. No murmurs/rubs/gallops.  ABD - Soft/Non tender/Non distended. Normal BS x4 quadrants.   Ext - No CORIN.   MSK - no joints swelling/TN  Neuro - cn 2-12 grossly intact. . no visible seizure activity appreciated. no tremor. gait not observed.    Psych- AAOx3. no suicidal/homicidal ideation. appropriate behaviour. attentive. normal affect.  hd today.transfuse with hd. adjust bp meds. f/u maribell scott T(C): 36.6 (07-01-25 @ 04:38), Max: 36.8 (06-30-25 @ 16:11)  HR: 71 (07-01-25 @ 04:38) (69 - 75)  BP: 187/84 (07-01-25 @ 04:38) (177/79 - 200/96)  RR: 18 (07-01-25 @ 04:38) (14 - 20)  SpO2: 95% (07-01-25 @ 04:38) (95% - 100%)    GEN - NAD  HEENT - NCAT, EOMI, NILA,   RESP - CTA BL, no wheeze//rhonchi/crackles. not on supplemental O2.  CARDIO - NS1S2, RRR. No murmurs  ABD - Soft/Non tender/Non distended. Normal BS x4 quadrants.   Ext - No CORIN.   MSK - no joints swelling/TN  Neuro - cn 2-12 grossly intact. . no visible seizure activity appreciated. no tremor. gait not observed.    Psych- AAOx3. . attentive. normal affect.  hd today.transfuse with hd. adjust bp meds. f/u lytes,k .iv hydralazine add prn. will recheck bp.spoke with RN  THONG Pt

## 2025-07-01 NOTE — H&P ADULT - NSHPPHYSICALEXAM_GEN_ALL_CORE
GENERAL: NAD, comfortable at rest  EYES: EOMI, PERRL, conjunctiva and sclera clear  LUNG: Clear to auscultation bilaterally; No wheezes, rales or rhonchi  HEART: Regular rate and rhythm; No murmurs, rubs, or gallops  ABDOMEN: Soft, Nondistended, diffused abd tenderness worst at RUQ   EXTREMITIES:  2+ Peripheral Pulses, No clubbing, cyanosis, or edema  PSYCH: normal mood and affect  NEUROLOGY: AAOx3, non-focal   SKIN: No rashes or lesions

## 2025-07-01 NOTE — CHART NOTE - NSCHARTNOTEFT_GEN_A_CORE
Notified by RN for patient with /80, +mild headache, rest VSS   Patient with mild headache, otherwise asymptomatic, /80 and rest VSS, NAD; patient accessed by MD   Ordered hydralazine 10mg IVP x1 stat as per MD   Continue to monitor   RN to notify provider with any changes in patient status

## 2025-07-01 NOTE — H&P ADULT - NSHPLABSRESULTS_GEN_ALL_CORE
6.4    3.73  )-----------( 61       ( 30 Jun 2025 19:29 )             20.9     140  |  104  |  56.8[H]  ----------------------------<  186[H]     06-30  5.6[H]   |  20.0[L]  |  5.45[H]    Ca    8.2[L]      30 Jun 2025 19:29    TPro  6.4[L]  /  Alb  3.6  /  TBili  0.4  /  DBili  x   /  AST  21  /  ALT  7   /  AlkPhos  104  06-30

## 2025-07-01 NOTE — PATIENT PROFILE ADULT - NSPROGENPREVTRANSF_GEN_A_NUR
Attempted to call pt regarding culture results. No answer.[facility]    ----- Message from Shahrzad Montes MD sent at 6/24/2024  7:53 AM CDT -----  Bacterial culture is negative.  There is no need for oral antibiotics at this time.   no

## 2025-07-02 ENCOUNTER — TRANSCRIPTION ENCOUNTER (OUTPATIENT)
Age: 70
End: 2025-07-02

## 2025-07-02 DIAGNOSIS — I10 ESSENTIAL (PRIMARY) HYPERTENSION: ICD-10-CM

## 2025-07-02 DIAGNOSIS — R07.9 CHEST PAIN, UNSPECIFIED: ICD-10-CM

## 2025-07-02 LAB
ANION GAP SERPL CALC-SCNC: 14 MMOL/L — SIGNIFICANT CHANGE UP (ref 5–17)
BUN SERPL-MCNC: 30.7 MG/DL — HIGH (ref 8–20)
CALCIUM SERPL-MCNC: 9.2 MG/DL — SIGNIFICANT CHANGE UP (ref 8.4–10.5)
CHLORIDE SERPL-SCNC: 99 MMOL/L — SIGNIFICANT CHANGE UP (ref 96–108)
CO2 SERPL-SCNC: 25 MMOL/L — SIGNIFICANT CHANGE UP (ref 22–29)
CREAT SERPL-MCNC: 4.14 MG/DL — HIGH (ref 0.5–1.3)
EGFR: 11 ML/MIN/1.73M2 — LOW
EGFR: 11 ML/MIN/1.73M2 — LOW
GLUCOSE BLDC GLUCOMTR-MCNC: 134 MG/DL — HIGH (ref 70–99)
GLUCOSE BLDC GLUCOMTR-MCNC: 135 MG/DL — HIGH (ref 70–99)
GLUCOSE BLDC GLUCOMTR-MCNC: 148 MG/DL — HIGH (ref 70–99)
GLUCOSE SERPL-MCNC: 136 MG/DL — HIGH (ref 70–99)
HCT VFR BLD CALC: 31.2 % — LOW (ref 34.5–45)
HGB BLD-MCNC: 10 G/DL — LOW (ref 11.5–15.5)
IMMATURE PLATELET FRACTION #: 3.7 K/UL — LOW (ref 4.7–11.1)
IMMATURE PLATELET FRACTION %: 4.4 % — SIGNIFICANT CHANGE UP (ref 1.6–4.9)
MCHC RBC-ENTMCNC: 28.9 PG — SIGNIFICANT CHANGE UP (ref 27–34)
MCHC RBC-ENTMCNC: 32.1 G/DL — SIGNIFICANT CHANGE UP (ref 32–36)
MCV RBC AUTO: 90.2 FL — SIGNIFICANT CHANGE UP (ref 80–100)
MRSA PCR RESULT.: SIGNIFICANT CHANGE UP
NRBC # BLD AUTO: 0 K/UL — SIGNIFICANT CHANGE UP (ref 0–0)
NRBC # FLD: 0 K/UL — SIGNIFICANT CHANGE UP (ref 0–0)
NRBC BLD AUTO-RTO: 0 /100 WBCS — SIGNIFICANT CHANGE UP (ref 0–0)
PLATELET # BLD AUTO: 85 K/UL — LOW (ref 150–400)
PMV BLD: 12.2 FL — SIGNIFICANT CHANGE UP (ref 7–13)
POTASSIUM SERPL-MCNC: 4.8 MMOL/L — SIGNIFICANT CHANGE UP (ref 3.5–5.3)
POTASSIUM SERPL-SCNC: 4.8 MMOL/L — SIGNIFICANT CHANGE UP (ref 3.5–5.3)
RBC # BLD: 3.46 M/UL — LOW (ref 3.8–5.2)
RBC # FLD: 17.7 % — HIGH (ref 10.3–14.5)
S AUREUS DNA NOSE QL NAA+PROBE: SIGNIFICANT CHANGE UP
SODIUM SERPL-SCNC: 138 MMOL/L — SIGNIFICANT CHANGE UP (ref 135–145)
TROPONIN T, HIGH SENSITIVITY RESULT: 69 NG/L — HIGH (ref 0–51)
WBC # BLD: 5.71 K/UL — SIGNIFICANT CHANGE UP (ref 3.8–10.5)
WBC # FLD AUTO: 5.71 K/UL — SIGNIFICANT CHANGE UP (ref 3.8–10.5)

## 2025-07-02 PROCEDURE — 93010 ELECTROCARDIOGRAM REPORT: CPT

## 2025-07-02 PROCEDURE — 80053 COMPREHEN METABOLIC PANEL: CPT

## 2025-07-02 PROCEDURE — 87640 STAPH A DNA AMP PROBE: CPT

## 2025-07-02 PROCEDURE — 86901 BLOOD TYPING SEROLOGIC RH(D): CPT

## 2025-07-02 PROCEDURE — 85027 COMPLETE CBC AUTOMATED: CPT

## 2025-07-02 PROCEDURE — 84100 ASSAY OF PHOSPHORUS: CPT

## 2025-07-02 PROCEDURE — 83735 ASSAY OF MAGNESIUM: CPT

## 2025-07-02 PROCEDURE — 86900 BLOOD TYPING SEROLOGIC ABO: CPT

## 2025-07-02 PROCEDURE — 87641 MR-STAPH DNA AMP PROBE: CPT

## 2025-07-02 PROCEDURE — 93005 ELECTROCARDIOGRAM TRACING: CPT

## 2025-07-02 PROCEDURE — 71045 X-RAY EXAM CHEST 1 VIEW: CPT | Mod: 26

## 2025-07-02 PROCEDURE — 86923 COMPATIBILITY TEST ELECTRIC: CPT

## 2025-07-02 PROCEDURE — 82550 ASSAY OF CK (CPK): CPT

## 2025-07-02 PROCEDURE — P9016: CPT

## 2025-07-02 PROCEDURE — 71045 X-RAY EXAM CHEST 1 VIEW: CPT

## 2025-07-02 PROCEDURE — 82962 GLUCOSE BLOOD TEST: CPT

## 2025-07-02 PROCEDURE — 83690 ASSAY OF LIPASE: CPT

## 2025-07-02 PROCEDURE — 36415 COLL VENOUS BLD VENIPUNCTURE: CPT

## 2025-07-02 PROCEDURE — 86850 RBC ANTIBODY SCREEN: CPT

## 2025-07-02 PROCEDURE — 80048 BASIC METABOLIC PNL TOTAL CA: CPT

## 2025-07-02 PROCEDURE — 36430 TRANSFUSION BLD/BLD COMPNT: CPT

## 2025-07-02 PROCEDURE — 99233 SBSQ HOSP IP/OBS HIGH 50: CPT

## 2025-07-02 PROCEDURE — 99223 1ST HOSP IP/OBS HIGH 75: CPT

## 2025-07-02 PROCEDURE — 84484 ASSAY OF TROPONIN QUANT: CPT

## 2025-07-02 PROCEDURE — 84132 ASSAY OF SERUM POTASSIUM: CPT

## 2025-07-02 PROCEDURE — 85025 COMPLETE CBC W/AUTO DIFF WBC: CPT

## 2025-07-02 RX ORDER — ISOSORBIDE MONONITRATE 60 MG/1
30 TABLET, EXTENDED RELEASE ORAL ONCE
Refills: 0 | Status: COMPLETED | OUTPATIENT
Start: 2025-07-02 | End: 2025-07-02

## 2025-07-02 RX ORDER — LABETALOL HYDROCHLORIDE 200 MG/1
10 TABLET, FILM COATED ORAL ONCE
Refills: 0 | Status: COMPLETED | OUTPATIENT
Start: 2025-07-02 | End: 2025-07-02

## 2025-07-02 RX ORDER — ISOSORBIDE MONONITRATE 60 MG/1
1 TABLET, EXTENDED RELEASE ORAL
Qty: 0 | Refills: 0 | DISCHARGE
Start: 2025-07-02

## 2025-07-02 RX ORDER — ISOSORBIDE MONONITRATE 60 MG/1
60 TABLET, EXTENDED RELEASE ORAL DAILY
Refills: 0 | Status: DISCONTINUED | OUTPATIENT
Start: 2025-07-03 | End: 2025-07-03

## 2025-07-02 RX ADMIN — CARVEDILOL 25 MILLIGRAM(S): 3.12 TABLET, FILM COATED ORAL at 05:48

## 2025-07-02 RX ADMIN — Medication 1 MILLIGRAM(S): at 17:48

## 2025-07-02 RX ADMIN — Medication 1 MILLIGRAM(S): at 03:38

## 2025-07-02 RX ADMIN — Medication 1 MILLIGRAM(S): at 22:59

## 2025-07-02 RX ADMIN — Medication 1 MILLIGRAM(S): at 11:30

## 2025-07-02 RX ADMIN — MEROPENEM 500 MILLIGRAM(S): 1 INJECTION INTRAVENOUS at 22:43

## 2025-07-02 RX ADMIN — Medication 10 MILLIGRAM(S): at 04:00

## 2025-07-02 RX ADMIN — Medication 40 MILLIGRAM(S): at 05:43

## 2025-07-02 RX ADMIN — LISINOPRIL 20 MILLIGRAM(S): 5 TABLET ORAL at 05:53

## 2025-07-02 RX ADMIN — Medication 200 MICROGRAM(S): at 05:53

## 2025-07-02 RX ADMIN — Medication 10 MILLIGRAM(S): at 20:15

## 2025-07-02 RX ADMIN — Medication 10 MILLIGRAM(S): at 11:26

## 2025-07-02 RX ADMIN — LABETALOL HYDROCHLORIDE 10 MILLIGRAM(S): 200 TABLET, FILM COATED ORAL at 17:20

## 2025-07-02 RX ADMIN — Medication 1 GRAM(S): at 09:43

## 2025-07-02 RX ADMIN — TRAMADOL HYDROCHLORIDE 50 MILLIGRAM(S): 50 TABLET, FILM COATED ORAL at 05:43

## 2025-07-02 RX ADMIN — SERTRALINE 200 MILLIGRAM(S): 100 TABLET, FILM COATED ORAL at 11:25

## 2025-07-02 RX ADMIN — Medication 4 MILLIGRAM(S): at 12:19

## 2025-07-02 RX ADMIN — Medication 30 MILLILITER(S): at 17:53

## 2025-07-02 RX ADMIN — HEPARIN SODIUM 5000 UNIT(S): 1000 INJECTION INTRAVENOUS; SUBCUTANEOUS at 05:48

## 2025-07-02 RX ADMIN — METHOCARBAMOL 500 MILLIGRAM(S): 500 TABLET, FILM COATED ORAL at 05:43

## 2025-07-02 RX ADMIN — CARVEDILOL 25 MILLIGRAM(S): 3.12 TABLET, FILM COATED ORAL at 18:47

## 2025-07-02 RX ADMIN — METHOCARBAMOL 500 MILLIGRAM(S): 500 TABLET, FILM COATED ORAL at 22:44

## 2025-07-02 RX ADMIN — Medication 667 MILLIGRAM(S): at 09:43

## 2025-07-02 RX ADMIN — Medication 25 MILLIGRAM(S): at 05:48

## 2025-07-02 RX ADMIN — TRAMADOL HYDROCHLORIDE 50 MILLIGRAM(S): 50 TABLET, FILM COATED ORAL at 22:43

## 2025-07-02 RX ADMIN — Medication 50 MILLIGRAM(S): at 22:45

## 2025-07-02 RX ADMIN — Medication 1 GRAM(S): at 18:47

## 2025-07-02 RX ADMIN — ISOSORBIDE MONONITRATE 30 MILLIGRAM(S): 60 TABLET, EXTENDED RELEASE ORAL at 11:25

## 2025-07-02 RX ADMIN — LABETALOL HYDROCHLORIDE 10 MILLIGRAM(S): 200 TABLET, FILM COATED ORAL at 13:48

## 2025-07-02 NOTE — CONSULT NOTE ADULT - NS ATTEND AMEND GEN_ALL_CORE FT
Patient notes no chest pain but has been having epigastric pain and feels like a pill stuck his throat   HS trops 63-69    69 year old female with a PMH of HTN, DM, CAD s/p PCI, ESRD (HD-MWF), gastroparesis, IBS, Chronic Pancreatitis, Erosive Esophagitis, Cirrhosis secondary to HCV (Treated 8 years ago), Anxiety, Depression, Thyroid cancer s/p thyroidectomy , liver abcess (on meropenem until 7/15) who presents to the ED for abdominal pain.    1) Non cardiac chest pain   2) Uncontrolled Hypertension     - patient notes no chest pain or anginal symptoms, but of note the blood pressure is elevated, patient had a recent stress test done with no ischemia or infarct and normal LVEF   - will increase the Imdur to 60mg, Coreg 25mg po q 12hrs and start Hydralazine 50mg po 8hrs and Lisinopril 20mg po q daily   - monitor BP and no further cardiovascular work up     Loan Hogan D.O. [facility]  Cardiology/Vascular Cardiology -[facility]   Cardiology Service Phone: [phone]  Consults Line:  ext. [phone]  Also reachable by Teams.

## 2025-07-02 NOTE — DISCHARGE NOTE PROVIDER - NSDCMRMEDTOKEN_GEN_ALL_CORE_FT
acetaminophen 325 mg oral tablet: 2 tab(s) orally every 6 hours As needed Temp greater or equal to 38C (100.4F), Mild Pain (1 - 3)  calcium acetate 667 mg oral capsule: 3 cap(s) orally 2 times a day  carvedilol 25 mg oral tablet: 1 tab(s) orally every 12 hours  Creon 3000 units oral delayed release capsule: 1 cap(s) orally every 8 hours  HumaLOG KwikPen 100 units/mL injectable solution: injectable 3 times a day  hydrALAZINE 25 mg oral tablet: 1 tab(s) orally every 8 hours  HYDROmorphone 4 mg oral tablet: 1 tab(s) orally every 6 hours as needed for  severe pain MDD: 4 tabs  isosorbide mononitrate 30 mg oral tablet, extended release: 1 tab(s) orally once a day  levothyroxine 200 mcg (0.2 mg) oral tablet: 1 tab(s) orally once a day  lisinopril 20 mg oral tablet: 1 tab(s) orally once a day  meropenem 500 mg intravenous injection: 500 milligram(s) intravenously once a day MWF give antibiotic post HD. continue antibiotic  TIL 07/15/25  methocarbamol 500 mg oral tablet: 1 tab(s) orally 3 times a day  pantoprazole 40 mg oral delayed release tablet: 1 tab(s) orally once a day (before a meal)  sertraline 100 mg oral tablet: 2 tab(s) orally once a day  sucralfate 1 g/10 mL oral suspension: 10 milliliter(s) orally 3 times a day  traMADol 50 mg oral tablet: 1 tab(s) orally every 8 hours MDD: 3   acetaminophen 325 mg oral tablet: 2 tab(s) orally every 6 hours As needed Temp greater or equal to 38C (100.4F), Mild Pain (1 - 3)  calcium acetate 667 mg oral capsule: 3 cap(s) orally 2 times a day  carvedilol 25 mg oral tablet: 1 tab(s) orally every 12 hours  Creon 3000 units oral delayed release capsule: 1 cap(s) orally every 8 hours  HumaLOG KwikPen 100 units/mL injectable solution: injectable 3 times a day  hydrALAZINE 25 mg oral tablet: 1 tab(s) orally every 8 hours 2 tabs tid non DIALYSIS DAYS. 1 TAB TID HD DAYS  HYDROmorphone 4 mg oral tablet: 1 tab(s) orally every 6 hours as needed for  severe pain MDD: 4 tabs  isosorbide mononitrate 30 mg oral tablet, extended release: 1 tab(s) orally 2 times a day  levothyroxine 200 mcg (0.2 mg) oral tablet: 1 tab(s) orally once a day  lisinopril 20 mg oral tablet: 1 tab(s) orally once a day  meropenem 500 mg intravenous injection: 500 milligram(s) intravenously once a day MWF give antibiotic post HD. continue antibiotic  TIL 07/15/25  methocarbamol 500 mg oral tablet: 1 tab(s) orally 3 times a day  pantoprazole 40 mg oral delayed release tablet: 1 tab(s) orally once a day (before a meal)  sertraline 100 mg oral tablet: 2 tab(s) orally once a day  sucralfate 1 g/10 mL oral suspension: 10 milliliter(s) orally 3 times a day  traMADol 50 mg oral tablet: 1 tab(s) orally every 8 hours MDD: 3   acetaminophen 325 mg oral tablet: 2 tab(s) orally every 6 hours As needed Temp greater or equal to 38C (100.4F), Mild Pain (1 - 3)  calcium acetate 667 mg oral capsule: 3 cap(s) orally 2 times a day  carvedilol 25 mg oral tablet: 1 tab(s) orally every 12 hours  Creon 3000 units oral delayed release capsule: 1 cap(s) orally every 8 hours  HumaLOG KwikPen 100 units/mL injectable solution: injectable 3 times a day  hydrALAZINE 25 mg oral tablet: 1 tab(s) orally every 8 hours 2 tabs tid non DIALYSIS DAYS. 1 TAB TID HD DAYS  HYDROmorphone 4 mg oral tablet: 1 tab(s) orally every 6 hours as needed for  severe pain MDD: 4 tabs  isosorbide mononitrate 60 mg oral tablet, extended release: 1 tab(s) orally once a day non dialysis day. donot take dialysis day  levothyroxine 200 mcg (0.2 mg) oral tablet: 1 tab(s) orally once a day  lisinopril 20 mg oral tablet: 1 tab(s) orally once a day  meropenem 500 mg intravenous injection: 500 milligram(s) intravenously once a day MWF give antibiotic post HD. continue antibiotic  TIL 07/15/25  methocarbamol 500 mg oral tablet: 1 tab(s) orally 3 times a day  pantoprazole 40 mg oral delayed release tablet: 1 tab(s) orally once a day (before a meal)  sertraline 100 mg oral tablet: 2 tab(s) orally once a day  sucralfate 1 g/10 mL oral suspension: 10 milliliter(s) orally 3 times a day  traMADol 50 mg oral tablet: 1 tab(s) orally every 8 hours MDD: 3   acetaminophen 325 mg oral tablet: 2 tab(s) orally every 6 hours As needed Temp greater or equal to 38C (100.4F), Mild Pain (1 - 3)  calcium acetate 667 mg oral capsule: 3 cap(s) orally 2 times a day  carvedilol 25 mg oral tablet: 1 tab(s) orally every 12 hours  Creon 3000 units oral delayed release capsule: 1 cap(s) orally every 8 hours  HumaLOG KwikPen 100 units/mL injectable solution: injectable 3 times a day  hydrALAZINE 25 mg oral tablet: 1 tab(s) orally every 8 hours 2 tabs tid non DIALYSIS DAYS. 1 TAB TID HD DAYS  HYDROmorphone 4 mg oral tablet: 1 tab(s) orally every 6 hours as needed for  severe pain MDD: 4 tabs  isosorbide mononitrate 60 mg oral tablet, extended release: 1 tab(s) orally once a day non dialysis day. donot take dialysis day  levothyroxine 200 mcg (0.2 mg) oral tablet: 1 tab(s) orally once a day  lisinopril 20 mg oral tablet: 1 tab(s) orally once a day  methocarbamol 500 mg oral tablet: 1 tab(s) orally 3 times a day  pantoprazole 40 mg oral delayed release tablet: 1 tab(s) orally once a day (before a meal)  sertraline 100 mg oral tablet: 2 tab(s) orally once a day  sucralfate 1 g/10 mL oral suspension: 10 milliliter(s) orally 3 times a day

## 2025-07-02 NOTE — DIETITIAN NUTRITION RISK NOTIFICATION - WEIGHT LOSS
> 5% in 1 month Adbry Pregnancy And Lactation Text: It is unknown if this medication will adversely affect pregnancy or breast feeding.

## 2025-07-02 NOTE — DIETITIAN INITIAL EVALUATION ADULT - OTHER INFO
68 yo Female admitted for hypertensive urgency, acute on chronic anemia, hyperkalemia, elevated troponin, abdominal pain, liver abscess, ESRD on dialysis

## 2025-07-02 NOTE — DIETITIAN INITIAL EVALUATION ADULT - PERTINENT MEDS FT
MEDICATIONS  (STANDING):  calcium acetate 667 milliGRAM(s) Oral three times a day with meals  glucagon  Injectable 1 milliGRAM(s) IntraMuscular once  heparin   Injectable 5000 Unit(s) SubCutaneous every 12 hours  insulin lispro (ADMELOG) corrective regimen sliding scale   SubCutaneous three times a day before meals  isosorbide   mononitrate ER Tablet (IMDUR) 30 milliGRAM(s) Oral once  levothyroxine 200 MICROGram(s) Oral daily  lisinopril 20 milliGRAM(s) Oral daily  meropenem Injectable 500 milliGRAM(s) IV Push every 24 hours  pancrelipase (Creon 3000 Lipase Units) 1 Capsule(s) 1 Capsule(s) Oral three times a day with meals  pantoprazole    Tablet 40 milliGRAM(s) Oral before breakfast  sucralfate suspension 1 Gram(s) Oral two times a day  MEDICATIONS  (PRN):  aluminum hydroxide/magnesium hydroxide/simethicone Suspension 30 milliLiter(s) Oral every 4 hours PRN Dyspepsia  dextrose Oral Gel 15 Gram(s) Oral once PRN Blood Glucose LESS THAN 70 milliGRAM(s)/deciliter  ondansetron Injectable 4 milliGRAM(s) IV Push every 8 hours PRN Nausea and/or Vomiting

## 2025-07-02 NOTE — CONSULT NOTE ADULT - PROBLEM SELECTOR RECOMMENDATION 9
.  - called for chest pain  - patient denies chest pain, states she feel abdominal pain and sensation of pill stuck in her throat  - hsTnT 63-66-69, in the setting of ESRD with HD  - EKG NSR no ischemic changes  - NST on 6/15 without ischemia

## 2025-07-02 NOTE — DIETITIAN INITIAL EVALUATION ADULT - ADD RECOMMEND
1.) Recommend Consistent Carbohydrate (no snack), DASH/TLC diet.  2.) Recommend adding Glucerna oral nutritional supplement BID (provides 220 calories, 10 grams of protein/8oz) to promote PO intake.  3.) Recommend Nephro-Diamond daily.  4.) Monitor diet tolerance & PO intake, weight/hydration status, nutrition-related labs, and skin.

## 2025-07-02 NOTE — DISCHARGE NOTE PROVIDER - NSDCFUADDAPPT_GEN_ALL_CORE_FT
APPTS ARE READY TO BE MADE: [X] YES    Best Family or Patient Contact (if needed):    Additional Information about above appointments (if needed):    1: pcp one week  2:   3:     Other comments or requests:    APPTS ARE READY TO BE MADE: [X] YES    Best Family or Patient Contact (if needed):    Additional Information about above appointments (if needed):    1: pcp one week  2:   3:     Other comments or requests:     Patient was outreached but did not answer. A voicemail was left for the patient to return our call.   APPTS ARE READY TO BE MADE: [X] YES    Best Family or Patient Contact (if needed):    Additional Information about above appointments (if needed):    1: pcp one week  2:   3:     Other comments or requests:   Patient was outreached but did not answer. A voicemail was left for the patient to return our call.   APPTS ARE READY TO BE MADE: [X] YES    Best Family or Patient Contact (if needed):    Additional Information about above appointments (if needed):    1: pcp one week  2:   3:     Other comments or requests:     Patient was outreached, however they advised they were readmitted to the hospital.

## 2025-07-02 NOTE — CONSULT NOTE ADULT - PROBLEM SELECTOR RECOMMENDATION 2
.  - noted to be hypertensive since 7/1   - /95   - continue coreg 25mg PO BID, isosorbide 60mg PO, lisinopril 20mg   - start hydralazine 50 mg PO TID

## 2025-07-02 NOTE — DISCHARGE NOTE PROVIDER - HOSPITAL COURSE
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 Patient has multiple admissions for the same presentation. Found to have hb 6.4,hyperkalemia,uncontrolled htn. 6/27 CT-ABD: No acute abdominal findings to explain patient's pain. Redemonstrated cirrhosis with features of portal hypertension. New small bilateral pleural effusions with subjacent passive atelectasis.  pt has chronic Pain following with painmx. s/p 1 u prbc,pt refused FOBT understool risk. hb stable.coreected k, adjusted bp meds. elevated troponin likley demand ischemia with ckd. no chest pain. HD done. Pt to be complaint with HD as out pt and f/u james management out pt. 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 Patient has multiple admissions for the same presentation. Found to have hb 6.4,hyperkalemia,uncontrolled htn. 6/27 CT-ABD: No acute abdominal findings to explain patient's pain. Redemonstrated cirrhosis with features of portal hypertension. New small bilateral pleural effusions with subjacent passive atelectasis.  pt has chronic Pain following with painmx. s/p 1 u prbc,pt refused FOBT understool risk. hb stable.coreected k, adjusted bp meds. elevated troponin likley demand ischemia with ckd. no chest pain. sen by cardiology.HD done today. out pt f/u nephrology. Pt to be complaint with HD as out pt and f/u james management out pt. continue abx  as directed.       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 Patient has multiple admissions for the same presentation. Found to have hb 6.4,hyperkalemia,uncontrolled htn. 6/27 CT-ABD: No acute abdominal findings to explain patient's pain. Redemonstrated cirrhosis with features of portal hypertension. New small bilateral pleural effusions with subjacent passive atelectasis.  pt has chronic Pain following with painmx. s/p 1 u prbc,pt refused FOBT understool risk. hb stable.coreected k, adjusted bp meds. elevated troponin likley demand ischemia with ckd. no chest pain. sen by cardiology.HD done today. out pt f/u nephrology. Pt to be complaint with HD as out pt and f/u james management out pt. continue abx  as directed.spoke with [facility] care pharmcy with weekly f/u labs.

## 2025-07-02 NOTE — DIETITIAN INITIAL EVALUATION ADULT - NSICDXPASTMEDICALHX_GEN_ALL_CORE_FT
PAST MEDICAL HISTORY:  Bernard syndrome     CAD (coronary artery disease)     Chronic diastolic congestive heart failure     Cirrhosis     Diabetes     Esophagitis, erosive     ESRD on dialysis MWF at [facility]    Gastroparesis     HCV (hepatitis C virus)     HTN (hypertension)     IBS (irritable bowel syndrome)     Pancreatitis     Thyroid cancer

## 2025-07-02 NOTE — DIETITIAN INITIAL EVALUATION ADULT - DIET TYPE
Physical Therapy      Physical Therapy Treatment      Patient Name: Seb Arias     MRN:[id number]     Today's Date: 1/24/24     REFERRING DR Seaman      SUBJECTIVE:  pt states he is doing ok, wife is present and he has had no falls             GOALS:  safe gait/no falls/increased flexibility          OBJECTIVE:  full tx             ASSESSMENT: pt did well, he seems to have fatigue as only side effect of PT he continues to be stiff and have difficulty with amb, dyskinesia with UE/LE stiff and ridgid. Added new standing exs with walker to do hip flex (eggagerated) and standing squats          PLAN/TREATMENT/VISIT:     continue 1 x weekly   consistent carbohydrate (no snacks)/DASH/TLC (sodium and cholesterol restricted diet)

## 2025-07-02 NOTE — DISCHARGE NOTE PROVIDER - NSDCFUSCHEDAPPT_GEN_ALL_CORE_FT
[facility]  INFDISEASE [address]  Scheduled Appointment: 07/16/2025

## 2025-07-02 NOTE — CONSULT NOTE ADULT - ASSESSMENT
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 No acute abdominal findings to explain patient's pain. Redemonstrated cirrhosis with features of portal hypertension. New small bilateral pleural effusions with subjacent passive atelectasis. Cardiology now consulted for chest pain. Patient denies chest pain, states she feels abdominal pain and sensation of pill stuck in her throat.  NST 2 weeks ago without ischemia but here noted to be hypertensive to SBP 200s. hsTnT x 3 lateral, in the setting of ESRD. Patient nauseas at time of exam.   
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 Nephrology was consulted for HD needs.     ESRD on HD at [facility] MWF  HD access LUE AVF, functional   Fluid status euvolemic   HTN BP uncontrolled   Anemia of CKD - Hb is low   CKD MDB Hyperphosphatemia   Secondary hyperparathyroidism   Liver abscess currently on meropenem until 7/15/25  Abdominal pain; history of pain-seeking behaviour   Dilated extra and intrahepatic bile ducts    Plan   I arranged for HD today 3 hrs  ml/min 2K bath Goal UF 1.5L as tolerated   Dose medications for HD   LASHELL with HD   Labs on HD days   Pain control as per primary team   Rest of management as per primary team   Will follow

## 2025-07-02 NOTE — CONSULT NOTE ADULT - SUBJECTIVE AND OBJECTIVE BOX
[facility]                                              CARDIOLOGY AT [place] [address]                                             Telephone: [phone]. Fax:[phone]                                                       CARDIOLOGY CONSULTATION NOTE                                                                                             History obtained by: Patient and medical record  Community Cardiologist: Dr. 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 Patient has multiple admissions for the same presentation. Endorses 2 day history of worsening pain, since she did not have the chance to  her pain meds (Dilaudid 4mg PO). RUQ abd pain, describes as sharp, non-radiated, associated with nausea. Missed HD today due to pain. Denies fever, chills, vomiting, chest pain, SOB, palpitation, dysuria, blood in stool or any other acute symptoms. Was found to be hypertensive with BP:200/96 could be 2/2 pain & missed HD.  Labs remarkable for H/H:6.4/20.9, K:5.6, BUN/Cr:56.8/5.45, Lipase:61. 6/27 CT-ABD: No acute abdominal findings to explain patient's pain. Redemonstrated cirrhosis with features of portal hypertension. New small bilateral pleural effusions with subjacent passive atelectasis. Cardiology now consulted for chest pain. Patient denies chest pain, states she feels abdominal pain and sensation of pill stuck in her throat.  NST 2 weeks ago without ischemia but here noted to be hypertensive to SBP 200s. hsTnT x 3 lateral, in the setting of ESRD. Patient nauseas at time of exam.       CARDIAC TESTING   ECHO:  < from: TTE W or WO Ultrasound Enhancing Agent (25 @ 08:23) >    TRANSTHORACIC ECHOCARDIOGRAM REPORT  ________________________________________________________________________________                                      _______       Pt. Name:       HAYLEY LARA Study Date:    2025  MRN:   [id number]                YOB: 1955  Accession #:    [id number]                Age:           69 years  Account#:       [id number]               Gender:        F  Heart Rate:                              Height:        65.00 in (165.10 cm)  Rhythm:                                  Weight:        147.00 lb (66.68 kg)  Blood Pressure: 174/74 mmHg              BSA/BMI:       1.74 m² / 24.46 kg/m²  ________________________________________________________________________________________  Referring Physician: [id number] Jennifer Uribe  Primary Sonographer: Rhina Richardson    CPT:               ECHO TTE WO CON COMP W DOPP - 70339.m  Indication(s):     Chronic ischemic heart disease, unspecified - I25.9  Procedure:         Transthoracic echocardiogram with 2-D, M-mode and complete                     spectral and color flow Doppler.  Ordering Location: [facility]  Admission Status:  ED    _______________________________________________________________________________________     CONCLUSIONS:      1. Mild to moderate left ventricular hypertrophy.   2. Left ventricular cavity is normal in size. Left ventricular systolic function is normal with an ejection fraction visually estimated at 60 to 65 %.   3. There is moderate (grade 2) left ventricular diastolic dysfunction.   4. Normal right ventricular systolic function.   5. Left atrium is moderately dilated.   6. Fibrocalcific aortic valve sclerosis without stenosis.   7. Trace aortic regurgitation.   8. Mild mitral valve leafletcalcification.   9. Mitral valve leaflets are diffusely calcified.  10. There is moderate calcification of the mitral valve annulus.  11. Moderate mitral regurgitation.  12. Mild to moderate tricuspid regurgitation.  13. Estimated pulmonary artery systolic pressure is 39 mmHg, borderline pulmonary hypertension.  14. No pericardial effusion seen.    < end of copied text >    STRESS:    CATH:     ELECTROPHYSIOLOGY:     PAST MEDICAL HISTORY  Diabetes    Bernard syndrome    Pancreatitis    Gastroptosis    Liver disease    Cirrhosis    ESRD on dialysis    HTN (hypertension)    CAD (coronary artery disease)    Chronic diastolic congestive heart failure    IBS (irritable bowel syndrome)    Gastroparesis    Thyroid cancer    Esophagitis, erosive    HCV (hepatitis C virus)        PAST SURGICAL HISTORY  History of liver biopsy    H/O total thyroidectomy    History of cholecystectomy    S/P         SOCIAL HISTORY:  Denies smoking/alcohol/drugs  CIGARETTES:     ALCOHOL:  DRUGS:    FAMILY HISTORY:  FH: breast cancer (Mother)    FH: brain cancer (Father)      Family History of Cardiovascular Disease:  Yes [  ] No [  ]  Coronary Artery Disease in first degree relative: Yes [  ] No [  ]  Sudden Cardiac Death in First degree relative: Yes [  ] No [  ]    HOME MEDICATIONS:  acetaminophen 325 mg oral tablet: 2 tab(s) orally every 6 hours As needed Temp greater or equal to 38C (100.4F), Mild Pain (1 - 3) (2025 00:56)  carvedilol 25 mg oral tablet: 1 tab(s) orally every 12 hours (2025 00:56)  HumaLOG KwikPen 100 units/mL injectable solution: injectable 3 times a day (2025 00:56)  isosorbide mononitrate 30 mg oral tablet, extended release: 1 tab(s) orally once a day (2025 09:18)  levothyroxine 200 mcg (0.2 mg) oral tablet: 1 tab(s) orally once a day (2025 00:56)  sertraline 100 mg oral tablet: 2 tab(s) orally once a day (2025 00:56)      CURRENT CARDIAC MEDICATIONS:  carvedilol 25 milliGRAM(s) Oral every 12 hours  hydrALAZINE 50 milliGRAM(s) Oral every 8 hours  hydrALAZINE Injectable 10 milliGRAM(s) IV Push every 8 hours PRN SBP > 180  isosorbide   mononitrate ER Tablet (IMDUR) 30 milliGRAM(s) Oral once, Stop order after: 1 Doses  lisinopril 20 milliGRAM(s) Oral daily      CURRENT OTHER MEDICATIONS:  acetaminophen     Tablet .. 650 milliGRAM(s) Oral every 6 hours PRN Temp greater or equal to 38C (100.4F), Mild Pain (1 - 3)  HYDROmorphone   Tablet 4 milliGRAM(s) Oral every 6 hours PRN Moderate Pain (4 - 6)  HYDROmorphone  Injectable 1 milliGRAM(s) IV Push every 6 hours PRN Severe Pain (7 - 10)  melatonin 3 milliGRAM(s) Oral at bedtime PRN Insomnia  methocarbamol 500 milliGRAM(s) Oral three times a day  ondansetron Injectable 4 milliGRAM(s) IV Push every 8 hours PRN Nausea and/or Vomiting  sertraline 200 milliGRAM(s) Oral daily  traMADol 50 milliGRAM(s) Oral every 8 hours  aluminum hydroxide/magnesium hydroxide/simethicone Suspension 30 milliLiter(s) Oral every 4 hours PRN Dyspepsia  pantoprazole    Tablet 40 milliGRAM(s) Oral before breakfast  sucralfate suspension 1 Gram(s) Oral two times a day  calcium acetate 667 milliGRAM(s) Oral three times a day with meals  chlorhexidine 2% Cloths 1 Application(s) Topical <User Schedule>  dextrose 5%. 1000 milliLiter(s) (100 mL/Hr) IV Continuous <Continuous>  dextrose 5%. 1000 milliLiter(s) (50 mL/Hr) IV Continuous <Continuous>  dextrose 50% Injectable 25 Gram(s) IV Push once, Stop order after: 1 Doses  dextrose 50% Injectable 12.5 Gram(s) IV Push once, Stop order after: 1 Doses  dextrose 50% Injectable 25 Gram(s) IV Push once, Stop order after: 1 Doses  dextrose Oral Gel 15 Gram(s) Oral once, Stop order after: 1 Doses PRN Blood Glucose LESS THAN 70 milliGRAM(s)/deciliter  glucagon  Injectable 1 milliGRAM(s) IntraMuscular once, Stop order after: 1 Doses  heparin   Injectable 5000 Unit(s) SubCutaneous every 12 hours  insulin lispro (ADMELOG) corrective regimen sliding scale   SubCutaneous three times a day before meals  levothyroxine 200 MICROGram(s) Oral daily  meropenem Injectable 500 milliGRAM(s) IV Push every 24 hours, Stop order after: 7 Days  pancrelipase (Creon 3000 Lipase Units) 1 Capsule(s) 1 Capsule(s) Oral three times a day with meals      ALLERGIES:   ceftriaxone (Pruritus)  Augmentin (Hives)      REVIEW OF SYMPTOMS:   CONSTITUTIONAL: No fever, no chills, no weight loss, no weight gain, no fatigue   ENMT:  No vertigo; No sinus or throat pain  NECK: No pain or stiffness  CARDIOVASCULAR: No chest pain, no dyspnea, no syncope/presyncope, no palpitations, no dizziness, no Orthopnea, no Paroxsymal nocturnal dyspnea  RESPIRATORY: no Shortness of breath, no cough, no wheezing  : No dysuria, no hematuria   GI: No dark color stool, no nausea, no diarrhea, no constipation, no abdominal pain   NEURO: No headache, no slurred speech   MUSCULOSKELETAL: No joint pain or swelling; No muscle, back, or extremity pain  PSYCH: No agitation, no anxiety.    ALL OTHER REVIEW OF SYSTEMS ARE NEGATIVE.    VITAL SIGNS:  T(C): 36.6 (25 @ 11:25), Max: 36.9 (25 @ 05:40)  T(F): 97.8 (25 @ 11:25), Max: 98.5 (25 @ 05:40)  HR: 68 (25 @ 12:47) (68 - 78)  BP: 197/95 (25 @ 12:47) (129/71 - 211/71)  RR: 19 (25 @ 11:25) (16 - 19)  SpO2: 96% (25 @ 11:25) (94% - 98%)    INTAKE AND OUTPUT:      @ 07:01  -   @ 07:00  --------------------------------------------------------  IN: 0 mL / OUT: 1000 mL / NET: -1000 mL        PHYSICAL EXAM:  Constitutional: Comfortable . No acute distress.   HEENT: Atraumatic and normocephalic , neck is supple . no JVD. No carotid bruit.  CNS: A&Ox3. No focal deficits.   Respiratory: CTAB, unlabored   Cardiovascular: RRR normal s1 s2. No murmur. No rubs or gallop.  Gastrointestinal: Soft, non-tender. +Bowel sounds.   Extremities: 2+ Peripheral Pulses, No clubbing, cyanosis, or edema  Psychiatric: Calm . no agitation.   Skin: Warm and dry, no ulcers on extremities     LABS:                            10.0   5.71  )-----------( 85       ( 2025 08:15 )             31.2     07-02    138  |  99  |  30.7[H]  ----------------------------<  136[H]  4.8   |  25.0  |  4.14[H]    Ca    9.2      2025 08:15  Phos  2.2     07-01  Mg     1.9     07-01    TPro  6.0[L]  /  Alb  3.5  /  TBili  0.6  /  DBili  x   /  AST  14  /  ALT  5   /  AlkPhos  100  07-01      Urinalysis Basic - ( 2025 08:15 )    Color: x / Appearance: x / SG: x / pH: x  Gluc: 136 mg/dL / Ketone: x  / Bili: x / Urobili: x   Blood: x / Protein: x / Nitrite: x   Leuk Esterase: x / RBC: x / WBC x   Sq Epi: x / Non Sq Epi: x / Bacteria: x              INTERPRETATION OF TELEMETRY:     ECG:   Prior ECG: Yes [  ] No [  ]    RADIOLOGY & ADDITIONAL STUDIES:    X-ray:    CT scan:   MRI:   US:                                                [facility]                                              CARDIOLOGY AT [place] [address]                                             Telephone: [phone]. Fax:[phone]                                                       CARDIOLOGY CONSULTATION NOTE                                                                                             History obtained by: Patient and medical record  Community Cardiologist: Dr. 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 Patient has multiple admissions for the same presentation. Endorses 2 day history of worsening pain, since she did not have the chance to  her pain meds (Dilaudid 4mg PO). RUQ abd pain, describes as sharp, non-radiated, associated with nausea. Missed HD today due to pain. Denies fever, chills, vomiting, chest pain, SOB, palpitation, dysuria, blood in stool or any other acute symptoms. Was found to be hypertensive with BP:200/96 could be 2/2 pain & missed HD.  Labs remarkable for H/H:6.4/20.9, K:5.6, BUN/Cr:56.8/5.45, Lipase:61. 6/27 CT-ABD: No acute abdominal findings to explain patient's pain. Redemonstrated cirrhosis with features of portal hypertension. New small bilateral pleural effusions with subjacent passive atelectasis. Cardiology now consulted for chest pain. Patient denies chest pain, states she feels abdominal pain and sensation of pill stuck in her throat.  NST 2 weeks ago without ischemia but here noted to be hypertensive to SBP 200s. hsTnT x 3 lateral, in the setting of ESRD. Patient nauseas at time of exam.       CARDIAC TESTING   ECHO:  < from: TTE W or WO Ultrasound Enhancing Agent (25 @ 08:23) >    TRANSTHORACIC ECHOCARDIOGRAM REPORT  ________________________________________________________________________________                                      _______       Pt. Name:       HAYLEY LARA Study Date:    2025  MRN:   [id number]                YOB: 1955  Accession #:    [id number]                Age:           69 years  Account#:       [id number]               Gender:        F  Heart Rate:                              Height:        65.00 in (165.10 cm)  Rhythm:                                  Weight:        147.00 lb (66.68 kg)  Blood Pressure: 174/74 mmHg              BSA/BMI:       1.74 m² / 24.46 kg/m²  ________________________________________________________________________________________  Referring Physician: [id number] Jennifer Uribe  Primary Sonographer: Rhina Richardson    CPT:               ECHO TTE WO CON COMP W DOPP - 07275.m  Indication(s):     Chronic ischemic heart disease, unspecified - I25.9  Procedure:         Transthoracic echocardiogram with 2-D, M-mode and complete                     spectral and color flow Doppler.  Ordering Location: [facility]  Admission Status:  ED    _______________________________________________________________________________________     CONCLUSIONS:      1. Mild to moderate left ventricular hypertrophy.   2. Left ventricular cavity is normal in size. Left ventricular systolic function is normal with an ejection fraction visually estimated at 60 to 65 %.   3. There is moderate (grade 2) left ventricular diastolic dysfunction.   4. Normal right ventricular systolic function.   5. Left atrium is moderately dilated.   6. Fibrocalcific aortic valve sclerosis without stenosis.   7. Trace aortic regurgitation.   8. Mild mitral valve leafletcalcification.   9. Mitral valve leaflets are diffusely calcified.  10. There is moderate calcification of the mitral valve annulus.  11. Moderate mitral regurgitation.  12. Mild to moderate tricuspid regurgitation.  13. Estimated pulmonary artery systolic pressure is 39 mmHg, borderline pulmonary hypertension.  14. No pericardial effusion seen.    < end of copied text >    STRESS:    CATH:     ELECTROPHYSIOLOGY:     PAST MEDICAL HISTORY  Diabetes    Bernard syndrome    Pancreatitis    Gastroptosis    Liver disease    Cirrhosis    ESRD on dialysis    HTN (hypertension)    CAD (coronary artery disease)    Chronic diastolic congestive heart failure    IBS (irritable bowel syndrome)    Gastroparesis    Thyroid cancer    Esophagitis, erosive    HCV (hepatitis C virus)        PAST SURGICAL HISTORY  History of liver biopsy    H/O total thyroidectomy    History of cholecystectomy    S/P         SOCIAL HISTORY:  Denies smoking/alcohol/drugs  CIGARETTES:     ALCOHOL:  DRUGS:    FAMILY HISTORY:  FH: breast cancer (Mother)    FH: brain cancer (Father)      Family History of Cardiovascular Disease:  Yes [  ] No [  ]  Coronary Artery Disease in first degree relative: Yes [  ] No [  ]  Sudden Cardiac Death in First degree relative: Yes [  ] No [  ]    HOME MEDICATIONS:  acetaminophen 325 mg oral tablet: 2 tab(s) orally every 6 hours As needed Temp greater or equal to 38C (100.4F), Mild Pain (1 - 3) (2025 00:56)  carvedilol 25 mg oral tablet: 1 tab(s) orally every 12 hours (2025 00:56)  HumaLOG KwikPen 100 units/mL injectable solution: injectable 3 times a day (2025 00:56)  isosorbide mononitrate 30 mg oral tablet, extended release: 1 tab(s) orally once a day (2025 09:18)  levothyroxine 200 mcg (0.2 mg) oral tablet: 1 tab(s) orally once a day (2025 00:56)  sertraline 100 mg oral tablet: 2 tab(s) orally once a day (2025 00:56)      CURRENT CARDIAC MEDICATIONS:  carvedilol 25 milliGRAM(s) Oral every 12 hours  hydrALAZINE 50 milliGRAM(s) Oral every 8 hours  hydrALAZINE Injectable 10 milliGRAM(s) IV Push every 8 hours PRN SBP > 180  isosorbide   mononitrate ER Tablet (IMDUR) 30 milliGRAM(s) Oral once, Stop order after: 1 Doses  lisinopril 20 milliGRAM(s) Oral daily      CURRENT OTHER MEDICATIONS:  acetaminophen     Tablet .. 650 milliGRAM(s) Oral every 6 hours PRN Temp greater or equal to 38C (100.4F), Mild Pain (1 - 3)  HYDROmorphone   Tablet 4 milliGRAM(s) Oral every 6 hours PRN Moderate Pain (4 - 6)  HYDROmorphone  Injectable 1 milliGRAM(s) IV Push every 6 hours PRN Severe Pain (7 - 10)  melatonin 3 milliGRAM(s) Oral at bedtime PRN Insomnia  methocarbamol 500 milliGRAM(s) Oral three times a day  ondansetron Injectable 4 milliGRAM(s) IV Push every 8 hours PRN Nausea and/or Vomiting  sertraline 200 milliGRAM(s) Oral daily  traMADol 50 milliGRAM(s) Oral every 8 hours  aluminum hydroxide/magnesium hydroxide/simethicone Suspension 30 milliLiter(s) Oral every 4 hours PRN Dyspepsia  pantoprazole    Tablet 40 milliGRAM(s) Oral before breakfast  sucralfate suspension 1 Gram(s) Oral two times a day  calcium acetate 667 milliGRAM(s) Oral three times a day with meals  chlorhexidine 2% Cloths 1 Application(s) Topical <User Schedule>  dextrose 5%. 1000 milliLiter(s) (100 mL/Hr) IV Continuous <Continuous>  dextrose 5%. 1000 milliLiter(s) (50 mL/Hr) IV Continuous <Continuous>  dextrose 50% Injectable 25 Gram(s) IV Push once, Stop order after: 1 Doses  dextrose 50% Injectable 12.5 Gram(s) IV Push once, Stop order after: 1 Doses  dextrose 50% Injectable 25 Gram(s) IV Push once, Stop order after: 1 Doses  dextrose Oral Gel 15 Gram(s) Oral once, Stop order after: 1 Doses PRN Blood Glucose LESS THAN 70 milliGRAM(s)/deciliter  glucagon  Injectable 1 milliGRAM(s) IntraMuscular once, Stop order after: 1 Doses  heparin   Injectable 5000 Unit(s) SubCutaneous every 12 hours  insulin lispro (ADMELOG) corrective regimen sliding scale   SubCutaneous three times a day before meals  levothyroxine 200 MICROGram(s) Oral daily  meropenem Injectable 500 milliGRAM(s) IV Push every 24 hours, Stop order after: 7 Days  pancrelipase (Creon 3000 Lipase Units) 1 Capsule(s) 1 Capsule(s) Oral three times a day with meals      ALLERGIES:   ceftriaxone (Pruritus)  Augmentin (Hives)      REVIEW OF SYMPTOMS:   CONSTITUTIONAL: No fever, no chills, no weight loss, no weight gain, no fatigue   ENMT:  No vertigo; No sinus or throat pain  NECK: No pain or stiffness  CARDIOVASCULAR: No chest pain, no dyspnea, no syncope/presyncope, no palpitations, no dizziness, no Orthopnea, no Paroxsymal nocturnal dyspnea  RESPIRATORY: no Shortness of breath, no cough, no wheezing  : No dysuria, no hematuria   GI: No dark color stool, no nausea, no diarrhea, no constipation, no abdominal pain   NEURO: No headache, no slurred speech   MUSCULOSKELETAL: No joint pain or swelling; No muscle, back, or extremity pain  PSYCH: No agitation, no anxiety.    ALL OTHER REVIEW OF SYSTEMS ARE NEGATIVE.    VITAL SIGNS:  T(C): 36.6 (25 @ 11:25), Max: 36.9 (25 @ 05:40)  T(F): 97.8 (25 @ 11:25), Max: 98.5 (25 @ 05:40)  HR: 68 (25 @ 12:47) (68 - 78)  BP: 197/95 (25 @ 12:47) (129/71 - 211/71)  RR: 19 (25 @ 11:25) (16 - 19)  SpO2: 96% (25 @ 11:25) (94% - 98%)    INTAKE AND OUTPUT:      @ 07:01  -   @ 07:00  --------------------------------------------------------  IN: 0 mL / OUT: 1000 mL / NET: -1000 mL        PHYSICAL EXAM:  Constitutional: Comfortable . No acute distress.   HEENT: Atraumatic and normocephalic , neck is supple . no JVD. No carotid bruit.  CNS: A&Ox3. No focal deficits.   Respiratory: CTAB, unlabored   Cardiovascular: RRR normal s1 s2. No murmur. No rubs or gallop.  Gastrointestinal: Soft, non-tender. +Bowel sounds.   Extremities: 2+ Peripheral Pulses, No clubbing, cyanosis, or edema  Psychiatric: Calm . no agitation.   Skin: Warm and dry, no ulcers on extremities     LABS:                            10.0   5.71  )-----------( 85       ( 2025 08:15 )             31.2     07-02    138  |  99  |  30.7[H]  ----------------------------<  136[H]  4.8   |  25.0  |  4.14[H]    Ca    9.2      2025 08:15  Phos  2.2     07-01  Mg     1.9     07-01    TPro  6.0[L]  /  Alb  3.5  /  TBili  0.6  /  DBili  x   /  AST  14  /  ALT  5   /  AlkPhos  100  07-01      Urinalysis Basic - ( 2025 08:15 )    Color: x / Appearance: x / SG: x / pH: x  Gluc: 136 mg/dL / Ketone: x  / Bili: x / Urobili: x   Blood: x / Protein: x / Nitrite: x   Leuk Esterase: x / RBC: x / WBC x   Sq Epi: x / Non Sq Epi: x / Bacteria: x              INTERPRETATION OF TELEMETRY:     ECG: NSR no ischemic changes  Prior ECG: Yes [  ] No [  ]    RADIOLOGY & ADDITIONAL STUDIES:    X-ray:    CT scan:   < from: CT Abdomen and Pelvis w/ IV Cont (25 @ 21:32) >    IMPRESSION:  No acute abdominal findings to explain patient's pain.    Redemonstrated cirrhosis with features of portal hypertension.    New small bilateral pleural effusions with subjacent passive atelectasis.    Additional findings as above.        --- End of Report ---    < end of copied text >  MRI:   US:

## 2025-07-02 NOTE — PROGRESS NOTE ADULT - TIME BILLING
Time spent reviewing the chart documentation, reviewing labs and imaging studies, evaluating the patient, discussing the plan of care with the medical team, and documenting.

## 2025-07-02 NOTE — DISCHARGE NOTE PROVIDER - NSDCFUADDINST_GEN_ALL_CORE_FT
liver abscess on Meropenem 500mg IV QD (on HD days to be given post HD) - To be continued until 7/15  via picc line. remove picc line after complete antibiotic

## 2025-07-02 NOTE — DIETITIAN INITIAL EVALUATION ADULT - ORAL INTAKE PTA/DIET HISTORY
Pt visited, reports decreased PO intake for 2 weeks and 10 lbs weight loss (accounting for 6.6% body weight loss). Pt experiencing vomiting. NKFA reported.

## 2025-07-02 NOTE — DISCHARGE NOTE PROVIDER - ATTENDING DISCHARGE PHYSICAL EXAMINATION:
T(C): 36.9 (07-02-25 @ 05:40), Max: 36.9 (07-02-25 @ 05:40)  HR: 75 (07-02-25 @ 05:40) (70 - 78)  BP: 164/75 (07-02-25 @ 05:40) (129/71 - 211/71)  RR: 18 (07-02-25 @ 05:40) (16 - 19)  SpO2: 96% (07-02-25 @ 05:40) (94% - 99%)    GEN - NAD  HEENT - NCAT, EOMI, NILA,   RESP - CTA BL, no wheeze/stridor/rhonchi/crackles. not on supplemental O2.  CARDIO - NS1S2,  No murmurs  ABD - Soft/Non tender/Non distended. Normal BS x4 quadrants.   Ext - No CORIN.  MSK - full ROM of BL upper and lower extremities without pain or restriction. BL 5/5 strength on upper and lower extremities.   Neuro - cn 2-12 grossly intact no tremor. gait not observed.     Psych- AAOx3. attentive. normal affect. T(C): 36.9 (07-03-25 @ 07:51), Max: 36.9 (07-03-25 @ 07:51)  HR: 69 (07-03-25 @ 07:51) (68 - 82)  BP: 117/69 (07-03-25 @ 07:51) (117/69 - 200/96)  RR: 18 (07-03-25 @ 07:51) (18 - 19)  SpO2: 96% (07-03-25 @ 07:51) (94% - 98%)    GEN - NAD  HEENT - NCAT, EOMI, NILA,   RESP - CTA BL, no wheeze/stridor/rhonchi/crackles. not on supplemental O2.  CARDIO - NS1S2,  No murmurs  ABD - Soft/Non tender/Non distended. Normal BS x4 quadrants.   Ext - No CORIN.  MSK - full ROM of BL upper and lower extremities without pain or restriction. BL 5/5 strength on upper and lower extremities.   Neuro - cn 2-12 grossly intact no tremor. gait not observed.     Psych- AAOx3. attentive. normal affect. T(C): 36.6 (07-03-25 @ 12:02), Max: 36.9 (07-03-25 @ 07:51)  HR: 66 (07-03-25 @ 12:02) (66 - 82)  BP: 152/65 (07-03-25 @ 12:02) (117/69 - 200/96)  RR: 18 (07-03-25 @ 12:02) (18 - 19)  SpO2: 95% (07-03-25 @ 12:02) (94% - 98%)        GEN - NAD  HEENT - NCAT, EOMI, NILA,   RESP - CTA BL, no wheeze/stridor/rhonchi/crackles. not on supplemental O2.  CARDIO - NS1S2,  No murmurs  ABD - Soft/Non tender/Non distended. Normal BS x4 quadrants.   Ext - No CORIN.  MSK - full ROM of BL upper and lower extremities without pain or restriction. BL 5/5 strength on upper and lower extremities.   Neuro - cn 2-12 grossly intact no tremor. gait not observed.     Psych- AAOx3. attentive. normal affect.

## 2025-07-02 NOTE — DISCHARGE NOTE PROVIDER - CARE PROVIDER_API CALL
Blaine Rich  Nephrology  [address] [place] [id number]  Phone: [phone]  Fax: [phone]  Follow Up Time: 2 weeks

## 2025-07-02 NOTE — DISCHARGE NOTE PROVIDER - DETAILS OF MALNUTRITION DIAGNOSIS/DIAGNOSES
This patient has been assessed with a concern for Malnutrition and was treated during this hospitalization for the following Nutrition diagnosis/diagnoses:     -  07/02/2025: Severe protein-calorie malnutrition

## 2025-07-02 NOTE — DISCHARGE NOTE PROVIDER - NSDCCPCAREPLAN_GEN_ALL_CORE_FT
PRINCIPAL DISCHARGE DIAGNOSIS  Diagnosis: Acute hyperkalemia  Assessment and Plan of Treatment:       SECONDARY DISCHARGE DIAGNOSES  Diagnosis: Anemia of chronic disease  Assessment and Plan of Treatment:     Diagnosis: ESRD on hemodialysis  Assessment and Plan of Treatment:     Diagnosis: Anemia of chronic disease  Assessment and Plan of Treatment:     Diagnosis: Liver abscess  Assessment and Plan of Treatment:

## 2025-07-02 NOTE — DIETITIAN INITIAL EVALUATION ADULT - PERTINENT LABORATORY DATA
07-02    138  |  99  |  30.7[H]  ----------------------------<  136[H]  4.8   |  25.0  |  4.14[H]    Phos  2.2     07-01  Mg     1.9       POCT Blood Glucose.: 148 mg/dL (07-02-25 @ 09:33)  A1C with Estimated Average Glucose Result: 7.3 % (05-22-25 @ 06:07)  A1C with Estimated Average Glucose Result: 7.7 % (08-01-24 @ 06:16)

## 2025-07-03 ENCOUNTER — TRANSCRIPTION ENCOUNTER (OUTPATIENT)
Age: 70
End: 2025-07-03

## 2025-07-03 VITALS
DIASTOLIC BLOOD PRESSURE: 75 MMHG | SYSTOLIC BLOOD PRESSURE: 153 MMHG | OXYGEN SATURATION: 100 % | RESPIRATION RATE: 18 BRPM | HEART RATE: 74 BPM

## 2025-07-03 LAB
ANION GAP SERPL CALC-SCNC: 15 MMOL/L — SIGNIFICANT CHANGE UP (ref 5–17)
BUN SERPL-MCNC: 38 MG/DL — HIGH (ref 8–20)
CALCIUM SERPL-MCNC: 8.9 MG/DL — SIGNIFICANT CHANGE UP (ref 8.4–10.5)
CHLORIDE SERPL-SCNC: 99 MMOL/L — SIGNIFICANT CHANGE UP (ref 96–108)
CO2 SERPL-SCNC: 26 MMOL/L — SIGNIFICANT CHANGE UP (ref 22–29)
CREAT SERPL-MCNC: 4.95 MG/DL — HIGH (ref 0.5–1.3)
EGFR: 9 ML/MIN/1.73M2 — LOW
EGFR: 9 ML/MIN/1.73M2 — LOW
GLUCOSE BLDC GLUCOMTR-MCNC: 127 MG/DL — HIGH (ref 70–99)
GLUCOSE BLDC GLUCOMTR-MCNC: 145 MG/DL — HIGH (ref 70–99)
GLUCOSE BLDC GLUCOMTR-MCNC: 188 MG/DL — HIGH (ref 70–99)
GLUCOSE SERPL-MCNC: 110 MG/DL — HIGH (ref 70–99)
HCT VFR BLD CALC: 31 % — LOW (ref 34.5–45)
HGB BLD-MCNC: 9.7 G/DL — LOW (ref 11.5–15.5)
IMMATURE PLATELET FRACTION #: 4.3 K/UL — LOW (ref 4.7–11.1)
IMMATURE PLATELET FRACTION %: 5.2 % — HIGH (ref 1.6–4.9)
MCHC RBC-ENTMCNC: 28.8 PG — SIGNIFICANT CHANGE UP (ref 27–34)
MCHC RBC-ENTMCNC: 31.3 G/DL — LOW (ref 32–36)
MCV RBC AUTO: 92 FL — SIGNIFICANT CHANGE UP (ref 80–100)
NRBC # BLD AUTO: 0 K/UL — SIGNIFICANT CHANGE UP (ref 0–0)
NRBC # FLD: 0 K/UL — SIGNIFICANT CHANGE UP (ref 0–0)
NRBC BLD AUTO-RTO: 0 /100 WBCS — SIGNIFICANT CHANGE UP (ref 0–0)
PLATELET # BLD AUTO: 82 K/UL — LOW (ref 150–400)
PMV BLD: 11.7 FL — SIGNIFICANT CHANGE UP (ref 7–13)
POTASSIUM SERPL-MCNC: 4.7 MMOL/L — SIGNIFICANT CHANGE UP (ref 3.5–5.3)
POTASSIUM SERPL-SCNC: 4.7 MMOL/L — SIGNIFICANT CHANGE UP (ref 3.5–5.3)
RBC # BLD: 3.37 M/UL — LOW (ref 3.8–5.2)
RBC # FLD: 17.3 % — HIGH (ref 10.3–14.5)
SODIUM SERPL-SCNC: 140 MMOL/L — SIGNIFICANT CHANGE UP (ref 135–145)
WBC # BLD: 4.4 K/UL — SIGNIFICANT CHANGE UP (ref 3.8–10.5)
WBC # FLD AUTO: 4.4 K/UL — SIGNIFICANT CHANGE UP (ref 3.8–10.5)

## 2025-07-03 PROCEDURE — 99232 SBSQ HOSP IP/OBS MODERATE 35: CPT

## 2025-07-03 PROCEDURE — 80048 BASIC METABOLIC PNL TOTAL CA: CPT

## 2025-07-03 PROCEDURE — 86923 COMPATIBILITY TEST ELECTRIC: CPT

## 2025-07-03 PROCEDURE — 82962 GLUCOSE BLOOD TEST: CPT

## 2025-07-03 PROCEDURE — 80053 COMPREHEN METABOLIC PANEL: CPT

## 2025-07-03 PROCEDURE — 96375 TX/PRO/DX INJ NEW DRUG ADDON: CPT

## 2025-07-03 PROCEDURE — 85025 COMPLETE CBC W/AUTO DIFF WBC: CPT

## 2025-07-03 PROCEDURE — 93005 ELECTROCARDIOGRAM TRACING: CPT

## 2025-07-03 PROCEDURE — 86850 RBC ANTIBODY SCREEN: CPT

## 2025-07-03 PROCEDURE — 83690 ASSAY OF LIPASE: CPT

## 2025-07-03 PROCEDURE — P9016: CPT

## 2025-07-03 PROCEDURE — 82550 ASSAY OF CK (CPK): CPT

## 2025-07-03 PROCEDURE — 87640 STAPH A DNA AMP PROBE: CPT

## 2025-07-03 PROCEDURE — 84100 ASSAY OF PHOSPHORUS: CPT

## 2025-07-03 PROCEDURE — 84132 ASSAY OF SERUM POTASSIUM: CPT

## 2025-07-03 PROCEDURE — 99285 EMERGENCY DEPT VISIT HI MDM: CPT

## 2025-07-03 PROCEDURE — 71045 X-RAY EXAM CHEST 1 VIEW: CPT

## 2025-07-03 PROCEDURE — 36415 COLL VENOUS BLD VENIPUNCTURE: CPT

## 2025-07-03 PROCEDURE — 99239 HOSP IP/OBS DSCHRG MGMT >30: CPT

## 2025-07-03 PROCEDURE — 87641 MR-STAPH DNA AMP PROBE: CPT

## 2025-07-03 PROCEDURE — 86900 BLOOD TYPING SEROLOGIC ABO: CPT

## 2025-07-03 PROCEDURE — 84484 ASSAY OF TROPONIN QUANT: CPT

## 2025-07-03 PROCEDURE — 36430 TRANSFUSION BLD/BLD COMPNT: CPT

## 2025-07-03 PROCEDURE — 86901 BLOOD TYPING SEROLOGIC RH(D): CPT

## 2025-07-03 PROCEDURE — 96376 TX/PRO/DX INJ SAME DRUG ADON: CPT

## 2025-07-03 PROCEDURE — 96374 THER/PROPH/DIAG INJ IV PUSH: CPT

## 2025-07-03 PROCEDURE — 85027 COMPLETE CBC AUTOMATED: CPT

## 2025-07-03 PROCEDURE — 83735 ASSAY OF MAGNESIUM: CPT

## 2025-07-03 RX ORDER — ISOSORBIDE MONONITRATE 60 MG/1
1 TABLET, EXTENDED RELEASE ORAL
Qty: 30 | Refills: 0
Start: 2025-07-03 | End: 2025-08-01

## 2025-07-03 RX ADMIN — Medication 40 MILLIGRAM(S): at 06:24

## 2025-07-03 RX ADMIN — METHOCARBAMOL 500 MILLIGRAM(S): 500 TABLET, FILM COATED ORAL at 17:01

## 2025-07-03 RX ADMIN — CARVEDILOL 25 MILLIGRAM(S): 3.12 TABLET, FILM COATED ORAL at 06:24

## 2025-07-03 RX ADMIN — TRAMADOL HYDROCHLORIDE 50 MILLIGRAM(S): 50 TABLET, FILM COATED ORAL at 06:24

## 2025-07-03 RX ADMIN — LISINOPRIL 20 MILLIGRAM(S): 5 TABLET ORAL at 06:24

## 2025-07-03 RX ADMIN — Medication 1 GRAM(S): at 06:27

## 2025-07-03 RX ADMIN — TRAMADOL HYDROCHLORIDE 50 MILLIGRAM(S): 50 TABLET, FILM COATED ORAL at 16:58

## 2025-07-03 RX ADMIN — Medication 50 MILLIGRAM(S): at 06:25

## 2025-07-03 RX ADMIN — HEPARIN SODIUM 5000 UNIT(S): 1000 INJECTION INTRAVENOUS; SUBCUTANEOUS at 17:02

## 2025-07-03 RX ADMIN — Medication 10 MILLIGRAM(S): at 06:59

## 2025-07-03 RX ADMIN — Medication 667 MILLIGRAM(S): at 11:15

## 2025-07-03 RX ADMIN — Medication 1 MILLIGRAM(S): at 11:08

## 2025-07-03 RX ADMIN — Medication 1 MILLIGRAM(S): at 05:01

## 2025-07-03 RX ADMIN — Medication 1 MILLIGRAM(S): at 18:09

## 2025-07-03 RX ADMIN — Medication 667 MILLIGRAM(S): at 16:59

## 2025-07-03 RX ADMIN — Medication 1 GRAM(S): at 17:02

## 2025-07-03 RX ADMIN — CARVEDILOL 25 MILLIGRAM(S): 3.12 TABLET, FILM COATED ORAL at 17:01

## 2025-07-03 RX ADMIN — HEPARIN SODIUM 5000 UNIT(S): 1000 INJECTION INTRAVENOUS; SUBCUTANEOUS at 05:01

## 2025-07-03 RX ADMIN — INSULIN LISPRO 2: 100 INJECTION, SOLUTION INTRAVENOUS; SUBCUTANEOUS at 11:20

## 2025-07-03 RX ADMIN — Medication 50 MILLIGRAM(S): at 17:06

## 2025-07-03 RX ADMIN — Medication 1 APPLICATION(S): at 06:22

## 2025-07-03 RX ADMIN — Medication 200 MICROGRAM(S): at 06:24

## 2025-07-03 RX ADMIN — Medication 667 MILLIGRAM(S): at 08:39

## 2025-07-03 RX ADMIN — MEROPENEM 500 MILLIGRAM(S): 1 INJECTION INTRAVENOUS at 18:09

## 2025-07-03 RX ADMIN — SERTRALINE 200 MILLIGRAM(S): 100 TABLET, FILM COATED ORAL at 11:11

## 2025-07-03 RX ADMIN — METHOCARBAMOL 500 MILLIGRAM(S): 500 TABLET, FILM COATED ORAL at 06:24

## 2025-07-03 NOTE — PROGRESS NOTE ADULT - SUBJECTIVE AND OBJECTIVE BOX
Reason for visit: ESRD    Subjective: No acute overnight event. Patient seen on HD earlier today. No complaints.    ROS: All systems were reviewed in detail pertinent positive and negative mentioned above, rest are negative.    Physical Exam:  Gen: no acute distress  MS: alert, conversing normally  HENT: NCAT, MMM  CV: rhythm reg reg, rate normal, no m/g/r  Chest: CTAB, no w/r/r,  Abd: soft, NT, ND  Extremities: No edema    =======================================================  Vital Signs Last 24 Hrs  T(C): 36.7 (03 Jul 2025 14:49), Max: 36.9 (03 Jul 2025 07:51)  T(F): 98.1 (03 Jul 2025 14:49), Max: 98.4 (03 Jul 2025 07:51)  HR: 74 (03 Jul 2025 16:55) (64 - 76)  BP: 153/75 (03 Jul 2025 16:55) (117/69 - 190/77)  BP(mean): --  RR: 18 (03 Jul 2025 16:55) (17 - 19)  SpO2: 100% (03 Jul 2025 16:55) (95% - 100%)    Parameters below as of 03 Jul 2025 16:55  Patient On (Oxygen Delivery Method): room air      I&O's Summary    03 Jul 2025 07:01  -  03 Jul 2025 20:35  --------------------------------------------------------  IN: 800 mL / OUT: 1498 mL / NET: -698 mL      =======================================================  Current Antibiotics:  meropenem Injectable 500 milliGRAM(s) IV Push every 24 hours    Other medications:  calcium acetate 667 milliGRAM(s) Oral three times a day with meals  carvedilol 25 milliGRAM(s) Oral every 12 hours  chlorhexidine 2% Cloths 1 Application(s) Topical <User Schedule>  dextrose 5%. 1000 milliLiter(s) IV Continuous <Continuous>  dextrose 5%. 1000 milliLiter(s) IV Continuous <Continuous>  dextrose 50% Injectable 25 Gram(s) IV Push once  dextrose 50% Injectable 12.5 Gram(s) IV Push once  dextrose 50% Injectable 25 Gram(s) IV Push once  glucagon  Injectable 1 milliGRAM(s) IntraMuscular once  heparin   Injectable 5000 Unit(s) SubCutaneous every 12 hours  hydrALAZINE 50 milliGRAM(s) Oral every 8 hours  insulin lispro (ADMELOG) corrective regimen sliding scale   SubCutaneous three times a day before meals  isosorbide   mononitrate ER Tablet (IMDUR) 60 milliGRAM(s) Oral daily  levothyroxine 200 MICROGram(s) Oral daily  lisinopril 20 milliGRAM(s) Oral daily  methocarbamol 500 milliGRAM(s) Oral three times a day  pancrelipase (Creon 3000 Lipase Units) 1 Capsule(s) 1 Capsule(s) Oral three times a day with meals  pantoprazole    Tablet 40 milliGRAM(s) Oral before breakfast  sertraline 200 milliGRAM(s) Oral daily  sucralfate suspension 1 Gram(s) Oral two times a day  traMADol 50 milliGRAM(s) Oral every 8 hours    =======================================================  07-03    140  |  99  |  38.0[H]  ----------------------------<  110[H]  4.7   |  26.0  |  4.95[H]    Ca    8.9      03 Jul 2025 07:07      Creatinine: 4.95 mg/dL (07-03-25 @ 07:07)  Creatinine: 4.14 mg/dL (07-02-25 @ 08:15)  Creatinine: 2.36 mg/dL (07-01-25 @ 16:00)  Creatinine: 5.45 mg/dL (06-30-25 @ 19:29)    =======================================================      
Patient is a 69y old  Female who presents with a chief complaint of Abdominal pain, Anemia (02 Jul 2025 08:56)      no chest pain,sob  Has chronic abd pain  REVIEW OF SYSTEMS: All systems are reviewed and found to be negative except above    MEDICATIONS  (STANDING):  calcium acetate 667 milliGRAM(s) Oral three times a day with meals  carvedilol 25 milliGRAM(s) Oral every 12 hours  chlorhexidine 2% Cloths 1 Application(s) Topical <User Schedule>  dextrose 5%. 1000 milliLiter(s) (100 mL/Hr) IV Continuous <Continuous>  dextrose 5%. 1000 milliLiter(s) (50 mL/Hr) IV Continuous <Continuous>  dextrose 50% Injectable 25 Gram(s) IV Push once  dextrose 50% Injectable 12.5 Gram(s) IV Push once  dextrose 50% Injectable 25 Gram(s) IV Push once  glucagon  Injectable 1 milliGRAM(s) IntraMuscular once  heparin   Injectable 5000 Unit(s) SubCutaneous every 12 hours  hydrALAZINE 25 milliGRAM(s) Oral every 8 hours  insulin lispro (ADMELOG) corrective regimen sliding scale   SubCutaneous three times a day before meals  isosorbide   mononitrate ER Tablet (IMDUR) 30 milliGRAM(s) Oral daily  levothyroxine 200 MICROGram(s) Oral daily  lisinopril 20 milliGRAM(s) Oral daily  meropenem Injectable 500 milliGRAM(s) IV Push every 24 hours  methocarbamol 500 milliGRAM(s) Oral three times a day  pancrelipase (Creon 3000 Lipase Units) 1 Capsule(s) 1 Capsule(s) Oral three times a day with meals  pantoprazole    Tablet 40 milliGRAM(s) Oral before breakfast  sertraline 200 milliGRAM(s) Oral daily  sucralfate suspension 1 Gram(s) Oral two times a day  traMADol 50 milliGRAM(s) Oral every 8 hours    MEDICATIONS  (PRN):  acetaminophen     Tablet .. 650 milliGRAM(s) Oral every 6 hours PRN Temp greater or equal to 38C (100.4F), Mild Pain (1 - 3)  aluminum hydroxide/magnesium hydroxide/simethicone Suspension 30 milliLiter(s) Oral every 4 hours PRN Dyspepsia  dextrose Oral Gel 15 Gram(s) Oral once PRN Blood Glucose LESS THAN 70 milliGRAM(s)/deciliter  hydrALAZINE Injectable 10 milliGRAM(s) IV Push every 8 hours PRN SBP > 180  HYDROmorphone   Tablet 4 milliGRAM(s) Oral every 6 hours PRN Moderate Pain (4 - 6)  HYDROmorphone  Injectable 1 milliGRAM(s) IV Push every 6 hours PRN Severe Pain (7 - 10)  melatonin 3 milliGRAM(s) Oral at bedtime PRN Insomnia  ondansetron Injectable 4 milliGRAM(s) IV Push every 8 hours PRN Nausea and/or Vomiting      CAPILLARY BLOOD GLUCOSE      POCT Blood Glucose.: 148 mg/dL (02 Jul 2025 09:33)  POCT Blood Glucose.: 149 mg/dL (01 Jul 2025 22:22)  POCT Blood Glucose.: 119 mg/dL (01 Jul 2025 16:35)    I&O's Summary    01 Jul 2025 07:01  -  02 Jul 2025 07:00  --------------------------------------------------------  IN: 0 mL / OUT: 1000 mL / NET: -1000 mL        PHYSICAL EXAM:  Vital Signs Last 24 Hrs  T(C): 36.6 (02 Jul 2025 11:25), Max: 36.9 (02 Jul 2025 05:40)  T(F): 97.8 (02 Jul 2025 11:25), Max: 98.5 (02 Jul 2025 05:40)  HR: 76 (02 Jul 2025 11:25) (71 - 78)  BP: 183/80 (02 Jul 2025 11:25) (129/71 - 211/71)  BP(mean): --  RR: 19 (02 Jul 2025 11:25) (16 - 19)  SpO2: 96% (02 Jul 2025 11:25) (94% - 99%)    Parameters below as of 01 Jul 2025 16:44  Patient On (Oxygen Delivery Method): room air        CONSTITUTIONAL: NAD,  EYES: PERRLA; conjunctiva and sclera clear  ENMT: Moist oral mucosa,   RESPIRATORY: Normal respiratory effort; lungs are clear to auscultation bilaterally  CARDIOVASCULAR: Regular rate and rhythm, normal S1 and S2, no murmur   EXTS: No lower extremity edema; Peripheral pulses are 2+ bilaterally  ABDOMEN: Nontender to palpation, normoactive bowel sounds, no rebound/guarding;   MUSCLOSKELETAL:    no joint swelling or tenderness to palpation  PSYCH: affect appropriate  NEUROLOGY: A+O to person, place, and time; CN 2-12 are intact and symmetric; no gross sensory deficits;       LABS:                        10.0   5.71  )-----------( 85       ( 02 Jul 2025 08:15 )             31.2     07-02    138  |  99  |  30.7[H]  ----------------------------<  136[H]  4.8   |  25.0  |  4.14[H]    Ca    9.2      02 Jul 2025 08:15  Phos  2.2     07-01  Mg     1.9     07-01    TPro  6.0[L]  /  Alb  3.5  /  TBili  0.6  /  DBili  x   /  AST  14  /  ALT  5   /  AlkPhos  100  07-01          Urinalysis Basic - ( 02 Jul 2025 08:15 )    Color: x / Appearance: x / SG: x / pH: x  Gluc: 136 mg/dL / Ketone: x  / Bili: x / Urobili: x   Blood: x / Protein: x / Nitrite: x   Leuk Esterase: x / RBC: x / WBC x   Sq Epi: x / Non Sq Epi: x / Bacteria: x          RADIOLOGY & ADDITIONAL TESTS:  Results Reviewed:   Imaging Personally Reviewed:  < from: TTE W or WO Ultrasound Enhancing Agent (06.16.25 @ 08:23) >  CONCLUSIONS:      1. Mild to moderate left ventricular hypertrophy.   2. Left ventricular cavity is normal in size. Left ventricular systolic function is normal with an ejection fraction visually estimated at 60 to 65 %.   3. There is moderate (grade 2) left ventricular diastolic dysfunction.   4. Normal right ventricular systolic function.   5. Left atrium is moderately dilated.   6. Fibrocalcific aortic valve sclerosis without stenosis.   7. Trace aortic regurgitation.   8. Mild mitral valve leafletcalcification.   9. Mitral valve leaflets are diffusely calcified.  10. There is moderate calcification of the mitral valve annulus.  11. Moderate mitral regurgitation.  12. Mild to moderate tricuspid regurgitation.  13. Estimated pulmonary artery systolic pressure is 39 mmHg, borderline pulmonary hypertension.  14. No pericardial effusion seen.    < end of copied text >

## 2025-07-03 NOTE — DISCHARGE NOTE NURSING/CASE MANAGEMENT/SOCIAL WORK - NSDCFUADDAPPT_GEN_ALL_CORE_FT
APPTS ARE READY TO BE MADE: [X] YES    Best Family or Patient Contact (if needed):    Additional Information about above appointments (if needed):    1: pcp one week  2:   3:     Other comments or requests:

## 2025-07-03 NOTE — DISCHARGE NOTE NURSING/CASE MANAGEMENT/SOCIAL WORK - FINANCIAL ASSISTANCE
[facility] provides services at a reduced cost to those who are determined to be eligible through [facility]’s financial assistance program. Information regarding [facility]’s financial assistance program can be found by going to [email or web address] or by calling [phone]. Improved

## 2025-07-03 NOTE — PROGRESS NOTE ADULT - ASSESSMENT
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 Nephrology was consulted for HD needs.     ESRD on HD at [facility] MWF  HD access LUE AVF, functional   Fluid status euvolemic   HTN BP uncontrolled   Anemia of CKD - Hb is low   CKD MDB Hyperphosphatemia   Secondary hyperparathyroidism   Liver abscess currently on meropenem until 7/15/25  Abdominal pain; history of pain-seeking behaviour   Dilated extra and intrahepatic bile ducts    Plan   I arranged for HD today 3 hrs  ml/min 2K bath Goal UF 1.5L as tolerated   Dose medications for HD   LASHELL with HD   Labs on HD days   Pain control as per primary team   Rest of management as per primary team   Will follow       Seen on HD.  Qd, Qb, UF rate reviewed.  Vitals stable.  Access working well.  Patient tolerating HD well.
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 Patient has multiple admissions for the same presentation.     Hypertensive urgency   Hypertensive upon ED arrival with BP: 200/96  -bp still high  -add imdur  -iv prn hydralazine   - Coreg 25mg BID  - Lisinopril 10mg QD  - Hydralazine 25mg TID  - Monitor BP.  -will adjust dose      Acute on Chronic Anemia  -1 u prbc   -hb 10 --from 6.4  - Patient refused  FOBT   - Transfuse as per protocol to keep hgb>7    Hyperkalemia   - improved   - s/p Humulin 5U  - Monitor K level correct accordingly       Elevated trop  Likely demand ischemia  -ekg no acute change. no cp  -reviewed tte 6/16/25  -trend trop  -c/s cardiology    Abdominal pain.  Continuous abdominal pain w/o any acute finding.   6/27 CT-ABD: No acute abdominal findings to explain patient's pain. Redemonstrated cirrhosis with features of portal hypertension. New small bilateral pleural effusions with subjacent passive atelectasis.  - Pain control  - PPI  - Pain control   - Pain management consult     Liver abscess.  6/2/25 CT-ABD remarkable for Liver abscess   - PICC line in place  - Meropenem 500mg IV QD (on HD days to be given post HD) - To be continued until 7/15    ESRD on dialysis.  HD-MWF  Missed Monday HD due to pain   - hd done yesterday  - Nephro following  - Ca Acetate 667mg     Diabetes mellitus.  5/22 A1C = 7.3  - ISS with hypoglycemia protocol.        Chronic Pancreatitis   - Creon     dvt ppx scd's    plan of care dw pt  [place] rn

## 2025-07-03 NOTE — DISCHARGE NOTE NURSING/CASE MANAGEMENT/SOCIAL WORK - PATIENT PORTAL LINK FT
You can access the FollowMyHealth Patient Portal offered by [facility] by registering at the following website: [email or web address]. By joining [employer]’s FollowMyHealth portal, you will also be able to view your health information using other applications (apps) compatible with our system.

## 2025-07-03 NOTE — DISCHARGE NOTE NURSING/CASE MANAGEMENT/SOCIAL WORK - NSDCPEFALRISK_GEN_ALL_CORE
For information on Fall & Injury Prevention, visit: [email or web address] OR  [email or web address] OR  https://www.cdc.gov/steadi/patient.html

## 2025-07-08 NOTE — DIETITIAN INITIAL EVALUATION ADULT - NUTRITION CONSULT
[FreeTextEntry8] : Here for an acute visit, reports worrying about recent change in bowel habits  Reports change in bowel habit since shifted in new home, worried she has colon cancer brings picture of multiple recent bowel movements Reports looking at bowel habits critically reports some bowel hard sometime thins, some constipated Reports looks at bowel and feel its pencil thin reports health anxiety also reports itching face also had hypothyroid during pregnancy needed levothyroxine, never has labs checked post-partum Has a baby ten months old has an appointment with GI in August Prior labs and notes reviewed  No real rational for concern for colon cancer, no blood in stool, no abdominal pain, pictures seem of regular BM's
yes

## 2025-07-16 ENCOUNTER — APPOINTMENT (OUTPATIENT)
Dept: INFECTIOUS DISEASE | Facility: CLINIC | Age: 70
End: 2025-07-16
Payer: COMMERCIAL

## 2025-07-16 VITALS
HEART RATE: 76 BPM | SYSTOLIC BLOOD PRESSURE: 120 MMHG | TEMPERATURE: 97.1 F | WEIGHT: 132 LBS | BODY MASS INDEX: 21.99 KG/M2 | OXYGEN SATURATION: 95 % | DIASTOLIC BLOOD PRESSURE: 60 MMHG | HEIGHT: 65 IN

## 2025-07-16 PROBLEM — K75.0 HEPATIC ABSCESS: Status: ACTIVE | Noted: 2025-07-16

## 2025-07-16 PROCEDURE — 99213 OFFICE O/P EST LOW 20 MIN: CPT

## 2025-07-17 LAB
CRP SERPL-MCNC: <3 MG/L
ERYTHROCYTE [SEDIMENTATION RATE] IN BLOOD BY WESTERGREN METHOD: 10 MM/HR

## 2025-07-19 ENCOUNTER — INPATIENT (INPATIENT)
Facility: HOSPITAL | Age: 70
LOS: 2 days | Discharge: HOME | DRG: 684 | End: 2025-07-22
Attending: STUDENT IN AN ORGANIZED HEALTH CARE EDUCATION/TRAINING PROGRAM | Admitting: HOSPITALIST
Payer: COMMERCIAL

## 2025-07-19 VITALS
SYSTOLIC BLOOD PRESSURE: 203 MMHG | DIASTOLIC BLOOD PRESSURE: 84 MMHG | HEART RATE: 78 BPM | TEMPERATURE: 98 F | WEIGHT: 132.06 LBS | RESPIRATION RATE: 20 BRPM | HEIGHT: 65 IN | OXYGEN SATURATION: 97 %

## 2025-07-19 DIAGNOSIS — E89.0 POSTPROCEDURAL HYPOTHYROIDISM: Chronic | ICD-10-CM

## 2025-07-19 DIAGNOSIS — Z98.891 HISTORY OF UTERINE SCAR FROM PREVIOUS SURGERY: Chronic | ICD-10-CM

## 2025-07-19 DIAGNOSIS — Z90.49 ACQUIRED ABSENCE OF OTHER SPECIFIED PARTS OF DIGESTIVE TRACT: Chronic | ICD-10-CM

## 2025-07-19 DIAGNOSIS — Z98.890 OTHER SPECIFIED POSTPROCEDURAL STATES: Chronic | ICD-10-CM

## 2025-07-19 LAB
ALBUMIN SERPL ELPH-MCNC: 3.9 G/DL — SIGNIFICANT CHANGE UP (ref 3.3–5.2)
ALP SERPL-CCNC: 106 U/L — SIGNIFICANT CHANGE UP (ref 40–120)
ALT FLD-CCNC: 6 U/L — SIGNIFICANT CHANGE UP
ANION GAP SERPL CALC-SCNC: 16 MMOL/L — SIGNIFICANT CHANGE UP (ref 5–17)
APTT BLD: 38.1 SEC — HIGH (ref 26.1–36.8)
AST SERPL-CCNC: 15 U/L — SIGNIFICANT CHANGE UP
BASOPHILS # BLD AUTO: 0.03 K/UL — SIGNIFICANT CHANGE UP (ref 0–0.2)
BASOPHILS NFR BLD AUTO: 0.7 % — SIGNIFICANT CHANGE UP (ref 0–2)
BILIRUB SERPL-MCNC: 0.4 MG/DL — SIGNIFICANT CHANGE UP (ref 0.4–2)
BLD GP AB SCN SERPL QL: SIGNIFICANT CHANGE UP
BUN SERPL-MCNC: 62.6 MG/DL — HIGH (ref 8–20)
CALCIUM SERPL-MCNC: 8.6 MG/DL — SIGNIFICANT CHANGE UP (ref 8.4–10.5)
CHLORIDE SERPL-SCNC: 103 MMOL/L — SIGNIFICANT CHANGE UP (ref 96–108)
CO2 SERPL-SCNC: 21 MMOL/L — LOW (ref 22–29)
CREAT SERPL-MCNC: 6.14 MG/DL — HIGH (ref 0.5–1.3)
EGFR: 7 ML/MIN/1.73M2 — LOW
EGFR: 7 ML/MIN/1.73M2 — LOW
EOSINOPHIL # BLD AUTO: 0.27 K/UL — SIGNIFICANT CHANGE UP (ref 0–0.5)
EOSINOPHIL NFR BLD AUTO: 6.2 % — HIGH (ref 0–6)
GLUCOSE SERPL-MCNC: 246 MG/DL — HIGH (ref 70–99)
HCT VFR BLD CALC: 30.4 % — LOW (ref 34.5–45)
HGB BLD-MCNC: 9.8 G/DL — LOW (ref 11.5–15.5)
IMM GRANULOCYTES # BLD AUTO: 0.01 K/UL — SIGNIFICANT CHANGE UP (ref 0–0.07)
IMM GRANULOCYTES NFR BLD AUTO: 0.2 % — SIGNIFICANT CHANGE UP (ref 0–0.9)
IMMATURE PLATELET FRACTION #: 2.3 K/UL — LOW (ref 4.7–11.1)
IMMATURE PLATELET FRACTION %: 3.3 % — SIGNIFICANT CHANGE UP (ref 1.6–4.9)
INR BLD: 1.11 RATIO — SIGNIFICANT CHANGE UP (ref 0.85–1.16)
LACTATE SERPL-SCNC: 1.1 MMOL/L — SIGNIFICANT CHANGE UP (ref 0.5–2)
LIDOCAIN IGE QN: 44 U/L — SIGNIFICANT CHANGE UP (ref 22–51)
LYMPHOCYTES # BLD AUTO: 0.7 K/UL — LOW (ref 1–3.3)
LYMPHOCYTES NFR BLD AUTO: 16.1 % — SIGNIFICANT CHANGE UP (ref 13–44)
MCHC RBC-ENTMCNC: 30.9 PG — SIGNIFICANT CHANGE UP (ref 27–34)
MCHC RBC-ENTMCNC: 32.2 G/DL — SIGNIFICANT CHANGE UP (ref 32–36)
MCV RBC AUTO: 95.9 FL — SIGNIFICANT CHANGE UP (ref 80–100)
MONOCYTES # BLD AUTO: 0.31 K/UL — SIGNIFICANT CHANGE UP (ref 0–0.9)
MONOCYTES NFR BLD AUTO: 7.1 % — SIGNIFICANT CHANGE UP (ref 2–14)
NEUTROPHILS # BLD AUTO: 3.02 K/UL — SIGNIFICANT CHANGE UP (ref 1.8–7.4)
NEUTROPHILS NFR BLD AUTO: 69.7 % — SIGNIFICANT CHANGE UP (ref 43–77)
NRBC # BLD AUTO: 0 K/UL — SIGNIFICANT CHANGE UP (ref 0–0)
NRBC # FLD: 0 K/UL — SIGNIFICANT CHANGE UP (ref 0–0)
NRBC BLD AUTO-RTO: 0 /100 WBCS — SIGNIFICANT CHANGE UP (ref 0–0)
PLATELET # BLD AUTO: 71 K/UL — LOW (ref 150–400)
PMV BLD: 11.9 FL — SIGNIFICANT CHANGE UP (ref 7–13)
POTASSIUM SERPL-MCNC: 5.7 MMOL/L — HIGH (ref 3.5–5.3)
POTASSIUM SERPL-SCNC: 5.7 MMOL/L — HIGH (ref 3.5–5.3)
PROT SERPL-MCNC: 6.7 G/DL — SIGNIFICANT CHANGE UP (ref 6.6–8.7)
PROTHROM AB SERPL-ACNC: 12.9 SEC — SIGNIFICANT CHANGE UP (ref 9.9–13.4)
RBC # BLD: 3.17 M/UL — LOW (ref 3.8–5.2)
RBC # FLD: 19.1 % — HIGH (ref 10.3–14.5)
SODIUM SERPL-SCNC: 140 MMOL/L — SIGNIFICANT CHANGE UP (ref 135–145)
TROPONIN T, HIGH SENSITIVITY RESULT: 74 NG/L — HIGH (ref 0–51)
TROPONIN T, HIGH SENSITIVITY RESULT: 80 NG/L — HIGH (ref 0–51)
WBC # BLD: 4.34 K/UL — SIGNIFICANT CHANGE UP (ref 3.8–10.5)
WBC # FLD AUTO: 4.34 K/UL — SIGNIFICANT CHANGE UP (ref 3.8–10.5)

## 2025-07-19 PROCEDURE — 74176 CT ABD & PELVIS W/O CONTRAST: CPT | Mod: 26

## 2025-07-19 PROCEDURE — 93010 ELECTROCARDIOGRAM REPORT: CPT

## 2025-07-19 PROCEDURE — 99285 EMERGENCY DEPT VISIT HI MDM: CPT | Mod: GC

## 2025-07-19 PROCEDURE — 71045 X-RAY EXAM CHEST 1 VIEW: CPT | Mod: 26

## 2025-07-19 RX ORDER — HYDROMORPHONE/SOD CHLOR,ISO/PF 2 MG/10 ML
0.5 SYRINGE (ML) INJECTION ONCE
Refills: 0 | Status: DISCONTINUED | OUTPATIENT
Start: 2025-07-19 | End: 2025-07-19

## 2025-07-19 RX ORDER — ONDANSETRON HCL/PF 4 MG/2 ML
4 VIAL (ML) INJECTION ONCE
Refills: 0 | Status: COMPLETED | OUTPATIENT
Start: 2025-07-19 | End: 2025-07-19

## 2025-07-19 RX ADMIN — Medication 0.5 MILLIGRAM(S): at 20:04

## 2025-07-19 RX ADMIN — Medication 4 MILLIGRAM(S): at 20:04

## 2025-07-19 RX ADMIN — Medication 0.5 MILLIGRAM(S): at 23:00

## 2025-07-19 RX ADMIN — Medication 0.5 MILLIGRAM(S): at 22:01

## 2025-07-19 RX ADMIN — Medication 0.5 MILLIGRAM(S): at 21:04

## 2025-07-20 DIAGNOSIS — N18.6 END STAGE RENAL DISEASE: ICD-10-CM

## 2025-07-20 LAB
GLUCOSE BLDC GLUCOMTR-MCNC: 109 MG/DL — HIGH (ref 70–99)
GLUCOSE BLDC GLUCOMTR-MCNC: 113 MG/DL — HIGH (ref 70–99)
GLUCOSE BLDC GLUCOMTR-MCNC: 118 MG/DL — HIGH (ref 70–99)
GLUCOSE BLDC GLUCOMTR-MCNC: 128 MG/DL — HIGH (ref 70–99)
GLUCOSE BLDC GLUCOMTR-MCNC: 145 MG/DL — HIGH (ref 70–99)
GLUCOSE BLDC GLUCOMTR-MCNC: 35 MG/DL — CRITICAL LOW (ref 70–99)
GLUCOSE BLDC GLUCOMTR-MCNC: 35 MG/DL — CRITICAL LOW (ref 70–99)
GLUCOSE BLDC GLUCOMTR-MCNC: 89 MG/DL — SIGNIFICANT CHANGE UP (ref 70–99)

## 2025-07-20 PROCEDURE — 85025 COMPLETE CBC W/AUTO DIFF WBC: CPT

## 2025-07-20 PROCEDURE — 74176 CT ABD & PELVIS W/O CONTRAST: CPT

## 2025-07-20 PROCEDURE — 99223 1ST HOSP IP/OBS HIGH 75: CPT

## 2025-07-20 PROCEDURE — 83690 ASSAY OF LIPASE: CPT

## 2025-07-20 PROCEDURE — 86900 BLOOD TYPING SEROLOGIC ABO: CPT

## 2025-07-20 PROCEDURE — 84484 ASSAY OF TROPONIN QUANT: CPT

## 2025-07-20 PROCEDURE — 86901 BLOOD TYPING SEROLOGIC RH(D): CPT

## 2025-07-20 PROCEDURE — 85610 PROTHROMBIN TIME: CPT

## 2025-07-20 PROCEDURE — 93005 ELECTROCARDIOGRAM TRACING: CPT

## 2025-07-20 PROCEDURE — 85730 THROMBOPLASTIN TIME PARTIAL: CPT

## 2025-07-20 PROCEDURE — 80053 COMPREHEN METABOLIC PANEL: CPT

## 2025-07-20 PROCEDURE — 71045 X-RAY EXAM CHEST 1 VIEW: CPT

## 2025-07-20 PROCEDURE — 86850 RBC ANTIBODY SCREEN: CPT

## 2025-07-20 PROCEDURE — 99223 1ST HOSP IP/OBS HIGH 75: CPT | Mod: 25

## 2025-07-20 PROCEDURE — 82962 GLUCOSE BLOOD TEST: CPT

## 2025-07-20 PROCEDURE — 83605 ASSAY OF LACTIC ACID: CPT

## 2025-07-20 PROCEDURE — 36415 COLL VENOUS BLD VENIPUNCTURE: CPT

## 2025-07-20 RX ORDER — METHOCARBAMOL 500 MG/1
500 TABLET, FILM COATED ORAL THREE TIMES A DAY
Refills: 0 | Status: DISCONTINUED | OUTPATIENT
Start: 2025-07-20 | End: 2025-07-22

## 2025-07-20 RX ORDER — HYDROMORPHONE/SOD CHLOR,ISO/PF 2 MG/10 ML
1 SYRINGE (ML) INJECTION ONCE
Refills: 0 | Status: DISCONTINUED | OUTPATIENT
Start: 2025-07-20 | End: 2025-07-20

## 2025-07-20 RX ORDER — LEVOTHYROXINE SODIUM 300 MCG
200 TABLET ORAL DAILY
Refills: 0 | Status: DISCONTINUED | OUTPATIENT
Start: 2025-07-20 | End: 2025-07-22

## 2025-07-20 RX ORDER — LIPASE/PROTEASE/AMYLASE 10K-37.5K
1 CAPSULE,DELAYED RELEASE (ENTERIC COATED) ORAL
Refills: 0 | Status: DISCONTINUED | OUTPATIENT
Start: 2025-07-20 | End: 2025-07-22

## 2025-07-20 RX ORDER — DEXTROSE 50 % IN WATER 50 %
25 SYRINGE (ML) INTRAVENOUS ONCE
Refills: 0 | Status: COMPLETED | OUTPATIENT
Start: 2025-07-20 | End: 2025-07-20

## 2025-07-20 RX ORDER — GLUCAGON 3 MG/1
1 POWDER NASAL ONCE
Refills: 0 | Status: DISCONTINUED | OUTPATIENT
Start: 2025-07-20 | End: 2025-07-22

## 2025-07-20 RX ORDER — ISOSORBIDE MONONITRATE 60 MG/1
60 TABLET, EXTENDED RELEASE ORAL DAILY
Refills: 0 | Status: DISCONTINUED | OUTPATIENT
Start: 2025-07-20 | End: 2025-07-22

## 2025-07-20 RX ORDER — LISINOPRIL 5 MG/1
20 TABLET ORAL DAILY
Refills: 0 | Status: DISCONTINUED | OUTPATIENT
Start: 2025-07-20 | End: 2025-07-20

## 2025-07-20 RX ORDER — CARVEDILOL 3.12 MG/1
25 TABLET, FILM COATED ORAL EVERY 12 HOURS
Refills: 0 | Status: DISCONTINUED | OUTPATIENT
Start: 2025-07-20 | End: 2025-07-22

## 2025-07-20 RX ORDER — FUROSEMIDE 10 MG/ML
40 INJECTION INTRAMUSCULAR; INTRAVENOUS ONCE
Refills: 0 | Status: COMPLETED | OUTPATIENT
Start: 2025-07-20 | End: 2025-07-20

## 2025-07-20 RX ORDER — INSULIN LISPRO 100 U/ML
2 INJECTION, SOLUTION INTRAVENOUS; SUBCUTANEOUS
Refills: 0 | Status: DISCONTINUED | OUTPATIENT
Start: 2025-07-20 | End: 2025-07-22

## 2025-07-20 RX ORDER — DEXTROSE 50 % IN WATER 50 %
25 SYRINGE (ML) INTRAVENOUS ONCE
Refills: 0 | Status: DISCONTINUED | OUTPATIENT
Start: 2025-07-20 | End: 2025-07-22

## 2025-07-20 RX ORDER — HEPARIN SODIUM 1000 [USP'U]/ML
5000 INJECTION INTRAVENOUS; SUBCUTANEOUS EVERY 12 HOURS
Refills: 0 | Status: DISCONTINUED | OUTPATIENT
Start: 2025-07-20 | End: 2025-07-22

## 2025-07-20 RX ORDER — ACETAMINOPHEN 500 MG/5ML
650 LIQUID (ML) ORAL EVERY 6 HOURS
Refills: 0 | Status: DISCONTINUED | OUTPATIENT
Start: 2025-07-20 | End: 2025-07-22

## 2025-07-20 RX ORDER — ONDANSETRON HCL/PF 4 MG/2 ML
4 VIAL (ML) INJECTION EVERY 8 HOURS
Refills: 0 | Status: DISCONTINUED | OUTPATIENT
Start: 2025-07-20 | End: 2025-07-22

## 2025-07-20 RX ORDER — SODIUM ZIRCONIUM CYCLOSILICATE 5 G/5G
10 POWDER, FOR SUSPENSION ORAL ONCE
Refills: 0 | Status: COMPLETED | OUTPATIENT
Start: 2025-07-20 | End: 2025-07-20

## 2025-07-20 RX ORDER — INSULIN LISPRO 100 U/ML
INJECTION, SOLUTION INTRAVENOUS; SUBCUTANEOUS
Refills: 0 | Status: DISCONTINUED | OUTPATIENT
Start: 2025-07-20 | End: 2025-07-22

## 2025-07-20 RX ORDER — CARVEDILOL 3.12 MG/1
25 TABLET, FILM COATED ORAL EVERY 12 HOURS
Refills: 0 | Status: DISCONTINUED | OUTPATIENT
Start: 2025-07-20 | End: 2025-07-20

## 2025-07-20 RX ORDER — SUCRALFATE 1 G
1 TABLET ORAL
Refills: 0 | Status: DISCONTINUED | OUTPATIENT
Start: 2025-07-20 | End: 2025-07-22

## 2025-07-20 RX ORDER — LISINOPRIL 5 MG/1
20 TABLET ORAL DAILY
Refills: 0 | Status: DISCONTINUED | OUTPATIENT
Start: 2025-07-20 | End: 2025-07-22

## 2025-07-20 RX ORDER — DEXTROSE 50 % IN WATER 50 %
15 SYRINGE (ML) INTRAVENOUS ONCE
Refills: 0 | Status: DISCONTINUED | OUTPATIENT
Start: 2025-07-20 | End: 2025-07-22

## 2025-07-20 RX ORDER — MAGNESIUM, ALUMINUM HYDROXIDE 200-200 MG
30 TABLET,CHEWABLE ORAL EVERY 4 HOURS
Refills: 0 | Status: DISCONTINUED | OUTPATIENT
Start: 2025-07-20 | End: 2025-07-22

## 2025-07-20 RX ORDER — SODIUM CHLORIDE 9 G/1000ML
1000 INJECTION, SOLUTION INTRAVENOUS
Refills: 0 | Status: DISCONTINUED | OUTPATIENT
Start: 2025-07-20 | End: 2025-07-22

## 2025-07-20 RX ORDER — MELATONIN 5 MG
3 TABLET ORAL AT BEDTIME
Refills: 0 | Status: DISCONTINUED | OUTPATIENT
Start: 2025-07-20 | End: 2025-07-22

## 2025-07-20 RX ORDER — SERTRALINE 100 MG/1
100 TABLET, FILM COATED ORAL DAILY
Refills: 0 | Status: DISCONTINUED | OUTPATIENT
Start: 2025-07-20 | End: 2025-07-22

## 2025-07-20 RX ORDER — HYDROMORPHONE/SOD CHLOR,ISO/PF 2 MG/10 ML
4 SYRINGE (ML) INJECTION EVERY 6 HOURS
Refills: 0 | Status: DISCONTINUED | OUTPATIENT
Start: 2025-07-20 | End: 2025-07-22

## 2025-07-20 RX ADMIN — Medication 1 MILLIGRAM(S): at 02:14

## 2025-07-20 RX ADMIN — Medication 25 GRAM(S): at 06:09

## 2025-07-20 RX ADMIN — Medication 50 MILLIGRAM(S): at 05:45

## 2025-07-20 RX ADMIN — Medication 667 MILLIGRAM(S): at 13:19

## 2025-07-20 RX ADMIN — Medication 200 MICROGRAM(S): at 05:44

## 2025-07-20 RX ADMIN — Medication 1 CAPSULE(S): at 17:01

## 2025-07-20 RX ADMIN — Medication 4 MILLIGRAM(S): at 14:58

## 2025-07-20 RX ADMIN — Medication 4 MILLIGRAM(S): at 06:19

## 2025-07-20 RX ADMIN — Medication 10 MILLIGRAM(S): at 02:14

## 2025-07-20 RX ADMIN — METHOCARBAMOL 500 MILLIGRAM(S): 500 TABLET, FILM COATED ORAL at 13:19

## 2025-07-20 RX ADMIN — Medication 25 MILLILITER(S): at 04:14

## 2025-07-20 RX ADMIN — Medication 4 MILLIGRAM(S): at 14:16

## 2025-07-20 RX ADMIN — Medication 3 MILLILITER(S): at 05:33

## 2025-07-20 RX ADMIN — Medication 1 GRAM(S): at 05:45

## 2025-07-20 RX ADMIN — ISOSORBIDE MONONITRATE 60 MILLIGRAM(S): 60 TABLET, EXTENDED RELEASE ORAL at 17:03

## 2025-07-20 RX ADMIN — Medication 4 MILLIGRAM(S): at 00:42

## 2025-07-20 RX ADMIN — FUROSEMIDE 40 MILLIGRAM(S): 10 INJECTION INTRAMUSCULAR; INTRAVENOUS at 01:42

## 2025-07-20 RX ADMIN — HEPARIN SODIUM 5000 UNIT(S): 1000 INJECTION INTRAVENOUS; SUBCUTANEOUS at 17:03

## 2025-07-20 RX ADMIN — Medication 50 MILLIGRAM(S): at 13:19

## 2025-07-20 RX ADMIN — Medication 1 GRAM(S): at 20:51

## 2025-07-20 RX ADMIN — LISINOPRIL 20 MILLIGRAM(S): 5 TABLET ORAL at 04:14

## 2025-07-20 RX ADMIN — Medication 4 MILLIGRAM(S): at 20:51

## 2025-07-20 RX ADMIN — Medication 50 MILLIGRAM(S): at 20:51

## 2025-07-20 RX ADMIN — Medication 4 MILLIGRAM(S): at 05:44

## 2025-07-20 RX ADMIN — SODIUM ZIRCONIUM CYCLOSILICATE 10 GRAM(S): 5 POWDER, FOR SUSPENSION ORAL at 04:14

## 2025-07-20 RX ADMIN — Medication 10 UNIT(S): at 04:15

## 2025-07-20 RX ADMIN — CARVEDILOL 25 MILLIGRAM(S): 3.12 TABLET, FILM COATED ORAL at 04:14

## 2025-07-20 RX ADMIN — SODIUM ZIRCONIUM CYCLOSILICATE 10 GRAM(S): 5 POWDER, FOR SUSPENSION ORAL at 17:04

## 2025-07-20 RX ADMIN — Medication 10 MILLIGRAM(S): at 00:42

## 2025-07-20 RX ADMIN — INSULIN LISPRO 2 UNIT(S): 100 INJECTION, SOLUTION INTRAVENOUS; SUBCUTANEOUS at 17:01

## 2025-07-20 RX ADMIN — Medication 667 MILLIGRAM(S): at 17:02

## 2025-07-20 RX ADMIN — METHOCARBAMOL 500 MILLIGRAM(S): 500 TABLET, FILM COATED ORAL at 05:44

## 2025-07-20 RX ADMIN — HEPARIN SODIUM 5000 UNIT(S): 1000 INJECTION INTRAVENOUS; SUBCUTANEOUS at 05:45

## 2025-07-20 RX ADMIN — SERTRALINE 100 MILLIGRAM(S): 100 TABLET, FILM COATED ORAL at 13:20

## 2025-07-20 RX ADMIN — Medication 25 MILLIGRAM(S): at 04:14

## 2025-07-20 RX ADMIN — CARVEDILOL 25 MILLIGRAM(S): 3.12 TABLET, FILM COATED ORAL at 17:02

## 2025-07-20 RX ADMIN — Medication 4 MILLIGRAM(S): at 21:51

## 2025-07-20 RX ADMIN — Medication 1 CAPSULE(S): at 13:19

## 2025-07-21 ENCOUNTER — TRANSCRIPTION ENCOUNTER (OUTPATIENT)
Age: 70
End: 2025-07-21

## 2025-07-21 LAB
ACANTHOCYTES BLD QL SMEAR: SLIGHT — SIGNIFICANT CHANGE UP
ALBUMIN SERPL ELPH-MCNC: 3.4 G/DL — SIGNIFICANT CHANGE UP (ref 3.3–5.2)
ALP SERPL-CCNC: 94 U/L — SIGNIFICANT CHANGE UP (ref 40–120)
ALT FLD-CCNC: 5 U/L — SIGNIFICANT CHANGE UP
ANION GAP SERPL CALC-SCNC: 15 MMOL/L — SIGNIFICANT CHANGE UP (ref 5–17)
ANISOCYTOSIS BLD QL: SLIGHT — SIGNIFICANT CHANGE UP
AST SERPL-CCNC: 14 U/L — SIGNIFICANT CHANGE UP
BASOPHILS # BLD AUTO: 0.05 K/UL — SIGNIFICANT CHANGE UP (ref 0–0.2)
BASOPHILS # BLD MANUAL: 0.03 K/UL — SIGNIFICANT CHANGE UP (ref 0–0.2)
BASOPHILS NFR BLD AUTO: 1.5 % — SIGNIFICANT CHANGE UP (ref 0–2)
BASOPHILS NFR BLD MANUAL: 0.9 % — SIGNIFICANT CHANGE UP (ref 0–2)
BILIRUB SERPL-MCNC: 0.4 MG/DL — SIGNIFICANT CHANGE UP (ref 0.4–2)
BUN SERPL-MCNC: 71.3 MG/DL — HIGH (ref 8–20)
BURR CELLS BLD QL SMEAR: SLIGHT — SIGNIFICANT CHANGE UP
CALCIUM SERPL-MCNC: 8.5 MG/DL — SIGNIFICANT CHANGE UP (ref 8.4–10.5)
CHLORIDE SERPL-SCNC: 105 MMOL/L — SIGNIFICANT CHANGE UP (ref 96–108)
CO2 SERPL-SCNC: 20 MMOL/L — LOW (ref 22–29)
CREAT SERPL-MCNC: 7.08 MG/DL — HIGH (ref 0.5–1.3)
DACRYOCYTES BLD QL SMEAR: SLIGHT — SIGNIFICANT CHANGE UP
EGFR: 6 ML/MIN/1.73M2 — LOW
EGFR: 6 ML/MIN/1.73M2 — LOW
EOSINOPHIL # BLD AUTO: 0.27 K/UL — SIGNIFICANT CHANGE UP (ref 0–0.5)
EOSINOPHIL # BLD MANUAL: 0.36 K/UL — SIGNIFICANT CHANGE UP (ref 0–0.5)
EOSINOPHIL NFR BLD AUTO: 8.1 % — HIGH (ref 0–6)
EOSINOPHIL NFR BLD MANUAL: 10.7 % — HIGH (ref 0–6)
GIANT PLATELETS BLD QL SMEAR: PRESENT
GLUCOSE BLDC GLUCOMTR-MCNC: 100 MG/DL — HIGH (ref 70–99)
GLUCOSE BLDC GLUCOMTR-MCNC: 106 MG/DL — HIGH (ref 70–99)
GLUCOSE BLDC GLUCOMTR-MCNC: 134 MG/DL — HIGH (ref 70–99)
GLUCOSE BLDC GLUCOMTR-MCNC: 145 MG/DL — HIGH (ref 70–99)
GLUCOSE BLDC GLUCOMTR-MCNC: 147 MG/DL — HIGH (ref 70–99)
GLUCOSE SERPL-MCNC: 120 MG/DL — HIGH (ref 70–99)
HCT VFR BLD CALC: 28.6 % — LOW (ref 34.5–45)
HGB BLD-MCNC: 9.1 G/DL — LOW (ref 11.5–15.5)
IMM GRANULOCYTES # BLD AUTO: 0.01 K/UL — SIGNIFICANT CHANGE UP (ref 0–0.07)
IMM GRANULOCYTES NFR BLD AUTO: 0.3 % — SIGNIFICANT CHANGE UP (ref 0–0.9)
IMMATURE PLATELET FRACTION #: 2.1 K/UL — LOW (ref 4.7–11.1)
IMMATURE PLATELET FRACTION %: 2.8 % — SIGNIFICANT CHANGE UP (ref 1.6–4.9)
LYMPHOCYTES # BLD AUTO: 0.86 K/UL — LOW (ref 1–3.3)
LYMPHOCYTES # BLD MANUAL: 0.71 K/UL — LOW (ref 1–3.3)
LYMPHOCYTES NFR BLD AUTO: 25.9 % — SIGNIFICANT CHANGE UP (ref 13–44)
LYMPHOCYTES NFR BLD MANUAL: 21.4 % — SIGNIFICANT CHANGE UP (ref 13–44)
MAGNESIUM SERPL-MCNC: 2.5 MG/DL — SIGNIFICANT CHANGE UP (ref 1.6–2.6)
MCHC RBC-ENTMCNC: 30.8 PG — SIGNIFICANT CHANGE UP (ref 27–34)
MCHC RBC-ENTMCNC: 31.8 G/DL — LOW (ref 32–36)
MCV RBC AUTO: 96.9 FL — SIGNIFICANT CHANGE UP (ref 80–100)
MONOCYTES # BLD AUTO: 0.38 K/UL — SIGNIFICANT CHANGE UP (ref 0–0.9)
MONOCYTES # BLD MANUAL: 0.21 K/UL — SIGNIFICANT CHANGE UP (ref 0–0.9)
MONOCYTES NFR BLD AUTO: 11.4 % — SIGNIFICANT CHANGE UP (ref 2–14)
MONOCYTES NFR BLD MANUAL: 6.3 % — SIGNIFICANT CHANGE UP (ref 2–14)
NEUTROPHILS # BLD AUTO: 1.75 K/UL — LOW (ref 1.8–7.4)
NEUTROPHILS # BLD MANUAL: 2.02 K/UL — SIGNIFICANT CHANGE UP (ref 1.8–7.4)
NEUTROPHILS NFR BLD AUTO: 52.8 % — SIGNIFICANT CHANGE UP (ref 43–77)
NEUTROPHILS NFR BLD MANUAL: 60.7 % — SIGNIFICANT CHANGE UP (ref 43–77)
NRBC # BLD AUTO: 0 K/UL — SIGNIFICANT CHANGE UP (ref 0–0)
NRBC # FLD: 0 K/UL — SIGNIFICANT CHANGE UP (ref 0–0)
NRBC BLD AUTO-RTO: 0 /100 WBCS — SIGNIFICANT CHANGE UP (ref 0–0)
OVALOCYTES BLD QL SMEAR: SLIGHT — SIGNIFICANT CHANGE UP
PHOSPHATE SERPL-MCNC: 5.9 MG/DL — HIGH (ref 2.4–4.7)
PLAT MORPH BLD: NORMAL — SIGNIFICANT CHANGE UP
PLATELET # BLD AUTO: 74 K/UL — LOW (ref 150–400)
PMV BLD: 10.8 FL — SIGNIFICANT CHANGE UP (ref 7–13)
POIKILOCYTOSIS BLD QL AUTO: SLIGHT — SIGNIFICANT CHANGE UP
POLYCHROMASIA BLD QL SMEAR: ABNORMAL
POTASSIUM SERPL-MCNC: 5.4 MMOL/L — HIGH (ref 3.5–5.3)
POTASSIUM SERPL-SCNC: 5.4 MMOL/L — HIGH (ref 3.5–5.3)
PROT SERPL-MCNC: 6.1 G/DL — LOW (ref 6.6–8.7)
RBC # BLD: 2.95 M/UL — LOW (ref 3.8–5.2)
RBC # FLD: 19.5 % — HIGH (ref 10.3–14.5)
RBC BLD AUTO: ABNORMAL
SCHISTOCYTES BLD QL AUTO: SLIGHT — SIGNIFICANT CHANGE UP
SODIUM SERPL-SCNC: 140 MMOL/L — SIGNIFICANT CHANGE UP (ref 135–145)
WBC # BLD: 3.32 K/UL — LOW (ref 3.8–10.5)
WBC # FLD AUTO: 3.32 K/UL — LOW (ref 3.8–10.5)

## 2025-07-21 PROCEDURE — 85730 THROMBOPLASTIN TIME PARTIAL: CPT

## 2025-07-21 PROCEDURE — 71045 X-RAY EXAM CHEST 1 VIEW: CPT

## 2025-07-21 PROCEDURE — 84100 ASSAY OF PHOSPHORUS: CPT

## 2025-07-21 PROCEDURE — 36415 COLL VENOUS BLD VENIPUNCTURE: CPT

## 2025-07-21 PROCEDURE — 99232 SBSQ HOSP IP/OBS MODERATE 35: CPT

## 2025-07-21 PROCEDURE — 84484 ASSAY OF TROPONIN QUANT: CPT

## 2025-07-21 PROCEDURE — 93005 ELECTROCARDIOGRAM TRACING: CPT

## 2025-07-21 PROCEDURE — 82962 GLUCOSE BLOOD TEST: CPT

## 2025-07-21 PROCEDURE — 74176 CT ABD & PELVIS W/O CONTRAST: CPT

## 2025-07-21 PROCEDURE — 86900 BLOOD TYPING SEROLOGIC ABO: CPT

## 2025-07-21 PROCEDURE — 86901 BLOOD TYPING SEROLOGIC RH(D): CPT

## 2025-07-21 PROCEDURE — 85025 COMPLETE CBC W/AUTO DIFF WBC: CPT

## 2025-07-21 PROCEDURE — 86850 RBC ANTIBODY SCREEN: CPT

## 2025-07-21 PROCEDURE — 80053 COMPREHEN METABOLIC PANEL: CPT

## 2025-07-21 PROCEDURE — 83690 ASSAY OF LIPASE: CPT

## 2025-07-21 PROCEDURE — 85610 PROTHROMBIN TIME: CPT

## 2025-07-21 PROCEDURE — 83735 ASSAY OF MAGNESIUM: CPT

## 2025-07-21 PROCEDURE — 99239 HOSP IP/OBS DSCHRG MGMT >30: CPT

## 2025-07-21 PROCEDURE — 83605 ASSAY OF LACTIC ACID: CPT

## 2025-07-21 RX ADMIN — Medication 1 GRAM(S): at 05:30

## 2025-07-21 RX ADMIN — Medication 1 CAPSULE(S): at 07:56

## 2025-07-21 RX ADMIN — Medication 4 MILLIGRAM(S): at 11:58

## 2025-07-21 RX ADMIN — Medication 50 MILLIGRAM(S): at 07:15

## 2025-07-21 RX ADMIN — Medication 200 MICROGRAM(S): at 05:30

## 2025-07-21 RX ADMIN — Medication 50 MILLIGRAM(S): at 13:18

## 2025-07-21 RX ADMIN — Medication 667 MILLIGRAM(S): at 07:56

## 2025-07-21 RX ADMIN — METHOCARBAMOL 500 MILLIGRAM(S): 500 TABLET, FILM COATED ORAL at 13:18

## 2025-07-21 RX ADMIN — Medication 1 GRAM(S): at 23:13

## 2025-07-21 RX ADMIN — ISOSORBIDE MONONITRATE 60 MILLIGRAM(S): 60 TABLET, EXTENDED RELEASE ORAL at 11:34

## 2025-07-21 RX ADMIN — Medication 1 APPLICATION(S): at 11:33

## 2025-07-21 RX ADMIN — INSULIN LISPRO 2 UNIT(S): 100 INJECTION, SOLUTION INTRAVENOUS; SUBCUTANEOUS at 11:33

## 2025-07-21 RX ADMIN — HEPARIN SODIUM 5000 UNIT(S): 1000 INJECTION INTRAVENOUS; SUBCUTANEOUS at 05:30

## 2025-07-21 RX ADMIN — Medication 50 MILLIGRAM(S): at 23:13

## 2025-07-21 RX ADMIN — Medication 5 MILLIGRAM(S): at 22:46

## 2025-07-21 RX ADMIN — SERTRALINE 100 MILLIGRAM(S): 100 TABLET, FILM COATED ORAL at 11:35

## 2025-07-21 RX ADMIN — Medication 4 MILLIGRAM(S): at 06:30

## 2025-07-21 RX ADMIN — Medication 667 MILLIGRAM(S): at 11:59

## 2025-07-21 RX ADMIN — CARVEDILOL 25 MILLIGRAM(S): 3.12 TABLET, FILM COATED ORAL at 07:15

## 2025-07-21 RX ADMIN — LISINOPRIL 20 MILLIGRAM(S): 5 TABLET ORAL at 07:15

## 2025-07-21 RX ADMIN — Medication 4 MILLIGRAM(S): at 12:58

## 2025-07-21 RX ADMIN — Medication 4 MILLIGRAM(S): at 23:14

## 2025-07-21 RX ADMIN — Medication 1 GRAM(S): at 11:35

## 2025-07-21 RX ADMIN — Medication 4 MILLIGRAM(S): at 05:30

## 2025-07-21 RX ADMIN — Medication 1 CAPSULE(S): at 11:58

## 2025-07-21 RX ADMIN — INSULIN LISPRO 2 UNIT(S): 100 INJECTION, SOLUTION INTRAVENOUS; SUBCUTANEOUS at 07:57

## 2025-07-22 VITALS
TEMPERATURE: 98 F | RESPIRATION RATE: 18 BRPM | HEART RATE: 73 BPM | SYSTOLIC BLOOD PRESSURE: 147 MMHG | OXYGEN SATURATION: 97 % | DIASTOLIC BLOOD PRESSURE: 73 MMHG

## 2025-07-22 LAB
BACTERIA BLD CULT: NORMAL
GLUCOSE BLDC GLUCOMTR-MCNC: 123 MG/DL — HIGH (ref 70–99)

## 2025-07-22 PROCEDURE — 93005 ELECTROCARDIOGRAM TRACING: CPT

## 2025-07-22 PROCEDURE — 82962 GLUCOSE BLOOD TEST: CPT

## 2025-07-22 PROCEDURE — 84100 ASSAY OF PHOSPHORUS: CPT

## 2025-07-22 PROCEDURE — 36415 COLL VENOUS BLD VENIPUNCTURE: CPT

## 2025-07-22 PROCEDURE — 85025 COMPLETE CBC W/AUTO DIFF WBC: CPT

## 2025-07-22 PROCEDURE — 83735 ASSAY OF MAGNESIUM: CPT

## 2025-07-22 PROCEDURE — 85730 THROMBOPLASTIN TIME PARTIAL: CPT

## 2025-07-22 PROCEDURE — 96374 THER/PROPH/DIAG INJ IV PUSH: CPT

## 2025-07-22 PROCEDURE — 74176 CT ABD & PELVIS W/O CONTRAST: CPT

## 2025-07-22 PROCEDURE — 83690 ASSAY OF LIPASE: CPT

## 2025-07-22 PROCEDURE — 86901 BLOOD TYPING SEROLOGIC RH(D): CPT

## 2025-07-22 PROCEDURE — 86850 RBC ANTIBODY SCREEN: CPT

## 2025-07-22 PROCEDURE — 84484 ASSAY OF TROPONIN QUANT: CPT

## 2025-07-22 PROCEDURE — 80053 COMPREHEN METABOLIC PANEL: CPT

## 2025-07-22 PROCEDURE — 86900 BLOOD TYPING SEROLOGIC ABO: CPT

## 2025-07-22 PROCEDURE — 96375 TX/PRO/DX INJ NEW DRUG ADDON: CPT

## 2025-07-22 PROCEDURE — 85610 PROTHROMBIN TIME: CPT

## 2025-07-22 PROCEDURE — 99233 SBSQ HOSP IP/OBS HIGH 50: CPT

## 2025-07-22 PROCEDURE — 71045 X-RAY EXAM CHEST 1 VIEW: CPT

## 2025-07-22 PROCEDURE — 99285 EMERGENCY DEPT VISIT HI MDM: CPT | Mod: 25

## 2025-07-22 PROCEDURE — 96376 TX/PRO/DX INJ SAME DRUG ADON: CPT

## 2025-07-22 PROCEDURE — 83605 ASSAY OF LACTIC ACID: CPT

## 2025-07-22 RX ORDER — LABETALOL HYDROCHLORIDE 200 MG/1
10 TABLET, FILM COATED ORAL ONCE
Refills: 0 | Status: COMPLETED | OUTPATIENT
Start: 2025-07-22 | End: 2025-07-22

## 2025-07-22 RX ORDER — HYDROMORPHONE/SOD CHLOR,ISO/PF 2 MG/10 ML
2 SYRINGE (ML) INJECTION ONCE
Refills: 0 | Status: DISCONTINUED | OUTPATIENT
Start: 2025-07-22 | End: 2025-07-22

## 2025-07-22 RX ADMIN — Medication 50 MILLIGRAM(S): at 05:06

## 2025-07-22 RX ADMIN — Medication 2 MILLIGRAM(S): at 02:05

## 2025-07-22 RX ADMIN — Medication 1 GRAM(S): at 05:06

## 2025-07-22 RX ADMIN — INSULIN LISPRO 2 UNIT(S): 100 INJECTION, SOLUTION INTRAVENOUS; SUBCUTANEOUS at 07:47

## 2025-07-22 RX ADMIN — SERTRALINE 100 MILLIGRAM(S): 100 TABLET, FILM COATED ORAL at 11:17

## 2025-07-22 RX ADMIN — Medication 4 MILLIGRAM(S): at 00:14

## 2025-07-22 RX ADMIN — LISINOPRIL 20 MILLIGRAM(S): 5 TABLET ORAL at 05:07

## 2025-07-22 RX ADMIN — HEPARIN SODIUM 5000 UNIT(S): 1000 INJECTION INTRAVENOUS; SUBCUTANEOUS at 05:06

## 2025-07-22 RX ADMIN — Medication 667 MILLIGRAM(S): at 07:47

## 2025-07-22 RX ADMIN — LABETALOL HYDROCHLORIDE 10 MILLIGRAM(S): 200 TABLET, FILM COATED ORAL at 01:34

## 2025-07-22 RX ADMIN — Medication 1 APPLICATION(S): at 05:07

## 2025-07-22 RX ADMIN — CARVEDILOL 25 MILLIGRAM(S): 3.12 TABLET, FILM COATED ORAL at 05:06

## 2025-07-22 RX ADMIN — Medication 1 GRAM(S): at 11:17

## 2025-07-22 RX ADMIN — Medication 4 MILLIGRAM(S): at 09:09

## 2025-07-22 RX ADMIN — Medication 1 CAPSULE(S): at 07:47

## 2025-07-22 RX ADMIN — Medication 200 MICROGRAM(S): at 05:06

## 2025-07-22 RX ADMIN — Medication 2 MILLIGRAM(S): at 01:46

## 2025-07-22 RX ADMIN — Medication 4 MILLIGRAM(S): at 10:09

## 2025-07-22 RX ADMIN — ISOSORBIDE MONONITRATE 60 MILLIGRAM(S): 60 TABLET, EXTENDED RELEASE ORAL at 11:17

## 2025-07-25 ENCOUNTER — INPATIENT (INPATIENT)
Facility: HOSPITAL | Age: 70
LOS: 2 days | Discharge: HOME | DRG: 684 | End: 2025-07-28
Attending: STUDENT IN AN ORGANIZED HEALTH CARE EDUCATION/TRAINING PROGRAM | Admitting: HOSPITALIST
Payer: MEDICARE

## 2025-07-25 VITALS
WEIGHT: 132.06 LBS | HEART RATE: 72 BPM | SYSTOLIC BLOOD PRESSURE: 201 MMHG | OXYGEN SATURATION: 99 % | DIASTOLIC BLOOD PRESSURE: 84 MMHG | HEIGHT: 65 IN | TEMPERATURE: 98 F | RESPIRATION RATE: 16 BRPM

## 2025-07-25 DIAGNOSIS — N18.6 END STAGE RENAL DISEASE: ICD-10-CM

## 2025-07-25 DIAGNOSIS — Z98.890 OTHER SPECIFIED POSTPROCEDURAL STATES: Chronic | ICD-10-CM

## 2025-07-25 DIAGNOSIS — Z90.49 ACQUIRED ABSENCE OF OTHER SPECIFIED PARTS OF DIGESTIVE TRACT: Chronic | ICD-10-CM

## 2025-07-25 DIAGNOSIS — Z98.891 HISTORY OF UTERINE SCAR FROM PREVIOUS SURGERY: Chronic | ICD-10-CM

## 2025-07-25 DIAGNOSIS — E89.0 POSTPROCEDURAL HYPOTHYROIDISM: Chronic | ICD-10-CM

## 2025-07-25 LAB
ALBUMIN SERPL ELPH-MCNC: 3.8 G/DL — SIGNIFICANT CHANGE UP (ref 3.3–5.2)
ALP SERPL-CCNC: 91 U/L — SIGNIFICANT CHANGE UP (ref 40–120)
ALT FLD-CCNC: 9 U/L — SIGNIFICANT CHANGE UP
ANION GAP SERPL CALC-SCNC: 15 MMOL/L — SIGNIFICANT CHANGE UP (ref 5–17)
APTT BLD: 32.6 SEC — SIGNIFICANT CHANGE UP (ref 26.1–36.8)
AST SERPL-CCNC: 27 U/L — SIGNIFICANT CHANGE UP
BASOPHILS # BLD AUTO: 0.03 K/UL — SIGNIFICANT CHANGE UP (ref 0–0.2)
BASOPHILS # BLD MANUAL: 0.07 K/UL — SIGNIFICANT CHANGE UP (ref 0–0.2)
BASOPHILS NFR BLD AUTO: 0.8 % — SIGNIFICANT CHANGE UP (ref 0–2)
BASOPHILS NFR BLD MANUAL: 1.7 % — SIGNIFICANT CHANGE UP (ref 0–2)
BILIRUB SERPL-MCNC: 0.3 MG/DL — LOW (ref 0.4–2)
BUN SERPL-MCNC: 60.7 MG/DL — HIGH (ref 8–20)
BURR CELLS BLD QL SMEAR: SLIGHT — SIGNIFICANT CHANGE UP
CALCIUM SERPL-MCNC: 8.8 MG/DL — SIGNIFICANT CHANGE UP (ref 8.4–10.5)
CHLORIDE SERPL-SCNC: 102 MMOL/L — SIGNIFICANT CHANGE UP (ref 96–108)
CO2 SERPL-SCNC: 22 MMOL/L — SIGNIFICANT CHANGE UP (ref 22–29)
CREAT SERPL-MCNC: 6.31 MG/DL — HIGH (ref 0.5–1.3)
DACRYOCYTES BLD QL SMEAR: SLIGHT — SIGNIFICANT CHANGE UP
EGFR: 7 ML/MIN/1.73M2 — LOW
EGFR: 7 ML/MIN/1.73M2 — LOW
EOSINOPHIL # BLD AUTO: 0.25 K/UL — SIGNIFICANT CHANGE UP (ref 0–0.5)
EOSINOPHIL # BLD MANUAL: 0.36 K/UL — SIGNIFICANT CHANGE UP (ref 0–0.5)
EOSINOPHIL NFR BLD AUTO: 6.4 % — HIGH (ref 0–6)
EOSINOPHIL NFR BLD MANUAL: 9.2 % — HIGH (ref 0–6)
GLUCOSE BLDC GLUCOMTR-MCNC: 127 MG/DL — HIGH (ref 70–99)
GLUCOSE SERPL-MCNC: 114 MG/DL — HIGH (ref 70–99)
HCT VFR BLD CALC: 31.3 % — LOW (ref 34.5–45)
HGB BLD-MCNC: 10.1 G/DL — LOW (ref 11.5–15.5)
IMM GRANULOCYTES # BLD AUTO: 0.01 K/UL — SIGNIFICANT CHANGE UP (ref 0–0.07)
IMM GRANULOCYTES NFR BLD AUTO: 0.3 % — SIGNIFICANT CHANGE UP (ref 0–0.9)
IMMATURE PLATELET FRACTION #: 3.1 K/UL — LOW (ref 4.7–11.1)
IMMATURE PLATELET FRACTION %: 5.5 % — HIGH (ref 1.6–4.9)
INR BLD: 1.06 RATIO — SIGNIFICANT CHANGE UP (ref 0.85–1.16)
LIDOCAIN IGE QN: 125 U/L — HIGH (ref 22–51)
LYMPHOCYTES # BLD AUTO: 0.81 K/UL — LOW (ref 1–3.3)
LYMPHOCYTES # BLD MANUAL: 0.99 K/UL — LOW (ref 1–3.3)
LYMPHOCYTES NFR BLD AUTO: 20.7 % — SIGNIFICANT CHANGE UP (ref 13–44)
LYMPHOCYTES NFR BLD MANUAL: 25.2 % — SIGNIFICANT CHANGE UP (ref 13–44)
MANUAL NEUTROPHIL BANDS #: 0.16 K/UL — SIGNIFICANT CHANGE UP (ref 0–0.84)
MCHC RBC-ENTMCNC: 31.1 PG — SIGNIFICANT CHANGE UP (ref 27–34)
MCHC RBC-ENTMCNC: 32.3 G/DL — SIGNIFICANT CHANGE UP (ref 32–36)
MCV RBC AUTO: 96.3 FL — SIGNIFICANT CHANGE UP (ref 80–100)
MONOCYTES # BLD AUTO: 0.52 K/UL — SIGNIFICANT CHANGE UP (ref 0–0.9)
MONOCYTES # BLD MANUAL: 0.16 K/UL — SIGNIFICANT CHANGE UP (ref 0–0.9)
MONOCYTES NFR BLD AUTO: 13.3 % — SIGNIFICANT CHANGE UP (ref 2–14)
MONOCYTES NFR BLD MANUAL: 4.2 % — SIGNIFICANT CHANGE UP (ref 2–14)
NEUTROPHILS # BLD AUTO: 2.3 K/UL — SIGNIFICANT CHANGE UP (ref 1.8–7.4)
NEUTROPHILS # BLD MANUAL: 2.18 K/UL — SIGNIFICANT CHANGE UP (ref 1.8–7.4)
NEUTROPHILS NFR BLD AUTO: 58.5 % — SIGNIFICANT CHANGE UP (ref 43–77)
NEUTROPHILS NFR BLD MANUAL: 55.5 % — SIGNIFICANT CHANGE UP (ref 43–77)
NEUTS BAND # BLD: 4.2 % — SIGNIFICANT CHANGE UP (ref 0–8)
NEUTS BAND NFR BLD: 4.2 % — SIGNIFICANT CHANGE UP (ref 0–8)
NRBC # BLD AUTO: 0 K/UL — SIGNIFICANT CHANGE UP (ref 0–0)
NRBC # FLD: 0 K/UL — SIGNIFICANT CHANGE UP (ref 0–0)
NRBC BLD AUTO-RTO: 0 /100 WBCS — SIGNIFICANT CHANGE UP (ref 0–0)
OVALOCYTES BLD QL SMEAR: SLIGHT — SIGNIFICANT CHANGE UP
PLAT MORPH BLD: NORMAL — SIGNIFICANT CHANGE UP
PLATELET # BLD AUTO: 57 K/UL — LOW (ref 150–400)
PMV BLD: 12.4 FL — SIGNIFICANT CHANGE UP (ref 7–13)
POLYCHROMASIA BLD QL SMEAR: SLIGHT — SIGNIFICANT CHANGE UP
POTASSIUM SERPL-MCNC: 5.3 MMOL/L — SIGNIFICANT CHANGE UP (ref 3.5–5.3)
POTASSIUM SERPL-SCNC: 5.3 MMOL/L — SIGNIFICANT CHANGE UP (ref 3.5–5.3)
PROT SERPL-MCNC: 6.9 G/DL — SIGNIFICANT CHANGE UP (ref 6.6–8.7)
PROTHROM AB SERPL-ACNC: 12.3 SEC — SIGNIFICANT CHANGE UP (ref 9.9–13.4)
RBC # BLD: 3.25 M/UL — LOW (ref 3.8–5.2)
RBC # FLD: 19.9 % — HIGH (ref 10.3–14.5)
RBC BLD AUTO: ABNORMAL
SODIUM SERPL-SCNC: 139 MMOL/L — SIGNIFICANT CHANGE UP (ref 135–145)
WBC # BLD: 3.92 K/UL — SIGNIFICANT CHANGE UP (ref 3.8–10.5)
WBC # FLD AUTO: 3.92 K/UL — SIGNIFICANT CHANGE UP (ref 3.8–10.5)

## 2025-07-25 PROCEDURE — 83690 ASSAY OF LIPASE: CPT

## 2025-07-25 PROCEDURE — 80053 COMPREHEN METABOLIC PANEL: CPT

## 2025-07-25 PROCEDURE — 85730 THROMBOPLASTIN TIME PARTIAL: CPT

## 2025-07-25 PROCEDURE — 82962 GLUCOSE BLOOD TEST: CPT

## 2025-07-25 PROCEDURE — 99223 1ST HOSP IP/OBS HIGH 75: CPT

## 2025-07-25 PROCEDURE — 93005 ELECTROCARDIOGRAM TRACING: CPT

## 2025-07-25 PROCEDURE — 99222 1ST HOSP IP/OBS MODERATE 55: CPT | Mod: FS

## 2025-07-25 PROCEDURE — 85025 COMPLETE CBC W/AUTO DIFF WBC: CPT

## 2025-07-25 PROCEDURE — 93010 ELECTROCARDIOGRAM REPORT: CPT

## 2025-07-25 PROCEDURE — 99285 EMERGENCY DEPT VISIT HI MDM: CPT

## 2025-07-25 PROCEDURE — 85610 PROTHROMBIN TIME: CPT

## 2025-07-25 PROCEDURE — 83735 ASSAY OF MAGNESIUM: CPT

## 2025-07-25 PROCEDURE — 36415 COLL VENOUS BLD VENIPUNCTURE: CPT

## 2025-07-25 PROCEDURE — 84100 ASSAY OF PHOSPHORUS: CPT

## 2025-07-25 RX ORDER — HYDROMORPHONE/SOD CHLOR,ISO/PF 2 MG/10 ML
4 SYRINGE (ML) INJECTION EVERY 6 HOURS
Refills: 0 | Status: DISCONTINUED | OUTPATIENT
Start: 2025-07-25 | End: 2025-07-28

## 2025-07-25 RX ORDER — HYDROMORPHONE/SOD CHLOR,ISO/PF 2 MG/10 ML
1 SYRINGE (ML) INJECTION ONCE
Refills: 0 | Status: DISCONTINUED | OUTPATIENT
Start: 2025-07-25 | End: 2025-07-25

## 2025-07-25 RX ORDER — GLUCAGON 3 MG/1
1 POWDER NASAL ONCE
Refills: 0 | Status: DISCONTINUED | OUTPATIENT
Start: 2025-07-25 | End: 2025-07-28

## 2025-07-25 RX ORDER — SERTRALINE 100 MG/1
100 TABLET, FILM COATED ORAL
Refills: 0 | Status: DISCONTINUED | OUTPATIENT
Start: 2025-07-25 | End: 2025-07-28

## 2025-07-25 RX ORDER — HEPARIN SODIUM 1000 [USP'U]/ML
5000 INJECTION INTRAVENOUS; SUBCUTANEOUS EVERY 12 HOURS
Refills: 0 | Status: DISCONTINUED | OUTPATIENT
Start: 2025-07-25 | End: 2025-07-28

## 2025-07-25 RX ORDER — LISINOPRIL 5 MG/1
20 TABLET ORAL DAILY
Refills: 0 | Status: DISCONTINUED | OUTPATIENT
Start: 2025-07-25 | End: 2025-07-28

## 2025-07-25 RX ORDER — ISOSORBIDE MONONITRATE 60 MG/1
60 TABLET, EXTENDED RELEASE ORAL DAILY
Refills: 0 | Status: DISCONTINUED | OUTPATIENT
Start: 2025-07-25 | End: 2025-07-28

## 2025-07-25 RX ORDER — SENNA 187 MG
2 TABLET ORAL AT BEDTIME
Refills: 0 | Status: DISCONTINUED | OUTPATIENT
Start: 2025-07-25 | End: 2025-07-28

## 2025-07-25 RX ORDER — BISACODYL 5 MG
5 TABLET, DELAYED RELEASE (ENTERIC COATED) ORAL DAILY
Refills: 0 | Status: DISCONTINUED | OUTPATIENT
Start: 2025-07-25 | End: 2025-07-28

## 2025-07-25 RX ORDER — ONDANSETRON HCL/PF 4 MG/2 ML
4 VIAL (ML) INJECTION ONCE
Refills: 0 | Status: COMPLETED | OUTPATIENT
Start: 2025-07-25 | End: 2025-07-25

## 2025-07-25 RX ORDER — LEVOTHYROXINE SODIUM 300 MCG
200 TABLET ORAL DAILY
Refills: 0 | Status: DISCONTINUED | OUTPATIENT
Start: 2025-07-25 | End: 2025-07-28

## 2025-07-25 RX ORDER — SODIUM CHLORIDE 9 G/1000ML
1000 INJECTION, SOLUTION INTRAVENOUS
Refills: 0 | Status: DISCONTINUED | OUTPATIENT
Start: 2025-07-25 | End: 2025-07-28

## 2025-07-25 RX ORDER — METHOCARBAMOL 500 MG/1
500 TABLET, FILM COATED ORAL THREE TIMES A DAY
Refills: 0 | Status: DISCONTINUED | OUTPATIENT
Start: 2025-07-25 | End: 2025-07-28

## 2025-07-25 RX ORDER — SUCRALFATE 1 G
1 TABLET ORAL
Refills: 0 | Status: DISCONTINUED | OUTPATIENT
Start: 2025-07-25 | End: 2025-07-28

## 2025-07-25 RX ORDER — DEXTROSE 50 % IN WATER 50 %
12.5 SYRINGE (ML) INTRAVENOUS ONCE
Refills: 0 | Status: DISCONTINUED | OUTPATIENT
Start: 2025-07-25 | End: 2025-07-28

## 2025-07-25 RX ORDER — DEXTROSE 50 % IN WATER 50 %
15 SYRINGE (ML) INTRAVENOUS ONCE
Refills: 0 | Status: DISCONTINUED | OUTPATIENT
Start: 2025-07-25 | End: 2025-07-28

## 2025-07-25 RX ORDER — POLYETHYLENE GLYCOL 3350 17 G/17G
17 POWDER, FOR SOLUTION ORAL DAILY
Refills: 0 | Status: DISCONTINUED | OUTPATIENT
Start: 2025-07-25 | End: 2025-07-26

## 2025-07-25 RX ORDER — DEXTROSE 50 % IN WATER 50 %
25 SYRINGE (ML) INTRAVENOUS ONCE
Refills: 0 | Status: DISCONTINUED | OUTPATIENT
Start: 2025-07-25 | End: 2025-07-28

## 2025-07-25 RX ORDER — NALOXONE HYDROCHLORIDE 0.4 MG/ML
0.4 INJECTION, SOLUTION INTRAMUSCULAR; INTRAVENOUS; SUBCUTANEOUS ONCE
Refills: 0 | Status: DISCONTINUED | OUTPATIENT
Start: 2025-07-25 | End: 2025-07-28

## 2025-07-25 RX ORDER — LIPASE/PROTEASE/AMYLASE 10K-37.5K
1 CAPSULE,DELAYED RELEASE (ENTERIC COATED) ORAL
Refills: 0 | Status: DISCONTINUED | OUTPATIENT
Start: 2025-07-25 | End: 2025-07-26

## 2025-07-25 RX ORDER — INSULIN LISPRO 100 U/ML
INJECTION, SOLUTION INTRAVENOUS; SUBCUTANEOUS
Refills: 0 | Status: DISCONTINUED | OUTPATIENT
Start: 2025-07-25 | End: 2025-07-28

## 2025-07-25 RX ORDER — CARVEDILOL 3.12 MG/1
25 TABLET, FILM COATED ORAL EVERY 12 HOURS
Refills: 0 | Status: DISCONTINUED | OUTPATIENT
Start: 2025-07-25 | End: 2025-07-28

## 2025-07-25 RX ORDER — INSULIN LISPRO 100 U/ML
INJECTION, SOLUTION INTRAVENOUS; SUBCUTANEOUS AT BEDTIME
Refills: 0 | Status: DISCONTINUED | OUTPATIENT
Start: 2025-07-25 | End: 2025-07-28

## 2025-07-25 RX ORDER — HYDROMORPHONE/SOD CHLOR,ISO/PF 2 MG/10 ML
2 SYRINGE (ML) INJECTION EVERY 6 HOURS
Refills: 0 | Status: DISCONTINUED | OUTPATIENT
Start: 2025-07-25 | End: 2025-07-28

## 2025-07-25 RX ADMIN — Medication 25 MILLIGRAM(S): at 22:18

## 2025-07-25 RX ADMIN — Medication 10 MILLIGRAM(S): at 23:17

## 2025-07-25 RX ADMIN — METHOCARBAMOL 500 MILLIGRAM(S): 500 TABLET, FILM COATED ORAL at 22:18

## 2025-07-25 RX ADMIN — Medication 2 TABLET(S): at 22:18

## 2025-07-25 RX ADMIN — Medication 2 MILLIGRAM(S): at 18:30

## 2025-07-25 RX ADMIN — Medication 2 MILLIGRAM(S): at 17:34

## 2025-07-25 RX ADMIN — Medication 1 MILLIGRAM(S): at 13:09

## 2025-07-25 RX ADMIN — Medication 10 MILLIGRAM(S): at 19:05

## 2025-07-25 RX ADMIN — Medication 1 MILLIGRAM(S): at 12:18

## 2025-07-25 RX ADMIN — CARVEDILOL 25 MILLIGRAM(S): 3.12 TABLET, FILM COATED ORAL at 22:18

## 2025-07-25 RX ADMIN — Medication 4 MILLIGRAM(S): at 12:18

## 2025-07-26 LAB
A1C WITH ESTIMATED AVERAGE GLUCOSE RESULT: 5.9 % — HIGH (ref 4–5.6)
ALBUMIN SERPL ELPH-MCNC: 3.6 G/DL — SIGNIFICANT CHANGE UP (ref 3.3–5.2)
ALP SERPL-CCNC: 96 U/L — SIGNIFICANT CHANGE UP (ref 40–120)
ALT FLD-CCNC: 8 U/L — SIGNIFICANT CHANGE UP
ANION GAP SERPL CALC-SCNC: 13 MMOL/L — SIGNIFICANT CHANGE UP (ref 5–17)
AST SERPL-CCNC: 17 U/L — SIGNIFICANT CHANGE UP
BASOPHILS # BLD AUTO: 0.03 K/UL — SIGNIFICANT CHANGE UP (ref 0–0.2)
BASOPHILS # BLD MANUAL: 0 K/UL — SIGNIFICANT CHANGE UP (ref 0–0.2)
BASOPHILS NFR BLD AUTO: 0.8 % — SIGNIFICANT CHANGE UP (ref 0–2)
BASOPHILS NFR BLD MANUAL: 0 % — SIGNIFICANT CHANGE UP (ref 0–2)
BILIRUB DIRECT SERPL-MCNC: 0.1 MG/DL — SIGNIFICANT CHANGE UP (ref 0–0.3)
BILIRUB INDIRECT FLD-MCNC: 0.3 MG/DL — SIGNIFICANT CHANGE UP (ref 0.2–1)
BILIRUB SERPL-MCNC: 0.4 MG/DL — SIGNIFICANT CHANGE UP (ref 0.4–2)
BUN SERPL-MCNC: 24.2 MG/DL — HIGH (ref 8–20)
CALCIUM SERPL-MCNC: 9 MG/DL — SIGNIFICANT CHANGE UP (ref 8.4–10.5)
CHLORIDE SERPL-SCNC: 99 MMOL/L — SIGNIFICANT CHANGE UP (ref 96–108)
CO2 SERPL-SCNC: 27 MMOL/L — SIGNIFICANT CHANGE UP (ref 22–29)
CREAT SERPL-MCNC: 3.83 MG/DL — HIGH (ref 0.5–1.3)
EGFR: 12 ML/MIN/1.73M2 — LOW
EGFR: 12 ML/MIN/1.73M2 — LOW
EOSINOPHIL # BLD AUTO: 0.23 K/UL — SIGNIFICANT CHANGE UP (ref 0–0.5)
EOSINOPHIL # BLD MANUAL: 0.23 K/UL — SIGNIFICANT CHANGE UP (ref 0–0.5)
EOSINOPHIL NFR BLD AUTO: 6 % — SIGNIFICANT CHANGE UP (ref 0–6)
EOSINOPHIL NFR BLD MANUAL: 5.9 % — SIGNIFICANT CHANGE UP (ref 0–6)
ESTIMATED AVERAGE GLUCOSE: 123 MG/DL — HIGH (ref 68–114)
GLUCOSE BLDC GLUCOMTR-MCNC: 136 MG/DL — HIGH (ref 70–99)
GLUCOSE BLDC GLUCOMTR-MCNC: 142 MG/DL — HIGH (ref 70–99)
GLUCOSE BLDC GLUCOMTR-MCNC: 154 MG/DL — HIGH (ref 70–99)
GLUCOSE BLDC GLUCOMTR-MCNC: 267 MG/DL — HIGH (ref 70–99)
GLUCOSE SERPL-MCNC: 129 MG/DL — HIGH (ref 70–99)
HCT VFR BLD CALC: 32.3 % — LOW (ref 34.5–45)
HGB BLD-MCNC: 10.5 G/DL — LOW (ref 11.5–15.5)
IMM GRANULOCYTES # BLD AUTO: 0 K/UL — SIGNIFICANT CHANGE UP (ref 0–0.07)
IMM GRANULOCYTES NFR BLD AUTO: 0 % — SIGNIFICANT CHANGE UP (ref 0–0.9)
IMMATURE PLATELET FRACTION #: 2.2 K/UL — LOW (ref 4.7–11.1)
IMMATURE PLATELET FRACTION %: 3.3 % — SIGNIFICANT CHANGE UP (ref 1.6–4.9)
LACTATE SERPL-SCNC: 0.7 MMOL/L — SIGNIFICANT CHANGE UP (ref 0.5–2)
LIDOCAIN IGE QN: 19 U/L — LOW (ref 22–51)
LYMPHOCYTES # BLD AUTO: 0.62 K/UL — LOW (ref 1–3.3)
LYMPHOCYTES # BLD MANUAL: 0.91 K/UL — LOW (ref 1–3.3)
LYMPHOCYTES NFR BLD AUTO: 16.2 % — SIGNIFICANT CHANGE UP (ref 13–44)
LYMPHOCYTES NFR BLD MANUAL: 23.7 % — SIGNIFICANT CHANGE UP (ref 13–44)
MAGNESIUM SERPL-MCNC: 2.2 MG/DL — SIGNIFICANT CHANGE UP (ref 1.6–2.6)
MCHC RBC-ENTMCNC: 31 PG — SIGNIFICANT CHANGE UP (ref 27–34)
MCHC RBC-ENTMCNC: 32.5 G/DL — SIGNIFICANT CHANGE UP (ref 32–36)
MCV RBC AUTO: 95.3 FL — SIGNIFICANT CHANGE UP (ref 80–100)
MONOCYTES # BLD AUTO: 0.44 K/UL — SIGNIFICANT CHANGE UP (ref 0–0.9)
MONOCYTES # BLD MANUAL: 0.33 K/UL — SIGNIFICANT CHANGE UP (ref 0–0.9)
MONOCYTES NFR BLD AUTO: 11.5 % — SIGNIFICANT CHANGE UP (ref 2–14)
MONOCYTES NFR BLD MANUAL: 8.5 % — SIGNIFICANT CHANGE UP (ref 2–14)
NEUTROPHILS # BLD AUTO: 2.51 K/UL — SIGNIFICANT CHANGE UP (ref 1.8–7.4)
NEUTROPHILS # BLD MANUAL: 2.37 K/UL — SIGNIFICANT CHANGE UP (ref 1.8–7.4)
NEUTROPHILS NFR BLD AUTO: 65.5 % — SIGNIFICANT CHANGE UP (ref 43–77)
NEUTROPHILS NFR BLD MANUAL: 61.9 % — SIGNIFICANT CHANGE UP (ref 43–77)
NRBC # BLD AUTO: 0 K/UL — SIGNIFICANT CHANGE UP (ref 0–0)
NRBC # FLD: 0 K/UL — SIGNIFICANT CHANGE UP (ref 0–0)
NRBC BLD AUTO-RTO: 0 /100 WBCS — SIGNIFICANT CHANGE UP (ref 0–0)
PHOSPHATE SERPL-MCNC: 3.6 MG/DL — SIGNIFICANT CHANGE UP (ref 2.4–4.7)
PLAT MORPH BLD: NORMAL — SIGNIFICANT CHANGE UP
PLATELET # BLD AUTO: 68 K/UL — LOW (ref 150–400)
PMV BLD: 11.1 FL — SIGNIFICANT CHANGE UP (ref 7–13)
POLYCHROMASIA BLD QL SMEAR: SLIGHT — SIGNIFICANT CHANGE UP
POTASSIUM SERPL-MCNC: 4.1 MMOL/L — SIGNIFICANT CHANGE UP (ref 3.5–5.3)
POTASSIUM SERPL-SCNC: 4.1 MMOL/L — SIGNIFICANT CHANGE UP (ref 3.5–5.3)
PROT SERPL-MCNC: 6.4 G/DL — LOW (ref 6.6–8.7)
RBC # BLD: 3.39 M/UL — LOW (ref 3.8–5.2)
RBC # FLD: 19.7 % — HIGH (ref 10.3–14.5)
RBC BLD AUTO: NORMAL — SIGNIFICANT CHANGE UP
SODIUM SERPL-SCNC: 139 MMOL/L — SIGNIFICANT CHANGE UP (ref 135–145)
WBC # BLD: 3.83 K/UL — SIGNIFICANT CHANGE UP (ref 3.8–10.5)
WBC # FLD AUTO: 3.83 K/UL — SIGNIFICANT CHANGE UP (ref 3.8–10.5)

## 2025-07-26 PROCEDURE — 85730 THROMBOPLASTIN TIME PARTIAL: CPT

## 2025-07-26 PROCEDURE — 80076 HEPATIC FUNCTION PANEL: CPT

## 2025-07-26 PROCEDURE — 82962 GLUCOSE BLOOD TEST: CPT

## 2025-07-26 PROCEDURE — 83605 ASSAY OF LACTIC ACID: CPT

## 2025-07-26 PROCEDURE — 84100 ASSAY OF PHOSPHORUS: CPT

## 2025-07-26 PROCEDURE — 93005 ELECTROCARDIOGRAM TRACING: CPT

## 2025-07-26 PROCEDURE — 99232 SBSQ HOSP IP/OBS MODERATE 35: CPT

## 2025-07-26 PROCEDURE — 80048 BASIC METABOLIC PNL TOTAL CA: CPT

## 2025-07-26 PROCEDURE — 99233 SBSQ HOSP IP/OBS HIGH 50: CPT

## 2025-07-26 PROCEDURE — 83690 ASSAY OF LIPASE: CPT

## 2025-07-26 PROCEDURE — 85610 PROTHROMBIN TIME: CPT

## 2025-07-26 PROCEDURE — 36415 COLL VENOUS BLD VENIPUNCTURE: CPT

## 2025-07-26 PROCEDURE — 83036 HEMOGLOBIN GLYCOSYLATED A1C: CPT

## 2025-07-26 PROCEDURE — 83735 ASSAY OF MAGNESIUM: CPT

## 2025-07-26 PROCEDURE — 74176 CT ABD & PELVIS W/O CONTRAST: CPT | Mod: 26

## 2025-07-26 PROCEDURE — 80053 COMPREHEN METABOLIC PANEL: CPT

## 2025-07-26 PROCEDURE — 74176 CT ABD & PELVIS W/O CONTRAST: CPT

## 2025-07-26 PROCEDURE — 85025 COMPLETE CBC W/AUTO DIFF WBC: CPT

## 2025-07-26 RX ORDER — HYDROMORPHONE/SOD CHLOR,ISO/PF 2 MG/10 ML
1 SYRINGE (ML) INJECTION ONCE
Refills: 0 | Status: DISCONTINUED | OUTPATIENT
Start: 2025-07-26 | End: 2025-07-26

## 2025-07-26 RX ORDER — HYDROMORPHONE/SOD CHLOR,ISO/PF 2 MG/10 ML
0.2 SYRINGE (ML) INJECTION EVERY 6 HOURS
Refills: 0 | Status: DISCONTINUED | OUTPATIENT
Start: 2025-07-26 | End: 2025-07-27

## 2025-07-26 RX ORDER — HYDROMORPHONE/SOD CHLOR,ISO/PF 2 MG/10 ML
0.2 SYRINGE (ML) INJECTION EVERY 6 HOURS
Refills: 0 | Status: DISCONTINUED | OUTPATIENT
Start: 2025-07-26 | End: 2025-07-26

## 2025-07-26 RX ORDER — POLYETHYLENE GLYCOL 3350 17 G/17G
17 POWDER, FOR SOLUTION ORAL
Refills: 0 | Status: DISCONTINUED | OUTPATIENT
Start: 2025-07-26 | End: 2025-07-28

## 2025-07-26 RX ADMIN — Medication 1 MILLIGRAM(S): at 21:12

## 2025-07-26 RX ADMIN — CARVEDILOL 25 MILLIGRAM(S): 3.12 TABLET, FILM COATED ORAL at 05:49

## 2025-07-26 RX ADMIN — LISINOPRIL 20 MILLIGRAM(S): 5 TABLET ORAL at 05:49

## 2025-07-26 RX ADMIN — Medication 1 GRAM(S): at 13:00

## 2025-07-26 RX ADMIN — Medication 25 MILLIGRAM(S): at 14:06

## 2025-07-26 RX ADMIN — Medication 1 MILLIGRAM(S): at 21:50

## 2025-07-26 RX ADMIN — METHOCARBAMOL 500 MILLIGRAM(S): 500 TABLET, FILM COATED ORAL at 14:08

## 2025-07-26 RX ADMIN — HEPARIN SODIUM 5000 UNIT(S): 1000 INJECTION INTRAVENOUS; SUBCUTANEOUS at 17:44

## 2025-07-26 RX ADMIN — Medication 25 MILLIGRAM(S): at 22:29

## 2025-07-26 RX ADMIN — Medication 25 MILLIGRAM(S): at 05:49

## 2025-07-26 RX ADMIN — Medication 200 MICROGRAM(S): at 06:06

## 2025-07-26 RX ADMIN — CARVEDILOL 25 MILLIGRAM(S): 3.12 TABLET, FILM COATED ORAL at 17:45

## 2025-07-26 RX ADMIN — INSULIN LISPRO 1: 100 INJECTION, SOLUTION INTRAVENOUS; SUBCUTANEOUS at 12:24

## 2025-07-26 RX ADMIN — SERTRALINE 100 MILLIGRAM(S): 100 TABLET, FILM COATED ORAL at 17:55

## 2025-07-26 RX ADMIN — INSULIN LISPRO 3: 100 INJECTION, SOLUTION INTRAVENOUS; SUBCUTANEOUS at 16:55

## 2025-07-26 RX ADMIN — Medication 40 MILLIGRAM(S): at 06:00

## 2025-07-26 RX ADMIN — METHOCARBAMOL 500 MILLIGRAM(S): 500 TABLET, FILM COATED ORAL at 22:29

## 2025-07-26 RX ADMIN — Medication 1 GRAM(S): at 05:49

## 2025-07-26 RX ADMIN — Medication 4 MILLIGRAM(S): at 00:14

## 2025-07-26 RX ADMIN — Medication 1 GRAM(S): at 00:14

## 2025-07-26 RX ADMIN — SERTRALINE 100 MILLIGRAM(S): 100 TABLET, FILM COATED ORAL at 06:05

## 2025-07-26 RX ADMIN — Medication 4 MILLIGRAM(S): at 17:44

## 2025-07-26 RX ADMIN — ISOSORBIDE MONONITRATE 60 MILLIGRAM(S): 60 TABLET, EXTENDED RELEASE ORAL at 13:05

## 2025-07-26 RX ADMIN — Medication 0.2 MILLIGRAM(S): at 13:30

## 2025-07-26 RX ADMIN — Medication 0.2 MILLIGRAM(S): at 13:00

## 2025-07-26 RX ADMIN — Medication 1 GRAM(S): at 17:44

## 2025-07-26 RX ADMIN — METHOCARBAMOL 500 MILLIGRAM(S): 500 TABLET, FILM COATED ORAL at 05:50

## 2025-07-27 LAB
ALBUMIN SERPL ELPH-MCNC: 3.6 G/DL — SIGNIFICANT CHANGE UP (ref 3.3–5.2)
ALP SERPL-CCNC: 93 U/L — SIGNIFICANT CHANGE UP (ref 40–120)
ALT FLD-CCNC: 7 U/L — SIGNIFICANT CHANGE UP
ANION GAP SERPL CALC-SCNC: 15 MMOL/L — SIGNIFICANT CHANGE UP (ref 5–17)
APPEARANCE UR: ABNORMAL
AST SERPL-CCNC: 15 U/L — SIGNIFICANT CHANGE UP
BACTERIA # UR AUTO: ABNORMAL /HPF
BILIRUB SERPL-MCNC: 0.3 MG/DL — LOW (ref 0.4–2)
BILIRUB UR-MCNC: NEGATIVE — SIGNIFICANT CHANGE UP
BUN SERPL-MCNC: 40.1 MG/DL — HIGH (ref 8–20)
CALCIUM SERPL-MCNC: 8.9 MG/DL — SIGNIFICANT CHANGE UP (ref 8.4–10.5)
CHLORIDE SERPL-SCNC: 101 MMOL/L — SIGNIFICANT CHANGE UP (ref 96–108)
CO2 SERPL-SCNC: 24 MMOL/L — SIGNIFICANT CHANGE UP (ref 22–29)
COLOR SPEC: YELLOW — SIGNIFICANT CHANGE UP
CREAT SERPL-MCNC: 5.1 MG/DL — HIGH (ref 0.5–1.3)
DIFF PNL FLD: NEGATIVE — SIGNIFICANT CHANGE UP
EGFR: 9 ML/MIN/1.73M2 — LOW
EGFR: 9 ML/MIN/1.73M2 — LOW
GLUCOSE BLDC GLUCOMTR-MCNC: 131 MG/DL — HIGH (ref 70–99)
GLUCOSE BLDC GLUCOMTR-MCNC: 176 MG/DL — HIGH (ref 70–99)
GLUCOSE BLDC GLUCOMTR-MCNC: 192 MG/DL — HIGH (ref 70–99)
GLUCOSE BLDC GLUCOMTR-MCNC: 195 MG/DL — HIGH (ref 70–99)
GLUCOSE SERPL-MCNC: 119 MG/DL — HIGH (ref 70–99)
GLUCOSE UR QL: NEGATIVE MG/DL — SIGNIFICANT CHANGE UP
HCT VFR BLD CALC: 30.8 % — LOW (ref 34.5–45)
HGB BLD-MCNC: 10 G/DL — LOW (ref 11.5–15.5)
IMMATURE PLATELET FRACTION #: 2.5 K/UL — LOW (ref 4.7–11.1)
IMMATURE PLATELET FRACTION %: 4.1 % — SIGNIFICANT CHANGE UP (ref 1.6–4.9)
KETONES UR QL: NEGATIVE MG/DL — SIGNIFICANT CHANGE UP
LEUKOCYTE ESTERASE UR-ACNC: ABNORMAL
MAGNESIUM SERPL-MCNC: 2.3 MG/DL — SIGNIFICANT CHANGE UP (ref 1.6–2.6)
MCHC RBC-ENTMCNC: 31.2 PG — SIGNIFICANT CHANGE UP (ref 27–34)
MCHC RBC-ENTMCNC: 32.5 G/DL — SIGNIFICANT CHANGE UP (ref 32–36)
MCV RBC AUTO: 96 FL — SIGNIFICANT CHANGE UP (ref 80–100)
MRSA PCR RESULT.: SIGNIFICANT CHANGE UP
NITRITE UR-MCNC: NEGATIVE — SIGNIFICANT CHANGE UP
NRBC # BLD AUTO: 0 K/UL — SIGNIFICANT CHANGE UP (ref 0–0)
NRBC # FLD: 0 K/UL — SIGNIFICANT CHANGE UP (ref 0–0)
NRBC BLD AUTO-RTO: 0 /100 WBCS — SIGNIFICANT CHANGE UP (ref 0–0)
PH UR: 8 — SIGNIFICANT CHANGE UP (ref 5–8)
PHOSPHATE SERPL-MCNC: 4.6 MG/DL — SIGNIFICANT CHANGE UP (ref 2.4–4.7)
PLATELET # BLD AUTO: 62 K/UL — LOW (ref 150–400)
PMV BLD: 12 FL — SIGNIFICANT CHANGE UP (ref 7–13)
POTASSIUM SERPL-MCNC: 4.9 MMOL/L — SIGNIFICANT CHANGE UP (ref 3.5–5.3)
POTASSIUM SERPL-SCNC: 4.9 MMOL/L — SIGNIFICANT CHANGE UP (ref 3.5–5.3)
PROT SERPL-MCNC: 6.2 G/DL — LOW (ref 6.6–8.7)
PROT UR-MCNC: 300 MG/DL
RBC # BLD: 3.21 M/UL — LOW (ref 3.8–5.2)
RBC # FLD: 19.3 % — HIGH (ref 10.3–14.5)
RBC CASTS # UR COMP ASSIST: 3 /HPF — SIGNIFICANT CHANGE UP (ref 0–4)
S AUREUS DNA NOSE QL NAA+PROBE: SIGNIFICANT CHANGE UP
SODIUM SERPL-SCNC: 140 MMOL/L — SIGNIFICANT CHANGE UP (ref 135–145)
SP GR SPEC: 1.02 — SIGNIFICANT CHANGE UP (ref 1–1.03)
SQUAMOUS # UR AUTO: 13 /HPF — HIGH (ref 0–5)
UROBILINOGEN FLD QL: 0.2 MG/DL — SIGNIFICANT CHANGE UP (ref 0.2–1)
WBC # BLD: 3.46 K/UL — LOW (ref 3.8–10.5)
WBC # FLD AUTO: 3.46 K/UL — LOW (ref 3.8–10.5)
WBC UR QL: 77 /HPF — HIGH (ref 0–5)

## 2025-07-27 PROCEDURE — 99232 SBSQ HOSP IP/OBS MODERATE 35: CPT

## 2025-07-27 RX ADMIN — Medication 0.2 MILLIGRAM(S): at 10:18

## 2025-07-27 RX ADMIN — Medication 1 GRAM(S): at 12:44

## 2025-07-27 RX ADMIN — METHOCARBAMOL 500 MILLIGRAM(S): 500 TABLET, FILM COATED ORAL at 06:14

## 2025-07-27 RX ADMIN — INSULIN LISPRO 1: 100 INJECTION, SOLUTION INTRAVENOUS; SUBCUTANEOUS at 16:40

## 2025-07-27 RX ADMIN — Medication 4 MILLIGRAM(S): at 16:42

## 2025-07-27 RX ADMIN — HEPARIN SODIUM 5000 UNIT(S): 1000 INJECTION INTRAVENOUS; SUBCUTANEOUS at 17:46

## 2025-07-27 RX ADMIN — Medication 25 MILLIGRAM(S): at 13:15

## 2025-07-27 RX ADMIN — Medication 200 MICROGRAM(S): at 05:32

## 2025-07-27 RX ADMIN — Medication 4 MILLIGRAM(S): at 09:11

## 2025-07-27 RX ADMIN — SERTRALINE 100 MILLIGRAM(S): 100 TABLET, FILM COATED ORAL at 17:47

## 2025-07-27 RX ADMIN — SERTRALINE 100 MILLIGRAM(S): 100 TABLET, FILM COATED ORAL at 05:32

## 2025-07-27 RX ADMIN — Medication 0.2 MILLIGRAM(S): at 10:45

## 2025-07-27 RX ADMIN — METHOCARBAMOL 500 MILLIGRAM(S): 500 TABLET, FILM COATED ORAL at 22:33

## 2025-07-27 RX ADMIN — ISOSORBIDE MONONITRATE 60 MILLIGRAM(S): 60 TABLET, EXTENDED RELEASE ORAL at 13:15

## 2025-07-27 RX ADMIN — METHOCARBAMOL 500 MILLIGRAM(S): 500 TABLET, FILM COATED ORAL at 13:15

## 2025-07-27 RX ADMIN — Medication 4 MILLIGRAM(S): at 22:32

## 2025-07-27 RX ADMIN — CARVEDILOL 25 MILLIGRAM(S): 3.12 TABLET, FILM COATED ORAL at 06:14

## 2025-07-27 RX ADMIN — HEPARIN SODIUM 5000 UNIT(S): 1000 INJECTION INTRAVENOUS; SUBCUTANEOUS at 05:32

## 2025-07-27 RX ADMIN — Medication 25 MILLIGRAM(S): at 06:14

## 2025-07-27 RX ADMIN — Medication 1 GRAM(S): at 06:14

## 2025-07-27 RX ADMIN — INSULIN LISPRO 1: 100 INJECTION, SOLUTION INTRAVENOUS; SUBCUTANEOUS at 12:01

## 2025-07-27 RX ADMIN — Medication 4 MILLIGRAM(S): at 10:00

## 2025-07-27 RX ADMIN — CARVEDILOL 25 MILLIGRAM(S): 3.12 TABLET, FILM COATED ORAL at 17:46

## 2025-07-27 RX ADMIN — Medication 4 MILLIGRAM(S): at 16:01

## 2025-07-27 RX ADMIN — LISINOPRIL 20 MILLIGRAM(S): 5 TABLET ORAL at 06:14

## 2025-07-27 RX ADMIN — Medication 25 MILLIGRAM(S): at 22:33

## 2025-07-27 RX ADMIN — Medication 40 MILLIGRAM(S): at 05:32

## 2025-07-27 RX ADMIN — Medication 1 GRAM(S): at 17:46

## 2025-07-27 RX ADMIN — Medication 1 APPLICATION(S): at 12:44

## 2025-07-28 ENCOUNTER — TRANSCRIPTION ENCOUNTER (OUTPATIENT)
Age: 70
End: 2025-07-28

## 2025-07-28 VITALS
OXYGEN SATURATION: 100 % | HEART RATE: 60 BPM | DIASTOLIC BLOOD PRESSURE: 60 MMHG | RESPIRATION RATE: 18 BRPM | TEMPERATURE: 98 F | SYSTOLIC BLOOD PRESSURE: 124 MMHG

## 2025-07-28 LAB
ANION GAP SERPL CALC-SCNC: 16 MMOL/L — SIGNIFICANT CHANGE UP (ref 5–17)
BUN SERPL-MCNC: 52.3 MG/DL — HIGH (ref 8–20)
CALCIUM SERPL-MCNC: 8.7 MG/DL — SIGNIFICANT CHANGE UP (ref 8.4–10.5)
CHLORIDE SERPL-SCNC: 99 MMOL/L — SIGNIFICANT CHANGE UP (ref 96–108)
CO2 SERPL-SCNC: 23 MMOL/L — SIGNIFICANT CHANGE UP (ref 22–29)
CREAT SERPL-MCNC: 5.95 MG/DL — HIGH (ref 0.5–1.3)
EGFR: 7 ML/MIN/1.73M2 — LOW
EGFR: 7 ML/MIN/1.73M2 — LOW
GLUCOSE BLDC GLUCOMTR-MCNC: 131 MG/DL — HIGH (ref 70–99)
GLUCOSE BLDC GLUCOMTR-MCNC: 141 MG/DL — HIGH (ref 70–99)
GLUCOSE SERPL-MCNC: 121 MG/DL — HIGH (ref 70–99)
HCT VFR BLD CALC: 29.7 % — LOW (ref 34.5–45)
HGB BLD-MCNC: 9.8 G/DL — LOW (ref 11.5–15.5)
IMMATURE PLATELET FRACTION #: 2.9 K/UL — LOW (ref 4.7–11.1)
IMMATURE PLATELET FRACTION %: 4.5 % — SIGNIFICANT CHANGE UP (ref 1.6–4.9)
MAGNESIUM SERPL-MCNC: 2.2 MG/DL — SIGNIFICANT CHANGE UP (ref 1.6–2.6)
MCHC RBC-ENTMCNC: 31.2 PG — SIGNIFICANT CHANGE UP (ref 27–34)
MCHC RBC-ENTMCNC: 33 G/DL — SIGNIFICANT CHANGE UP (ref 32–36)
MCV RBC AUTO: 94.6 FL — SIGNIFICANT CHANGE UP (ref 80–100)
NRBC # BLD AUTO: 0 K/UL — SIGNIFICANT CHANGE UP (ref 0–0)
NRBC # FLD: 0 K/UL — SIGNIFICANT CHANGE UP (ref 0–0)
NRBC BLD AUTO-RTO: 0 /100 WBCS — SIGNIFICANT CHANGE UP (ref 0–0)
PHOSPHATE SERPL-MCNC: 4.5 MG/DL — SIGNIFICANT CHANGE UP (ref 2.4–4.7)
PLATELET # BLD AUTO: 65 K/UL — LOW (ref 150–400)
PMV BLD: 12.1 FL — SIGNIFICANT CHANGE UP (ref 7–13)
POTASSIUM SERPL-MCNC: 4.7 MMOL/L — SIGNIFICANT CHANGE UP (ref 3.5–5.3)
POTASSIUM SERPL-SCNC: 4.7 MMOL/L — SIGNIFICANT CHANGE UP (ref 3.5–5.3)
RBC # BLD: 3.14 M/UL — LOW (ref 3.8–5.2)
RBC # FLD: 19 % — HIGH (ref 10.3–14.5)
SODIUM SERPL-SCNC: 137 MMOL/L — SIGNIFICANT CHANGE UP (ref 135–145)
WBC # BLD: 3.89 K/UL — SIGNIFICANT CHANGE UP (ref 3.8–10.5)
WBC # FLD AUTO: 3.89 K/UL — SIGNIFICANT CHANGE UP (ref 3.8–10.5)

## 2025-07-28 PROCEDURE — 87641 MR-STAPH DNA AMP PROBE: CPT

## 2025-07-28 PROCEDURE — 87077 CULTURE AEROBIC IDENTIFY: CPT

## 2025-07-28 PROCEDURE — 80048 BASIC METABOLIC PNL TOTAL CA: CPT

## 2025-07-28 PROCEDURE — 85610 PROTHROMBIN TIME: CPT

## 2025-07-28 PROCEDURE — 85027 COMPLETE CBC AUTOMATED: CPT

## 2025-07-28 PROCEDURE — 99239 HOSP IP/OBS DSCHRG MGMT >30: CPT

## 2025-07-28 PROCEDURE — 87186 SC STD MICRODIL/AGAR DIL: CPT

## 2025-07-28 PROCEDURE — 99232 SBSQ HOSP IP/OBS MODERATE 35: CPT

## 2025-07-28 PROCEDURE — 87640 STAPH A DNA AMP PROBE: CPT

## 2025-07-28 PROCEDURE — 83735 ASSAY OF MAGNESIUM: CPT

## 2025-07-28 PROCEDURE — 36415 COLL VENOUS BLD VENIPUNCTURE: CPT

## 2025-07-28 PROCEDURE — 81001 URINALYSIS AUTO W/SCOPE: CPT

## 2025-07-28 PROCEDURE — 80076 HEPATIC FUNCTION PANEL: CPT

## 2025-07-28 PROCEDURE — 82962 GLUCOSE BLOOD TEST: CPT

## 2025-07-28 PROCEDURE — 83036 HEMOGLOBIN GLYCOSYLATED A1C: CPT

## 2025-07-28 PROCEDURE — 96376 TX/PRO/DX INJ SAME DRUG ADON: CPT

## 2025-07-28 PROCEDURE — 93005 ELECTROCARDIOGRAM TRACING: CPT

## 2025-07-28 PROCEDURE — 84100 ASSAY OF PHOSPHORUS: CPT

## 2025-07-28 PROCEDURE — 85730 THROMBOPLASTIN TIME PARTIAL: CPT

## 2025-07-28 PROCEDURE — 80053 COMPREHEN METABOLIC PANEL: CPT

## 2025-07-28 PROCEDURE — 99285 EMERGENCY DEPT VISIT HI MDM: CPT | Mod: 25

## 2025-07-28 PROCEDURE — 96375 TX/PRO/DX INJ NEW DRUG ADDON: CPT

## 2025-07-28 PROCEDURE — 74176 CT ABD & PELVIS W/O CONTRAST: CPT

## 2025-07-28 PROCEDURE — 83605 ASSAY OF LACTIC ACID: CPT

## 2025-07-28 PROCEDURE — 96374 THER/PROPH/DIAG INJ IV PUSH: CPT

## 2025-07-28 PROCEDURE — 87086 URINE CULTURE/COLONY COUNT: CPT

## 2025-07-28 PROCEDURE — 83690 ASSAY OF LIPASE: CPT

## 2025-07-28 PROCEDURE — 85025 COMPLETE CBC W/AUTO DIFF WBC: CPT

## 2025-07-28 RX ORDER — HYDROMORPHONE/SOD CHLOR,ISO/PF 2 MG/10 ML
1 SYRINGE (ML) INJECTION
Qty: 12 | Refills: 0
Start: 2025-07-28 | End: 2025-07-30

## 2025-07-28 RX ADMIN — METHOCARBAMOL 500 MILLIGRAM(S): 500 TABLET, FILM COATED ORAL at 05:49

## 2025-07-28 RX ADMIN — Medication 1 GRAM(S): at 11:34

## 2025-07-28 RX ADMIN — HEPARIN SODIUM 5000 UNIT(S): 1000 INJECTION INTRAVENOUS; SUBCUTANEOUS at 05:50

## 2025-07-28 RX ADMIN — CARVEDILOL 25 MILLIGRAM(S): 3.12 TABLET, FILM COATED ORAL at 05:49

## 2025-07-28 RX ADMIN — ISOSORBIDE MONONITRATE 60 MILLIGRAM(S): 60 TABLET, EXTENDED RELEASE ORAL at 11:37

## 2025-07-28 RX ADMIN — Medication 4 MILLIGRAM(S): at 04:35

## 2025-07-28 RX ADMIN — LISINOPRIL 20 MILLIGRAM(S): 5 TABLET ORAL at 05:49

## 2025-07-28 RX ADMIN — Medication 25 MILLIGRAM(S): at 05:49

## 2025-07-28 RX ADMIN — Medication 1 APPLICATION(S): at 05:50

## 2025-07-28 RX ADMIN — SERTRALINE 100 MILLIGRAM(S): 100 TABLET, FILM COATED ORAL at 06:28

## 2025-07-28 RX ADMIN — Medication 10 MILLIGRAM(S): at 04:35

## 2025-07-28 RX ADMIN — Medication 2 MILLIGRAM(S): at 11:34

## 2025-07-28 RX ADMIN — Medication 1 GRAM(S): at 05:49

## 2025-07-28 RX ADMIN — Medication 1 GRAM(S): at 00:59

## 2025-07-28 RX ADMIN — POLYETHYLENE GLYCOL 3350 17 GRAM(S): 17 POWDER, FOR SOLUTION ORAL at 05:49

## 2025-07-28 RX ADMIN — Medication 200 MICROGRAM(S): at 05:52

## 2025-07-28 RX ADMIN — Medication 40 MILLIGRAM(S): at 05:49

## 2025-07-29 LAB
-  AMPICILLIN: SIGNIFICANT CHANGE UP
-  CIPROFLOXACIN: SIGNIFICANT CHANGE UP
-  DAPTOMYCIN: SIGNIFICANT CHANGE UP
-  LEVOFLOXACIN: SIGNIFICANT CHANGE UP
-  LINEZOLID: SIGNIFICANT CHANGE UP
-  NITROFURANTOIN: SIGNIFICANT CHANGE UP
-  TETRACYCLINE: SIGNIFICANT CHANGE UP
-  VANCOMYCIN: SIGNIFICANT CHANGE UP
CULTURE RESULTS: ABNORMAL
METHOD TYPE: SIGNIFICANT CHANGE UP
ORGANISM # SPEC MICROSCOPIC CNT: ABNORMAL
ORGANISM # SPEC MICROSCOPIC CNT: SIGNIFICANT CHANGE UP
SPECIMEN SOURCE: SIGNIFICANT CHANGE UP

## 2025-08-04 ENCOUNTER — TRANSCRIPTION ENCOUNTER (OUTPATIENT)
Age: 70
End: 2025-08-04

## 2025-08-08 ENCOUNTER — NON-APPOINTMENT (OUTPATIENT)
Age: 70
End: 2025-08-08

## 2025-08-08 ENCOUNTER — APPOINTMENT (OUTPATIENT)
Dept: CARE COORDINATION | Facility: HOME HEALTH | Age: 70
End: 2025-08-08

## 2025-08-19 ENCOUNTER — TRANSCRIPTION ENCOUNTER (OUTPATIENT)
Age: 70
End: 2025-08-19

## (undated) DEVICE — SUT VICRYL 0 27" UR-6

## (undated) DEVICE — SUT VICRYL PLUS 4-0 18" PS-2 UNDYED

## (undated) DEVICE — Device

## (undated) DEVICE — DRSG STERISTRIPS 0.5 X 4"

## (undated) DEVICE — TUBING STRYKEFLOW II SUCTION / IRRIGATOR

## (undated) DEVICE — DISSECTOR ENDOSCOPIC KITTNER SINGLE TIP

## (undated) DEVICE — STAPLER COVIDIEN ENDO GIA STANDARD HANDLE

## (undated) DEVICE — TROCAR COVIDIEN VERSAPORT BLADELESS OPTICAL 5MM STANDARD

## (undated) DEVICE — SUT MONOCRYL 4-0 27" PS-2 UNDYED

## (undated) DEVICE — DRAPE 1/2 SHEET 40X57"

## (undated) DEVICE — ELCTR ROCKER SWITCH PENCIL BLUE 10FT

## (undated) DEVICE — ENDOCATCH 10MM

## (undated) DEVICE — SOL IRR POUR NS 0.9% 1000ML

## (undated) DEVICE — ELCTR LAPAROSCOPIC MONOPOLAR CORD

## (undated) DEVICE — TROCAR COVIDIEN VERSAONE FIXATION CANNULA 5MM

## (undated) DEVICE — DRSG MASTISOL

## (undated) DEVICE — SYR CONTROL LUER LOK 10CC

## (undated) DEVICE — DRSG DERMABOND 0.7ML

## (undated) DEVICE — LIGASURE MARYLAND 5MM X 37CM

## (undated) DEVICE — D HELP - CLEARVIEW CLEARIFY SYSTEM

## (undated) DEVICE — TROCAR COVIDIEN VERSAPORT BLADELESS OPTICAL 12MM STANDARD

## (undated) DEVICE — PACK GENERAL LAPAROSCOPY

## (undated) DEVICE — ELCTR GROUNDING PAD ADULT COVIDIEN

## (undated) DEVICE — SOL IRR POUR H2O 1000ML

## (undated) DEVICE — GLV 7.5 PROTEXIS (WHITE)

## (undated) DEVICE — VENODYNE/SCD SLEEVE CALF MEDIUM

## (undated) DEVICE — WARMING BLANKET UPPER ADULT

## (undated) DEVICE — TUBING INSUFFLATION LAP FILTER 10FT